# Patient Record
Sex: MALE | Race: WHITE | Employment: OTHER | URBAN - METROPOLITAN AREA
[De-identification: names, ages, dates, MRNs, and addresses within clinical notes are randomized per-mention and may not be internally consistent; named-entity substitution may affect disease eponyms.]

---

## 2017-01-17 DIAGNOSIS — M70.62 TROCHANTERIC BURSITIS OF LEFT HIP: ICD-10-CM

## 2017-01-17 RX ORDER — DICLOFENAC SODIUM 75 MG/1
TABLET, DELAYED RELEASE ORAL
Qty: 60 TABLET | Refills: 1 | Status: SHIPPED | OUTPATIENT
Start: 2017-01-17 | End: 2017-03-13 | Stop reason: SDUPTHER

## 2017-02-07 ENCOUNTER — OFFICE VISIT (OUTPATIENT)
Dept: ORTHOPEDIC SURGERY | Age: 72
End: 2017-02-07

## 2017-02-07 VITALS
WEIGHT: 195.11 LBS | DIASTOLIC BLOOD PRESSURE: 74 MMHG | BODY MASS INDEX: 31.36 KG/M2 | SYSTOLIC BLOOD PRESSURE: 136 MMHG | HEIGHT: 66 IN | HEART RATE: 76 BPM

## 2017-02-07 DIAGNOSIS — M70.62 TROCHANTERIC BURSITIS OF LEFT HIP: Primary | ICD-10-CM

## 2017-02-07 PROCEDURE — 99213 OFFICE O/P EST LOW 20 MIN: CPT | Performed by: ORTHOPAEDIC SURGERY

## 2017-02-07 RX ORDER — LISINOPRIL 10 MG/1
10 TABLET ORAL DAILY
COMMUNITY
Start: 2017-02-01 | End: 2018-09-26

## 2017-02-28 ENCOUNTER — OFFICE VISIT (OUTPATIENT)
Dept: ORTHOPEDIC SURGERY | Age: 72
End: 2017-02-28

## 2017-02-28 VITALS
SYSTOLIC BLOOD PRESSURE: 131 MMHG | HEART RATE: 73 BPM | WEIGHT: 195.11 LBS | BODY MASS INDEX: 32.51 KG/M2 | DIASTOLIC BLOOD PRESSURE: 65 MMHG | HEIGHT: 65 IN

## 2017-02-28 DIAGNOSIS — M70.62 TROCHANTERIC BURSITIS OF LEFT HIP: Primary | ICD-10-CM

## 2017-02-28 PROCEDURE — 99212 OFFICE O/P EST SF 10 MIN: CPT | Performed by: ORTHOPAEDIC SURGERY

## 2017-02-28 PROCEDURE — 20610 DRAIN/INJ JOINT/BURSA W/O US: CPT | Performed by: ORTHOPAEDIC SURGERY

## 2017-03-13 DIAGNOSIS — M70.62 TROCHANTERIC BURSITIS OF LEFT HIP: ICD-10-CM

## 2017-03-13 RX ORDER — DICLOFENAC SODIUM 75 MG/1
TABLET, DELAYED RELEASE ORAL
Qty: 60 TABLET | Refills: 0 | Status: SHIPPED | OUTPATIENT
Start: 2017-03-13 | End: 2017-04-17 | Stop reason: SDUPTHER

## 2017-04-17 DIAGNOSIS — M70.62 TROCHANTERIC BURSITIS OF LEFT HIP: ICD-10-CM

## 2017-04-19 RX ORDER — DICLOFENAC SODIUM 75 MG/1
TABLET, DELAYED RELEASE ORAL
Qty: 60 TABLET | Refills: 0 | Status: SHIPPED | OUTPATIENT
Start: 2017-04-19 | End: 2017-05-15 | Stop reason: SDUPTHER

## 2017-05-15 DIAGNOSIS — M70.62 TROCHANTERIC BURSITIS OF LEFT HIP: ICD-10-CM

## 2017-05-15 RX ORDER — DICLOFENAC SODIUM 75 MG/1
TABLET, DELAYED RELEASE ORAL
Qty: 60 TABLET | Refills: 0 | Status: SHIPPED | OUTPATIENT
Start: 2017-05-15 | End: 2017-06-19 | Stop reason: SDUPTHER

## 2017-06-19 DIAGNOSIS — M70.62 TROCHANTERIC BURSITIS OF LEFT HIP: ICD-10-CM

## 2017-06-19 RX ORDER — DICLOFENAC SODIUM 75 MG/1
TABLET, DELAYED RELEASE ORAL
Qty: 60 TABLET | Refills: 0 | Status: SHIPPED | OUTPATIENT
Start: 2017-06-19 | End: 2017-07-19 | Stop reason: SDUPTHER

## 2017-07-19 DIAGNOSIS — M70.62 TROCHANTERIC BURSITIS OF LEFT HIP: ICD-10-CM

## 2017-07-19 RX ORDER — DICLOFENAC SODIUM 75 MG/1
TABLET, DELAYED RELEASE ORAL
Qty: 60 TABLET | Refills: 0 | Status: SHIPPED | OUTPATIENT
Start: 2017-07-19 | End: 2017-08-20 | Stop reason: SDUPTHER

## 2017-08-20 DIAGNOSIS — M70.62 TROCHANTERIC BURSITIS OF LEFT HIP: ICD-10-CM

## 2017-08-21 RX ORDER — DICLOFENAC SODIUM 75 MG/1
TABLET, DELAYED RELEASE ORAL
Qty: 60 TABLET | Refills: 0 | Status: SHIPPED | OUTPATIENT
Start: 2017-08-21 | End: 2017-09-19 | Stop reason: SDUPTHER

## 2017-09-14 ENCOUNTER — HOSPITAL ENCOUNTER (OUTPATIENT)
Dept: OTHER | Age: 72
Discharge: HOME | End: 2017-09-14
Attending: INTERNAL MEDICINE | Admitting: INTERNAL MEDICINE

## 2017-09-14 VITALS
BODY MASS INDEX: 31.34 KG/M2 | HEIGHT: 66 IN | WEIGHT: 195 LBS | DIASTOLIC BLOOD PRESSURE: 71 MMHG | TEMPERATURE: 98.3 F | RESPIRATION RATE: 16 BRPM | HEART RATE: 60 BPM | OXYGEN SATURATION: 92 % | SYSTOLIC BLOOD PRESSURE: 112 MMHG

## 2017-09-14 LAB
GLUCOSE BLD-MCNC: 103 MG/DL (ref 70–99)
GLUCOSE BLD-MCNC: 117 MG/DL (ref 70–99)
PERFORMED ON: ABNORMAL
PERFORMED ON: ABNORMAL

## 2017-09-14 RX ORDER — SODIUM CHLORIDE, SODIUM LACTATE, POTASSIUM CHLORIDE, CALCIUM CHLORIDE 600; 310; 30; 20 MG/100ML; MG/100ML; MG/100ML; MG/100ML
INJECTION, SOLUTION INTRAVENOUS CONTINUOUS
Status: DISCONTINUED | OUTPATIENT
Start: 2017-09-14 | End: 2017-09-15 | Stop reason: HOSPADM

## 2017-09-14 RX ADMIN — SODIUM CHLORIDE, SODIUM LACTATE, POTASSIUM CHLORIDE, CALCIUM CHLORIDE: 600; 310; 30; 20 INJECTION, SOLUTION INTRAVENOUS at 09:11

## 2017-09-14 ASSESSMENT — PAIN - FUNCTIONAL ASSESSMENT: PAIN_FUNCTIONAL_ASSESSMENT: 0-10

## 2017-09-19 DIAGNOSIS — M70.62 TROCHANTERIC BURSITIS OF LEFT HIP: ICD-10-CM

## 2017-09-19 RX ORDER — DICLOFENAC SODIUM 75 MG/1
TABLET, DELAYED RELEASE ORAL
Qty: 60 TABLET | Refills: 0 | Status: ON HOLD | OUTPATIENT
Start: 2017-09-19 | End: 2018-09-19 | Stop reason: ALTCHOICE

## 2017-10-03 ENCOUNTER — OFFICE VISIT (OUTPATIENT)
Dept: ORTHOPEDIC SURGERY | Age: 72
End: 2017-10-03

## 2017-10-03 VITALS
HEART RATE: 70 BPM | DIASTOLIC BLOOD PRESSURE: 72 MMHG | SYSTOLIC BLOOD PRESSURE: 123 MMHG | HEIGHT: 66 IN | BODY MASS INDEX: 31.36 KG/M2 | WEIGHT: 195.11 LBS

## 2017-10-03 DIAGNOSIS — M70.62 TROCHANTERIC BURSITIS OF LEFT HIP: Primary | ICD-10-CM

## 2017-10-03 PROCEDURE — 99212 OFFICE O/P EST SF 10 MIN: CPT | Performed by: ORTHOPAEDIC SURGERY

## 2017-10-03 PROCEDURE — 20610 DRAIN/INJ JOINT/BURSA W/O US: CPT | Performed by: ORTHOPAEDIC SURGERY

## 2017-10-03 RX ORDER — MELOXICAM 15 MG/1
15 TABLET ORAL DAILY
Qty: 30 TABLET | Refills: 2 | Status: SHIPPED | OUTPATIENT
Start: 2017-10-03 | End: 2018-01-09 | Stop reason: SDUPTHER

## 2017-10-03 NOTE — MR AVS SNAPSHOT
After Visit Summary             Lisa Roberto Back   10/3/2017 1:45 PM   Office Visit    Description:  Male : 1945   Provider:  Hilaria Piña MD   Department:  [address] [place] [address] Drive and Future Appointments         Below is a list of your follow-up and future appointments. This may not be a complete list as you may have made appointments directly with providers that we are not aware of or your providers may have made some for you. Please call your providers to confirm appointments. It is important to keep your appointments. Please bring your current insurance card, photo ID, co-pay, and all medication bottles to your appointment. If self-pay, payment is expected at the time of service. Information from Your Visit        Department     Name Address Phone Fax    [employer] [facility]  [address] [phone]      You Were Seen for:         Comments    Trochanteric bursitis of left hip   [[id number]]         Vital Signs     Blood Pressure Pulse Height Weight Body Mass Index Smoking Status    123/72 70 5' 6.14\" (1.68 m) 195 lb 1.7 oz (88.5 kg) 31.36 kg/m2 Former Smoker      Additional Information about your Body Mass Index (BMI)           Your BMI as listed above is considered obese (30 or more). BMI is an estimate of body fat, calculated from your height and weight. The higher your BMI, the greater your risk of heart disease, high blood pressure, type 2 diabetes, stroke, gallstones, arthritis, sleep apnea, and certain cancers. BMI is not perfect. It may overestimate body fat in athletes and people who are more muscular. Even a small weight loss (between 5 and 10 percent of your current weight) by decreasing your calorie intake and becoming more physically active will help lower your risk of developing or worsening diseases associated with obesity.

## 2018-01-09 DIAGNOSIS — M70.62 TROCHANTERIC BURSITIS OF LEFT HIP: ICD-10-CM

## 2018-01-10 RX ORDER — MELOXICAM 15 MG/1
TABLET ORAL
Qty: 30 TABLET | Refills: 1 | Status: SHIPPED | OUTPATIENT
Start: 2018-01-10 | End: 2018-03-12 | Stop reason: SDUPTHER

## 2018-03-09 ENCOUNTER — OFFICE VISIT (OUTPATIENT)
Dept: ORTHOPEDIC SURGERY | Age: 73
End: 2018-03-09

## 2018-03-09 VITALS
DIASTOLIC BLOOD PRESSURE: 95 MMHG | WEIGHT: 195.11 LBS | HEIGHT: 66 IN | HEART RATE: 77 BPM | BODY MASS INDEX: 31.36 KG/M2 | SYSTOLIC BLOOD PRESSURE: 167 MMHG

## 2018-03-09 DIAGNOSIS — M70.62 TROCHANTERIC BURSITIS OF LEFT HIP: Primary | ICD-10-CM

## 2018-03-09 PROCEDURE — 4040F PNEUMOC VAC/ADMIN/RCVD: CPT | Performed by: ORTHOPAEDIC SURGERY

## 2018-03-09 PROCEDURE — 3017F COLORECTAL CA SCREEN DOC REV: CPT | Performed by: ORTHOPAEDIC SURGERY

## 2018-03-09 PROCEDURE — 1036F TOBACCO NON-USER: CPT | Performed by: ORTHOPAEDIC SURGERY

## 2018-03-09 PROCEDURE — 20610 DRAIN/INJ JOINT/BURSA W/O US: CPT | Performed by: ORTHOPAEDIC SURGERY

## 2018-03-09 PROCEDURE — 99212 OFFICE O/P EST SF 10 MIN: CPT | Performed by: ORTHOPAEDIC SURGERY

## 2018-03-09 PROCEDURE — G8427 DOCREV CUR MEDS BY ELIG CLIN: HCPCS | Performed by: ORTHOPAEDIC SURGERY

## 2018-03-09 PROCEDURE — G8484 FLU IMMUNIZE NO ADMIN: HCPCS | Performed by: ORTHOPAEDIC SURGERY

## 2018-03-09 PROCEDURE — G8417 CALC BMI ABV UP PARAM F/U: HCPCS | Performed by: ORTHOPAEDIC SURGERY

## 2018-03-09 PROCEDURE — 1123F ACP DISCUSS/DSCN MKR DOCD: CPT | Performed by: ORTHOPAEDIC SURGERY

## 2018-03-09 NOTE — PROGRESS NOTES
Subjective: Patient states that he is here for follow-up of his chronic left hip greater trochanteric bursitis. I last injected him in October 2017 and he is requesting another injection. Most of his pain is over the lateral aspect of his hip does not radiate proximal or distal and there is no numbness or tingling  Objective: Physical exam shows tenderness right over the greater trochanteric bursa. Increased pain with hip abduction against resistance. Negative logroll. Strength is 5 over 5 in the hip flexion extension 3+ to 4 minus over 5 into a hip abduction is alert and oriented ×3   Imaging:  Assessment and plan: I injected his left hip greater trochanteric bursa with Marcaine and lidocaine and Kenalog 4/4/2 mL for greater trochanteric bursitis. He tolerated this well. He'll continue his meloxicam and we discussed side effects of this medicine he'll follow-up with me as needed.   We did talk about the possibility of bursectomy in the future

## 2018-03-12 DIAGNOSIS — M70.62 TROCHANTERIC BURSITIS OF LEFT HIP: ICD-10-CM

## 2018-03-12 RX ORDER — MELOXICAM 15 MG/1
15 TABLET ORAL DAILY
Qty: 90 TABLET | Refills: 1 | Status: SHIPPED | OUTPATIENT
Start: 2018-03-12 | End: 2018-09-09 | Stop reason: SDUPTHER

## 2018-09-18 ENCOUNTER — HOSPITAL ENCOUNTER (OUTPATIENT)
Age: 73
Discharge: HOME | End: 2018-09-18
Payer: MEDICARE

## 2018-09-18 ENCOUNTER — ANESTHESIA EVENT (OUTPATIENT)
Dept: ENDOSCOPY | Age: 73
End: 2018-09-18
Payer: MEDICARE

## 2018-09-18 ENCOUNTER — HOSPITAL ENCOUNTER (OUTPATIENT)
Dept: CT IMAGING | Age: 73
Discharge: HOME | End: 2018-09-18
Payer: MEDICARE

## 2018-09-18 DIAGNOSIS — C16.0 GASTROESOPHAGEAL CANCER (HCC): ICD-10-CM

## 2018-09-18 PROCEDURE — 6360000004 HC RX CONTRAST MEDICATION: Performed by: INTERNAL MEDICINE

## 2018-09-18 PROCEDURE — 71260 CT THORAX DX C+: CPT

## 2018-09-18 PROCEDURE — 74177 CT ABD & PELVIS W/CONTRAST: CPT

## 2018-09-18 RX ADMIN — IOHEXOL 50 ML: 240 INJECTION, SOLUTION INTRATHECAL; INTRAVASCULAR; INTRAVENOUS; ORAL at 07:16

## 2018-09-18 RX ADMIN — IOPAMIDOL 75 ML: 755 INJECTION, SOLUTION INTRAVENOUS at 07:17

## 2018-09-18 NOTE — ANESTHESIA PRE PROCEDURE
Department of Anesthesiology  Preprocedure Note       Name:  Kathi Franks   Age:  68 y.o.  :  1945                                          MRN:  [id number]         Date:  2018      Surgeon: Verona Schofield):  Gerard Watson MD    Procedure: Procedure(s):  EUS/ WITH ANESTHESIA    HPI:  This is a 69 y/o male here for an EGD/EUS evaluation. Medications prior to admission:   meloxicam (MOBIC) 15 MG tablet TAKE ONE TABLET BY MOUTH DAILY   diclofenac (VOLTAREN) 75 MG EC tablet TAKE ONE TABLET BY MOUTH TWICE A DAY   lisinopril (PRINIVIL;ZESTRIL) 10 MG tablet    gabapentin (NEURONTIN) 300 MG capsule    gabapentin (NEURONTIN) 100 MG capsule Take 3 capsules by mouth every evening   metFORMIN (GLUCOPHAGE) 500 MG tablet    CARTIA  MG ER capsule      Allergies:     No Known Allergies      Problem List:     Trochanteric bursitis of left hip M70.62     Past Medical History:     Cancer ([facility]     prostate    Diabetes mellitus ([facility]     Hypertension      Past Surgical History:     CATARACT REMOVAL WITH IMPLANT      COLONOSCOPY      negative    COLONOSCOPY  2017    diverticulosis    HERNIA REPAIR      PROSTATECTOMY       Social History:     Smoking status: Former Smoker     Types: Cigarettes     Quit date:     Smokeless tobacco: Never Used    Alcohol use No                           Vital Signs (Current):   BP: 155/85 Pulse: 63   Resp: 16 SpO2: 96   Temp: 97.2 °F (36.2 °C)   Height: 5' 6\" (1.676 m)  (18) Weight: 200 lb (90.7 kg)  (18)   BMI: 32.3           NPO Status: > 8 hrs    ECHO:  2015  Normal left ventricle size, wall thickness and systolic function with an estimated ejection fraction of 55%. No regional wall motion abnormalities are seen. Diastolic filling parameters suggest grade I diastolic dysfunction. Aortic sclerosis vs very minimal aortic stenosis. There is trivial to mild tricuspid regurgitation.  Systolic pulmonary artery pressure (SPAP) is normal and estimated at 28. Mild pulmonic regurgitation is present. Anesthesia Evaluation  Patient summary reviewed and Nursing notes reviewed  Airway:   TM distance: >3 FB   Neck ROM: limited  Mouth opening: > = 3 FB Dental:          Pulmonary:       (-) COPD, asthma, sleep apnea and not a current smoker                           Cardiovascular:    (+) hypertension:,     (-) past MI, CABG/stent and  angina          Echocardiogram reviewed                  Neuro/Psych:      (-) seizures, TIA and CVA           GI/Hepatic/Renal:            ROS comment: H/O Prostate Ca. Endo/Other:    (+) DiabetesType II DM, , malignancy/cancer. (-) hypothyroidism               Abdominal:           Vascular:     - DVT and PE. Anesthesia Plan      TIVA     ASA 3       Induction: intravenous. Anesthetic plan and risks discussed with patient. Plan discussed with CRNA.             Lauren Alcaraz MD

## 2018-09-19 ENCOUNTER — HOSPITAL ENCOUNTER (OUTPATIENT)
Age: 73
Setting detail: OUTPATIENT SURGERY
Discharge: HOME | End: 2018-09-19
Attending: INTERNAL MEDICINE | Admitting: INTERNAL MEDICINE
Payer: MEDICARE

## 2018-09-19 ENCOUNTER — ANESTHESIA (OUTPATIENT)
Dept: ENDOSCOPY | Age: 73
End: 2018-09-19
Payer: MEDICARE

## 2018-09-19 VITALS
SYSTOLIC BLOOD PRESSURE: 145 MMHG | HEIGHT: 66 IN | DIASTOLIC BLOOD PRESSURE: 77 MMHG | BODY MASS INDEX: 32.14 KG/M2 | OXYGEN SATURATION: 97 % | WEIGHT: 200 LBS | TEMPERATURE: 97.8 F | HEART RATE: 63 BPM | RESPIRATION RATE: 18 BRPM

## 2018-09-19 VITALS — DIASTOLIC BLOOD PRESSURE: 91 MMHG | SYSTOLIC BLOOD PRESSURE: 149 MMHG | OXYGEN SATURATION: 91 %

## 2018-09-19 LAB
GLUCOSE BLD-MCNC: 119 MG/DL (ref 70–99)
PERFORMED ON: ABNORMAL

## 2018-09-19 PROCEDURE — 2709999900 HC NON-CHARGEABLE SUPPLY: Performed by: INTERNAL MEDICINE

## 2018-09-19 PROCEDURE — 7100000011 HC PHASE II RECOVERY - ADDTL 15 MIN: Performed by: INTERNAL MEDICINE

## 2018-09-19 PROCEDURE — 3700000000 HC ANESTHESIA ATTENDED CARE: Performed by: INTERNAL MEDICINE

## 2018-09-19 PROCEDURE — 6360000002 HC RX W HCPCS: Performed by: NURSE ANESTHETIST, CERTIFIED REGISTERED

## 2018-09-19 PROCEDURE — 88305 TISSUE EXAM BY PATHOLOGIST: CPT

## 2018-09-19 PROCEDURE — 3609012400 HC EGD TRANSORAL BIOPSY SINGLE/MULTIPLE: Performed by: INTERNAL MEDICINE

## 2018-09-19 PROCEDURE — 88173 CYTOPATH EVAL FNA REPORT: CPT

## 2018-09-19 PROCEDURE — 2580000003 HC RX 258: Performed by: NURSE ANESTHETIST, CERTIFIED REGISTERED

## 2018-09-19 PROCEDURE — 2720000010 HC SURG SUPPLY STERILE: Performed by: INTERNAL MEDICINE

## 2018-09-19 PROCEDURE — 3700000001 HC ADD 15 MINUTES (ANESTHESIA): Performed by: INTERNAL MEDICINE

## 2018-09-19 PROCEDURE — 7100000010 HC PHASE II RECOVERY - FIRST 15 MIN: Performed by: INTERNAL MEDICINE

## 2018-09-19 PROCEDURE — 3609018500 HC EGD US SCOPE W/ADJACENT STRUCTURES: Performed by: INTERNAL MEDICINE

## 2018-09-19 PROCEDURE — 88172 CYTP DX EVAL FNA 1ST EA SITE: CPT

## 2018-09-19 PROCEDURE — 88177 CYTP FNA EVAL EA ADDL: CPT

## 2018-09-19 RX ORDER — SODIUM CHLORIDE, SODIUM LACTATE, POTASSIUM CHLORIDE, CALCIUM CHLORIDE 600; 310; 30; 20 MG/100ML; MG/100ML; MG/100ML; MG/100ML
INJECTION, SOLUTION INTRAVENOUS CONTINUOUS PRN
Status: DISCONTINUED | OUTPATIENT
Start: 2018-09-19 | End: 2018-09-19 | Stop reason: SDUPTHER

## 2018-09-19 RX ORDER — PROPOFOL 10 MG/ML
INJECTION, EMULSION INTRAVENOUS PRN
Status: DISCONTINUED | OUTPATIENT
Start: 2018-09-19 | End: 2018-09-19 | Stop reason: SDUPTHER

## 2018-09-19 RX ORDER — SODIUM CHLORIDE, SODIUM LACTATE, POTASSIUM CHLORIDE, CALCIUM CHLORIDE 600; 310; 30; 20 MG/100ML; MG/100ML; MG/100ML; MG/100ML
INJECTION, SOLUTION INTRAVENOUS ONCE
Status: DISCONTINUED | OUTPATIENT
Start: 2018-09-19 | End: 2018-09-19 | Stop reason: HOSPADM

## 2018-09-19 RX ADMIN — SODIUM CHLORIDE, POTASSIUM CHLORIDE, SODIUM LACTATE AND CALCIUM CHLORIDE: 600; 310; 30; 20 INJECTION, SOLUTION INTRAVENOUS at 10:13

## 2018-09-19 RX ADMIN — PROPOFOL 600 MG: 10 INJECTION, EMULSION INTRAVENOUS at 10:13

## 2018-09-19 ASSESSMENT — PULMONARY FUNCTION TESTS
PIF_VALUE: 0

## 2018-09-19 ASSESSMENT — PAIN SCALES - GENERAL
PAINLEVEL_OUTOF10: 0

## 2018-09-19 ASSESSMENT — LIFESTYLE VARIABLES: SMOKING_STATUS: 0

## 2018-09-19 ASSESSMENT — PAIN - FUNCTIONAL ASSESSMENT: PAIN_FUNCTIONAL_ASSESSMENT: 0-10

## 2018-09-19 NOTE — ANESTHESIA POSTPROCEDURE EVALUATION
Department of Anesthesiology  Postprocedure Note    Patient: Jo-Ann Faustin  MRN: [id number]  YOB: 1945  Date of evaluation: 9/19/2018    Procedure Summary     Date:  09/19/18 Room / Location:  [place] [address] / [facility] ENDOSCOPY    Anesthesia Start:  1000 Anesthesia Stop:  1053    Procedures:       EUS/ WITH ANESTHESIA (N/A )      EGD BIOPSY Diagnosis:  (LOCALIZED TUMOR)    Surgeon:  Rizwan Berrios MD Responsible Provider:  Donovan Paris MD    Anesthesia Type:  TIVA ASA Status:  3     Anesthesia Type: TIVA    Caleb Phase I: Caleb Score: 10    Caleb Phase II: Caleb Score: 10    Anesthesia Post Evaluation   Anesthetic Problems: no   Last Vitals:   BP: 145/77 Pulse: 63   Resp: 18 SpO2: 97   Temp: 97.8     Cardiovascular System Stable: yes  Respiratory Function: Airway Patent yes  ETT no  Ventilator no  Level of consciousness: awake, alert and oriented  Post-op pain: adequate analgesia  Hydration Adequate: yes  Nausea/Vomiting:no  Other Issues:     Bev Butler MD

## 2018-09-19 NOTE — OP NOTE
[address]                  [address] [address]                                 OPERATIVE REPORT    PATIENT NAME: Jeny Rodgers                        :        1945  MED REC NO:   [id number]                          ROOM:  ACCOUNT NO:   [de-identified]                           ADMIT DATE: 2018  PROVIDER:     Jose Ryan MD    DATE OF PROCEDURE:  2018    PREPROCEDURE DIAGNOSES:  1.  GE junction tumor. 2.  Dysphagia. POSTPROCEDURE DIAGNOSES:  1.  A 3 cm GE junction tumor. 2.  A 1 cm paraesophageal lymph node status post FNA. 3.  T3 esophageal mass. PROCEDURE PERFORMED:  EUS with FNA. PROCEDURE INDICATIONS:  This is a 51-year-old male with history of  hypertension, diabetes, prostate cancer, presented two weeks ago with  complaints of dysphagia. He underwent EGD last week, which showed evidence  of GE junction tumor. Biopsies confirmed adenocarcinoma, poorly  differentiated. CT of the abdomen and pelvis did not show any evidence of  metastatic disease except for small local lymph nodes, concerning for local  metastasis. EUS is being performed for staging purposes. MEDICATIONS:  MAC per Anesthesia. OPERATIVE PROCEDURE:  Informed consent was obtained after discussing risks,  benefits, and alternatives. Full history and physical was performed. The  patient was classified as ASA class III. Medications were sequentially  given by Anesthesia. Cardiopulmonary status was continuously monitored  throughout the procedure. The patient was placed in the left lateral  decubitus position. Once the patient was deemed to be adequately sedated,  a standard linear echoendoscope was inserted in the mouth and advanced  under direct visualization to the second portion of the duodenum. The  entire mucosa of the esophagus, stomach, and duodenum were examined  carefully.     Ultrasound images of the mediastinum, AP window, and

## 2018-09-19 NOTE — OP NOTE
Jalen Zaidi   9/19/2018  Upper EUS  A pre-procedure re-evaluation was performed immediately prior to the procedure.   Preprocedure Dx: gej cancer  Postprocedure Dx: same  Medications: Procedural sedation with MAC  Complications: None  Estimated Blood Loss: <5cc  Specimens: were obtained  Edward Jackman

## 2018-09-26 ENCOUNTER — OFFICE VISIT (OUTPATIENT)
Dept: CARDIOTHORACIC SURGERY | Age: 73
End: 2018-09-26
Payer: MEDICARE

## 2018-09-26 VITALS
DIASTOLIC BLOOD PRESSURE: 82 MMHG | OXYGEN SATURATION: 95 % | SYSTOLIC BLOOD PRESSURE: 154 MMHG | HEART RATE: 69 BPM | TEMPERATURE: 98.4 F | WEIGHT: 203 LBS | BODY MASS INDEX: 32.77 KG/M2

## 2018-09-26 DIAGNOSIS — C15.5 MALIGNANT NEOPLASM OF LOWER THIRD OF ESOPHAGUS (HCC): Primary | ICD-10-CM

## 2018-09-26 PROCEDURE — 99204 OFFICE O/P NEW MOD 45 MIN: CPT | Performed by: THORACIC SURGERY (CARDIOTHORACIC VASCULAR SURGERY)

## 2018-09-26 RX ORDER — DILTIAZEM HYDROCHLORIDE 120 MG/1
120 CAPSULE, EXTENDED RELEASE ORAL DAILY
Status: ON HOLD | COMMUNITY
End: 2019-04-24 | Stop reason: HOSPADM

## 2018-09-26 NOTE — Clinical Note
Thank you for your referral I was sure if the treatment we'll start earlier so we can have time prior to-[place] for surgery

## 2018-09-26 NOTE — PROGRESS NOTES
Referred by Dr. Deion Parker. Review of Systems:  Constitutional:  No night sweats, headaches, weight loss. Eyes:  No glaucoma, cataracts. ENMT:  + occasional food gets stuck in throat but no real difficulty swallowing. No nosebleeds, deviated septum. Cardiac:  No arrhythmias. No cardiologist.  Vascular:  No claudication, varicosities. GI:  No PUD, heartburn. :  No kidney stones, frequent UTIs  Musculoskeletal:  No arthritis, gout. Respiratory:  No SOB, emphysema, asthma. Integumentary:  No dermatitis, itching, rash. Neurological:  No stroke, TIAs, seizures. Psychiatric:  No depression, anxiety. Endocrine: + diabetes. No thyroid issues. Hematologic:  No bleeding, easy bruising. Immunologic:  + esophageal cancer- starts treatment 10/9/18. No steroid therapies.

## 2018-10-17 ENCOUNTER — OFFICE VISIT (OUTPATIENT)
Dept: CARDIOTHORACIC SURGERY | Age: 73
End: 2018-10-17
Payer: MEDICARE

## 2018-10-17 VITALS
WEIGHT: 205 LBS | DIASTOLIC BLOOD PRESSURE: 76 MMHG | SYSTOLIC BLOOD PRESSURE: 136 MMHG | BODY MASS INDEX: 32.95 KG/M2 | TEMPERATURE: 98.9 F | HEIGHT: 66 IN | OXYGEN SATURATION: 96 % | HEART RATE: 73 BPM

## 2018-10-17 DIAGNOSIS — C15.5 MALIGNANT NEOPLASM OF LOWER THIRD OF ESOPHAGUS (HCC): Primary | ICD-10-CM

## 2018-10-17 PROCEDURE — 99212 OFFICE O/P EST SF 10 MIN: CPT | Performed by: THORACIC SURGERY (CARDIOTHORACIC VASCULAR SURGERY)

## 2018-10-17 RX ORDER — ONDANSETRON 4 MG/1
4 TABLET, FILM COATED ORAL EVERY 8 HOURS PRN
Status: ON HOLD | COMMUNITY
End: 2019-04-26

## 2018-11-01 NOTE — PROGRESS NOTES
Pre-op esophageal cancer  No major complain or event since seen last  Maintaining his weight  Vital signs stable afebrile  S1 plus S2 +0 no additional sounds  Bilaterally clear no additional sounds  Soft nondistended nontender bowel sounds positive  Plan  Follow-up in 3 weeks if patient maintained weight we'll plan for a PET scan 4 weeks after chemo if no distant metastasis proceed with surgical resection

## 2018-11-07 ENCOUNTER — OFFICE VISIT (OUTPATIENT)
Dept: CARDIOTHORACIC SURGERY | Age: 73
End: 2018-11-07
Payer: MEDICARE

## 2018-11-07 VITALS
SYSTOLIC BLOOD PRESSURE: 120 MMHG | HEART RATE: 78 BPM | HEIGHT: 66 IN | BODY MASS INDEX: 32.78 KG/M2 | DIASTOLIC BLOOD PRESSURE: 68 MMHG | WEIGHT: 204 LBS | OXYGEN SATURATION: 96 % | TEMPERATURE: 97.2 F

## 2018-11-07 DIAGNOSIS — C15.5 MALIGNANT NEOPLASM OF LOWER THIRD OF ESOPHAGUS (HCC): Primary | ICD-10-CM

## 2018-11-07 PROCEDURE — 99212 OFFICE O/P EST SF 10 MIN: CPT | Performed by: THORACIC SURGERY (CARDIOTHORACIC VASCULAR SURGERY)

## 2018-11-07 RX ORDER — OMEPRAZOLE 40 MG/1
40 CAPSULE, DELAYED RELEASE ORAL DAILY PRN
Status: ON HOLD | COMMUNITY
End: 2019-04-24 | Stop reason: HOSPADM

## 2018-11-07 RX ORDER — BIOTIN 10 MG
10 TABLET ORAL DAILY
COMMUNITY
End: 2019-04-11

## 2018-11-20 ENCOUNTER — TELEPHONE (OUTPATIENT)
Dept: CARDIOTHORACIC SURGERY | Age: 73
End: 2018-11-20

## 2018-11-26 ENCOUNTER — OFFICE VISIT (OUTPATIENT)
Dept: CARDIOLOGY CLINIC | Age: 73
End: 2018-11-26
Payer: MEDICARE

## 2018-11-26 VITALS
SYSTOLIC BLOOD PRESSURE: 112 MMHG | HEART RATE: 90 BPM | DIASTOLIC BLOOD PRESSURE: 70 MMHG | BODY MASS INDEX: 31.79 KG/M2 | WEIGHT: 197.8 LBS | OXYGEN SATURATION: 96 % | HEIGHT: 66 IN

## 2018-11-26 DIAGNOSIS — R01.1 MURMUR: ICD-10-CM

## 2018-11-26 DIAGNOSIS — Z01.810 PREOPERATIVE CARDIOVASCULAR EXAMINATION: Primary | ICD-10-CM

## 2018-11-26 DIAGNOSIS — I10 ESSENTIAL HYPERTENSION: ICD-10-CM

## 2018-11-26 PROCEDURE — G8417 CALC BMI ABV UP PARAM F/U: HCPCS | Performed by: INTERNAL MEDICINE

## 2018-11-26 PROCEDURE — 1101F PT FALLS ASSESS-DOCD LE1/YR: CPT | Performed by: INTERNAL MEDICINE

## 2018-11-26 PROCEDURE — 93000 ELECTROCARDIOGRAM COMPLETE: CPT | Performed by: INTERNAL MEDICINE

## 2018-11-26 PROCEDURE — G8427 DOCREV CUR MEDS BY ELIG CLIN: HCPCS | Performed by: INTERNAL MEDICINE

## 2018-11-26 PROCEDURE — G8484 FLU IMMUNIZE NO ADMIN: HCPCS | Performed by: INTERNAL MEDICINE

## 2018-11-26 PROCEDURE — 99204 OFFICE O/P NEW MOD 45 MIN: CPT | Performed by: INTERNAL MEDICINE

## 2018-11-26 PROCEDURE — 3017F COLORECTAL CA SCREEN DOC REV: CPT | Performed by: INTERNAL MEDICINE

## 2018-11-26 NOTE — PROGRESS NOTES
[facility]   Cardiac Consultation    Referring Provider:  Beth Almeida MD     Chief Complaint   Patient presents with    New Patient    Cardiac Clearance     tumor removal         History of Present Illness:  NEW patient for interventional cardiology, referred by Dr. Fabi Swan for preoperative cardiac evaluation for esophageal surgery. Today he states he has been feeling well. He is active and is able to walk 2 flights of stairs with no chest pain or SOB. He can walk one mile with no issues. Patient currently denies any weight gain, edema, palpitations, chest pain, shortness of breath, dizziness, and syncope. Past Medical History:   has a past medical history of Cancer ([facility] [address] Diabetes mellitus ([facility] [address] and Hypertension. Surgical History:   has a past surgical history that includes Colonoscopy (2012); hernia repair; Prostatectomy (2000); Cataract removal with implant (2003); Colonoscopy (09/14/2017); pr office/outpt visit,procedure only (N/A, 9/19/2018); and Upper gastrointestinal endoscopy (9/19/2018). Social History:   reports that he quit smoking about 46 years ago. His smoking use included Cigarettes. He has never used smokeless tobacco. He reports that he does not drink alcohol or use drugs. Family History:  family history includes Cancer in his father. Home Medications:  Prior to Admission medications    Medication Sig Start Date End Date Taking?  Authorizing Provider   Biotin 10 MG tablet Take 10 mg by mouth daily   Yes Historical Provider, MD   omeprazole (PRILOSEC) 40 MG delayed release capsule Take 40 mg by mouth daily   Yes Historical Provider, MD   Magic Mouthwash (MIRACLE MOUTHWASH) Swish and spit 5 mLs 4 times daily as needed for Irritation   Yes Historical Provider, MD   ondansetron (ZOFRAN) 4 MG tablet Take 4 mg by mouth every 8 hours as needed for Nausea or Vomiting   Yes Historical Provider, MD   diltiazem (CARDIZEM 12 HR) 120 MG extended release capsule Take medical decision making performed by me. MD Maria Luisa Leslie.  Viktoriya Marcus M.D., [facility]

## 2018-11-26 NOTE — LETTER
Biotin 10 MG tablet Take 10 mg by mouth daily   Yes Historical Provider, MD   omeprazole (PRILOSEC) 40 MG delayed release capsule Take 40 mg by mouth daily   Yes Historical Provider, MD   Magic Mouthwash (MIRACLE MOUTHWASH) Swish and spit 5 mLs 4 times daily as needed for Irritation   Yes Historical Provider, MD   ondansetron (ZOFRAN) 4 MG tablet Take 4 mg by mouth every 8 hours as needed for Nausea or Vomiting   Yes Historical Provider, MD   diltiazem (CARDIZEM 12 HR) 120 MG extended release capsule Take 120 mg by mouth daily   Yes Historical Provider, MD   meloxicam (MOBIC) 15 MG tablet TAKE ONE TABLET BY MOUTH DAILY 9/10/18  Yes Patrice Li MD   gabapentin (NEURONTIN) 100 MG capsule Take 3 capsules by mouth every evening  Patient taking differently: Take by mouth 2 times daily. . 5/3/16  Yes Patrice Li MD   metFORMIN (GLUCOPHAGE) 500 MG tablet 500 mg daily (with breakfast)  8/31/15  Yes Historical Provider, MD        Allergies:  No known allergies     Review of Systems:   · Constitutional: there has been no unanticipated weight loss. There's been no change in energy level, sleep pattern, or activity level. · Eyes: No visual changes or diplopia. No scleral icterus. · ENT: No Headaches, hearing loss or vertigo. No mouth sores or sore throat. · Cardiovascular: Reviewed in HPI  · Respiratory: No cough or wheezing, no sputum production. No hematemesis. · Gastrointestinal: No abdominal pain, appetite loss, blood in stools. No change in bowel or bladder habits. · Genitourinary: No dysuria, trouble voiding, or hematuria. · Musculoskeletal:  No gait disturbance, weakness or joint complaints. · Integumentary: No rash or pruritis. · Neurological: No headache, diplopia, change in muscle strength, numbness or tingling. No change in gait, balance, coordination, mood, affect, memory, mentation, behavior. · Psychiatric: No anxiety, no depression.

## 2018-11-27 ENCOUNTER — HOSPITAL ENCOUNTER (OUTPATIENT)
Dept: CARDIOLOGY | Age: 73
Discharge: HOME | End: 2018-11-27
Payer: MEDICARE

## 2018-11-27 DIAGNOSIS — I10 ESSENTIAL HYPERTENSION: ICD-10-CM

## 2018-11-27 DIAGNOSIS — Z01.810 PREOPERATIVE CARDIOVASCULAR EXAMINATION: ICD-10-CM

## 2018-11-27 DIAGNOSIS — R01.1 MURMUR: ICD-10-CM

## 2018-11-27 LAB
LV EF: 55 %
LVEF MODALITY: NORMAL

## 2018-11-27 PROCEDURE — 93306 TTE W/DOPPLER COMPLETE: CPT

## 2018-11-28 ENCOUNTER — TELEPHONE (OUTPATIENT)
Dept: CARDIOLOGY CLINIC | Age: 73
End: 2018-11-28

## 2018-12-12 ENCOUNTER — TELEPHONE (OUTPATIENT)
Dept: CARDIOTHORACIC SURGERY | Age: 73
End: 2018-12-12

## 2018-12-12 DIAGNOSIS — C15.5 MALIGNANT NEOPLASM OF LOWER THIRD OF ESOPHAGUS (HCC): Primary | ICD-10-CM

## 2018-12-19 ENCOUNTER — OFFICE VISIT (OUTPATIENT)
Dept: CARDIOTHORACIC SURGERY | Age: 73
End: 2018-12-19
Payer: MEDICARE

## 2018-12-19 VITALS
SYSTOLIC BLOOD PRESSURE: 132 MMHG | OXYGEN SATURATION: 97 % | HEART RATE: 72 BPM | BODY MASS INDEX: 31.34 KG/M2 | DIASTOLIC BLOOD PRESSURE: 70 MMHG | HEIGHT: 66 IN | WEIGHT: 195 LBS | TEMPERATURE: 97.1 F

## 2018-12-19 DIAGNOSIS — C15.5 MALIGNANT NEOPLASM OF LOWER THIRD OF ESOPHAGUS (HCC): Primary | ICD-10-CM

## 2018-12-19 PROCEDURE — G8417 CALC BMI ABV UP PARAM F/U: HCPCS | Performed by: THORACIC SURGERY (CARDIOTHORACIC VASCULAR SURGERY)

## 2018-12-19 PROCEDURE — G8427 DOCREV CUR MEDS BY ELIG CLIN: HCPCS | Performed by: THORACIC SURGERY (CARDIOTHORACIC VASCULAR SURGERY)

## 2018-12-19 PROCEDURE — 3017F COLORECTAL CA SCREEN DOC REV: CPT | Performed by: THORACIC SURGERY (CARDIOTHORACIC VASCULAR SURGERY)

## 2018-12-19 PROCEDURE — 99211 OFF/OP EST MAY X REQ PHY/QHP: CPT | Performed by: THORACIC SURGERY (CARDIOTHORACIC VASCULAR SURGERY)

## 2019-01-02 ENCOUNTER — ANESTHESIA EVENT (OUTPATIENT)
Dept: OPERATING ROOM | Age: 74
End: 2019-01-02
Payer: MEDICARE

## 2019-01-02 RX ORDER — LIDOCAINE HYDROCHLORIDE 10 MG/ML
1 INJECTION, SOLUTION EPIDURAL; INFILTRATION; INTRACAUDAL; PERINEURAL
Status: CANCELLED | OUTPATIENT
Start: 2019-01-02 | End: 2019-01-02

## 2019-01-02 RX ORDER — SODIUM CHLORIDE 0.9 % (FLUSH) 0.9 %
10 SYRINGE (ML) INJECTION PRN
Status: CANCELLED | OUTPATIENT
Start: 2019-01-02

## 2019-01-02 RX ORDER — SODIUM CHLORIDE, SODIUM LACTATE, POTASSIUM CHLORIDE, CALCIUM CHLORIDE 600; 310; 30; 20 MG/100ML; MG/100ML; MG/100ML; MG/100ML
INJECTION, SOLUTION INTRAVENOUS CONTINUOUS
Status: CANCELLED | OUTPATIENT
Start: 2019-01-02

## 2019-01-03 ENCOUNTER — HOSPITAL ENCOUNTER (OUTPATIENT)
Age: 74
Setting detail: OUTPATIENT SURGERY
Discharge: HOME | End: 2019-01-03
Attending: THORACIC SURGERY (CARDIOTHORACIC VASCULAR SURGERY) | Admitting: THORACIC SURGERY (CARDIOTHORACIC VASCULAR SURGERY)
Payer: MEDICARE

## 2019-01-03 ENCOUNTER — ANESTHESIA (OUTPATIENT)
Dept: OPERATING ROOM | Age: 74
End: 2019-01-03
Payer: MEDICARE

## 2019-01-03 VITALS
HEIGHT: 66 IN | HEART RATE: 67 BPM | RESPIRATION RATE: 14 BRPM | WEIGHT: 190 LBS | OXYGEN SATURATION: 96 % | BODY MASS INDEX: 30.53 KG/M2 | TEMPERATURE: 97.1 F | SYSTOLIC BLOOD PRESSURE: 116 MMHG | DIASTOLIC BLOOD PRESSURE: 70 MMHG

## 2019-01-03 VITALS — SYSTOLIC BLOOD PRESSURE: 97 MMHG | DIASTOLIC BLOOD PRESSURE: 61 MMHG | OXYGEN SATURATION: 100 %

## 2019-01-03 DIAGNOSIS — C15.5 CANCER OF ABDOMINAL ESOPHAGUS (HCC): ICD-10-CM

## 2019-01-03 LAB
ANION GAP SERPL CALCULATED.3IONS-SCNC: 10 MMOL/L (ref 3–16)
BUN BLDV-MCNC: 14 MG/DL (ref 7–20)
CALCIUM SERPL-MCNC: 9.3 MG/DL (ref 8.3–10.6)
CHLORIDE BLD-SCNC: 106 MMOL/L (ref 99–110)
CO2: 28 MMOL/L (ref 21–32)
CREAT SERPL-MCNC: 0.8 MG/DL (ref 0.8–1.3)
GFR AFRICAN AMERICAN: >60
GFR NON-AFRICAN AMERICAN: >60
GLUCOSE BLD-MCNC: 107 MG/DL (ref 70–99)
GLUCOSE BLD-MCNC: 122 MG/DL (ref 70–99)
PERFORMED ON: ABNORMAL
POTASSIUM REFLEX MAGNESIUM: 4.5 MMOL/L (ref 3.5–5.1)
SODIUM BLD-SCNC: 144 MMOL/L (ref 136–145)

## 2019-01-03 PROCEDURE — 7100000000 HC PACU RECOVERY - FIRST 15 MIN: Performed by: THORACIC SURGERY (CARDIOTHORACIC VASCULAR SURGERY)

## 2019-01-03 PROCEDURE — 2500000003 HC RX 250 WO HCPCS: Performed by: NURSE ANESTHETIST, CERTIFIED REGISTERED

## 2019-01-03 PROCEDURE — 88312 SPECIAL STAINS GROUP 1: CPT

## 2019-01-03 PROCEDURE — 2580000003 HC RX 258: Performed by: ANESTHESIOLOGY

## 2019-01-03 PROCEDURE — 3600000015 HC SURGERY LEVEL 5 ADDTL 15MIN: Performed by: THORACIC SURGERY (CARDIOTHORACIC VASCULAR SURGERY)

## 2019-01-03 PROCEDURE — 88342 IMHCHEM/IMCYTCHM 1ST ANTB: CPT

## 2019-01-03 PROCEDURE — 3600000005 HC SURGERY LEVEL 5 BASE: Performed by: THORACIC SURGERY (CARDIOTHORACIC VASCULAR SURGERY)

## 2019-01-03 PROCEDURE — 80048 BASIC METABOLIC PNL TOTAL CA: CPT

## 2019-01-03 PROCEDURE — 88305 TISSUE EXAM BY PATHOLOGIST: CPT

## 2019-01-03 PROCEDURE — 7100000001 HC PACU RECOVERY - ADDTL 15 MIN: Performed by: THORACIC SURGERY (CARDIOTHORACIC VASCULAR SURGERY)

## 2019-01-03 PROCEDURE — 2500000003 HC RX 250 WO HCPCS: Performed by: ANESTHESIOLOGY

## 2019-01-03 PROCEDURE — 6360000002 HC RX W HCPCS: Performed by: NURSE ANESTHETIST, CERTIFIED REGISTERED

## 2019-01-03 PROCEDURE — 3700000000 HC ANESTHESIA ATTENDED CARE: Performed by: THORACIC SURGERY (CARDIOTHORACIC VASCULAR SURGERY)

## 2019-01-03 PROCEDURE — 3700000001 HC ADD 15 MINUTES (ANESTHESIA): Performed by: THORACIC SURGERY (CARDIOTHORACIC VASCULAR SURGERY)

## 2019-01-03 PROCEDURE — 7100000011 HC PHASE II RECOVERY - ADDTL 15 MIN: Performed by: THORACIC SURGERY (CARDIOTHORACIC VASCULAR SURGERY)

## 2019-01-03 PROCEDURE — 2709999900 HC NON-CHARGEABLE SUPPLY: Performed by: THORACIC SURGERY (CARDIOTHORACIC VASCULAR SURGERY)

## 2019-01-03 PROCEDURE — 88341 IMHCHEM/IMCYTCHM EA ADD ANTB: CPT

## 2019-01-03 PROCEDURE — 7100000010 HC PHASE II RECOVERY - FIRST 15 MIN: Performed by: THORACIC SURGERY (CARDIOTHORACIC VASCULAR SURGERY)

## 2019-01-03 RX ORDER — SODIUM CHLORIDE, SODIUM LACTATE, POTASSIUM CHLORIDE, CALCIUM CHLORIDE 600; 310; 30; 20 MG/100ML; MG/100ML; MG/100ML; MG/100ML
INJECTION, SOLUTION INTRAVENOUS CONTINUOUS
Status: DISCONTINUED | OUTPATIENT
Start: 2019-01-03 | End: 2019-01-03 | Stop reason: HOSPADM

## 2019-01-03 RX ORDER — SODIUM CHLORIDE 0.9 % (FLUSH) 0.9 %
10 SYRINGE (ML) INJECTION EVERY 12 HOURS SCHEDULED
Status: DISCONTINUED | OUTPATIENT
Start: 2019-01-03 | End: 2019-01-03 | Stop reason: HOSPADM

## 2019-01-03 RX ORDER — ONDANSETRON 2 MG/ML
4 INJECTION INTRAMUSCULAR; INTRAVENOUS PRN
Status: DISCONTINUED | OUTPATIENT
Start: 2019-01-03 | End: 2019-01-03 | Stop reason: HOSPADM

## 2019-01-03 RX ORDER — ONDANSETRON 2 MG/ML
INJECTION INTRAMUSCULAR; INTRAVENOUS PRN
Status: DISCONTINUED | OUTPATIENT
Start: 2019-01-03 | End: 2019-01-03 | Stop reason: SDUPTHER

## 2019-01-03 RX ORDER — LIDOCAINE HYDROCHLORIDE 10 MG/ML
1 INJECTION, SOLUTION EPIDURAL; INFILTRATION; INTRACAUDAL; PERINEURAL
Status: COMPLETED | OUTPATIENT
Start: 2019-01-03 | End: 2019-01-03

## 2019-01-03 RX ORDER — LIDOCAINE HYDROCHLORIDE 20 MG/ML
INJECTION, SOLUTION INFILTRATION; PERINEURAL PRN
Status: DISCONTINUED | OUTPATIENT
Start: 2019-01-03 | End: 2019-01-03 | Stop reason: SDUPTHER

## 2019-01-03 RX ORDER — MORPHINE SULFATE 4 MG/ML
2 INJECTION, SOLUTION INTRAMUSCULAR; INTRAVENOUS EVERY 5 MIN PRN
Status: DISCONTINUED | OUTPATIENT
Start: 2019-01-03 | End: 2019-01-03 | Stop reason: HOSPADM

## 2019-01-03 RX ORDER — MORPHINE SULFATE 4 MG/ML
1 INJECTION, SOLUTION INTRAMUSCULAR; INTRAVENOUS EVERY 5 MIN PRN
Status: DISCONTINUED | OUTPATIENT
Start: 2019-01-03 | End: 2019-01-03 | Stop reason: HOSPADM

## 2019-01-03 RX ORDER — OXYCODONE HYDROCHLORIDE AND ACETAMINOPHEN 5; 325 MG/1; MG/1
1 TABLET ORAL PRN
Status: DISCONTINUED | OUTPATIENT
Start: 2019-01-03 | End: 2019-01-03 | Stop reason: HOSPADM

## 2019-01-03 RX ORDER — DIPHENHYDRAMINE HYDROCHLORIDE 50 MG/ML
12.5 INJECTION INTRAMUSCULAR; INTRAVENOUS
Status: DISCONTINUED | OUTPATIENT
Start: 2019-01-03 | End: 2019-01-03 | Stop reason: HOSPADM

## 2019-01-03 RX ORDER — OXYCODONE HYDROCHLORIDE AND ACETAMINOPHEN 5; 325 MG/1; MG/1
2 TABLET ORAL PRN
Status: DISCONTINUED | OUTPATIENT
Start: 2019-01-03 | End: 2019-01-03 | Stop reason: HOSPADM

## 2019-01-03 RX ORDER — DEXAMETHASONE SODIUM PHOSPHATE 4 MG/ML
INJECTION, SOLUTION INTRA-ARTICULAR; INTRALESIONAL; INTRAMUSCULAR; INTRAVENOUS; SOFT TISSUE PRN
Status: DISCONTINUED | OUTPATIENT
Start: 2019-01-03 | End: 2019-01-03 | Stop reason: SDUPTHER

## 2019-01-03 RX ORDER — MEPERIDINE HYDROCHLORIDE 50 MG/ML
12.5 INJECTION INTRAMUSCULAR; INTRAVENOUS; SUBCUTANEOUS EVERY 5 MIN PRN
Status: DISCONTINUED | OUTPATIENT
Start: 2019-01-03 | End: 2019-01-03 | Stop reason: HOSPADM

## 2019-01-03 RX ORDER — ROCURONIUM BROMIDE 10 MG/ML
INJECTION, SOLUTION INTRAVENOUS PRN
Status: DISCONTINUED | OUTPATIENT
Start: 2019-01-03 | End: 2019-01-03 | Stop reason: SDUPTHER

## 2019-01-03 RX ORDER — PROMETHAZINE HYDROCHLORIDE 25 MG/ML
6.25 INJECTION, SOLUTION INTRAMUSCULAR; INTRAVENOUS
Status: DISCONTINUED | OUTPATIENT
Start: 2019-01-03 | End: 2019-01-03 | Stop reason: HOSPADM

## 2019-01-03 RX ORDER — LABETALOL HYDROCHLORIDE 5 MG/ML
5 INJECTION, SOLUTION INTRAVENOUS EVERY 10 MIN PRN
Status: DISCONTINUED | OUTPATIENT
Start: 2019-01-03 | End: 2019-01-03 | Stop reason: HOSPADM

## 2019-01-03 RX ORDER — HYDRALAZINE HYDROCHLORIDE 20 MG/ML
5 INJECTION INTRAMUSCULAR; INTRAVENOUS EVERY 10 MIN PRN
Status: DISCONTINUED | OUTPATIENT
Start: 2019-01-03 | End: 2019-01-03 | Stop reason: HOSPADM

## 2019-01-03 RX ORDER — PROPOFOL 10 MG/ML
INJECTION, EMULSION INTRAVENOUS PRN
Status: DISCONTINUED | OUTPATIENT
Start: 2019-01-03 | End: 2019-01-03 | Stop reason: SDUPTHER

## 2019-01-03 RX ADMIN — PROPOFOL 200 MG: 10 INJECTION, EMULSION INTRAVENOUS at 08:21

## 2019-01-03 RX ADMIN — LIDOCAINE HYDROCHLORIDE 50 MG: 20 INJECTION, SOLUTION INFILTRATION; PERINEURAL at 08:21

## 2019-01-03 RX ADMIN — DEXAMETHASONE SODIUM PHOSPHATE 6 MG: 4 INJECTION, SOLUTION INTRAMUSCULAR; INTRAVENOUS at 08:28

## 2019-01-03 RX ADMIN — SODIUM CHLORIDE, POTASSIUM CHLORIDE, SODIUM LACTATE AND CALCIUM CHLORIDE: 600; 310; 30; 20 INJECTION, SOLUTION INTRAVENOUS at 06:57

## 2019-01-03 RX ADMIN — ROCURONIUM BROMIDE 50 MG: 10 SOLUTION INTRAVENOUS at 08:21

## 2019-01-03 RX ADMIN — LIDOCAINE HYDROCHLORIDE 1 ML: 10 INJECTION, SOLUTION EPIDURAL; INFILTRATION; INTRACAUDAL; PERINEURAL at 06:57

## 2019-01-03 RX ADMIN — SUGAMMADEX 200 MG: 100 INJECTION, SOLUTION INTRAVENOUS at 08:46

## 2019-01-03 RX ADMIN — ONDANSETRON 4 MG: 2 INJECTION INTRAMUSCULAR; INTRAVENOUS at 08:21

## 2019-01-03 ASSESSMENT — PULMONARY FUNCTION TESTS
PIF_VALUE: 0
PIF_VALUE: 18
PIF_VALUE: 23
PIF_VALUE: 18
PIF_VALUE: 20
PIF_VALUE: 18
PIF_VALUE: 23
PIF_VALUE: 19
PIF_VALUE: 20
PIF_VALUE: 0
PIF_VALUE: 1
PIF_VALUE: 20
PIF_VALUE: 1
PIF_VALUE: 18
PIF_VALUE: 1
PIF_VALUE: 19
PIF_VALUE: 18
PIF_VALUE: 18
PIF_VALUE: 0
PIF_VALUE: 18
PIF_VALUE: 18
PIF_VALUE: 20
PIF_VALUE: 18
PIF_VALUE: 19
PIF_VALUE: 14
PIF_VALUE: 0
PIF_VALUE: 0
PIF_VALUE: 18
PIF_VALUE: 18
PIF_VALUE: 0
PIF_VALUE: 0
PIF_VALUE: 18
PIF_VALUE: 21

## 2019-01-03 ASSESSMENT — PAIN - FUNCTIONAL ASSESSMENT: PAIN_FUNCTIONAL_ASSESSMENT: 0-10

## 2019-01-03 ASSESSMENT — PAIN SCALES - GENERAL
PAINLEVEL_OUTOF10: 0
PAINLEVEL_OUTOF10: 0

## 2019-01-28 ENCOUNTER — TELEPHONE (OUTPATIENT)
Dept: CARDIOTHORACIC SURGERY | Age: 74
End: 2019-01-28

## 2019-01-29 ENCOUNTER — OFFICE VISIT (OUTPATIENT)
Dept: ORTHOPEDIC SURGERY | Age: 74
End: 2019-01-29
Payer: MEDICARE

## 2019-01-29 VITALS — BODY MASS INDEX: 30.54 KG/M2 | WEIGHT: 190.04 LBS | HEIGHT: 66 IN

## 2019-01-29 DIAGNOSIS — M70.62 TROCHANTERIC BURSITIS OF LEFT HIP: Primary | ICD-10-CM

## 2019-01-29 PROCEDURE — 1101F PT FALLS ASSESS-DOCD LE1/YR: CPT | Performed by: ORTHOPAEDIC SURGERY

## 2019-01-29 PROCEDURE — G8427 DOCREV CUR MEDS BY ELIG CLIN: HCPCS | Performed by: ORTHOPAEDIC SURGERY

## 2019-01-29 PROCEDURE — 99212 OFFICE O/P EST SF 10 MIN: CPT | Performed by: ORTHOPAEDIC SURGERY

## 2019-01-29 PROCEDURE — 1036F TOBACCO NON-USER: CPT | Performed by: ORTHOPAEDIC SURGERY

## 2019-01-29 PROCEDURE — G8417 CALC BMI ABV UP PARAM F/U: HCPCS | Performed by: ORTHOPAEDIC SURGERY

## 2019-01-29 PROCEDURE — 20610 DRAIN/INJ JOINT/BURSA W/O US: CPT | Performed by: ORTHOPAEDIC SURGERY

## 2019-01-29 PROCEDURE — G8484 FLU IMMUNIZE NO ADMIN: HCPCS | Performed by: ORTHOPAEDIC SURGERY

## 2019-01-29 PROCEDURE — 1123F ACP DISCUSS/DSCN MKR DOCD: CPT | Performed by: ORTHOPAEDIC SURGERY

## 2019-01-29 PROCEDURE — 3017F COLORECTAL CA SCREEN DOC REV: CPT | Performed by: ORTHOPAEDIC SURGERY

## 2019-01-29 PROCEDURE — 4040F PNEUMOC VAC/ADMIN/RCVD: CPT | Performed by: ORTHOPAEDIC SURGERY

## 2019-03-05 ENCOUNTER — TELEPHONE (OUTPATIENT)
Dept: VASCULAR SURGERY | Age: 74
End: 2019-03-05

## 2019-03-11 DIAGNOSIS — M70.62 TROCHANTERIC BURSITIS OF LEFT HIP: ICD-10-CM

## 2019-03-11 RX ORDER — MELOXICAM 15 MG/1
TABLET ORAL
Qty: 90 TABLET | Refills: 0 | Status: ON HOLD | OUTPATIENT
Start: 2019-03-11 | End: 2019-04-24 | Stop reason: HOSPADM

## 2019-03-25 ENCOUNTER — TELEPHONE (OUTPATIENT)
Dept: CARDIOTHORACIC SURGERY | Age: 74
End: 2019-03-25

## 2019-03-25 ENCOUNTER — TELEPHONE (OUTPATIENT)
Dept: CARDIOLOGY CLINIC | Age: 74
End: 2019-03-25

## 2019-03-26 NOTE — PROGRESS NOTES
EKG DATED 11/26/18 OK PER CARDIOLOGY FOR CLEARANCE, NSR, DR HARE AWARE OF DATE AND IS OK WITH IT FOR SURGERY 4/2/19.

## 2019-04-01 ENCOUNTER — ANESTHESIA EVENT (OUTPATIENT)
Dept: OPERATING ROOM | Age: 74
End: 2019-04-01
Payer: MEDICARE

## 2019-04-01 NOTE — ANESTHESIA PRE PROCEDURE
Department of Anesthesiology  Preprocedure Note       Name:  Chucky Mckoy   Age:  68 y.o.  :  1945                                          MRN:  [id number]         Date:  2019      Surgeon:  Lukas Rogers MD    Procedure: ESOPHAGOGASTRODUODENOSCOPY WITH BIOPSY  CPT CODE - 99306 (N/A )    HPI:   Chucky Mckoy is a 68 y.o. male who has a c/o with food sticking in his lower part of the esophagus. Initially he could pass it with some water; subsequently progressed to where he had to cough to release the pressure. No pain. About 10-15 pounds lost weight  An EGD on 2019 which showed lower esophageal mass biopsy confirmed adenocarcinoma. EUS confirmed the presence of positive paraesophageal lymph nodes.     Medications prior to admission:    meloxicam (MOBIC) 15 MG tablet TAKE ONE TABLET BY MOUTH DAILY   Biotin 10 MG tablet Take 10 mg by mouth daily   omeprazole (PRILOSEC) 40 MG Take 40 mg by mouth daily   ondansetron (ZOFRAN) 4 MG tablet Take 4 mg by mouth every 8 hours as needed for Nausea or Vomiting   diltiazem (CARDIZEM 12 HR) 120 MG ER Take 120 mg by mouth daily   gabapentin (NEURONTIN) 100 MG capsule Take one tab 3 times daily   metFORMIN (GLUCOPHAGE) 500 MG tablet 500 mg Daily with supper      Allergies:     No Known Allergies      Problem List:     Trochanteric bursitis of left hip M70.62     Past Medical History:     Acid reflux     hx of     Cancer (HCC)     prostate    Diabetes mellitus ([address]     Esophageal cancer ([address]     Hypertension     HÉCTOR (obstructive sleep apnea)     past history, resolved after surgery     Past Surgical History:     CATARACT REMOVAL WITH IMPLANT      COLONOSCOPY      negative    COLONOSCOPY  2017    diverticulosis    ESOPHAGEAL DILATATION N/A 1/3/2019    ESOPHAGOGASTRODUODENOSCOPY WITH BIOPSY  CPT CODE - 30515 performed by Lukas Rogers MD at [address] Right 2    inguinal    VT OFFICE/OUTPT VISIT,PROCEDURE ONLY N/A 2018 (-) seizures, TIA and CVA           GI/Hepatic/Renal:   (+) GERD: well controlled,      (-) no renal disease      ROS comment: SEE HPI. Endo/Other:    (+) DiabetesType II DM, , : arthritis: OA., .                 Abdominal:           Vascular:     - DVT and PE. Anesthesia Plan      general     ASA 3       Induction: intravenous. MIPS: Prophylactic antiemetics administered. Anesthetic plan and risks discussed with patient and spouse. Plan discussed with CRNA.           Bud Gerber MD

## 2019-04-02 ENCOUNTER — HOSPITAL ENCOUNTER (OUTPATIENT)
Age: 74
Setting detail: OUTPATIENT SURGERY
Discharge: HOME | End: 2019-04-02
Attending: THORACIC SURGERY (CARDIOTHORACIC VASCULAR SURGERY) | Admitting: THORACIC SURGERY (CARDIOTHORACIC VASCULAR SURGERY)
Payer: MEDICARE

## 2019-04-02 ENCOUNTER — ANESTHESIA (OUTPATIENT)
Dept: OPERATING ROOM | Age: 74
End: 2019-04-02
Payer: MEDICARE

## 2019-04-02 VITALS
WEIGHT: 190 LBS | TEMPERATURE: 97.3 F | HEART RATE: 71 BPM | DIASTOLIC BLOOD PRESSURE: 70 MMHG | RESPIRATION RATE: 16 BRPM | SYSTOLIC BLOOD PRESSURE: 119 MMHG | HEIGHT: 66 IN | OXYGEN SATURATION: 93 % | BODY MASS INDEX: 30.53 KG/M2

## 2019-04-02 VITALS — DIASTOLIC BLOOD PRESSURE: 72 MMHG | OXYGEN SATURATION: 100 % | SYSTOLIC BLOOD PRESSURE: 120 MMHG

## 2019-04-02 DIAGNOSIS — C15.9 MALIGNANT NEOPLASM OF ESOPHAGUS, UNSPECIFIED LOCATION (HCC): ICD-10-CM

## 2019-04-02 LAB
ANION GAP SERPL CALCULATED.3IONS-SCNC: 12 MMOL/L (ref 3–16)
BUN BLDV-MCNC: 12 MG/DL (ref 7–20)
CALCIUM SERPL-MCNC: 9.5 MG/DL (ref 8.3–10.6)
CHLORIDE BLD-SCNC: 106 MMOL/L (ref 99–110)
CO2: 23 MMOL/L (ref 21–32)
CREAT SERPL-MCNC: 0.7 MG/DL (ref 0.8–1.3)
GFR AFRICAN AMERICAN: >60
GFR NON-AFRICAN AMERICAN: >60
GLUCOSE BLD-MCNC: 102 MG/DL (ref 70–99)
GLUCOSE BLD-MCNC: 106 MG/DL (ref 70–99)
GLUCOSE BLD-MCNC: 118 MG/DL (ref 70–99)
PERFORMED ON: ABNORMAL
PERFORMED ON: ABNORMAL
POTASSIUM REFLEX MAGNESIUM: 4.6 MMOL/L (ref 3.5–5.1)
SODIUM BLD-SCNC: 141 MMOL/L (ref 136–145)

## 2019-04-02 PROCEDURE — 3700000001 HC ADD 15 MINUTES (ANESTHESIA): Performed by: THORACIC SURGERY (CARDIOTHORACIC VASCULAR SURGERY)

## 2019-04-02 PROCEDURE — 3600000015 HC SURGERY LEVEL 5 ADDTL 15MIN: Performed by: THORACIC SURGERY (CARDIOTHORACIC VASCULAR SURGERY)

## 2019-04-02 PROCEDURE — 7100000001 HC PACU RECOVERY - ADDTL 15 MIN: Performed by: THORACIC SURGERY (CARDIOTHORACIC VASCULAR SURGERY)

## 2019-04-02 PROCEDURE — 2709999900 HC NON-CHARGEABLE SUPPLY: Performed by: THORACIC SURGERY (CARDIOTHORACIC VASCULAR SURGERY)

## 2019-04-02 PROCEDURE — 2500000003 HC RX 250 WO HCPCS: Performed by: NURSE ANESTHETIST, CERTIFIED REGISTERED

## 2019-04-02 PROCEDURE — 7100000000 HC PACU RECOVERY - FIRST 15 MIN: Performed by: THORACIC SURGERY (CARDIOTHORACIC VASCULAR SURGERY)

## 2019-04-02 PROCEDURE — 7100000010 HC PHASE II RECOVERY - FIRST 15 MIN: Performed by: THORACIC SURGERY (CARDIOTHORACIC VASCULAR SURGERY)

## 2019-04-02 PROCEDURE — 7100000011 HC PHASE II RECOVERY - ADDTL 15 MIN: Performed by: THORACIC SURGERY (CARDIOTHORACIC VASCULAR SURGERY)

## 2019-04-02 PROCEDURE — 3700000000 HC ANESTHESIA ATTENDED CARE: Performed by: THORACIC SURGERY (CARDIOTHORACIC VASCULAR SURGERY)

## 2019-04-02 PROCEDURE — 80048 BASIC METABOLIC PNL TOTAL CA: CPT

## 2019-04-02 PROCEDURE — 2580000003 HC RX 258: Performed by: ANESTHESIOLOGY

## 2019-04-02 PROCEDURE — 6370000000 HC RX 637 (ALT 250 FOR IP): Performed by: ANESTHESIOLOGY

## 2019-04-02 PROCEDURE — 3600000005 HC SURGERY LEVEL 5 BASE: Performed by: THORACIC SURGERY (CARDIOTHORACIC VASCULAR SURGERY)

## 2019-04-02 PROCEDURE — 6360000002 HC RX W HCPCS: Performed by: NURSE ANESTHETIST, CERTIFIED REGISTERED

## 2019-04-02 PROCEDURE — 88305 TISSUE EXAM BY PATHOLOGIST: CPT

## 2019-04-02 RX ORDER — PROPOFOL 10 MG/ML
INJECTION, EMULSION INTRAVENOUS PRN
Status: DISCONTINUED | OUTPATIENT
Start: 2019-04-02 | End: 2019-04-02 | Stop reason: SDUPTHER

## 2019-04-02 RX ORDER — LIDOCAINE HYDROCHLORIDE 20 MG/ML
INJECTION, SOLUTION EPIDURAL; INFILTRATION; INTRACAUDAL; PERINEURAL PRN
Status: DISCONTINUED | OUTPATIENT
Start: 2019-04-02 | End: 2019-04-02 | Stop reason: SDUPTHER

## 2019-04-02 RX ORDER — OXYCODONE HYDROCHLORIDE AND ACETAMINOPHEN 5; 325 MG/1; MG/1
2 TABLET ORAL PRN
Status: COMPLETED | OUTPATIENT
Start: 2019-04-02 | End: 2019-04-02

## 2019-04-02 RX ORDER — ONDANSETRON 2 MG/ML
INJECTION INTRAMUSCULAR; INTRAVENOUS PRN
Status: DISCONTINUED | OUTPATIENT
Start: 2019-04-02 | End: 2019-04-02 | Stop reason: SDUPTHER

## 2019-04-02 RX ORDER — SODIUM CHLORIDE 0.9 % (FLUSH) 0.9 %
10 SYRINGE (ML) INJECTION EVERY 12 HOURS SCHEDULED
Status: DISCONTINUED | OUTPATIENT
Start: 2019-04-02 | End: 2019-04-02 | Stop reason: HOSPADM

## 2019-04-02 RX ORDER — SODIUM CHLORIDE 0.9 % (FLUSH) 0.9 %
10 SYRINGE (ML) INJECTION PRN
Status: DISCONTINUED | OUTPATIENT
Start: 2019-04-02 | End: 2019-04-02 | Stop reason: HOSPADM

## 2019-04-02 RX ORDER — ROCURONIUM BROMIDE 10 MG/ML
INJECTION, SOLUTION INTRAVENOUS PRN
Status: DISCONTINUED | OUTPATIENT
Start: 2019-04-02 | End: 2019-04-02 | Stop reason: SDUPTHER

## 2019-04-02 RX ORDER — FENTANYL CITRATE 50 UG/ML
25 INJECTION, SOLUTION INTRAMUSCULAR; INTRAVENOUS EVERY 5 MIN PRN
Status: DISCONTINUED | OUTPATIENT
Start: 2019-04-02 | End: 2019-04-02 | Stop reason: HOSPADM

## 2019-04-02 RX ORDER — ONDANSETRON 2 MG/ML
4 INJECTION INTRAMUSCULAR; INTRAVENOUS
Status: DISCONTINUED | OUTPATIENT
Start: 2019-04-02 | End: 2019-04-02 | Stop reason: HOSPADM

## 2019-04-02 RX ORDER — MEPERIDINE HYDROCHLORIDE 50 MG/ML
12.5 INJECTION INTRAMUSCULAR; INTRAVENOUS; SUBCUTANEOUS EVERY 5 MIN PRN
Status: DISCONTINUED | OUTPATIENT
Start: 2019-04-02 | End: 2019-04-02 | Stop reason: HOSPADM

## 2019-04-02 RX ORDER — DEXAMETHASONE SODIUM PHOSPHATE 4 MG/ML
INJECTION, SOLUTION INTRA-ARTICULAR; INTRALESIONAL; INTRAMUSCULAR; INTRAVENOUS; SOFT TISSUE PRN
Status: DISCONTINUED | OUTPATIENT
Start: 2019-04-02 | End: 2019-04-02 | Stop reason: SDUPTHER

## 2019-04-02 RX ORDER — FENTANYL CITRATE 50 UG/ML
INJECTION, SOLUTION INTRAMUSCULAR; INTRAVENOUS PRN
Status: DISCONTINUED | OUTPATIENT
Start: 2019-04-02 | End: 2019-04-02 | Stop reason: SDUPTHER

## 2019-04-02 RX ORDER — LIDOCAINE HYDROCHLORIDE 10 MG/ML
0.3 INJECTION, SOLUTION EPIDURAL; INFILTRATION; INTRACAUDAL; PERINEURAL
Status: DISCONTINUED | OUTPATIENT
Start: 2019-04-02 | End: 2019-04-02 | Stop reason: HOSPADM

## 2019-04-02 RX ORDER — SODIUM CHLORIDE, SODIUM LACTATE, POTASSIUM CHLORIDE, CALCIUM CHLORIDE 600; 310; 30; 20 MG/100ML; MG/100ML; MG/100ML; MG/100ML
INJECTION, SOLUTION INTRAVENOUS CONTINUOUS
Status: DISCONTINUED | OUTPATIENT
Start: 2019-04-02 | End: 2019-04-02 | Stop reason: HOSPADM

## 2019-04-02 RX ORDER — OXYCODONE HYDROCHLORIDE AND ACETAMINOPHEN 5; 325 MG/1; MG/1
1 TABLET ORAL PRN
Status: COMPLETED | OUTPATIENT
Start: 2019-04-02 | End: 2019-04-02

## 2019-04-02 RX ORDER — HYDRALAZINE HYDROCHLORIDE 20 MG/ML
5 INJECTION INTRAMUSCULAR; INTRAVENOUS EVERY 10 MIN PRN
Status: DISCONTINUED | OUTPATIENT
Start: 2019-04-02 | End: 2019-04-02 | Stop reason: HOSPADM

## 2019-04-02 RX ADMIN — ONDANSETRON 4 MG: 2 INJECTION INTRAMUSCULAR; INTRAVENOUS at 11:44

## 2019-04-02 RX ADMIN — PHENYLEPHRINE HYDROCHLORIDE 200 MCG: 10 INJECTION INTRAVENOUS at 11:43

## 2019-04-02 RX ADMIN — PHENYLEPHRINE HYDROCHLORIDE 200 MCG: 10 INJECTION INTRAVENOUS at 11:22

## 2019-04-02 RX ADMIN — DEXAMETHASONE SODIUM PHOSPHATE 8 MG: 4 INJECTION, SOLUTION INTRAMUSCULAR; INTRAVENOUS at 11:34

## 2019-04-02 RX ADMIN — LIDOCAINE HYDROCHLORIDE 60 MG: 20 INJECTION, SOLUTION EPIDURAL; INFILTRATION; INTRACAUDAL; PERINEURAL at 11:14

## 2019-04-02 RX ADMIN — SUGAMMADEX 200 MG: 100 INJECTION, SOLUTION INTRAVENOUS at 11:44

## 2019-04-02 RX ADMIN — PHENYLEPHRINE HYDROCHLORIDE 200 MCG: 10 INJECTION INTRAVENOUS at 11:28

## 2019-04-02 RX ADMIN — ROCURONIUM BROMIDE 50 MG: 10 SOLUTION INTRAVENOUS at 11:14

## 2019-04-02 RX ADMIN — FENTANYL CITRATE 50 MCG: 50 INJECTION INTRAMUSCULAR; INTRAVENOUS at 11:14

## 2019-04-02 RX ADMIN — FENTANYL CITRATE 50 MCG: 50 INJECTION INTRAMUSCULAR; INTRAVENOUS at 11:09

## 2019-04-02 RX ADMIN — OXYCODONE AND ACETAMINOPHEN 2 TABLET: 5; 325 TABLET ORAL at 13:07

## 2019-04-02 RX ADMIN — PROPOFOL 160 MG: 10 INJECTION, EMULSION INTRAVENOUS at 11:14

## 2019-04-02 RX ADMIN — SODIUM CHLORIDE, POTASSIUM CHLORIDE, SODIUM LACTATE AND CALCIUM CHLORIDE: 600; 310; 30; 20 INJECTION, SOLUTION INTRAVENOUS at 10:26

## 2019-04-02 ASSESSMENT — PULMONARY FUNCTION TESTS
PIF_VALUE: 2
PIF_VALUE: 19
PIF_VALUE: 19
PIF_VALUE: 20
PIF_VALUE: 1
PIF_VALUE: 20
PIF_VALUE: 0
PIF_VALUE: 20
PIF_VALUE: 21
PIF_VALUE: 20
PIF_VALUE: 21
PIF_VALUE: 21
PIF_VALUE: 19
PIF_VALUE: 21
PIF_VALUE: 1
PIF_VALUE: 20
PIF_VALUE: 19
PIF_VALUE: 20
PIF_VALUE: 0
PIF_VALUE: 20
PIF_VALUE: 2
PIF_VALUE: 20
PIF_VALUE: 20
PIF_VALUE: 19
PIF_VALUE: 20
PIF_VALUE: 20
PIF_VALUE: 17
PIF_VALUE: 20
PIF_VALUE: 20
PIF_VALUE: 16
PIF_VALUE: 0
PIF_VALUE: 20
PIF_VALUE: 19
PIF_VALUE: 22
PIF_VALUE: 2
PIF_VALUE: 18

## 2019-04-02 ASSESSMENT — PAIN SCALES - GENERAL
PAINLEVEL_OUTOF10: 5
PAINLEVEL_OUTOF10: 5

## 2019-04-02 ASSESSMENT — PAIN - FUNCTIONAL ASSESSMENT: PAIN_FUNCTIONAL_ASSESSMENT: 0-10

## 2019-04-02 NOTE — PROGRESS NOTES
Patient arrived to PACU. VSS. Report from [address] [place] [address] and [address] / Rubin Brown RN.

## 2019-04-02 NOTE — H&P
Chief Complaint: Difficulty swallowing     History of Present Illness: We are asked to see this patient in consultation by Dr. martinez  Dawne Bloch a 68 y. o. male who 6 month with food stuck in his lower part of the esophagus initially he could pass it with some water subsequently progressed to he have to trough to release the pressure no pain about 10-15 pounds lost weight: 2.4 EGD which showed lower esophageal mass biopsy confirmed adenocarcinoma EUS confirmed the presence of positive paraesophageal lymph node PET scan is pending     Past Medical History:  Past Medical History           Past Medical History:   Diagnosis Date    Cancer [facility])       prostate    Diabetes mellitus ([facility] [address]      Hypertension              Past Surgical History:  Past Surgical History             Past Surgical History:   Procedure Laterality Date    CATARACT REMOVAL WITH IMPLANT   2003    COLONOSCOPY   2012     negative    COLONOSCOPY   09/14/2017     diverticulosis    HERNIA REPAIR        MS OFFICE/OUTPT VISIT,PROCEDURE ONLY N/A 9/19/2018     EUS/ WITH ANESTHESIA performed by Bob Ramos MD at [facility]   9/19/2018     EGD BIOPSY performed by Bob Ramos MD at [address] [place] [address] Medications:   Home Medications                 Prior to Admission medications    Medication Sig Start Date End Date Taking? Authorizing Provider   diltiazem (CARDIZEM 12 HR) 120 MG extended release capsule Take 120 mg by mouth daily     Yes Historical Provider, MD   meloxicam (MOBIC) 15 MG tablet TAKE ONE TABLET BY MOUTH DAILY 9/10/18   Yes Behzad Orozco MD   gabapentin (NEURONTIN) 100 MG capsule Take 3 capsules by mouth every evening  Patient taking differently: Take by mouth 2 times daily.  . 5/3/16   Yes Behzad Orozco MD   metFORMIN (GLUCOPHAGE) 500 MG tablet 500 mg daily (with breakfast)  8/31/15   Yes Historical Provider, MD       Facility Administered Medications:      Allergies:            Allergies   Allergen Reactions    No Known Allergies           Social History:    Working:   Caffeine:   Lifestyle:    Social History   Social History                Social History    Marital status:        Spouse name: N/A    Number of children: N/A    Years of education: N/A            Occupational History    Not on file.                Social History Main Topics    Smoking status: Former Smoker       Types: Cigarettes       Quit date: 1972    Smokeless tobacco: Never Used    Alcohol use No    Drug use: No    Sexual activity: Yes              Other Topics Concern    Not on file            Social History Narrative    No narrative on file            Family History:  Heart Disease:   Stroke:   Cancer:   Diabetes:   Hypertension:   Aneurysm/PVD:      Family History             Family History   Problem Relation Age of Onset    Cancer Father              Review of Systems:  Constitutional:  No night sweats, headaches, weight loss. Eyes:  No glaucoma, cataracts. ENMT:  No nosebleeds, deviated septum. Cardiac:  No arrhythmias. Vascular:  No claudication, varicosities. GI:  No PUD, heartburn. :  No kidney stones, frequent UTIs  Musculoskeletal:  No arthritis, gout. Respiratory:  No SOB, emphysema, asthma. Integumentary:  No dermatitis, itching, rash. Neurological:  No stroke, TIAs, seizures. Psychiatric:  No depression, anxiety. Endocrine: No diabetes, thyroid issues. Hematologic:  No bleeding, easy bruising.   Immunologic:  No known cancer, steroid therapies.        Physical Examination:    BP (!) 154/82 (Site: Left Upper Arm, Position: Sitting, Cuff Size: Medium Adult)   Pulse 69   Temp 98.4 °F (36.9 °C) (Oral)   Wt 203 lb (92.1 kg)   SpO2 95%   BMI 32.77 kg/m²      BP RUE:          BP LUE:   Admission Weight: 203 lb (92.1 kg)   Hand dominance:     General appearance: NAD, well nourished  Eyes: anicteric, PERRLA  ENMT: no scars or lesions, no nasal deformity, normal dentition, no cyanosis of oral mucosa  Neck: no masses, no thyroid enlargement, no JVD. Respiratory: effort is unlabored, symmetric, no crackles, wheezes or rubs. No palpable/percussable abnormalities. Cardiovascular: regular, no murmur. PMI normal, no thrill. No carotid bruits. No edema or varicosities. Abdominal aorta cannot be appreciated given body habitus. GI: abdomen soft, nondistended, no organomegaly. No masses. Lymphatic: no cervical/supraclavicular adenopathy  Musculoskeletal: strength and tone normal. Full ROM. No scoliosis. Extremities: warm and pink. No clubbing or petechiae. Skin: no dermatitis or ulceration. No nodularity or induration. Neuro: CN grossly intact. Sensation and motor function grossly intact. Psychiatric: oriented, appropriate mood/affect.        MEDICAL DECISION MAKING/TESTING  Studies personally reviewed.        CT:   Within the chest there are few sub solid nodules in the right upper lobe of   indeterminate etiology.  Postinflammatory-infectious change is favored. .   Consider follow-up chest CT in 3 months time.  In addition, there are few   tiny nonspecific mediastinal nodes.       There is mild thickening at the GE junction, compatible with a history of   gastroesophageal carcinoma. Doreene Sohail are few small lymph nodes seen in this   region, the largest of which measures 1.0 x 1.2 cm.  These could represent   early maria luz metastasis       Small hyperdense nodule at the base of the bladder.  Consider direct   visualization to rule out bladder mass.       Cholelithiasis.       There are few tiny sclerotic foci within the bones, favored to represent bone   islands.  Recommend correlation with PSA level         Pathology  FINAL DIAGNOSIS:    Lymph Node, Paraesophageal Fine Needle Aspiration:  -  Positive for malignancy  -  Atypical glandular groups, consistent with adenocarcinoma.    CASEL/CASEL  09/20/2018    CLINICAL DIAGNOSIS:    Localized tumor, gastroesophageal cancer    SPECIMEN:  LYMPH NODE, PARAESOPHAGEAL FNA    GROSS DESCRIPTION:   Three direct smears prepared. Specimen also in  cytolyte (sent to [facility] laboratory for a liquid based preparation  smear and cell block).  CASEL/CASEL  IMMEDIATE EVALUATION:  Specimen A. Evaluation episode #1 - paraesophageal lymph node-  :Nondiagnostic, mostly blood. Specimen A. Evaluation episode #2 - paraesophageal lymph node- : One  atypical group of glandular cells - suspicious. Specimen A. Evaluation episode #3 - paraesophageal lymph node-  :Nondiagnostic, blood only. (Dr. Demetris Vanegas).          Labs:   CBC: No results for input(s): WBC, HGB, HCT, MCV, PLT in the last 72 hours. BMP: No results for input(s): NA, K, CL, CO2, PHOS, BUN, CREATININE, CALCIUM, MG in the last 72 hours. Cardiac Enzymes: No results for input(s): CKTOTAL, CKMB, CKMBINDEX, TROPONINI in the last 72 hours. PT/INR: No results for input(s): PROTIME, INR in the last 72 hours. APTT: No results for input(s): APTT in the last 72 hours. 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 Risks, benefits and postoperative complications discussed including bleeding, infection, stroke, death, postop pulmonary and renal issues.

## 2019-04-03 NOTE — ANESTHESIA POSTPROCEDURE EVALUATION
Department of Anesthesiology  Postprocedure Note  LATE ENTRY-PATIENT SEEN DOS      Patient: Frank Raya  MRN: [id number]  YOB: 1945  Date: 4/3/2019    Procedure Summary     Date:  04/02/19 Room / Location:  [facility] 08 / [facility]    Anesthesia Start:  1109 Anesthesia Stop:  1200    Procedure:  ESOPHAGOGASTRODUODENOSCOPY WITH BIOPSY  CPT CODE - 53747 (N/A Esophagus) Diagnosis:       Malign[facility] of [facility]      (ESOPHAGEAL CANCER)    Surgeon:  Lexx Robison MD Responsible Provider:  Joao Katz MD    Anesthesia Type:  general ASA Status:  3        Anesthesia Type: general    Caleb Phase I: Caleb Score: 10    Caleb Phase II: Caleb Score: 10    Anesthesia Post Evaluation   Anesthetic Problems: no   Last Vitals:     BP: 144/88 Pulse: 81   Resp: 14 SpO2: 95   Temp: 97.2 °F (36.2 °C)     Cardiovascular System Stable: yes  Respiratory Function: Airway Patent yes  ETT no  Ventilator no  Level of consciousness: awake, alert and oriented  Post-op pain: adequate analgesia  Hydration Adequate: yes  Nausea/Vomiting:no  Other Issues:     James Alba MD

## 2019-04-08 NOTE — OP NOTE
[address] [address]                                OPERATIVE REPORT    PATIENT NAME: Ingrid Dobson                        :        1945  MED REC NO:   [id number]                          ROOM:  ACCOUNT NO:   [de-identified]                           ADMIT DATE: 2019  PROVIDER:     Lara Rudolph MD    DATE OF PROCEDURE:  2019    PREOPERATIVE DIAGNOSIS:  Esophageal cancer. POSTOPERATIVE DIAGNOSIS:  Esophageal cancer. OPERATION PERFORMED:  EGD with multiple biopsy. PROCEDURE DESCRIPTION:  The patient was taken to the operating room,  after informed written consent was obtained, lying supine under general  anesthesia. ET tube was placed with no difficulty. EGD was inserted  through the mouth, advanced all the way to the stomach at the EG  junction roughly at 39. We could see multiple nodular structure very  suspicious for malignant recurrence. Multiple biopsy was obtained, sent  for pathology. Scope was removed and the patient was dispositioned to  recovery room in stable condition without any complication.         Leela Hylton MD    D: 2019 21:01:57       T: 2019 0:03:13     [id number]  Job#: [id number]     Doc#: [id number]    CC:

## 2019-04-10 ENCOUNTER — OFFICE VISIT (OUTPATIENT)
Dept: CARDIOTHORACIC SURGERY | Age: 74
End: 2019-04-10
Payer: MEDICARE

## 2019-04-10 VITALS
HEART RATE: 77 BPM | SYSTOLIC BLOOD PRESSURE: 120 MMHG | DIASTOLIC BLOOD PRESSURE: 70 MMHG | TEMPERATURE: 98.3 F | OXYGEN SATURATION: 97 % | BODY MASS INDEX: 30.53 KG/M2 | HEIGHT: 66 IN | WEIGHT: 190 LBS

## 2019-04-10 DIAGNOSIS — C15.3 MALIGNANT NEOPLASM OF UPPER THIRD OF ESOPHAGUS (HCC): Primary | ICD-10-CM

## 2019-04-10 PROCEDURE — 4040F PNEUMOC VAC/ADMIN/RCVD: CPT | Performed by: THORACIC SURGERY (CARDIOTHORACIC VASCULAR SURGERY)

## 2019-04-10 PROCEDURE — 1036F TOBACCO NON-USER: CPT | Performed by: THORACIC SURGERY (CARDIOTHORACIC VASCULAR SURGERY)

## 2019-04-10 PROCEDURE — 99214 OFFICE O/P EST MOD 30 MIN: CPT | Performed by: THORACIC SURGERY (CARDIOTHORACIC VASCULAR SURGERY)

## 2019-04-10 PROCEDURE — 3017F COLORECTAL CA SCREEN DOC REV: CPT | Performed by: THORACIC SURGERY (CARDIOTHORACIC VASCULAR SURGERY)

## 2019-04-10 PROCEDURE — G8427 DOCREV CUR MEDS BY ELIG CLIN: HCPCS | Performed by: THORACIC SURGERY (CARDIOTHORACIC VASCULAR SURGERY)

## 2019-04-10 PROCEDURE — 1123F ACP DISCUSS/DSCN MKR DOCD: CPT | Performed by: THORACIC SURGERY (CARDIOTHORACIC VASCULAR SURGERY)

## 2019-04-10 PROCEDURE — G8417 CALC BMI ABV UP PARAM F/U: HCPCS | Performed by: THORACIC SURGERY (CARDIOTHORACIC VASCULAR SURGERY)

## 2019-04-10 NOTE — Clinical Note
On plan for surgical intervention Mark Mejia next week please let me know if you have a different opinionThanks

## 2019-04-11 NOTE — PROGRESS NOTES
Obstructive Sleep Apnea (HÉCTOR) Screening     Patient:  Adelso Bradford    YOB: 1945      Medical Record #:  [id number]                     Date:  4/11/2019     1. Are you a loud and/or regular snorer? []  Yes       [x] No    2. Have you been observed to gasp or stop breathing during sleep? []  Yes       [x] No    3. Do you feel tired or groggy upon awakening or do you awaken with a headache?           []  Yes       [x] No    4. Are you often tired or fatigued during the wake time hours? []  Yes       [x] No    5. Do you fall asleep sitting, reading, watching TV or driving? []  Yes       [x] No    6. Do you often have problems with memory or concentration? []  Yes       [x] No    **If patient's score is ? 3 they are considered high risk for HÉCTOR. Notify the anesthesiologist of the high risk and document in focus note. Note:  If the patient's BMI is more than 35 kg m¯² , has neck circumference > 40 cm, and/or high blood pressure the risk is greater (© American Sleep Apnea Association, 2006).

## 2019-04-15 ENCOUNTER — TELEPHONE (OUTPATIENT)
Dept: CARDIOTHORACIC SURGERY | Age: 74
End: 2019-04-15

## 2019-04-15 ENCOUNTER — HOSPITAL ENCOUNTER (OUTPATIENT)
Dept: PREADMISSION TESTING | Age: 74
Discharge: HOME | End: 2019-04-19
Payer: MEDICARE

## 2019-04-15 ENCOUNTER — HOSPITAL ENCOUNTER (OUTPATIENT)
Dept: GENERAL RADIOLOGY | Age: 74
Discharge: HOME | End: 2019-04-15
Payer: MEDICARE

## 2019-04-15 DIAGNOSIS — J18.9 PNEUMONIA OF LOWER LOBE DUE TO INFECTIOUS ORGANISM, UNSPECIFIED LATERALITY: Primary | ICD-10-CM

## 2019-04-15 DIAGNOSIS — Z01.811 PRE-OP CHEST EXAM: ICD-10-CM

## 2019-04-15 LAB
ABO/RH: NORMAL
ANION GAP SERPL CALCULATED.3IONS-SCNC: 9 MMOL/L (ref 3–16)
ANTIBODY SCREEN: NORMAL
BASOPHILS ABSOLUTE: 0 K/UL (ref 0–0.2)
BASOPHILS RELATIVE PERCENT: 0.8 %
BILIRUBIN URINE: NEGATIVE
BLOOD, URINE: NEGATIVE
BUN BLDV-MCNC: 11 MG/DL (ref 7–20)
CALCIUM SERPL-MCNC: 9.9 MG/DL (ref 8.3–10.6)
CHLORIDE BLD-SCNC: 105 MMOL/L (ref 99–110)
CLARITY: CLEAR
CO2: 28 MMOL/L (ref 21–32)
COLOR: YELLOW
CREAT SERPL-MCNC: 0.9 MG/DL (ref 0.8–1.3)
EOSINOPHILS ABSOLUTE: 0.2 K/UL (ref 0–0.6)
EOSINOPHILS RELATIVE PERCENT: 3.9 %
GFR AFRICAN AMERICAN: >60
GFR NON-AFRICAN AMERICAN: >60
GLUCOSE BLD-MCNC: 120 MG/DL (ref 70–99)
GLUCOSE URINE: NEGATIVE MG/DL
HCT VFR BLD CALC: 41.9 % (ref 40.5–52.5)
HEMOGLOBIN: 14 G/DL (ref 13.5–17.5)
KETONES, URINE: NEGATIVE MG/DL
LEUKOCYTE ESTERASE, URINE: NEGATIVE
LYMPHOCYTES ABSOLUTE: 1 K/UL (ref 1–5.1)
LYMPHOCYTES RELATIVE PERCENT: 16.2 %
MCH RBC QN AUTO: 30.7 PG (ref 26–34)
MCHC RBC AUTO-ENTMCNC: 33.4 G/DL (ref 31–36)
MCV RBC AUTO: 91.9 FL (ref 80–100)
MICROSCOPIC EXAMINATION: NORMAL
MONOCYTES ABSOLUTE: 0.8 K/UL (ref 0–1.3)
MONOCYTES RELATIVE PERCENT: 12.7 %
NEUTROPHILS ABSOLUTE: 3.9 K/UL (ref 1.7–7.7)
NEUTROPHILS RELATIVE PERCENT: 66.4 %
NITRITE, URINE: NEGATIVE
PDW BLD-RTO: 13.4 % (ref 12.4–15.4)
PH UA: 6.5 (ref 5–8)
PLATELET # BLD: 238 K/UL (ref 135–450)
PMV BLD AUTO: 7 FL (ref 5–10.5)
POTASSIUM SERPL-SCNC: 4.7 MMOL/L (ref 3.5–5.1)
PROTEIN UA: NEGATIVE MG/DL
RBC # BLD: 4.56 M/UL (ref 4.2–5.9)
SODIUM BLD-SCNC: 142 MMOL/L (ref 136–145)
SPECIFIC GRAVITY UA: <=1.005 (ref 1–1.03)
URINE REFLEX TO CULTURE: NORMAL
URINE TYPE: NORMAL
UROBILINOGEN, URINE: 1 E.U./DL
WBC # BLD: 5.9 K/UL (ref 4–11)

## 2019-04-15 PROCEDURE — 71046 X-RAY EXAM CHEST 2 VIEWS: CPT

## 2019-04-15 PROCEDURE — 81003 URINALYSIS AUTO W/O SCOPE: CPT

## 2019-04-15 PROCEDURE — 85025 COMPLETE CBC W/AUTO DIFF WBC: CPT

## 2019-04-15 PROCEDURE — 86901 BLOOD TYPING SEROLOGIC RH(D): CPT

## 2019-04-15 PROCEDURE — 86850 RBC ANTIBODY SCREEN: CPT

## 2019-04-15 PROCEDURE — 36415 COLL VENOUS BLD VENIPUNCTURE: CPT

## 2019-04-15 PROCEDURE — 86900 BLOOD TYPING SEROLOGIC ABO: CPT

## 2019-04-15 PROCEDURE — 80048 BASIC METABOLIC PNL TOTAL CA: CPT

## 2019-04-15 PROCEDURE — 93005 ELECTROCARDIOGRAM TRACING: CPT | Performed by: THORACIC SURGERY (CARDIOTHORACIC VASCULAR SURGERY)

## 2019-04-15 RX ORDER — AMOXICILLIN AND CLAVULANATE POTASSIUM 500; 125 MG/1; MG/1
1 TABLET, FILM COATED ORAL 3 TIMES DAILY
Qty: 15 TABLET | Refills: 0 | Status: ON HOLD | OUTPATIENT
Start: 2019-04-15 | End: 2019-04-24 | Stop reason: HOSPADM

## 2019-04-15 NOTE — TELEPHONE ENCOUNTER
Pre-op chest xray showing possible pneumonia. Reviewed with Dr. Melanie Posada. He wants to start augmentin. Patient agreeable to antibiotic. No allergies. Script sent to pharmacy. Everything a go for his surgery on Thursday.

## 2019-04-15 NOTE — TELEPHONE ENCOUNTER
Ed Jacome with [facility] called, she let me know that there was an abnormal CXR on Mr. Gaviota Forman, this was done pre op for surgery on the 18th. .  There is a L lower lobe opacity suspicious for pneumonia. I updated Ayesha Mills on this info when she came in this morning.

## 2019-04-16 ENCOUNTER — TELEPHONE (OUTPATIENT)
Dept: VASCULAR SURGERY | Age: 74
End: 2019-04-16

## 2019-04-16 LAB
EKG ATRIAL RATE: 69 BPM
EKG DIAGNOSIS: NORMAL
EKG P AXIS: 29 DEGREES
EKG P-R INTERVAL: 182 MS
EKG Q-T INTERVAL: 416 MS
EKG QRS DURATION: 70 MS
EKG QTC CALCULATION (BAZETT): 445 MS
EKG R AXIS: -8 DEGREES
EKG T AXIS: -32 DEGREES
EKG VENTRICULAR RATE: 69 BPM

## 2019-04-16 PROCEDURE — 93010 ELECTROCARDIOGRAM REPORT: CPT | Performed by: INTERNAL MEDICINE

## 2019-04-16 NOTE — TELEPHONE ENCOUNTER
Spoke with patient and wife in the office regarding surgery scheduled for 4/18/2019 @ 7:30 am with arrival time of 5:30 am @ [facility] with Dr. Jerardo Galvan to include: Reflatia Whitney esophagectomy with mediastinal lymphadenectomy including thoracic duct. Patient completed pre-admit lab work 4/15/2019. Patient knows not to eat or drink after midnight the day of surgery, take only heart related medications the morning of surgery with a sip of water and to call me with any questions or concerns.

## 2019-04-17 ENCOUNTER — ANESTHESIA EVENT (OUTPATIENT)
Dept: OPERATING ROOM | Age: 74
DRG: 327 | End: 2019-04-17
Payer: MEDICARE

## 2019-04-17 NOTE — ANESTHESIA PRE PROCEDURE
Department of Anesthesiology  Preprocedure Note       Name:  Elizabeth Ewing   Age:  68 y.o.  :  1945                                          MRN:  [id number]         Date:  2019      Surgeon: Jailene Ramirez):  Thomas Tolbert MD    Procedure: BENITO VALENTINA ESOPHAGECTOMY WITH MEDIASTINAL LYMPHADENECTOMY INCLUDING THORACIC DUCT (N/A )    Medications prior to admission:   Prior to Admission medications    Medication Sig Start Date End Date Taking? Authorizing Provider   amoxicillin-clavulanate (AUGMENTIN) 500-125 MG per tablet Take 1 tablet by mouth 3 times daily for 5 days 4/15/19 4/20/19  Ambreen Prakash MD   meloxicam (MOBIC) 15 MG tablet TAKE ONE TABLET BY MOUTH DAILY 3/11/19   Maile Ewing MD   omeprazole (PRILOSEC) 40 MG delayed release capsule Take 40 mg by mouth daily as needed     Historical Provider, MD   ondansetron (ZOFRAN) 4 MG tablet Take 4 mg by mouth every 8 hours as needed for Nausea or Vomiting    Historical Provider, MD   diltiazem (CARDIZEM 12 HR) 120 MG extended release capsule Take 120 mg by mouth daily    Historical Provider, MD   gabapentin (NEURONTIN) 100 MG capsule Take 3 capsules by mouth every evening  Patient taking differently: Take 300 mg by mouth 3 times daily. 300 mg every evening & 300mg at bedtime 5/3/16   Maile Ewing MD   metFORMIN (GLUCOPHAGE) 500 MG tablet 500 mg Daily with supper  8/31/15   Historical Provider, MD       Current medications:    No current facility-administered medications for this encounter.       Current Outpatient Medications   Medication Sig Dispense Refill    amoxicillin-clavulanate (AUGMENTIN) 500-125 MG per tablet Take 1 tablet by mouth 3 times daily for 5 days 15 tablet 0    meloxicam (MOBIC) 15 MG tablet TAKE ONE TABLET BY MOUTH DAILY 90 tablet 0    omeprazole (PRILOSEC) 40 MG delayed release capsule Take 40 mg by mouth daily as needed       ondansetron (ZOFRAN) 4 MG tablet Take 4 mg by mouth every 8 hours as needed for Nausea or Vomiting      diltiazem (CARDIZEM 12 HR) 120 MG extended release capsule Take 120 mg by mouth daily      gabapentin (NEURONTIN) 100 MG capsule Take 3 capsules by mouth every evening (Patient taking differently: Take 300 mg by mouth 3 times daily. 300 mg every evening & 300mg at bedtime) 90 capsule 3    metFORMIN (GLUCOPHAGE) 500 MG tablet 500 mg Daily with supper          Allergies: Allergies   Allergen Reactions    No Known Allergies        Problem List:    Patient Active Problem List   Diagnosis Code    Trochanteric bursitis of left hip M70.62       Past Medical History:        Diagnosis Date    Diabetes mellitus ([facility] [address]     Esophageal cancer ([facility] [address]     H/O sleep apnea     resolved after surgery    History of esophageal reflux     Hypertension     Prostate cancer [facility])        Past Surgical History:        Procedure Laterality Date    CATARACT REMOVAL WITH IMPLANT      COLONOSCOPY      negative    COLONOSCOPY  2017    diverticulosis    ESOPHAGEAL DILATATION N/A 1/3/2019    ESOPHAGOGASTRODUODENOSCOPY WITH BIOPSY  CPT CODE - 66635 performed by Bora Lassiter MD at [facility]    [place] OFFICE/OUTPT VISIT,PROCEDURE ONLY N/A 2018    EUS/ WITH ANESTHESIA performed by Sofia Davey MD at [facility] ENDOSCOPY  2018    EGD BIOPSY performed by Sofia Davey MD at [facility] N/A 2019    ESOPHAGOGASTRODUODENOSCOPY WITH BIOPSY  CPT CODE - 64782 performed by Bora Lassiter MD at [address] [place] [address]      hx of sleep apnea       Social History:    Social History     Tobacco Use    Smoking status: Former Smoker     Types: Cigarettes     Last attempt to quit: 1972     Years since quittin.3    Smokeless tobacco: Never Used   Substance Use Topics    Alcohol use:  Yes     Alcohol/week: 0.0 oz     Comment: very occasional/social                                Counseling given: Not Answered      Vital Signs (Current):   Vitals:    04/11/19 1400   Weight: 190 lb (86.2 kg)   Height: 5' 6\" (1.676 m)                                              BP Readings from Last 3 Encounters:   04/10/19 120/70   04/02/19 120/72   04/02/19 119/70       NPO Status:MN                                                                                 BMI:   Wt Readings from Last 3 Encounters:   04/10/19 190 lb (86.2 kg)   04/02/19 190 lb (86.2 kg)   01/29/19 190 lb 0.6 oz (86.2 kg)     Body mass index is 30.67 kg/m². CBC:   Lab Results   Component Value Date    WBC 5.9 04/15/2019    RBC 4.56 04/15/2019    HGB 14.0 04/15/2019    HCT 41.9 04/15/2019    MCV 91.9 04/15/2019    RDW 13.4 04/15/2019     04/15/2019       CMP:   Lab Results   Component Value Date     04/15/2019    K 4.7 04/15/2019    K 4.6 04/02/2019     04/15/2019    CO2 28 04/15/2019    BUN 11 04/15/2019    CREATININE 0.9 04/15/2019    GFRAA >60 04/15/2019    AGRATIO 1.8 11/21/2014    LABGLOM >60 04/15/2019    GLUCOSE 120 04/15/2019    PROT 6.9 11/21/2014    CALCIUM 9.9 04/15/2019    BILITOT 0.7 11/21/2014    ALKPHOS 55 11/21/2014    AST 20 11/21/2014    ALT 18 11/21/2014       POC Tests: No results for input(s): POCGLU, POCNA, POCK, POCCL, POCBUN, POCHEMO, POCHCT in the last 72 hours.     Coags: No results found for: PROTIME, INR, APTT    HCG (If Applicable): No results found for: PREGTESTUR, PREGSERUM, HCG, HCGQUANT     ABGs: No results found for: PHART, PO2ART, GOJ3IMO, RTJ3FJQ, BEART, F4RMYRZV     Type & Screen (If Applicable):  No results found for: LABABO, [address]    Anesthesia Evaluation  Patient summary reviewed and Nursing notes reviewed no history of anesthetic complications:   Airway: Mallampati: II  TM distance: >3 FB   Neck ROM: full  Mouth opening: > = 3 FB Dental: normal exam         Pulmonary:normal exam  breath sounds clear to auscultation  (+) sleep apnea:      (-) recent URI and not a current smoker                          ROS comment: S/P UPPP   Cardiovascular:    (+) hypertension:,       ECG reviewed  Rhythm: regular  Rate: normal    Stress test reviewed                Neuro/Psych:   Negative Neuro/Psych ROS              GI/Hepatic/Renal:   (+) GERD: well controlled,      (-) hiatal hernia      ROS comment: Dysphagia  Esophageal CA  Obese  . Endo/Other:    (+) DiabetesType II DM, well controlled, , malignancy/cancer. ROS comment: Esophageal CA  Hx of Prostate CA  S/P XRT and CHemotx Abdominal:           Vascular: negative vascular ROS. Anesthesia Plan      general     ASA 3       Induction: intravenous. arterial line  MIPS: Postoperative opioids intended. Anesthetic plan and risks discussed with patient. Use of blood products discussed with patient whom consented to blood products. Plan discussed with CRNA.     Attending anesthesiologist reviewed and agrees with Freeman Aguirre MD   4/17/2019

## 2019-04-18 ENCOUNTER — ANESTHESIA (OUTPATIENT)
Dept: OPERATING ROOM | Age: 74
DRG: 327 | End: 2019-04-18
Payer: MEDICARE

## 2019-04-18 ENCOUNTER — HOSPITAL ENCOUNTER (INPATIENT)
Age: 74
LOS: 6 days | Discharge: HOME HEALTH | DRG: 327 | End: 2019-04-24
Attending: THORACIC SURGERY (CARDIOTHORACIC VASCULAR SURGERY) | Admitting: THORACIC SURGERY (CARDIOTHORACIC VASCULAR SURGERY)
Payer: MEDICARE

## 2019-04-18 VITALS
RESPIRATION RATE: 19 BRPM | OXYGEN SATURATION: 100 % | SYSTOLIC BLOOD PRESSURE: 86 MMHG | TEMPERATURE: 94.8 F | DIASTOLIC BLOOD PRESSURE: 64 MMHG

## 2019-04-18 DIAGNOSIS — R13.10 DYSPHAGIA, UNSPECIFIED TYPE: ICD-10-CM

## 2019-04-18 DIAGNOSIS — G89.18 ACUTE POST-OPERATIVE PAIN: Primary | ICD-10-CM

## 2019-04-18 DIAGNOSIS — C15.9 MALIGNANT NEOPLASM OF ESOPHAGUS, UNSPECIFIED LOCATION (HCC): ICD-10-CM

## 2019-04-18 LAB
ABO/RH: NORMAL
ANTIBODY SCREEN: NORMAL
GLUCOSE BLD-MCNC: 102 MG/DL (ref 70–99)
GLUCOSE BLD-MCNC: 197 MG/DL (ref 70–99)
HCT VFR BLD CALC: 39.1 % (ref 40.5–52.5)
HEMOGLOBIN: 13.4 G/DL (ref 13.5–17.5)
MCH RBC QN AUTO: 31.2 PG (ref 26–34)
MCHC RBC AUTO-ENTMCNC: 34.4 G/DL (ref 31–36)
MCV RBC AUTO: 90.7 FL (ref 80–100)
PDW BLD-RTO: 13.5 % (ref 12.4–15.4)
PERFORMED ON: ABNORMAL
PERFORMED ON: ABNORMAL
PLATELET # BLD: 255 K/UL (ref 135–450)
PMV BLD AUTO: 6.5 FL (ref 5–10.5)
RBC # BLD: 4.31 M/UL (ref 4.2–5.9)
WBC # BLD: 13.4 K/UL (ref 4–11)

## 2019-04-18 PROCEDURE — 43200 ESOPHAGOSCOPY FLEXIBLE BRUSH: CPT | Performed by: THORACIC SURGERY (CARDIOTHORACIC VASCULAR SURGERY)

## 2019-04-18 PROCEDURE — 2000000000 HC ICU R&B

## 2019-04-18 PROCEDURE — C9290 INJ, BUPIVACAINE LIPOSOME: HCPCS | Performed by: THORACIC SURGERY (CARDIOTHORACIC VASCULAR SURGERY)

## 2019-04-18 PROCEDURE — 43117 PARTIAL REMOVAL OF ESOPHAGUS: CPT | Performed by: THORACIC SURGERY (CARDIOTHORACIC VASCULAR SURGERY)

## 2019-04-18 PROCEDURE — 6360000002 HC RX W HCPCS: Performed by: NURSE ANESTHETIST, CERTIFIED REGISTERED

## 2019-04-18 PROCEDURE — 85027 COMPLETE CBC AUTOMATED: CPT

## 2019-04-18 PROCEDURE — 88309 TISSUE EXAM BY PATHOLOGIST: CPT

## 2019-04-18 PROCEDURE — 94668 MNPJ CHEST WALL SBSQ: CPT

## 2019-04-18 PROCEDURE — 3E0G3GC INTRODUCTION OF OTHER THERAPEUTIC SUBSTANCE INTO UPPER GI, PERCUTANEOUS APPROACH: ICD-10-PCS | Performed by: THORACIC SURGERY (CARDIOTHORACIC VASCULAR SURGERY)

## 2019-04-18 PROCEDURE — 3600000014 HC SURGERY LEVEL 4 ADDTL 15MIN: Performed by: THORACIC SURGERY (CARDIOTHORACIC VASCULAR SURGERY)

## 2019-04-18 PROCEDURE — 2580000003 HC RX 258: Performed by: ANESTHESIOLOGY

## 2019-04-18 PROCEDURE — 6360000002 HC RX W HCPCS: Performed by: THORACIC SURGERY (CARDIOTHORACIC VASCULAR SURGERY)

## 2019-04-18 PROCEDURE — 3700000001 HC ADD 15 MINUTES (ANESTHESIA): Performed by: THORACIC SURGERY (CARDIOTHORACIC VASCULAR SURGERY)

## 2019-04-18 PROCEDURE — 88331 PATH CONSLTJ SURG 1 BLK 1SPC: CPT

## 2019-04-18 PROCEDURE — 44015 INSERT NEEDLE CATH BOWEL: CPT | Performed by: THORACIC SURGERY (CARDIOTHORACIC VASCULAR SURGERY)

## 2019-04-18 PROCEDURE — 2720000010 HC SURG SUPPLY STERILE: Performed by: THORACIC SURGERY (CARDIOTHORACIC VASCULAR SURGERY)

## 2019-04-18 PROCEDURE — 94770 HC ETCO2 MONITOR DAILY: CPT

## 2019-04-18 PROCEDURE — 2709999900 HC NON-CHARGEABLE SUPPLY: Performed by: THORACIC SURGERY (CARDIOTHORACIC VASCULAR SURGERY)

## 2019-04-18 PROCEDURE — 3600000004 HC SURGERY LEVEL 4 BASE: Performed by: THORACIC SURGERY (CARDIOTHORACIC VASCULAR SURGERY)

## 2019-04-18 PROCEDURE — 38381 THORACIC DUCT PROCEDURE: CPT | Performed by: THORACIC SURGERY (CARDIOTHORACIC VASCULAR SURGERY)

## 2019-04-18 PROCEDURE — 3700000000 HC ANESTHESIA ATTENDED CARE: Performed by: THORACIC SURGERY (CARDIOTHORACIC VASCULAR SURGERY)

## 2019-04-18 PROCEDURE — 88305 TISSUE EXAM BY PATHOLOGIST: CPT

## 2019-04-18 PROCEDURE — 2500000003 HC RX 250 WO HCPCS: Performed by: THORACIC SURGERY (CARDIOTHORACIC VASCULAR SURGERY)

## 2019-04-18 PROCEDURE — 86900 BLOOD TYPING SEROLOGIC ABO: CPT

## 2019-04-18 PROCEDURE — 6370000000 HC RX 637 (ALT 250 FOR IP): Performed by: THORACIC SURGERY (CARDIOTHORACIC VASCULAR SURGERY)

## 2019-04-18 PROCEDURE — 94761 N-INVAS EAR/PLS OXIMETRY MLT: CPT

## 2019-04-18 PROCEDURE — 6370000000 HC RX 637 (ALT 250 FOR IP)

## 2019-04-18 PROCEDURE — 2500000003 HC RX 250 WO HCPCS: Performed by: NURSE ANESTHETIST, CERTIFIED REGISTERED

## 2019-04-18 PROCEDURE — 0DT40ZZ RESECTION OF ESOPHAGOGASTRIC JUNCTION, OPEN APPROACH: ICD-10-PCS | Performed by: THORACIC SURGERY (CARDIOTHORACIC VASCULAR SURGERY)

## 2019-04-18 PROCEDURE — 2580000003 HC RX 258: Performed by: THORACIC SURGERY (CARDIOTHORACIC VASCULAR SURGERY)

## 2019-04-18 PROCEDURE — 86901 BLOOD TYPING SEROLOGIC RH(D): CPT

## 2019-04-18 PROCEDURE — 0WQF0ZZ REPAIR ABDOMINAL WALL, OPEN APPROACH: ICD-10-PCS | Performed by: THORACIC SURGERY (CARDIOTHORACIC VASCULAR SURGERY)

## 2019-04-18 PROCEDURE — 0DHA3UZ INSERTION OF FEEDING DEVICE INTO JEJUNUM, PERCUTANEOUS APPROACH: ICD-10-PCS | Performed by: THORACIC SURGERY (CARDIOTHORACIC VASCULAR SURGERY)

## 2019-04-18 PROCEDURE — 2700000000 HC OXYGEN THERAPY PER DAY

## 2019-04-18 PROCEDURE — 6360000002 HC RX W HCPCS: Performed by: ANESTHESIOLOGY

## 2019-04-18 PROCEDURE — 94640 AIRWAY INHALATION TREATMENT: CPT

## 2019-04-18 PROCEDURE — 88312 SPECIAL STAINS GROUP 1: CPT

## 2019-04-18 PROCEDURE — 86850 RBC ANTIBODY SCREEN: CPT

## 2019-04-18 PROCEDURE — 38746 REMOVE THORACIC LYMPH NODES: CPT | Performed by: THORACIC SURGERY (CARDIOTHORACIC VASCULAR SURGERY)

## 2019-04-18 PROCEDURE — 07T70ZZ RESECTION OF THORAX LYMPHATIC, OPEN APPROACH: ICD-10-PCS | Performed by: THORACIC SURGERY (CARDIOTHORACIC VASCULAR SURGERY)

## 2019-04-18 PROCEDURE — 94667 MNPJ CHEST WALL 1ST: CPT

## 2019-04-18 RX ORDER — ACETAMINOPHEN 325 MG/1
650 TABLET ORAL EVERY 4 HOURS PRN
Status: DISCONTINUED | OUTPATIENT
Start: 2019-04-18 | End: 2019-04-19

## 2019-04-18 RX ORDER — FENTANYL CITRATE 50 UG/ML
25 INJECTION, SOLUTION INTRAMUSCULAR; INTRAVENOUS EVERY 5 MIN PRN
Status: DISCONTINUED | OUTPATIENT
Start: 2019-04-18 | End: 2019-04-19

## 2019-04-18 RX ORDER — EPHEDRINE SULFATE 50 MG/ML
INJECTION INTRAVENOUS PRN
Status: DISCONTINUED | OUTPATIENT
Start: 2019-04-18 | End: 2019-04-18 | Stop reason: SDUPTHER

## 2019-04-18 RX ORDER — HYDROMORPHONE HCL 110MG/55ML
0.5 PATIENT CONTROLLED ANALGESIA SYRINGE INTRAVENOUS EVERY 5 MIN PRN
Status: DISCONTINUED | OUTPATIENT
Start: 2019-04-18 | End: 2019-04-19

## 2019-04-18 RX ORDER — FENTANYL CITRATE 50 UG/ML
50 INJECTION, SOLUTION INTRAMUSCULAR; INTRAVENOUS EVERY 5 MIN PRN
Status: DISCONTINUED | OUTPATIENT
Start: 2019-04-18 | End: 2019-04-19

## 2019-04-18 RX ORDER — ROCURONIUM BROMIDE 10 MG/ML
INJECTION, SOLUTION INTRAVENOUS PRN
Status: DISCONTINUED | OUTPATIENT
Start: 2019-04-18 | End: 2019-04-18 | Stop reason: SDUPTHER

## 2019-04-18 RX ORDER — PROPOFOL 10 MG/ML
INJECTION, EMULSION INTRAVENOUS PRN
Status: DISCONTINUED | OUTPATIENT
Start: 2019-04-18 | End: 2019-04-18 | Stop reason: SDUPTHER

## 2019-04-18 RX ORDER — LIDOCAINE HYDROCHLORIDE 10 MG/ML
1 INJECTION, SOLUTION EPIDURAL; INFILTRATION; INTRACAUDAL; PERINEURAL
Status: DISCONTINUED | OUTPATIENT
Start: 2019-04-18 | End: 2019-04-18 | Stop reason: HOSPADM

## 2019-04-18 RX ORDER — FENTANYL CITRATE 50 UG/ML
INJECTION, SOLUTION INTRAMUSCULAR; INTRAVENOUS PRN
Status: DISCONTINUED | OUTPATIENT
Start: 2019-04-18 | End: 2019-04-18 | Stop reason: SDUPTHER

## 2019-04-18 RX ORDER — MORPHINE SULFATE/0.9% NACL/PF 1 MG/ML
SYRINGE (ML) INJECTION CONTINUOUS
Status: DISCONTINUED | OUTPATIENT
Start: 2019-04-18 | End: 2019-04-22

## 2019-04-18 RX ORDER — SODIUM CHLORIDE 9 MG/ML
INJECTION, SOLUTION INTRAVENOUS
Status: DISPENSED
Start: 2019-04-18 | End: 2019-04-19

## 2019-04-18 RX ORDER — SODIUM CHLORIDE 0.9 % (FLUSH) 0.9 %
10 SYRINGE (ML) INJECTION EVERY 12 HOURS SCHEDULED
Status: DISCONTINUED | OUTPATIENT
Start: 2019-04-18 | End: 2019-04-18 | Stop reason: HOSPADM

## 2019-04-18 RX ORDER — SODIUM CHLORIDE 0.9 % (FLUSH) 0.9 %
10 SYRINGE (ML) INJECTION PRN
Status: DISCONTINUED | OUTPATIENT
Start: 2019-04-18 | End: 2019-04-18 | Stop reason: HOSPADM

## 2019-04-18 RX ORDER — HYDROMORPHONE HCL 110MG/55ML
PATIENT CONTROLLED ANALGESIA SYRINGE INTRAVENOUS PRN
Status: DISCONTINUED | OUTPATIENT
Start: 2019-04-18 | End: 2019-04-18 | Stop reason: SDUPTHER

## 2019-04-18 RX ORDER — DOCUSATE SODIUM 100 MG/1
100 CAPSULE, LIQUID FILLED ORAL 2 TIMES DAILY
Status: DISCONTINUED | OUTPATIENT
Start: 2019-04-19 | End: 2019-04-19

## 2019-04-18 RX ORDER — MORPHINE SULFATE 2 MG/ML
2 INJECTION, SOLUTION INTRAMUSCULAR; INTRAVENOUS
Status: DISCONTINUED | OUTPATIENT
Start: 2019-04-18 | End: 2019-04-24 | Stop reason: HOSPADM

## 2019-04-18 RX ORDER — NALOXONE HYDROCHLORIDE 0.4 MG/ML
0.4 INJECTION, SOLUTION INTRAMUSCULAR; INTRAVENOUS; SUBCUTANEOUS PRN
Status: DISCONTINUED | OUTPATIENT
Start: 2019-04-18 | End: 2019-04-22

## 2019-04-18 RX ORDER — KETOROLAC TROMETHAMINE 30 MG/ML
30 INJECTION, SOLUTION INTRAMUSCULAR; INTRAVENOUS EVERY 6 HOURS
Status: DISCONTINUED | OUTPATIENT
Start: 2019-04-18 | End: 2019-04-19

## 2019-04-18 RX ORDER — ONDANSETRON 2 MG/ML
4 INJECTION INTRAMUSCULAR; INTRAVENOUS
Status: ACTIVE | OUTPATIENT
Start: 2019-04-18 | End: 2019-04-18

## 2019-04-18 RX ORDER — HYDROMORPHONE HCL 110MG/55ML
0.25 PATIENT CONTROLLED ANALGESIA SYRINGE INTRAVENOUS EVERY 5 MIN PRN
Status: DISCONTINUED | OUTPATIENT
Start: 2019-04-18 | End: 2019-04-19

## 2019-04-18 RX ORDER — MORPHINE SULFATE 4 MG/ML
4 INJECTION, SOLUTION INTRAMUSCULAR; INTRAVENOUS
Status: DISCONTINUED | OUTPATIENT
Start: 2019-04-18 | End: 2019-04-24 | Stop reason: HOSPADM

## 2019-04-18 RX ORDER — ALBUTEROL SULFATE 2.5 MG/3ML
2.5 SOLUTION RESPIRATORY (INHALATION)
Status: DISCONTINUED | OUTPATIENT
Start: 2019-04-18 | End: 2019-04-24 | Stop reason: HOSPADM

## 2019-04-18 RX ORDER — ONDANSETRON 2 MG/ML
4 INJECTION INTRAMUSCULAR; INTRAVENOUS EVERY 6 HOURS PRN
Status: DISCONTINUED | OUTPATIENT
Start: 2019-04-18 | End: 2019-04-24 | Stop reason: HOSPADM

## 2019-04-18 RX ORDER — MIDAZOLAM HYDROCHLORIDE 1 MG/ML
INJECTION INTRAMUSCULAR; INTRAVENOUS PRN
Status: DISCONTINUED | OUTPATIENT
Start: 2019-04-18 | End: 2019-04-18 | Stop reason: SDUPTHER

## 2019-04-18 RX ORDER — PROMETHAZINE HYDROCHLORIDE 25 MG/ML
6.25 INJECTION, SOLUTION INTRAMUSCULAR; INTRAVENOUS
Status: ACTIVE | OUTPATIENT
Start: 2019-04-18 | End: 2019-04-18

## 2019-04-18 RX ORDER — SODIUM CHLORIDE 0.9 % (FLUSH) 0.9 %
10 SYRINGE (ML) INJECTION PRN
Status: DISCONTINUED | OUTPATIENT
Start: 2019-04-18 | End: 2019-04-24 | Stop reason: HOSPADM

## 2019-04-18 RX ORDER — SODIUM CHLORIDE, SODIUM LACTATE, POTASSIUM CHLORIDE, CALCIUM CHLORIDE 600; 310; 30; 20 MG/100ML; MG/100ML; MG/100ML; MG/100ML
INJECTION, SOLUTION INTRAVENOUS CONTINUOUS
Status: DISCONTINUED | OUTPATIENT
Start: 2019-04-18 | End: 2019-04-18

## 2019-04-18 RX ORDER — ONDANSETRON 2 MG/ML
INJECTION INTRAMUSCULAR; INTRAVENOUS PRN
Status: DISCONTINUED | OUTPATIENT
Start: 2019-04-18 | End: 2019-04-18 | Stop reason: SDUPTHER

## 2019-04-18 RX ORDER — SODIUM CHLORIDE 0.9 % (FLUSH) 0.9 %
10 SYRINGE (ML) INJECTION EVERY 12 HOURS SCHEDULED
Status: DISCONTINUED | OUTPATIENT
Start: 2019-04-18 | End: 2019-04-24 | Stop reason: HOSPADM

## 2019-04-18 RX ORDER — DEXTROSE, SODIUM CHLORIDE, AND POTASSIUM CHLORIDE 5; .45; .15 G/100ML; G/100ML; G/100ML
INJECTION INTRAVENOUS CONTINUOUS
Status: DISCONTINUED | OUTPATIENT
Start: 2019-04-18 | End: 2019-04-19

## 2019-04-18 RX ORDER — LIDOCAINE HYDROCHLORIDE 20 MG/ML
INJECTION, SOLUTION INFILTRATION; PERINEURAL PRN
Status: DISCONTINUED | OUTPATIENT
Start: 2019-04-18 | End: 2019-04-18 | Stop reason: SDUPTHER

## 2019-04-18 RX ADMIN — SODIUM CHLORIDE, POTASSIUM CHLORIDE, SODIUM LACTATE AND CALCIUM CHLORIDE: 600; 310; 30; 20 INJECTION, SOLUTION INTRAVENOUS at 06:06

## 2019-04-18 RX ADMIN — MUPIROCIN: 20 OINTMENT TOPICAL at 14:07

## 2019-04-18 RX ADMIN — Medication 200 MCG: at 10:46

## 2019-04-18 RX ADMIN — Medication 2 G: at 07:33

## 2019-04-18 RX ADMIN — Medication 10 ML: at 20:56

## 2019-04-18 RX ADMIN — ROCURONIUM BROMIDE 25 MG: 10 SOLUTION INTRAVENOUS at 08:16

## 2019-04-18 RX ADMIN — HYDROMORPHONE HYDROCHLORIDE 0.5 MG: 2 INJECTION INTRAMUSCULAR; INTRAVENOUS; SUBCUTANEOUS at 08:56

## 2019-04-18 RX ADMIN — POTASSIUM CHLORIDE, DEXTROSE MONOHYDRATE AND SODIUM CHLORIDE: 150; 5; 450 INJECTION, SOLUTION INTRAVENOUS at 13:24

## 2019-04-18 RX ADMIN — ROCURONIUM BROMIDE 30 MG: 10 SOLUTION INTRAVENOUS at 10:41

## 2019-04-18 RX ADMIN — SUGAMMADEX 200 MG: 100 INJECTION, SOLUTION INTRAVENOUS at 12:16

## 2019-04-18 RX ADMIN — LIDOCAINE HYDROCHLORIDE 20 MG: 20 INJECTION, SOLUTION INFILTRATION; PERINEURAL at 07:33

## 2019-04-18 RX ADMIN — HYDROMORPHONE HYDROCHLORIDE 0.5 MG: 2 INJECTION INTRAMUSCULAR; INTRAVENOUS; SUBCUTANEOUS at 13:18

## 2019-04-18 RX ADMIN — ALBUTEROL SULFATE 2.5 MG: 2.5 SOLUTION RESPIRATORY (INHALATION) at 20:30

## 2019-04-18 RX ADMIN — MUPIROCIN: 20 OINTMENT TOPICAL at 20:53

## 2019-04-18 RX ADMIN — Medication 100 MCG: at 11:04

## 2019-04-18 RX ADMIN — Medication: at 16:17

## 2019-04-18 RX ADMIN — EPHEDRINE SULFATE 10 MG: 50 INJECTION INTRAVENOUS at 10:47

## 2019-04-18 RX ADMIN — FENTANYL CITRATE 100 MCG: 50 INJECTION INTRAMUSCULAR; INTRAVENOUS at 07:33

## 2019-04-18 RX ADMIN — FAMOTIDINE 20 MG: 10 INJECTION, SOLUTION INTRAVENOUS at 21:04

## 2019-04-18 RX ADMIN — SODIUM CHLORIDE, POTASSIUM CHLORIDE, SODIUM LACTATE AND CALCIUM CHLORIDE: 600; 310; 30; 20 INJECTION, SOLUTION INTRAVENOUS at 07:30

## 2019-04-18 RX ADMIN — ROCURONIUM BROMIDE 50 MG: 10 SOLUTION INTRAVENOUS at 07:33

## 2019-04-18 RX ADMIN — HYDROMORPHONE HYDROCHLORIDE 0.5 MG: 2 INJECTION INTRAMUSCULAR; INTRAVENOUS; SUBCUTANEOUS at 13:27

## 2019-04-18 RX ADMIN — HYDROMORPHONE HYDROCHLORIDE 1 MG: 2 INJECTION INTRAMUSCULAR; INTRAVENOUS; SUBCUTANEOUS at 10:30

## 2019-04-18 RX ADMIN — HYDROMORPHONE HYDROCHLORIDE 0.5 MG: 2 INJECTION INTRAMUSCULAR; INTRAVENOUS; SUBCUTANEOUS at 08:49

## 2019-04-18 RX ADMIN — MIDAZOLAM HYDROCHLORIDE 2 MG: 2 INJECTION, SOLUTION INTRAMUSCULAR; INTRAVENOUS at 07:30

## 2019-04-18 RX ADMIN — ONDANSETRON 4 MG: 2 INJECTION INTRAMUSCULAR; INTRAVENOUS at 13:15

## 2019-04-18 RX ADMIN — Medication 1 G: at 11:25

## 2019-04-18 RX ADMIN — POTASSIUM CHLORIDE, DEXTROSE MONOHYDRATE AND SODIUM CHLORIDE 100 ML/HR: 150; 5; 450 INJECTION, SOLUTION INTRAVENOUS at 22:59

## 2019-04-18 RX ADMIN — KETOROLAC TROMETHAMINE 30 MG: 30 INJECTION, SOLUTION INTRAMUSCULAR; INTRAVENOUS at 13:17

## 2019-04-18 RX ADMIN — ALBUTEROL SULFATE 2.5 MG: 2.5 SOLUTION RESPIRATORY (INHALATION) at 16:11

## 2019-04-18 RX ADMIN — Medication 100 MCG: at 12:28

## 2019-04-18 RX ADMIN — PROPOFOL 180 MG: 10 INJECTION, EMULSION INTRAVENOUS at 07:33

## 2019-04-18 RX ADMIN — Medication 200 MCG: at 07:43

## 2019-04-18 RX ADMIN — ROCURONIUM BROMIDE 25 MG: 10 SOLUTION INTRAVENOUS at 10:01

## 2019-04-18 RX ADMIN — KETOROLAC TROMETHAMINE 30 MG: 30 INJECTION, SOLUTION INTRAMUSCULAR; INTRAVENOUS at 20:56

## 2019-04-18 RX ADMIN — ONDANSETRON 4 MG: 2 INJECTION INTRAMUSCULAR; INTRAVENOUS at 07:33

## 2019-04-18 RX ADMIN — SODIUM CHLORIDE, POTASSIUM CHLORIDE, SODIUM LACTATE AND CALCIUM CHLORIDE: 600; 310; 30; 20 INJECTION, SOLUTION INTRAVENOUS at 10:23

## 2019-04-18 RX ADMIN — MORPHINE SULFATE 30 MG: 1 INJECTION INTRAVENOUS at 13:37

## 2019-04-18 RX ADMIN — EPHEDRINE SULFATE 10 MG: 50 INJECTION INTRAVENOUS at 07:46

## 2019-04-18 ASSESSMENT — PULMONARY FUNCTION TESTS
PIF_VALUE: 36
PIF_VALUE: 24
PIF_VALUE: 3
PIF_VALUE: 33
PIF_VALUE: 36
PIF_VALUE: 21
PIF_VALUE: 22
PIF_VALUE: 35
PIF_VALUE: 18
PIF_VALUE: 21
PIF_VALUE: 36
PIF_VALUE: 20
PIF_VALUE: 35
PIF_VALUE: 25
PIF_VALUE: 36
PIF_VALUE: 32
PIF_VALUE: 36
PIF_VALUE: 18
PIF_VALUE: 3
PIF_VALUE: 38
PIF_VALUE: 34
PIF_VALUE: 3
PIF_VALUE: 34
PIF_VALUE: 35
PIF_VALUE: 22
PIF_VALUE: 21
PIF_VALUE: 22
PIF_VALUE: 26
PIF_VALUE: 22
PIF_VALUE: 19
PIF_VALUE: 24
PIF_VALUE: 20
PIF_VALUE: 19
PIF_VALUE: 3
PIF_VALUE: 35
PIF_VALUE: 23
PIF_VALUE: 36
PIF_VALUE: 25
PIF_VALUE: 21
PIF_VALUE: 2
PIF_VALUE: 34
PIF_VALUE: 34
PIF_VALUE: 36
PIF_VALUE: 35
PIF_VALUE: 1
PIF_VALUE: 36
PIF_VALUE: 34
PIF_VALUE: 33
PIF_VALUE: 33
PIF_VALUE: 2
PIF_VALUE: 34
PIF_VALUE: 20
PIF_VALUE: 35
PIF_VALUE: 21
PIF_VALUE: 20
PIF_VALUE: 20
PIF_VALUE: 21
PIF_VALUE: 20
PIF_VALUE: 22
PIF_VALUE: 21
PIF_VALUE: 36
PIF_VALUE: 32
PIF_VALUE: 20
PIF_VALUE: 23
PIF_VALUE: 23
PIF_VALUE: 26
PIF_VALUE: 22
PIF_VALUE: 20
PIF_VALUE: 36
PIF_VALUE: 22
PIF_VALUE: 37
PIF_VALUE: 20
PIF_VALUE: 34
PIF_VALUE: 27
PIF_VALUE: 23
PIF_VALUE: 1
PIF_VALUE: 34
PIF_VALUE: 20
PIF_VALUE: 21
PIF_VALUE: 33
PIF_VALUE: 26
PIF_VALUE: 22
PIF_VALUE: 34
PIF_VALUE: 23
PIF_VALUE: 3
PIF_VALUE: 21
PIF_VALUE: 21
PIF_VALUE: 17
PIF_VALUE: 23
PIF_VALUE: 36
PIF_VALUE: 23
PIF_VALUE: 35
PIF_VALUE: 36
PIF_VALUE: 25
PIF_VALUE: 36
PIF_VALUE: 36
PIF_VALUE: 22
PIF_VALUE: 2
PIF_VALUE: 35
PIF_VALUE: 32
PIF_VALUE: 1
PIF_VALUE: 21
PIF_VALUE: 1
PIF_VALUE: 21
PIF_VALUE: 31
PIF_VALUE: 23
PIF_VALUE: 36
PIF_VALUE: 22
PIF_VALUE: 24
PIF_VALUE: 36
PIF_VALUE: 34
PIF_VALUE: 28
PIF_VALUE: 22
PIF_VALUE: 23
PIF_VALUE: 1
PIF_VALUE: 21
PIF_VALUE: 35
PIF_VALUE: 22
PIF_VALUE: 21
PIF_VALUE: 21
PIF_VALUE: 20
PIF_VALUE: 26
PIF_VALUE: 36
PIF_VALUE: 23
PIF_VALUE: 17
PIF_VALUE: 21
PIF_VALUE: 36
PIF_VALUE: 36
PIF_VALUE: 22
PIF_VALUE: 22
PIF_VALUE: 31
PIF_VALUE: 20
PIF_VALUE: 23
PIF_VALUE: 20
PIF_VALUE: 35
PIF_VALUE: 35
PIF_VALUE: 22
PIF_VALUE: 36
PIF_VALUE: 23
PIF_VALUE: 31
PIF_VALUE: 32
PIF_VALUE: 36
PIF_VALUE: 22
PIF_VALUE: 22
PIF_VALUE: 34
PIF_VALUE: 36
PIF_VALUE: 33
PIF_VALUE: 26
PIF_VALUE: 33
PIF_VALUE: 21
PIF_VALUE: 36
PIF_VALUE: 20
PIF_VALUE: 26
PIF_VALUE: 24
PIF_VALUE: 22
PIF_VALUE: 21
PIF_VALUE: 36
PIF_VALUE: 18
PIF_VALUE: 37
PIF_VALUE: 35
PIF_VALUE: 32
PIF_VALUE: 22
PIF_VALUE: 25
PIF_VALUE: 33
PIF_VALUE: 22
PIF_VALUE: 22
PIF_VALUE: 32
PIF_VALUE: 0
PIF_VALUE: 23
PIF_VALUE: 22
PIF_VALUE: 35
PIF_VALUE: 26
PIF_VALUE: 3
PIF_VALUE: 23
PIF_VALUE: 23
PIF_VALUE: 34
PIF_VALUE: 17
PIF_VALUE: 35
PIF_VALUE: 19
PIF_VALUE: 23
PIF_VALUE: 35
PIF_VALUE: 32
PIF_VALUE: 37
PIF_VALUE: 34
PIF_VALUE: 34
PIF_VALUE: 24
PIF_VALUE: 21
PIF_VALUE: 24
PIF_VALUE: 36
PIF_VALUE: 35
PIF_VALUE: 21
PIF_VALUE: 34
PIF_VALUE: 25
PIF_VALUE: 23
PIF_VALUE: 22
PIF_VALUE: 34
PIF_VALUE: 19
PIF_VALUE: 17
PIF_VALUE: 35
PIF_VALUE: 33
PIF_VALUE: 36
PIF_VALUE: 23
PIF_VALUE: 4
PIF_VALUE: 20
PIF_VALUE: 36
PIF_VALUE: 36
PIF_VALUE: 35
PIF_VALUE: 19
PIF_VALUE: 21
PIF_VALUE: 17
PIF_VALUE: 20
PIF_VALUE: 22
PIF_VALUE: 22
PIF_VALUE: 18
PIF_VALUE: 21
PIF_VALUE: 36
PIF_VALUE: 23
PIF_VALUE: 21
PIF_VALUE: 21
PIF_VALUE: 34
PIF_VALUE: 20
PIF_VALUE: 35
PIF_VALUE: 27
PIF_VALUE: 2
PIF_VALUE: 38
PIF_VALUE: 35
PIF_VALUE: 22
PIF_VALUE: 38
PIF_VALUE: 10
PIF_VALUE: 1
PIF_VALUE: 34
PIF_VALUE: 34
PIF_VALUE: 36
PIF_VALUE: 20
PIF_VALUE: 35
PIF_VALUE: 21
PIF_VALUE: 20
PIF_VALUE: 20
PIF_VALUE: 21
PIF_VALUE: 20
PIF_VALUE: 23
PIF_VALUE: 59
PIF_VALUE: 36
PIF_VALUE: 35
PIF_VALUE: 20
PIF_VALUE: 20
PIF_VALUE: 21
PIF_VALUE: 21
PIF_VALUE: 1
PIF_VALUE: 35
PIF_VALUE: 33
PIF_VALUE: 21
PIF_VALUE: 24
PIF_VALUE: 19
PIF_VALUE: 24
PIF_VALUE: 24
PIF_VALUE: 35
PIF_VALUE: 33
PIF_VALUE: 20
PIF_VALUE: 26
PIF_VALUE: 35
PIF_VALUE: 21
PIF_VALUE: 17
PIF_VALUE: 21
PIF_VALUE: 22
PIF_VALUE: 23
PIF_VALUE: 23
PIF_VALUE: 32
PIF_VALUE: 21
PIF_VALUE: 23
PIF_VALUE: 18
PIF_VALUE: 1
PIF_VALUE: 34
PIF_VALUE: 36
PIF_VALUE: 35
PIF_VALUE: 19
PIF_VALUE: 22
PIF_VALUE: 35
PIF_VALUE: 32
PIF_VALUE: 31
PIF_VALUE: 34
PIF_VALUE: 33
PIF_VALUE: 23
PIF_VALUE: 36
PIF_VALUE: 35
PIF_VALUE: 35
PIF_VALUE: 27
PIF_VALUE: 34
PIF_VALUE: 20
PIF_VALUE: 19
PIF_VALUE: 21
PIF_VALUE: 20
PIF_VALUE: 36
PIF_VALUE: 35
PIF_VALUE: 21
PIF_VALUE: 20
PIF_VALUE: 21
PIF_VALUE: 19
PIF_VALUE: 0
PIF_VALUE: 36
PIF_VALUE: 35
PIF_VALUE: 35
PIF_VALUE: 36
PIF_VALUE: 37
PIF_VALUE: 21
PIF_VALUE: 35
PIF_VALUE: 22
PIF_VALUE: 21
PIF_VALUE: 21
PIF_VALUE: 36
PIF_VALUE: 33
PIF_VALUE: 35
PIF_VALUE: 26
PIF_VALUE: 35

## 2019-04-18 ASSESSMENT — PAIN SCALES - GENERAL
PAINLEVEL_OUTOF10: 8
PAINLEVEL_OUTOF10: 8
PAINLEVEL_OUTOF10: 9
PAINLEVEL_OUTOF10: 4
PAINLEVEL_OUTOF10: 8
PAINLEVEL_OUTOF10: 8

## 2019-04-18 ASSESSMENT — PAIN DESCRIPTION - ORIENTATION: ORIENTATION: MID;RIGHT

## 2019-04-18 ASSESSMENT — LIFESTYLE VARIABLES: SMOKING_STATUS: 0

## 2019-04-18 ASSESSMENT — PAIN DESCRIPTION - LOCATION: LOCATION: ABDOMEN;FLANK

## 2019-04-18 ASSESSMENT — PAIN DESCRIPTION - DESCRIPTORS
DESCRIPTORS: CONSTANT
DESCRIPTORS: PRESSURE

## 2019-04-18 ASSESSMENT — PAIN DESCRIPTION - FREQUENCY: FREQUENCY: CONTINUOUS

## 2019-04-18 ASSESSMENT — PAIN DESCRIPTION - ONSET: ONSET: ON-GOING

## 2019-04-18 ASSESSMENT — PAIN - FUNCTIONAL ASSESSMENT: PAIN_FUNCTIONAL_ASSESSMENT: 0-10

## 2019-04-18 NOTE — H&P
Patient was seen examined this morning image was reviewed history and physical was reviewed no new event or complaining since he last will proceed previously mentioned plan    Mel Doan MD [facility]

## 2019-04-18 NOTE — BRIEF OP NOTE
Brief Postoperative Note  ______________________________________________________________    Patient: Teagan Gonzalez  YOB: 1945  MRN: [id number]  Date of Procedure: 4/18/2019    Pre-Op Diagnosis: ESOPHAGEAL CANCER WITH DIFFICULTY SWALLOWING    Post-Op Diagnosis: Same       Procedure(s):  BENITO VALENTINA ESOPHAGECTOMY WITH   MEDIASTINAL LYMPHADENECTOMY INCLUDING THORACIC DUCT,   UMBILICAL HERNIA REPAIR  EGD  Jejunostomy feeding tube      Anesthesia: General    Surgeon(s):  Nicole Jain MD    Assistant: Jael Deras    Estimated Blood Loss (mL): 380     Complications: None    Specimens:   ID Type Source Tests Collected by Time Destination   A : FALCIFORM LIGAMENT Specimen Abdomen SURGICAL PATHOLOGY Nicole Jain MD 4/18/2019 0819    B : LEVEL 7 LYMPH NODE Tissue Tissue SURGICAL PATHOLOGY Nicole Jain MD 4/18/2019 1046    C : Esophagus and proximal stomach - needs margins Specimen Abdomen SURGICAL PATHOLOGY Nicole Jain MD 4/18/2019 1108    D : true gastric margin Specimen Abdomen SURGICAL PATHOLOGY Nicole Jain MD 4/18/2019 1138    E : true esophageal margin Specimen Abdomen SURGICAL PATHOLOGY Nicole Jain MD 4/18/2019 1138        Implants:  * No implants in log *      Drains:   Gastrostomy/Enterostomy/Jejunostomy Gastrostomy LUQ 1 (Active)       Findings: Margin is negative    Nicole Jain MD  Date: 4/18/2019  Time: 1:03 PM

## 2019-04-18 NOTE — PROGRESS NOTES
Patient admitted to CVU from OR and attached to monitors. Report received from [address] [place] [address]. Chest tubes connected to suction per order. NG tube placed to suction per order. Labs drawn and sent. Assessment complete. Hemodymanics stable and will continue to monitor. Please refer to the [facility] for medication administration.

## 2019-04-18 NOTE — PLAN OF CARE
Problem: Discharge Planning:  Goal: Participates in care planning  Description  Participates in care planning  Outcome: Ongoing  Goal: Discharged to appropriate level of care  Description  Discharged to appropriate level of care  Outcome: Ongoing     Problem: Airway Clearance - Ineffective:  Goal: Ability to maintain a clear airway will improve  Description  Ability to maintain a clear airway will improve  Outcome: Ongoing     Problem: Anxiety/Stress:  Goal: Level of anxiety will decrease  Description  Level of anxiety will decrease  Outcome: Ongoing     Problem: Aspiration:  Goal: Absence of aspiration  Description  Absence of aspiration  Outcome: Ongoing     Problem:  Bowel Function - Altered:  Goal: Bowel elimination is within specified parameters  Description  Bowel elimination is within specified parameters  Outcome: Ongoing     Problem: Cardiac Output - Decreased:  Goal: Hemodynamic stability will improve  Description  Hemodynamic stability will improve  Outcome: Ongoing     Problem: Fluid Volume - Imbalance:  Goal: Absence of imbalanced fluid volume signs and symptoms  Description  Absence of imbalanced fluid volume signs and symptoms  Outcome: Ongoing     Problem: Gas Exchange - Impaired:  Goal: Levels of oxygenation will improve  Description  Levels of oxygenation will improve  Outcome: Ongoing     Problem: Mental Status - Impaired:  Goal: Mental status will be restored to baseline  Description  Mental status will be restored to baseline  Outcome: Ongoing     Problem: Nutrition Deficit:  Goal: Ability to achieve adequate nutritional intake will improve  Description  Ability to achieve adequate nutritional intake will improve  Outcome: Ongoing     Problem: Pain:  Goal: Pain level will decrease  Description  Pain level will decrease  Outcome: Ongoing  Goal: Recognizes and communicates pain  Description  Recognizes and communicates pain  Outcome: Ongoing  Goal: Control of acute pain  Description  Control of

## 2019-04-18 NOTE — ANESTHESIA PROCEDURE NOTES
Arterial Line:    An arterial line was placed using surface landmarks, in the OR for the following indication(s): continuous blood pressure monitoring and blood sampling needed. A 20 gauge (size), 1 and 3/4 inch (length), Arrow (type) catheter was placed, Seldinger technique not used, into the left radial artery, secured by tape and suture. Anesthesia type: General    Events:  patient tolerated procedure well with no complications.     Additional notes:  US guided  Anesthesiologist: Speedy Culp MD  Preanesthetic Checklist  Completed: patient identified, site marked, surgical consent, pre-op evaluation, timeout performed, IV checked, risks and benefits discussed, monitors and equipment checked, anesthesia consent given, oxygen available and patient being monitored

## 2019-04-19 ENCOUNTER — APPOINTMENT (OUTPATIENT)
Dept: GENERAL RADIOLOGY | Age: 74
DRG: 327 | End: 2019-04-19
Attending: THORACIC SURGERY (CARDIOTHORACIC VASCULAR SURGERY)
Payer: MEDICARE

## 2019-04-19 LAB
ANION GAP SERPL CALCULATED.3IONS-SCNC: 9 MMOL/L (ref 3–16)
BUN BLDV-MCNC: 20 MG/DL (ref 7–20)
CALCIUM SERPL-MCNC: 7.6 MG/DL (ref 8.3–10.6)
CHLORIDE BLD-SCNC: 103 MMOL/L (ref 99–110)
CO2: 23 MMOL/L (ref 21–32)
CREAT SERPL-MCNC: 1.1 MG/DL (ref 0.8–1.3)
GFR AFRICAN AMERICAN: >60
GFR NON-AFRICAN AMERICAN: >60
GLUCOSE BLD-MCNC: 115 MG/DL (ref 70–99)
GLUCOSE BLD-MCNC: 137 MG/DL (ref 70–99)
GLUCOSE BLD-MCNC: 137 MG/DL (ref 70–99)
GLUCOSE BLD-MCNC: 142 MG/DL (ref 70–99)
GLUCOSE BLD-MCNC: 159 MG/DL (ref 70–99)
GLUCOSE BLD-MCNC: 204 MG/DL (ref 70–99)
HCT VFR BLD CALC: 34.9 % (ref 40.5–52.5)
HEMOGLOBIN: 12.2 G/DL (ref 13.5–17.5)
MAGNESIUM: 1.9 MG/DL (ref 1.8–2.4)
MCH RBC QN AUTO: 31.6 PG (ref 26–34)
MCHC RBC AUTO-ENTMCNC: 34.8 G/DL (ref 31–36)
MCV RBC AUTO: 90.7 FL (ref 80–100)
PDW BLD-RTO: 13.7 % (ref 12.4–15.4)
PERFORMED ON: ABNORMAL
PHOSPHORUS: 3.5 MG/DL (ref 2.5–4.9)
PLATELET # BLD: 230 K/UL (ref 135–450)
PMV BLD AUTO: 6.6 FL (ref 5–10.5)
POTASSIUM SERPL-SCNC: 4.9 MMOL/L (ref 3.5–5.1)
RBC # BLD: 3.85 M/UL (ref 4.2–5.9)
SODIUM BLD-SCNC: 135 MMOL/L (ref 136–145)
WBC # BLD: 9.5 K/UL (ref 4–11)

## 2019-04-19 PROCEDURE — 2500000003 HC RX 250 WO HCPCS: Performed by: NURSE PRACTITIONER

## 2019-04-19 PROCEDURE — 94770 HC ETCO2 MONITOR DAILY: CPT

## 2019-04-19 PROCEDURE — 6370000000 HC RX 637 (ALT 250 FOR IP): Performed by: NURSE PRACTITIONER

## 2019-04-19 PROCEDURE — 83735 ASSAY OF MAGNESIUM: CPT

## 2019-04-19 PROCEDURE — 2700000000 HC OXYGEN THERAPY PER DAY

## 2019-04-19 PROCEDURE — 94761 N-INVAS EAR/PLS OXIMETRY MLT: CPT

## 2019-04-19 PROCEDURE — 99024 POSTOP FOLLOW-UP VISIT: CPT | Performed by: THORACIC SURGERY (CARDIOTHORACIC VASCULAR SURGERY)

## 2019-04-19 PROCEDURE — 85027 COMPLETE CBC AUTOMATED: CPT

## 2019-04-19 PROCEDURE — 94640 AIRWAY INHALATION TREATMENT: CPT

## 2019-04-19 PROCEDURE — 6360000002 HC RX W HCPCS: Performed by: NURSE PRACTITIONER

## 2019-04-19 PROCEDURE — 6360000002 HC RX W HCPCS: Performed by: THORACIC SURGERY (CARDIOTHORACIC VASCULAR SURGERY)

## 2019-04-19 PROCEDURE — 37799 UNLISTED PX VASCULAR SURGERY: CPT

## 2019-04-19 PROCEDURE — C9113 INJ PANTOPRAZOLE SODIUM, VIA: HCPCS | Performed by: NURSE PRACTITIONER

## 2019-04-19 PROCEDURE — 80048 BASIC METABOLIC PNL TOTAL CA: CPT

## 2019-04-19 PROCEDURE — 71045 X-RAY EXAM CHEST 1 VIEW: CPT

## 2019-04-19 PROCEDURE — 2580000003 HC RX 258: Performed by: NURSE PRACTITIONER

## 2019-04-19 PROCEDURE — 2000000000 HC ICU R&B

## 2019-04-19 PROCEDURE — 84100 ASSAY OF PHOSPHORUS: CPT

## 2019-04-19 PROCEDURE — 2580000003 HC RX 258: Performed by: THORACIC SURGERY (CARDIOTHORACIC VASCULAR SURGERY)

## 2019-04-19 PROCEDURE — 94668 MNPJ CHEST WALL SBSQ: CPT

## 2019-04-19 RX ORDER — DEXTROSE MONOHYDRATE 25 G/50ML
12.5 INJECTION, SOLUTION INTRAVENOUS PRN
Status: DISCONTINUED | OUTPATIENT
Start: 2019-04-19 | End: 2019-04-24 | Stop reason: HOSPADM

## 2019-04-19 RX ORDER — PANTOPRAZOLE SODIUM 40 MG/10ML
40 INJECTION, POWDER, LYOPHILIZED, FOR SOLUTION INTRAVENOUS DAILY
Status: DISCONTINUED | OUTPATIENT
Start: 2019-04-19 | End: 2019-04-24 | Stop reason: HOSPADM

## 2019-04-19 RX ORDER — MAGNESIUM SULFATE 1 G/100ML
1 INJECTION INTRAVENOUS ONCE
Status: COMPLETED | OUTPATIENT
Start: 2019-04-19 | End: 2019-04-19

## 2019-04-19 RX ORDER — ACETAMINOPHEN 650 MG/1
650 SUPPOSITORY RECTAL EVERY 4 HOURS PRN
Status: DISCONTINUED | OUTPATIENT
Start: 2019-04-19 | End: 2019-04-24 | Stop reason: HOSPADM

## 2019-04-19 RX ORDER — FUROSEMIDE 10 MG/ML
20 INJECTION INTRAMUSCULAR; INTRAVENOUS EVERY 6 HOURS
Status: DISCONTINUED | OUTPATIENT
Start: 2019-04-19 | End: 2019-04-23

## 2019-04-19 RX ORDER — DEXTROSE MONOHYDRATE 50 MG/ML
100 INJECTION, SOLUTION INTRAVENOUS PRN
Status: DISCONTINUED | OUTPATIENT
Start: 2019-04-19 | End: 2019-04-24 | Stop reason: HOSPADM

## 2019-04-19 RX ORDER — METOPROLOL TARTRATE 5 MG/5ML
5 INJECTION INTRAVENOUS
Status: DISCONTINUED | OUTPATIENT
Start: 2019-04-19 | End: 2019-04-23

## 2019-04-19 RX ORDER — SODIUM CHLORIDE 9 MG/ML
INJECTION, SOLUTION INTRAVENOUS CONTINUOUS
Status: DISCONTINUED | OUTPATIENT
Start: 2019-04-19 | End: 2019-04-24 | Stop reason: HOSPADM

## 2019-04-19 RX ORDER — NICOTINE POLACRILEX 4 MG
15 LOZENGE BUCCAL PRN
Status: DISCONTINUED | OUTPATIENT
Start: 2019-04-19 | End: 2019-04-24 | Stop reason: HOSPADM

## 2019-04-19 RX ORDER — KETOROLAC TROMETHAMINE 30 MG/ML
15 INJECTION, SOLUTION INTRAMUSCULAR; INTRAVENOUS EVERY 6 HOURS
Status: COMPLETED | OUTPATIENT
Start: 2019-04-19 | End: 2019-04-20

## 2019-04-19 RX ORDER — SODIUM CHLORIDE 9 MG/ML
INJECTION, SOLUTION INTRAVENOUS
Status: DISPENSED
Start: 2019-04-19 | End: 2019-04-19

## 2019-04-19 RX ADMIN — FUROSEMIDE 20 MG: 10 INJECTION, SOLUTION INTRAMUSCULAR; INTRAVENOUS at 13:46

## 2019-04-19 RX ADMIN — FUROSEMIDE 20 MG: 10 INJECTION, SOLUTION INTRAMUSCULAR; INTRAVENOUS at 19:57

## 2019-04-19 RX ADMIN — KETOROLAC TROMETHAMINE 15 MG: 30 INJECTION, SOLUTION INTRAMUSCULAR; INTRAVENOUS at 09:14

## 2019-04-19 RX ADMIN — ENOXAPARIN SODIUM 40 MG: 40 INJECTION SUBCUTANEOUS at 09:14

## 2019-04-19 RX ADMIN — PANTOPRAZOLE SODIUM 40 MG: 40 INJECTION, POWDER, FOR SOLUTION INTRAVENOUS at 09:14

## 2019-04-19 RX ADMIN — INSULIN LISPRO 2 UNITS: 100 INJECTION, SOLUTION INTRAVENOUS; SUBCUTANEOUS at 07:58

## 2019-04-19 RX ADMIN — ALBUTEROL SULFATE 2.5 MG: 2.5 SOLUTION RESPIRATORY (INHALATION) at 20:34

## 2019-04-19 RX ADMIN — Medication 10 ML: at 09:15

## 2019-04-19 RX ADMIN — Medication 10 ML: at 20:08

## 2019-04-19 RX ADMIN — ALBUTEROL SULFATE 2.5 MG: 2.5 SOLUTION RESPIRATORY (INHALATION) at 16:05

## 2019-04-19 RX ADMIN — INSULIN LISPRO 2 UNITS: 100 INJECTION, SOLUTION INTRAVENOUS; SUBCUTANEOUS at 20:17

## 2019-04-19 RX ADMIN — MAGNESIUM SULFATE HEPTAHYDRATE 1 G: 1 INJECTION, SOLUTION INTRAVENOUS at 08:07

## 2019-04-19 RX ADMIN — CHLORASEPTIC 1 SPRAY: 1.5 LIQUID ORAL at 20:08

## 2019-04-19 RX ADMIN — SODIUM CHLORIDE: 9 INJECTION, SOLUTION INTRAVENOUS at 11:09

## 2019-04-19 RX ADMIN — METOPROLOL TARTRATE 5 MG: 5 INJECTION INTRAVENOUS at 15:09

## 2019-04-19 RX ADMIN — SODIUM CHLORIDE: 9 INJECTION, SOLUTION INTRAVENOUS at 19:58

## 2019-04-19 RX ADMIN — MUPIROCIN: 20 OINTMENT TOPICAL at 09:13

## 2019-04-19 RX ADMIN — KETOROLAC TROMETHAMINE 15 MG: 30 INJECTION, SOLUTION INTRAMUSCULAR; INTRAVENOUS at 15:06

## 2019-04-19 RX ADMIN — CHLORASEPTIC 1 SPRAY: 1.5 LIQUID ORAL at 12:22

## 2019-04-19 RX ADMIN — KETOROLAC TROMETHAMINE 30 MG: 30 INJECTION, SOLUTION INTRAMUSCULAR; INTRAVENOUS at 03:26

## 2019-04-19 RX ADMIN — SODIUM CHLORIDE: 9 INJECTION, SOLUTION INTRAVENOUS at 07:39

## 2019-04-19 RX ADMIN — ALBUTEROL SULFATE 2.5 MG: 2.5 SOLUTION RESPIRATORY (INHALATION) at 08:53

## 2019-04-19 RX ADMIN — METOPROLOL TARTRATE 5 MG: 5 INJECTION INTRAVENOUS at 20:54

## 2019-04-19 RX ADMIN — ALBUTEROL SULFATE 2.5 MG: 2.5 SOLUTION RESPIRATORY (INHALATION) at 11:43

## 2019-04-19 RX ADMIN — KETOROLAC TROMETHAMINE 15 MG: 30 INJECTION, SOLUTION INTRAMUSCULAR; INTRAVENOUS at 19:57

## 2019-04-19 RX ADMIN — METOPROLOL TARTRATE 5 MG: 5 INJECTION INTRAVENOUS at 23:45

## 2019-04-19 RX ADMIN — CHLORASEPTIC 1 SPRAY: 1.5 LIQUID ORAL at 09:42

## 2019-04-19 RX ADMIN — MUPIROCIN: 20 OINTMENT TOPICAL at 20:08

## 2019-04-19 RX ADMIN — CHLORASEPTIC 1 SPRAY: 1.5 LIQUID ORAL at 23:45

## 2019-04-19 ASSESSMENT — PAIN SCALES - GENERAL
PAINLEVEL_OUTOF10: 2
PAINLEVEL_OUTOF10: 2
PAINLEVEL_OUTOF10: 3
PAINLEVEL_OUTOF10: 2
PAINLEVEL_OUTOF10: 1
PAINLEVEL_OUTOF10: 3
PAINLEVEL_OUTOF10: 3
PAINLEVEL_OUTOF10: 2
PAINLEVEL_OUTOF10: 3
PAINLEVEL_OUTOF10: 2
PAINLEVEL_OUTOF10: 3
PAINLEVEL_OUTOF10: 2
PAINLEVEL_OUTOF10: 2

## 2019-04-19 ASSESSMENT — PAIN DESCRIPTION - PAIN TYPE
TYPE: SURGICAL PAIN

## 2019-04-19 ASSESSMENT — PAIN DESCRIPTION - LOCATION
LOCATION: CHEST;ABDOMEN
LOCATION: ABDOMEN;CHEST;RIB CAGE
LOCATION: CHEST;ABDOMEN;RIB CAGE
LOCATION: CHEST;ABDOMEN

## 2019-04-19 ASSESSMENT — PAIN DESCRIPTION - ORIENTATION
ORIENTATION: RIGHT;MID

## 2019-04-19 NOTE — PLAN OF CARE
Problem: Serum Glucose Level - Abnormal:  Goal: Ability to maintain appropriate glucose levels will improve to within specified parameters  Description  Ability to maintain appropriate glucose levels will improve to within specified parameters  4/19/2019 0840 by Jessica Proctor RN  Outcome: Ongoing  Accu checks as ordered. Sliding scale insulin coverage as ordered. Teaching done with patient concerning insulin usage, blood sugar coverage, Metformin held during admission and NPO status. Verbalized understanding.

## 2019-04-19 NOTE — PROGRESS NOTES
Shirland removed from left wrist by RN & pressure held for 10 minutes & pressure dressing applied. Splint reapplied to prevent bending at this time. CMS check WNL, fingers warm & cap refill less than 3 secs.  Davion Hash

## 2019-04-19 NOTE — PROGRESS NOTES
4 Eyes Skin Assessment     The patient is being assess for   Shift Handoff    I agree that 2 RN's have performed a thorough Head to Toe Skin Assessment on the patient. ALL assessment sites listed below have been assessed. Areas assessed by both nurses:   [x]   Head, Face, and Ears   [x]   Shoulders, Back, and Chest, Abdomen  [x]   Arms, Elbows, and Hands   [x]   Coccyx, Sacrum, and Ischium  [x]   Legs, Feet, and Heels            **SHARE this note so that the co-signing nurse is able to place an eSignature**    Co-signer eSignature: Electronically signed by Miri Truong RN on 4/19/19 at 8:35 AM    Does the Patient have Skin Breakdown?   No          Johann Prevention initiated:  Yes   Wound Care Orders initiated:  NA      [facility] nurse consulted for Pressure Injury (Stage 3,4, Unstageable, DTI, NWPT, Complex wounds)and New or Established Ostomies:  NA      Primary Nurse eSignature: Electronically signed by Arabella Tran RN on 4/19/19 at 8:05 AM

## 2019-04-19 NOTE — CARE COORDINATION
KESHA Rueda requested writer initiate referral to [facility] for Nocturnal Tube Feeds. Placed call to [facility] and spoke with [facility] for the referral. Provided her with patient's name and date of birth as they can access [facility]. [facility] was given contact number for KESHA Rueda and fax number. Updated Marybeth.      [facility] MSW, LSW

## 2019-04-19 NOTE — CARE COORDINATION
CASE MANAGEMENT INITIAL ASSESSMENT      Reviewed chart and met with patient today, re: 68year old male,  Explained Case Management role/services. Family present: wife  Primary contact information: Julian Zaidi [phone]    Admit date/status: 4/18/19  Diagnosis: esophagus cancer    Insurance: [facility]   Precert required for SNF - yes     3 night stay required -no    Living arrangements, Adls, care needs, prior to admission: patient lives in a single story house with wife. Independent in all ADL's     Transportation: private    [facility] [employer] [place] [facility] [place] [employer] in the home and/or outpatient, prior to admission: none    PT/OT recs: 2 [place] Notification (HEN): not initiated    Barriers to discharge: none    Plan/comments:   CM  Met with patient and wife at bedside. Explained need for tube feeding and patient states that they had spoken to dietician earlier today and would like referral to [address] Jaswinder Campos  Writer asked that Rommie Every make referral to [address]. Referral made.      [facility] on chart for MD brisa Rodrigues, RN

## 2019-04-19 NOTE — PLAN OF CARE
symptoms  4/19/2019 0156 by Shannon Urias RN  Outcome: Ongoing  4/18/2019 1633 by Danni Velázquez RN  Outcome: Ongoing     Problem: Tissue Perfusion - Cardiopulmonary, Altered:  Goal: Hemodynamic stability will improve  Description  Hemodynamic stability will improve  4/18/2019 1633 by Danni Velázquez RN  Outcome: Ongoing

## 2019-04-19 NOTE — PROGRESS NOTES
04/19/19 1605   Oxygen Therapy/Pulse Ox   O2 Therapy Room air   O2 Device Nasal cannula   Resp 19   SpO2 91 %   $End Tidal CO2 28

## 2019-04-19 NOTE — PROGRESS NOTES
Patient instructed on IS & acapella at 0800 & using every hour. Turned & repositioned with pillows for support to left side at 0915. Resting in naps. Remains NPO with Lemon swabs only for mouth care. Alee Bird NP into see patient and updated patient and wife as needed.  Daron Tobar

## 2019-04-19 NOTE — PROGRESS NOTES
Monitor 02 sats, patient drops to 87-89% when needs to cough. Coughing encouraged & splinting with pillow done. Patient able to cough up thick mucous & spit out, 02 sat increases back to 92-97% on RA. HOB elevated to a promote comfort & to facilitate breathing.  Marcia Tavares

## 2019-04-19 NOTE — PROGRESS NOTES
CVTS Thoracic Progress Note:          CC:  Post op follow up 4/18/19 BENITO VALENTINA ESOPHAGECTOMY WITH MEDIASTINAL LYMPHADENECTOMY INCLUDING THORACIC DUCT, UMBILICAL HERNIA REPAIR  EGD,Jejunostomy feeding tube. Subj: awake, sitting up in bed, in NAD     Obj:    Blood pressure (!) 127/59, pulse 95, temperature 99.1 °F (37.3 °C), temperature source Oral, resp. rate 18, height 5' 6\" (1.676 m), weight 186 lb 1.1 oz (84.4 kg), SpO2 94 %. Lungs CTAB   S1S2 remains RRR, SR in 90's    Abdomen rounded, soft, non-tender; J tube capped   Incision with occlusive dressing, CDI   Chest tube with 150/120/130= 400 ml drainage in last 24 hours/no airleak    Diagnostics:   Recent Labs     04/18/19  1330 04/19/19  0517   WBC 13.4* 9.5   HGB 13.4* 12.2*   HCT 39.1* 34.9*    230                                                                  Recent Labs     04/19/19  0517   *   K 4.9      CO2 23   BUN 20   CREATININE 1.1   GLUCOSE 204*            Recent Labs     04/19/19  0517   MG 1.90        CXR: 4/19/19   No ptx, left trace pleural effusion. Assess/Plan:   Recurrent adenocarcinoma (of the EG junction): s/p [place] valentina esophagectomy  Strict NPO   Continue chest tube to suction  J tube to remain capped (not to be used yet)  NG to low CWS  Morphine PCA for pain control - pt states he has adequate control. Will discuss with team possibly removing A line. Prophylaxis:  Lovenox, pepcid, and currently holding colace as he is NPO and not to use J tube yet.         Tyron Mccord, CNP  4/19/2019  9:06 AM   chest tube off suction,   Metoprolol 5 mg Q3 hours scheduled  D/c Art line  Lasix 20- mg Q6 hrs  IVF to 75 ml/hr  oobtc as tolerated    Patient was seen examined chart was reviewed image was reviewed personally  Agree with the previous Hanh Kearney MD [facility]

## 2019-04-20 ENCOUNTER — APPOINTMENT (OUTPATIENT)
Dept: GENERAL RADIOLOGY | Age: 74
DRG: 327 | End: 2019-04-20
Attending: THORACIC SURGERY (CARDIOTHORACIC VASCULAR SURGERY)
Payer: MEDICARE

## 2019-04-20 LAB
ANION GAP SERPL CALCULATED.3IONS-SCNC: 9 MMOL/L (ref 3–16)
BASOPHILS ABSOLUTE: 0 K/UL (ref 0–0.2)
BASOPHILS RELATIVE PERCENT: 0.3 %
BUN BLDV-MCNC: 18 MG/DL (ref 7–20)
CALCIUM SERPL-MCNC: 8.2 MG/DL (ref 8.3–10.6)
CHLORIDE BLD-SCNC: 104 MMOL/L (ref 99–110)
CO2: 26 MMOL/L (ref 21–32)
CREAT SERPL-MCNC: 1.1 MG/DL (ref 0.8–1.3)
EOSINOPHILS ABSOLUTE: 0.1 K/UL (ref 0–0.6)
EOSINOPHILS RELATIVE PERCENT: 0.7 %
GFR AFRICAN AMERICAN: >60
GFR NON-AFRICAN AMERICAN: >60
GLUCOSE BLD-MCNC: 131 MG/DL (ref 70–99)
GLUCOSE BLD-MCNC: 132 MG/DL (ref 70–99)
GLUCOSE BLD-MCNC: 137 MG/DL (ref 70–99)
GLUCOSE BLD-MCNC: 142 MG/DL (ref 70–99)
GLUCOSE BLD-MCNC: 149 MG/DL (ref 70–99)
GLUCOSE BLD-MCNC: 158 MG/DL (ref 70–99)
HCT VFR BLD CALC: 35.2 % (ref 40.5–52.5)
HEMOGLOBIN: 12 G/DL (ref 13.5–17.5)
LYMPHOCYTES ABSOLUTE: 0.6 K/UL (ref 1–5.1)
LYMPHOCYTES RELATIVE PERCENT: 6 %
MCH RBC QN AUTO: 31.3 PG (ref 26–34)
MCHC RBC AUTO-ENTMCNC: 34.1 G/DL (ref 31–36)
MCV RBC AUTO: 91.6 FL (ref 80–100)
MONOCYTES ABSOLUTE: 1.1 K/UL (ref 0–1.3)
MONOCYTES RELATIVE PERCENT: 11.1 %
NEUTROPHILS ABSOLUTE: 8.2 K/UL (ref 1.7–7.7)
NEUTROPHILS RELATIVE PERCENT: 81.9 %
PDW BLD-RTO: 13.8 % (ref 12.4–15.4)
PERFORMED ON: ABNORMAL
PLATELET # BLD: 223 K/UL (ref 135–450)
PMV BLD AUTO: 6.6 FL (ref 5–10.5)
POTASSIUM SERPL-SCNC: 4.5 MMOL/L (ref 3.5–5.1)
RBC # BLD: 3.85 M/UL (ref 4.2–5.9)
SODIUM BLD-SCNC: 139 MMOL/L (ref 136–145)
WBC # BLD: 10 K/UL (ref 4–11)

## 2019-04-20 PROCEDURE — 6370000000 HC RX 637 (ALT 250 FOR IP): Performed by: NURSE PRACTITIONER

## 2019-04-20 PROCEDURE — 2700000000 HC OXYGEN THERAPY PER DAY

## 2019-04-20 PROCEDURE — 2000000000 HC ICU R&B

## 2019-04-20 PROCEDURE — 97166 OT EVAL MOD COMPLEX 45 MIN: CPT

## 2019-04-20 PROCEDURE — 94761 N-INVAS EAR/PLS OXIMETRY MLT: CPT

## 2019-04-20 PROCEDURE — C9113 INJ PANTOPRAZOLE SODIUM, VIA: HCPCS | Performed by: NURSE PRACTITIONER

## 2019-04-20 PROCEDURE — 2500000003 HC RX 250 WO HCPCS: Performed by: NURSE PRACTITIONER

## 2019-04-20 PROCEDURE — 6360000002 HC RX W HCPCS: Performed by: THORACIC SURGERY (CARDIOTHORACIC VASCULAR SURGERY)

## 2019-04-20 PROCEDURE — 97110 THERAPEUTIC EXERCISES: CPT

## 2019-04-20 PROCEDURE — 80048 BASIC METABOLIC PNL TOTAL CA: CPT

## 2019-04-20 PROCEDURE — 6360000002 HC RX W HCPCS: Performed by: NURSE PRACTITIONER

## 2019-04-20 PROCEDURE — 71045 X-RAY EXAM CHEST 1 VIEW: CPT

## 2019-04-20 PROCEDURE — 2580000003 HC RX 258: Performed by: THORACIC SURGERY (CARDIOTHORACIC VASCULAR SURGERY)

## 2019-04-20 PROCEDURE — 36415 COLL VENOUS BLD VENIPUNCTURE: CPT

## 2019-04-20 PROCEDURE — 99024 POSTOP FOLLOW-UP VISIT: CPT | Performed by: THORACIC SURGERY (CARDIOTHORACIC VASCULAR SURGERY)

## 2019-04-20 PROCEDURE — 85025 COMPLETE CBC W/AUTO DIFF WBC: CPT

## 2019-04-20 PROCEDURE — 94668 MNPJ CHEST WALL SBSQ: CPT

## 2019-04-20 PROCEDURE — 94640 AIRWAY INHALATION TREATMENT: CPT

## 2019-04-20 RX ADMIN — ENOXAPARIN SODIUM 40 MG: 40 INJECTION SUBCUTANEOUS at 07:52

## 2019-04-20 RX ADMIN — METOPROLOL TARTRATE 5 MG: 5 INJECTION INTRAVENOUS at 13:14

## 2019-04-20 RX ADMIN — Medication 10 ML: at 07:52

## 2019-04-20 RX ADMIN — METOPROLOL TARTRATE 5 MG: 5 INJECTION INTRAVENOUS at 23:05

## 2019-04-20 RX ADMIN — FUROSEMIDE 20 MG: 10 INJECTION, SOLUTION INTRAMUSCULAR; INTRAVENOUS at 19:40

## 2019-04-20 RX ADMIN — ALBUTEROL SULFATE 2.5 MG: 2.5 SOLUTION RESPIRATORY (INHALATION) at 12:07

## 2019-04-20 RX ADMIN — INSULIN LISPRO 2 UNITS: 100 INJECTION, SOLUTION INTRAVENOUS; SUBCUTANEOUS at 04:10

## 2019-04-20 RX ADMIN — KETOROLAC TROMETHAMINE 15 MG: 30 INJECTION, SOLUTION INTRAMUSCULAR; INTRAVENOUS at 02:24

## 2019-04-20 RX ADMIN — MUPIROCIN: 20 OINTMENT TOPICAL at 07:51

## 2019-04-20 RX ADMIN — ALBUTEROL SULFATE 2.5 MG: 2.5 SOLUTION RESPIRATORY (INHALATION) at 16:04

## 2019-04-20 RX ADMIN — FUROSEMIDE 20 MG: 10 INJECTION, SOLUTION INTRAMUSCULAR; INTRAVENOUS at 13:13

## 2019-04-20 RX ADMIN — METOPROLOL TARTRATE 5 MG: 5 INJECTION INTRAVENOUS at 19:38

## 2019-04-20 RX ADMIN — MORPHINE SULFATE 30 MG: 1 INJECTION INTRAVENOUS at 00:00

## 2019-04-20 RX ADMIN — ALBUTEROL SULFATE 2.5 MG: 2.5 SOLUTION RESPIRATORY (INHALATION) at 20:20

## 2019-04-20 RX ADMIN — PANTOPRAZOLE SODIUM 40 MG: 40 INJECTION, POWDER, FOR SOLUTION INTRAVENOUS at 07:52

## 2019-04-20 RX ADMIN — Medication 10 ML: at 19:39

## 2019-04-20 RX ADMIN — MUPIROCIN: 20 OINTMENT TOPICAL at 20:00

## 2019-04-20 RX ADMIN — METOPROLOL TARTRATE 5 MG: 5 INJECTION INTRAVENOUS at 10:08

## 2019-04-20 RX ADMIN — INSULIN LISPRO 2 UNITS: 100 INJECTION, SOLUTION INTRAVENOUS; SUBCUTANEOUS at 20:31

## 2019-04-20 RX ADMIN — METOPROLOL TARTRATE 5 MG: 5 INJECTION INTRAVENOUS at 06:23

## 2019-04-20 RX ADMIN — CHLORASEPTIC 1 SPRAY: 1.5 LIQUID ORAL at 06:24

## 2019-04-20 RX ADMIN — ALBUTEROL SULFATE 2.5 MG: 2.5 SOLUTION RESPIRATORY (INHALATION) at 08:25

## 2019-04-20 RX ADMIN — KETOROLAC TROMETHAMINE 15 MG: 30 INJECTION, SOLUTION INTRAMUSCULAR; INTRAVENOUS at 07:51

## 2019-04-20 RX ADMIN — FUROSEMIDE 20 MG: 10 INJECTION, SOLUTION INTRAMUSCULAR; INTRAVENOUS at 07:51

## 2019-04-20 RX ADMIN — METOPROLOL TARTRATE 5 MG: 5 INJECTION INTRAVENOUS at 16:00

## 2019-04-20 RX ADMIN — FUROSEMIDE 20 MG: 10 INJECTION, SOLUTION INTRAMUSCULAR; INTRAVENOUS at 02:24

## 2019-04-20 ASSESSMENT — PAIN SCALES - GENERAL
PAINLEVEL_OUTOF10: 2
PAINLEVEL_OUTOF10: 2
PAINLEVEL_OUTOF10: 1
PAINLEVEL_OUTOF10: 2

## 2019-04-20 NOTE — PLAN OF CARE
UE There ex, Bed mobility, Transfers, ADL training, Adaptative equipment training, Therapeutic activity, Neuromuscular re-education, Patient education

## 2019-04-20 NOTE — PROGRESS NOTES
POD #2    Looks great. Up and around well already. Pain control good. Not passing gas yet. We discussed need for NGT for a while yet as his esophagus is healing  Will start J-tube feedings once his ileus resolves.

## 2019-04-20 NOTE — PROGRESS NOTES
Occupational Therapy   Occupational Therapy Initial Assessment  Date: 2019   Patient Name: Dima Isabel  MRN: [id number]     : 1945    Date of Service: 2019    Discharge Recommendations:  24 hour supervision or assist       Assessment   Performance deficits / Impairments: Decreased ADL status; Decreased functional mobility ; Decreased balance;Decreased high-level IADLs     After evaluation, pt found to be presenting with the above mentioned occupational performance deficits which are affecting participation in daily living skills. Pt would benefit from continued skilled occupational therapy to address ADLs, functional mobility, and safety while in acute care    Patient Education: role of OT, safety  REQUIRES OT FOLLOW UP: Yes  Activity Tolerance  Activity Tolerance: Patient Tolerated treatment well  Safety Devices  Safety Devices in place: Yes  Type of devices: Call light within reach; Left in chair;Nurse notified           Patient Diagnosis(es): Diagnoses of Malignant neoplasm of esophagus, unspecified location [facility]) and Dysphagia, unspecified type were pertinent to this visit. has a past medical history of Diabetes mellitus ([facility] Esophageal cancer ([facility] [address] H/O sleep apnea, History of esophageal reflux, Hypertension, and Prostate cancer ([facility] [address] has a past surgical history that includes Colonoscopy (); Prostatectomy (); Cataract removal with implant (); Colonoscopy (2017); pr office/outpt visit,procedure only (N/A, 2018); Upper gastrointestinal endoscopy (2018); Uvulopalatopharygoplasty; Esophagus dilation (N/A, 1/3/2019); Upper gastrointestinal endoscopy (N/A, 2019); Inguinal hernia repair (Right, ); and Esophagectomy (N/A, 2019).            Restrictions  Restrictions/Precautions  Restrictions/Precautions: General Precautions, Fall Risk  Position Activity Restriction  Other position/activity restrictions: IV, NG tube to suction , Right chest tube, bustamante catheter, telemetry    Subjective   General  Chart Reviewed: Yes  Patient assessed for rehabilitation services?: Yes  Family / Caregiver Present: Yes(wife)  Referring Practitioner: Dr. Hardik Dill  Diagnosis: [address]; s/p BENITO VALENTINA ESOPHAGECTOMY WITH MEDIASTINAL LYMPHADENECTOMY INCLUDING THORACIC DUCT,UMBILICAL HERNIA REPAIR 4-18-97   General Comment  Comments: RN cleared pt for OT eval; pt up in chair, pt & wife agreeable to OT  Oxygen Therapy  SpO2: 95 %  Social/Functional History  Social/Functional History  Lives With: Spouse(retired)  Type of Home: House  Home Layout: One level  Home Access: Stairs to enter with rails  Entrance Stairs - Number of Steps: 2  Entrance Stairs - Rails: Both  Bathroom Shower/Tub: Walk-in shower  Bathroom Toilet: Standard  Bathroom Equipment: Built-in shower seat, Hand-held shower  Home Equipment: Reacher, Sock aid, Long-handled shoehorn(adjustable bed)  ADL Assistance: Independent  Ambulation Assistance: Independent  Transfer Assistance: Independent  Active : Yes  Mode of Transportation: Car, Truck  Occupation: Retired  Type of occupation:   Leisure & Hobbies: hunting & fishing       Objective   Vision: Impaired  Vision Exceptions: Wears glasses for reading  Hearing: Within functional limits    Orientation  Overall Orientation Status: Within Functional Limits     Balance  Sitting Balance: Modified independent   Standing Balance: Contact guard assistance  Standing Balance  Time: 1-2 minutes x 1  Activity: sit<-->stand & forward reaching  Comment: declined walking at this time, had already been up with nursing  ADL  Feeding: NPO  LE Dressing:  Moderate assistance  Toileting: Dependent/Total(bustamante catheter)    RUE Tone: Normotonic  LUE Tone: Normotonic  Coordination  Movements Are Fluid And Coordinated: Yes     Bed mobility  Supine to Sit: Unable to assess(already up in chair)  Sit to Supine: Unable to assess(Left up in chair, pt agreeable)  Transfers  Sit to stand: Contact guard assistance  Stand to sit: Contact guard assistance  Vision - Basic Assessment  Prior Vision: Wears glasses only for reading  Cognition  Overall Cognitive Status: WFL       Type of ROM/Therapeutic Exercise: AROM BUE seated  Hand flex/ext: x  10  Reps  Elbow flex/ext:  x  10  Reps  Forearm sup/pron:  x 10   Reps  Shld flex/ext:  x  10  Reps      LUE AROM : WFL  RUE AROM : WFL    L Hand Grasp: 5/5  L Hand Release: 5/5    R Hand Grasp: 5/5  R Hand Release: 5/5     Plan   Plan  Times per week: 4-5x/ week   Current Treatment Recommendations: Functional Mobility Training, Safety Education & Training, Self-Care / ADL, Endurance Training      AM-PAC Score        AM-[facility] Inpatient Daily Activity Raw Score: 11  AM-PAC Inpatient ADL T-Scale Score : 29.04  ADL Inpatient CMS 0-100% Score: 70.42  ADL Inpatient CMS G-Code Modifier : CL    Goals  Short term goals  Time Frame for Short term goals: 1 week  Short term goal 1: SBA with bathroom mobility by 4-27-19  Short term goal 2: min assist with bed mobility   Short term goal 3: CGA with LE dressing  Short term goal 4: tolerate standing ADL's with supervision for 5 minutes  Long term goals  Time Frame for Long term goals : 2 weeks   Long term goal 1: independent with toileting hygiene  Patient Goals   Patient goals : be able to unpack \"we just moved into our new house\"       Therapy Time   Individual Concurrent Group Co-treatment   Time In 1528         Time Out 1550         Minutes [address], [place]

## 2019-04-20 NOTE — PLAN OF CARE
Problem: Anxiety/Stress:  Goal: Level of anxiety will decrease  Description  Level of anxiety will decrease  Outcome: Met This Shift     Problem: Gas Exchange - Impaired:  Goal: Levels of oxygenation will improve  Description  Levels of oxygenation will improve  Outcome: Met This Shift     Problem: Falls - Risk of:  Goal: Will remain free from falls  Description  Will remain free from falls  4/20/2019 1059 by Ashley Cruz RN  Outcome: Met This Shift  4/19/2019 2134 by Cherre Ormond, RN  Outcome: Ongoing     Problem: Cardiac Output - Decreased:  Goal: Hemodynamic stability will improve  Description  Hemodynamic stability will improve  4/19/2019 2134 by Cherre Ormond, RN  Outcome: Ongoing     Problem: Pain:  Goal: Control of acute pain  Description  Control of acute pain  4/19/2019 2134 by Cherre Ormond, RN  Outcome: Ongoing

## 2019-04-21 ENCOUNTER — APPOINTMENT (OUTPATIENT)
Dept: GENERAL RADIOLOGY | Age: 74
DRG: 327 | End: 2019-04-21
Attending: THORACIC SURGERY (CARDIOTHORACIC VASCULAR SURGERY)
Payer: MEDICARE

## 2019-04-21 LAB
ANION GAP SERPL CALCULATED.3IONS-SCNC: 10 MMOL/L (ref 3–16)
BASOPHILS ABSOLUTE: 0.1 K/UL (ref 0–0.2)
BASOPHILS RELATIVE PERCENT: 0.5 %
BUN BLDV-MCNC: 17 MG/DL (ref 7–20)
CALCIUM SERPL-MCNC: 8.6 MG/DL (ref 8.3–10.6)
CHLORIDE BLD-SCNC: 104 MMOL/L (ref 99–110)
CO2: 27 MMOL/L (ref 21–32)
CREAT SERPL-MCNC: 0.9 MG/DL (ref 0.8–1.3)
EOSINOPHILS ABSOLUTE: 0.2 K/UL (ref 0–0.6)
EOSINOPHILS RELATIVE PERCENT: 1.7 %
GFR AFRICAN AMERICAN: >60
GFR NON-AFRICAN AMERICAN: >60
GLUCOSE BLD-MCNC: 119 MG/DL (ref 70–99)
GLUCOSE BLD-MCNC: 134 MG/DL (ref 70–99)
GLUCOSE BLD-MCNC: 134 MG/DL (ref 70–99)
GLUCOSE BLD-MCNC: 136 MG/DL (ref 70–99)
GLUCOSE BLD-MCNC: 140 MG/DL (ref 70–99)
GLUCOSE BLD-MCNC: 147 MG/DL (ref 70–99)
HCT VFR BLD CALC: 33.5 % (ref 40.5–52.5)
HEMOGLOBIN: 11.7 G/DL (ref 13.5–17.5)
LYMPHOCYTES ABSOLUTE: 0.6 K/UL (ref 1–5.1)
LYMPHOCYTES RELATIVE PERCENT: 6 %
MCH RBC QN AUTO: 31.4 PG (ref 26–34)
MCHC RBC AUTO-ENTMCNC: 34.9 G/DL (ref 31–36)
MCV RBC AUTO: 90.1 FL (ref 80–100)
MONOCYTES ABSOLUTE: 1.1 K/UL (ref 0–1.3)
MONOCYTES RELATIVE PERCENT: 11.2 %
NEUTROPHILS ABSOLUTE: 8.2 K/UL (ref 1.7–7.7)
NEUTROPHILS RELATIVE PERCENT: 80.6 %
PDW BLD-RTO: 13.3 % (ref 12.4–15.4)
PERFORMED ON: ABNORMAL
PLATELET # BLD: 234 K/UL (ref 135–450)
PMV BLD AUTO: 6.7 FL (ref 5–10.5)
POTASSIUM SERPL-SCNC: 4.1 MMOL/L (ref 3.5–5.1)
RBC # BLD: 3.72 M/UL (ref 4.2–5.9)
SODIUM BLD-SCNC: 141 MMOL/L (ref 136–145)
WBC # BLD: 10.1 K/UL (ref 4–11)

## 2019-04-21 PROCEDURE — 6370000000 HC RX 637 (ALT 250 FOR IP): Performed by: NURSE PRACTITIONER

## 2019-04-21 PROCEDURE — 2580000003 HC RX 258: Performed by: THORACIC SURGERY (CARDIOTHORACIC VASCULAR SURGERY)

## 2019-04-21 PROCEDURE — 2580000003 HC RX 258: Performed by: NURSE PRACTITIONER

## 2019-04-21 PROCEDURE — 94668 MNPJ CHEST WALL SBSQ: CPT

## 2019-04-21 PROCEDURE — 80048 BASIC METABOLIC PNL TOTAL CA: CPT

## 2019-04-21 PROCEDURE — 97110 THERAPEUTIC EXERCISES: CPT

## 2019-04-21 PROCEDURE — G8979 MOBILITY GOAL STATUS: HCPCS

## 2019-04-21 PROCEDURE — 99024 POSTOP FOLLOW-UP VISIT: CPT | Performed by: THORACIC SURGERY (CARDIOTHORACIC VASCULAR SURGERY)

## 2019-04-21 PROCEDURE — 71045 X-RAY EXAM CHEST 1 VIEW: CPT

## 2019-04-21 PROCEDURE — 85025 COMPLETE CBC W/AUTO DIFF WBC: CPT

## 2019-04-21 PROCEDURE — 6360000002 HC RX W HCPCS: Performed by: THORACIC SURGERY (CARDIOTHORACIC VASCULAR SURGERY)

## 2019-04-21 PROCEDURE — 36415 COLL VENOUS BLD VENIPUNCTURE: CPT

## 2019-04-21 PROCEDURE — 2000000000 HC ICU R&B

## 2019-04-21 PROCEDURE — C9113 INJ PANTOPRAZOLE SODIUM, VIA: HCPCS | Performed by: NURSE PRACTITIONER

## 2019-04-21 PROCEDURE — 2500000003 HC RX 250 WO HCPCS: Performed by: NURSE PRACTITIONER

## 2019-04-21 PROCEDURE — 94640 AIRWAY INHALATION TREATMENT: CPT

## 2019-04-21 PROCEDURE — 97162 PT EVAL MOD COMPLEX 30 MIN: CPT

## 2019-04-21 PROCEDURE — G8978 MOBILITY CURRENT STATUS: HCPCS

## 2019-04-21 PROCEDURE — 6360000002 HC RX W HCPCS: Performed by: NURSE PRACTITIONER

## 2019-04-21 RX ADMIN — ALBUTEROL SULFATE 2.5 MG: 2.5 SOLUTION RESPIRATORY (INHALATION) at 12:00

## 2019-04-21 RX ADMIN — ALBUTEROL SULFATE 2.5 MG: 2.5 SOLUTION RESPIRATORY (INHALATION) at 08:19

## 2019-04-21 RX ADMIN — ENOXAPARIN SODIUM 40 MG: 40 INJECTION SUBCUTANEOUS at 08:34

## 2019-04-21 RX ADMIN — PANTOPRAZOLE SODIUM 40 MG: 40 INJECTION, POWDER, FOR SOLUTION INTRAVENOUS at 08:34

## 2019-04-21 RX ADMIN — FUROSEMIDE 20 MG: 10 INJECTION, SOLUTION INTRAMUSCULAR; INTRAVENOUS at 01:56

## 2019-04-21 RX ADMIN — MUPIROCIN: 20 OINTMENT TOPICAL at 08:35

## 2019-04-21 RX ADMIN — METOPROLOL TARTRATE 5 MG: 5 INJECTION INTRAVENOUS at 16:49

## 2019-04-21 RX ADMIN — METOPROLOL TARTRATE 5 MG: 5 INJECTION INTRAVENOUS at 13:40

## 2019-04-21 RX ADMIN — INSULIN LISPRO 2 UNITS: 100 INJECTION, SOLUTION INTRAVENOUS; SUBCUTANEOUS at 04:09

## 2019-04-21 RX ADMIN — FUROSEMIDE 20 MG: 10 INJECTION, SOLUTION INTRAMUSCULAR; INTRAVENOUS at 19:00

## 2019-04-21 RX ADMIN — METOPROLOL TARTRATE 5 MG: 5 INJECTION INTRAVENOUS at 08:34

## 2019-04-21 RX ADMIN — METOPROLOL TARTRATE 5 MG: 5 INJECTION INTRAVENOUS at 01:52

## 2019-04-21 RX ADMIN — MUPIROCIN: 20 OINTMENT TOPICAL at 22:05

## 2019-04-21 RX ADMIN — METOPROLOL TARTRATE 5 MG: 5 INJECTION INTRAVENOUS at 19:03

## 2019-04-21 RX ADMIN — ALBUTEROL SULFATE 2.5 MG: 2.5 SOLUTION RESPIRATORY (INHALATION) at 15:47

## 2019-04-21 RX ADMIN — METOPROLOL TARTRATE 5 MG: 5 INJECTION INTRAVENOUS at 12:17

## 2019-04-21 RX ADMIN — METOPROLOL TARTRATE 5 MG: 5 INJECTION INTRAVENOUS at 04:09

## 2019-04-21 RX ADMIN — SODIUM CHLORIDE: 9 INJECTION, SOLUTION INTRAVENOUS at 08:34

## 2019-04-21 RX ADMIN — FUROSEMIDE 20 MG: 10 INJECTION, SOLUTION INTRAMUSCULAR; INTRAVENOUS at 08:34

## 2019-04-21 RX ADMIN — Medication 10 ML: at 22:04

## 2019-04-21 RX ADMIN — FUROSEMIDE 20 MG: 10 INJECTION, SOLUTION INTRAMUSCULAR; INTRAVENOUS at 13:40

## 2019-04-21 RX ADMIN — ALBUTEROL SULFATE 2.5 MG: 2.5 SOLUTION RESPIRATORY (INHALATION) at 19:56

## 2019-04-21 RX ADMIN — METOPROLOL TARTRATE 5 MG: 5 INJECTION INTRAVENOUS at 22:04

## 2019-04-21 RX ADMIN — Medication 10 ML: at 08:34

## 2019-04-21 ASSESSMENT — PAIN SCALES - GENERAL
PAINLEVEL_OUTOF10: 0

## 2019-04-21 NOTE — PROGRESS NOTES
Physical Therapy    Facility/Department: [facility] C2 CARD TELEMETRY  Initial Assessment    NAME: Senia Fischer  : 1945  MRN: [id number]    Date of Service: 2019    Discharge Recommendations:  Home with assist PRN, Home with Home health PT   PT Equipment Recommendations  Equipment Needed: No    Assessment   Body structures, Functions, Activity limitations: Decreased functional mobility ; Decreased endurance  Assessment: Patient is a 68year old male with hx of esophageal CA with difficulty swallowing; s/p vince margie esophagectomy with mediastinal lymphadenectomy including thoracic duct, umbilical hernia repair 8-15-08. Patient requires SBA for OOB activity at this time. He is currently functioning slightly below baseline with functional mobility and endurance, and requires continued skilled therapy to address deficits and DC safely. Recommending home with PRN A. Treatment Diagnosis: decreased functional mobility  Prognosis: Good  Decision Making: Medium Complexity  Patient Education: Role of PT, safety with mobility, ther-ex, DC recs  Barriers to Learning: None  REQUIRES PT FOLLOW UP: Yes  Activity Tolerance  Activity Tolerance: Patient Tolerated treatment well       Patient Diagnosis(es): Diagnoses of Malignant neoplasm of esophagus, unspecified location ([address] and Dysphagia, unspecified type were pertinent to this visit. has a past medical history of Diabetes mellitus ([address] Esophageal cancer ([address] H/O sleep apnea, History of esophageal reflux, Hypertension, and Prostate cancer ([address] has a past surgical history that includes Colonoscopy (); Prostatectomy (); Cataract removal with implant (); Colonoscopy (2017); pr office/outpt visit,procedure only (N/A, 2018); Upper gastrointestinal endoscopy (2018); Uvulopalatopharygoplasty; Esophagus dilation (N/A, 1/3/2019); Upper gastrointestinal endoscopy (N/A, 2019);  Inguinal hernia repair (Right, ); and Esophagectomy (N/A, Sit: Unable to assess  Sit to Supine: Unable to assess  Comment: pt sitting in chair upon entry and at end of session     Transfers  Sit to Stand: Stand by assistance  Stand to sit: Stand by assistance     Ambulation  Ambulation?: Yes  Ambulation 1  Surface: level tile  Device: No Device  Assistance: Stand by assistance  Quality of Gait: Patient ambulates with decreased heel strike, moderate esteban, and no unsteadiness or LOB. Distance: 1000 ft    Stairs/Curb  Stairs?: No     Balance  Sitting - Static: Good  Sitting - Dynamic: Good  Standing - Static: Good  Standing - Dynamic: Good;-     Exercises  Hamstring Sets: HS curls with manual resistance: 20x B LE  Gluteal Sets: 20x B LE  Hip Flexion: seated marches: 20x B LE  Hip Abduction: with knee ext: 20x B LE  Knee Long Arc Quad: 20x B LE  Ankle Pumps: 20x B LE  Other exercises  Other exercises?: Yes  Other exercises 1: hip/knee flex to ext with manual resistance: 20x B LE  Other exercises 2: hip add pillow squeezes: 20x B LE     Plan   Plan  Times per week: 5-7x/wk in ICU  Times per day: Daily  Current Treatment Recommendations: Strengthening, Transfer Training, Endurance Training, Gait Training, Functional Mobility Training, Stair training, Safety Education & Training  Safety Devices  Type of devices: All fall risk precautions in place, Call light within reach, Left in chair, Nurse notified    G-Code  PT G-Codes  Functional Assessment Tool Used: AM-PAC  Score: 18  Functional Limitation: Mobility: Walking and moving around  Mobility: Walking and Moving Around Current Status (): At least 20 percent but less than 40 percent impaired, limited or restricted  Mobility: Walking and Moving Around Goal Status ():  At least 1 percent but less than 20 percent impaired, limited or restricted    AM-PAC Score  AM-PAC Inpatient Mobility without Stair Climbing Raw Score : 18  AM-PAC Inpatient without Stair Climbing T-Scale Score : 51.97  Mobility Inpatient CMS 0-100% Score: 23.26  Mobility Inpatient without Stair CMS G-Code Modifier : CJ       Goals  Short term goals  Time Frame for Short term goals: 1 week  Short term goal 1: Patient will perform bed mobility independently. Short term goal 2: Patient will perform transfers independently. Short term goal 3: Patient will ambulate 500 ft independently. Short term goal 4: Patient will ascend/descend 2 step with hand rail with SBA. Short term goal 5: Patient will perform 10-15 reps of B LE exercises.   Patient Goals   Patient goals : to return home       Therapy Time   Individual Concurrent Group Co-treatment   Time In 1402         Time Out 1430         Minutes 28         Timed Code Treatment Minutes: [address], [place] #[id number]

## 2019-04-21 NOTE — PROGRESS NOTES
Assessment complete. OOB to chair. A&Ox4. No s/s distress. No c/o pain Tele SR. Surgical incisions with dry dressings CDI. CT to H2O seal. No air leak. No crepitus. Hannon secure and draining. NG secure and to LWIS. Pt maintained NPO. Denies current needs. Call light in reach. Will monitor.

## 2019-04-21 NOTE — PROGRESS NOTES
POD #3    Doing well. Passing gas this morning. Abd soft. CT drainage low. No air leak. c/o odd \"popping\" sensation in his abdomen a few times over night. Not painful. Not quite sure what to make of this. Possible intestinal rub now that his GI tract is waking up. Will start TF via J tube at 10 cc's per hour for now. Continue current rehab.

## 2019-04-22 ENCOUNTER — APPOINTMENT (OUTPATIENT)
Dept: GENERAL RADIOLOGY | Age: 74
DRG: 327 | End: 2019-04-22
Attending: THORACIC SURGERY (CARDIOTHORACIC VASCULAR SURGERY)
Payer: MEDICARE

## 2019-04-22 LAB
ANION GAP SERPL CALCULATED.3IONS-SCNC: 13 MMOL/L (ref 3–16)
BASOPHILS ABSOLUTE: 0.1 K/UL (ref 0–0.2)
BASOPHILS RELATIVE PERCENT: 0.8 %
BUN BLDV-MCNC: 21 MG/DL (ref 7–20)
CALCIUM SERPL-MCNC: 8.3 MG/DL (ref 8.3–10.6)
CHLORIDE BLD-SCNC: 104 MMOL/L (ref 99–110)
CO2: 26 MMOL/L (ref 21–32)
CREAT SERPL-MCNC: 0.9 MG/DL (ref 0.8–1.3)
EOSINOPHILS ABSOLUTE: 0.3 K/UL (ref 0–0.6)
EOSINOPHILS RELATIVE PERCENT: 3.7 %
GFR AFRICAN AMERICAN: >60
GFR NON-AFRICAN AMERICAN: >60
GLUCOSE BLD-MCNC: 120 MG/DL (ref 70–99)
GLUCOSE BLD-MCNC: 123 MG/DL (ref 70–99)
GLUCOSE BLD-MCNC: 135 MG/DL (ref 70–99)
GLUCOSE BLD-MCNC: 146 MG/DL (ref 70–99)
GLUCOSE BLD-MCNC: 156 MG/DL (ref 70–99)
GLUCOSE BLD-MCNC: 157 MG/DL (ref 70–99)
HCT VFR BLD CALC: 35.2 % (ref 40.5–52.5)
HEMOGLOBIN: 12.2 G/DL (ref 13.5–17.5)
LYMPHOCYTES ABSOLUTE: 0.7 K/UL (ref 1–5.1)
LYMPHOCYTES RELATIVE PERCENT: 8.8 %
MCH RBC QN AUTO: 31.1 PG (ref 26–34)
MCHC RBC AUTO-ENTMCNC: 34.6 G/DL (ref 31–36)
MCV RBC AUTO: 90 FL (ref 80–100)
MONOCYTES ABSOLUTE: 0.9 K/UL (ref 0–1.3)
MONOCYTES RELATIVE PERCENT: 11.1 %
NEUTROPHILS ABSOLUTE: 6 K/UL (ref 1.7–7.7)
NEUTROPHILS RELATIVE PERCENT: 75.6 %
PDW BLD-RTO: 13.5 % (ref 12.4–15.4)
PERFORMED ON: ABNORMAL
PLATELET # BLD: 294 K/UL (ref 135–450)
PMV BLD AUTO: 6.6 FL (ref 5–10.5)
POTASSIUM SERPL-SCNC: 3.4 MMOL/L (ref 3.5–5.1)
RBC # BLD: 3.91 M/UL (ref 4.2–5.9)
SODIUM BLD-SCNC: 143 MMOL/L (ref 136–145)
WBC # BLD: 7.9 K/UL (ref 4–11)

## 2019-04-22 PROCEDURE — 85025 COMPLETE CBC W/AUTO DIFF WBC: CPT

## 2019-04-22 PROCEDURE — BD11YZZ FLUOROSCOPY OF ESOPHAGUS USING OTHER CONTRAST: ICD-10-PCS | Performed by: RADIOLOGY

## 2019-04-22 PROCEDURE — 6360000002 HC RX W HCPCS: Performed by: NURSE PRACTITIONER

## 2019-04-22 PROCEDURE — 71045 X-RAY EXAM CHEST 1 VIEW: CPT

## 2019-04-22 PROCEDURE — 94640 AIRWAY INHALATION TREATMENT: CPT

## 2019-04-22 PROCEDURE — 6370000000 HC RX 637 (ALT 250 FOR IP): Performed by: NURSE PRACTITIONER

## 2019-04-22 PROCEDURE — 2580000003 HC RX 258: Performed by: NURSE PRACTITIONER

## 2019-04-22 PROCEDURE — 43762 RPLC GTUBE NO REVJ TRC: CPT

## 2019-04-22 PROCEDURE — 2000000000 HC ICU R&B

## 2019-04-22 PROCEDURE — 97530 THERAPEUTIC ACTIVITIES: CPT

## 2019-04-22 PROCEDURE — C9113 INJ PANTOPRAZOLE SODIUM, VIA: HCPCS | Performed by: NURSE PRACTITIONER

## 2019-04-22 PROCEDURE — 99999 PR OFFICE/OUTPT VISIT,PROCEDURE ONLY: CPT | Performed by: THORACIC SURGERY (CARDIOTHORACIC VASCULAR SURGERY)

## 2019-04-22 PROCEDURE — 36415 COLL VENOUS BLD VENIPUNCTURE: CPT

## 2019-04-22 PROCEDURE — 94668 MNPJ CHEST WALL SBSQ: CPT

## 2019-04-22 PROCEDURE — 97116 GAIT TRAINING THERAPY: CPT

## 2019-04-22 PROCEDURE — 80048 BASIC METABOLIC PNL TOTAL CA: CPT

## 2019-04-22 PROCEDURE — 32551 INSERTION OF CHEST TUBE: CPT

## 2019-04-22 PROCEDURE — 2500000003 HC RX 250 WO HCPCS: Performed by: NURSE PRACTITIONER

## 2019-04-22 PROCEDURE — 2580000003 HC RX 258: Performed by: THORACIC SURGERY (CARDIOTHORACIC VASCULAR SURGERY)

## 2019-04-22 PROCEDURE — 74220 X-RAY XM ESOPHAGUS 1CNTRST: CPT

## 2019-04-22 PROCEDURE — 6360000002 HC RX W HCPCS: Performed by: THORACIC SURGERY (CARDIOTHORACIC VASCULAR SURGERY)

## 2019-04-22 RX ADMIN — INSULIN LISPRO 2 UNITS: 100 INJECTION, SOLUTION INTRAVENOUS; SUBCUTANEOUS at 21:02

## 2019-04-22 RX ADMIN — MUPIROCIN: 20 OINTMENT TOPICAL at 08:34

## 2019-04-22 RX ADMIN — FUROSEMIDE 20 MG: 10 INJECTION, SOLUTION INTRAMUSCULAR; INTRAVENOUS at 08:34

## 2019-04-22 RX ADMIN — FUROSEMIDE 20 MG: 10 INJECTION, SOLUTION INTRAMUSCULAR; INTRAVENOUS at 01:45

## 2019-04-22 RX ADMIN — METOPROLOL TARTRATE 5 MG: 5 INJECTION INTRAVENOUS at 01:44

## 2019-04-22 RX ADMIN — ALBUTEROL SULFATE 2.5 MG: 2.5 SOLUTION RESPIRATORY (INHALATION) at 07:54

## 2019-04-22 RX ADMIN — INSULIN LISPRO 2 UNITS: 100 INJECTION, SOLUTION INTRAVENOUS; SUBCUTANEOUS at 13:08

## 2019-04-22 RX ADMIN — PANTOPRAZOLE SODIUM 40 MG: 40 INJECTION, POWDER, FOR SOLUTION INTRAVENOUS at 08:35

## 2019-04-22 RX ADMIN — ALBUTEROL SULFATE 2.5 MG: 2.5 SOLUTION RESPIRATORY (INHALATION) at 12:20

## 2019-04-22 RX ADMIN — ENOXAPARIN SODIUM 40 MG: 40 INJECTION SUBCUTANEOUS at 08:35

## 2019-04-22 RX ADMIN — MUPIROCIN: 20 OINTMENT TOPICAL at 21:02

## 2019-04-22 RX ADMIN — FUROSEMIDE 20 MG: 10 INJECTION, SOLUTION INTRAMUSCULAR; INTRAVENOUS at 13:14

## 2019-04-22 RX ADMIN — ALBUTEROL SULFATE 2.5 MG: 2.5 SOLUTION RESPIRATORY (INHALATION) at 20:01

## 2019-04-22 RX ADMIN — METOPROLOL TARTRATE 5 MG: 5 INJECTION INTRAVENOUS at 08:34

## 2019-04-22 RX ADMIN — ALBUTEROL SULFATE 2.5 MG: 2.5 SOLUTION RESPIRATORY (INHALATION) at 16:52

## 2019-04-22 RX ADMIN — METOPROLOL TARTRATE 5 MG: 5 INJECTION INTRAVENOUS at 13:09

## 2019-04-22 RX ADMIN — METOPROLOL TARTRATE 5 MG: 5 INJECTION INTRAVENOUS at 18:30

## 2019-04-22 RX ADMIN — SODIUM CHLORIDE: 9 INJECTION, SOLUTION INTRAVENOUS at 05:36

## 2019-04-22 RX ADMIN — SODIUM CHLORIDE, PRESERVATIVE FREE 10 ML: 5 INJECTION INTRAVENOUS at 13:09

## 2019-04-22 RX ADMIN — METOPROLOL TARTRATE 5 MG: 5 INJECTION INTRAVENOUS at 16:01

## 2019-04-22 RX ADMIN — FUROSEMIDE 20 MG: 10 INJECTION, SOLUTION INTRAMUSCULAR; INTRAVENOUS at 21:01

## 2019-04-22 RX ADMIN — Medication 10 ML: at 08:35

## 2019-04-22 RX ADMIN — SODIUM CHLORIDE, PRESERVATIVE FREE 10 ML: 5 INJECTION INTRAVENOUS at 11:02

## 2019-04-22 RX ADMIN — METOPROLOL TARTRATE 5 MG: 5 INJECTION INTRAVENOUS at 21:02

## 2019-04-22 RX ADMIN — METOPROLOL TARTRATE 5 MG: 5 INJECTION INTRAVENOUS at 04:58

## 2019-04-22 RX ADMIN — SODIUM CHLORIDE, PRESERVATIVE FREE 10 ML: 5 INJECTION INTRAVENOUS at 16:01

## 2019-04-22 RX ADMIN — Medication 10 ML: at 21:02

## 2019-04-22 RX ADMIN — METOPROLOL TARTRATE 5 MG: 5 INJECTION INTRAVENOUS at 11:02

## 2019-04-22 ASSESSMENT — PAIN SCALES - GENERAL
PAINLEVEL_OUTOF10: 0

## 2019-04-22 NOTE — CARE COORDINATION
CM spoke to [facility] earlier today to update them of patient address and potential discharge in the next 1-2 days. CM went to bedside and updated patient and family that [facility] is continuing to follow and should be contacting them soon for discharge. Wife asking about PEG education. Nursing at bedside and states that they will likely start that process tomorrow as this is the first day we have used the tube.       Dany Hurst RN

## 2019-04-22 NOTE — FLOWSHEET NOTE
Bedside report given by Travis Howard RN, Skin assessment completed. Shift assessment completed and documented; see flowsheets for details. Pt resting in bed, VSS, Lines checked and verified. Repositioned patient, sacral Mepilex in place, call light within reach, will continue to closely monitor.

## 2019-04-22 NOTE — PLAN OF CARE
Problem: Nutrition  Goal: Optimal nutrition therapy  Outcome: Ongoing   Nutrition Problem: Inadequate oral intake  Intervention: Food and/or Nutrient Delivery: Start Tube Feeding  Nutritional Goals:  Tolerate TF at goal with wt maintenence

## 2019-04-22 NOTE — PROGRESS NOTES
Per discussion with Samuel Haley RN who cared for pt on 4/21/19, TF was not started because order for particular formula and water flushes were not clear and Dr. Kelli Lee told Saintclair Salinas RN to consult Dietitian. Saintclair Salinas RN did not start TF because the dietitian would not be in until Monday morning.  Req's received from dietitian, order placed and will start TF, today, after returning from [facility].

## 2019-04-22 NOTE — PROGRESS NOTES
CANCER WITH DIFFICULTY SWALLOWING; s/p BENITO VALENTINA ESOPHAGECTOMY WITH MEDIASTINAL LYMPHADENECTOMY INCLUDING THORACIC DUCT,UMBILICAL HERNIA REPAIR 7-40-97   General Comment  Comments: RN cleared pt for OT eval; pt up in chair, pt & wife agreeable to OT  Vital Signs  Patient Currently in Pain: Denies   Orientation  Orientation  Overall Orientation Status: Within Functional Limits  Objective             Balance  Sitting Balance: Modified independent   Standing Balance: Stand by assistance  Standing Balance  Time: ~10 minutes   Activity: functional ambulation  Comment: assist with line management  Functional Mobility  Functional - Mobility Device: No device  Activity: Other  Assist Level: Stand by assistance     Transfers  Sit to stand: Stand by assistance  Stand to sit: Stand by assistance  Transfer Comments: no AD                       Cognition  Overall Cognitive Status: WNL                    Plan   Plan  Times per week: 4-5x/ week   Current Treatment Recommendations: Functional Mobility Training, Safety Education & Training, Self-Care / ADL, Endurance Training    AM-PAC Score        AM-[facility] Inpatient Daily Activity Raw Score: 14  AM-PAC Inpatient ADL T-Scale Score : 33.39  ADL Inpatient CMS 0-100% Score: 59.67  ADL Inpatient CMS G-Code Modifier : CK    Goals  Short term goals  Time Frame for Short term goals: 1 week  Short term goal 1: SBA with bathroom mobility by 4-27-19  Short term goal 2: min assist with bed mobility   Short term goal 3: CGA with LE dressing  Short term goal 4: tolerate standing ADL's with supervision for 5 minutes  Long term goals  Time Frame for Long term goals : 2 weeks   Long term goal 1: independent with toileting hygiene  Patient Goals   Patient goals : be able to unpack \"we just moved into our new house\"       Therapy Time   Individual Concurrent Group Co-treatment   Time In 1205         Time Out 1220         Minutes 15         Timed Code Treatment Minutes: 15 Minutes       Wilfred Enrique

## 2019-04-22 NOTE — PROGRESS NOTES
Chest tubes meet criteria to remove per open heart protocol. No  air leak. no crepitus. Pt instructed on procedure. Site cleansed and prepped per protocol. Chest tubes X 2 removed without difficulty. Dry sterile dressing applied. Bilateral breath sounds audible. O2 Sats 94% on 0L nasal cannula. Patient tolerated well.

## 2019-04-22 NOTE — PROGRESS NOTES
CVTS Thoracic Progress Note:          CC:  Post op follow up 4/18/19 BHUPENDRA VALENTINA ESOPHAGECTOMY WITH MEDIASTINAL LYMPHADENECTOMY INCLUDING THORACIC DUCT, UMBILICAL HERNIA REPAIR  EGD,Jejunostomy feeding tube. Subj: awake, sitting up in chair, in NAD     Obj:    Blood pressure 139/74, pulse 84, temperature 98.6 °F (37 °C), temperature source Oral, resp. rate 16, height 5' 6\" (1.676 m), weight 181 lb 11.2 oz (82.4 kg), SpO2 95 %. Lungs CTAB   S1S2 remains RRR, SR in 80's    Abdomen rounded, soft, non-tender; J tube capped   Incision with occlusive dressing, CDI   Chest tube with 30/10/0= 40 ml drainage in last 24 hours/no airleak    Diagnostics:   Recent Labs     04/20/19  0425 04/21/19  0408 04/22/19  0415   WBC 10.0 10.1 7.9   HGB 12.0* 11.7* 12.2*   HCT 35.2* 33.5* 35.2*    234 294                                                                  Recent Labs     04/20/19  0425 04/21/19  0408 04/22/19  0415    141 143   K 4.5 4.1 3.4*    104 104   CO2 26 27 26   BUN 18 17 21*   CREATININE 1.1 0.9 0.9   GLUCOSE 158* 147* 146*       CXR: 4/19/19   No ptx, left trace pleural effusion. 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 Will d/c bustamante catheter. He will have an esophagram today (POD #4); if no leak might remove NGT later today     Prophylaxis:  Lovenox, pepcid, and currently holding colace as he is NPO. Will discuss possibly restarting colace (as liquid) tomorrow.        Mack Forde CNP  4/22/2019  8:59 AM       Patient was seen examined chart was reviewed image was reviewed personally  Agree with the previous Christi Barrera MD [facility]

## 2019-04-22 NOTE — PROGRESS NOTES
Physical Therapy  Facility/Department: [facility] C2 CARD TELEMETRY  Daily Treatment Note  NAME: Verito Zaidi  : 1945  MRN: [id number]    Date of Service: 2019    Discharge Recommendations:  Home with assist PRN   PT Equipment Recommendations  Equipment Needed: No    Patient Diagnosis(es): Diagnoses of Malignant neoplasm of esophagus, unspecified location [facility]) and Dysphagia, unspecified type were pertinent to this visit. has a past medical history of Diabetes mellitus ([facility] Esophageal cancer ([facility] H/O sleep apnea, History of esophageal reflux, Hypertension, and Prostate cancer ([facility] has a past surgical history that includes Colonoscopy (); Prostatectomy (); Cataract removal with implant (); Colonoscopy (2017); pr office/outpt visit,procedure only (N/A, 2018); Upper gastrointestinal endoscopy (2018); Uvulopalatopharygoplasty; Esophagus dilation (N/A, 1/3/2019); Upper gastrointestinal endoscopy (N/A, 2019); Inguinal hernia repair (Right, ); and Esophagectomy (N/A, 2019). Restrictions  Restrictions/Precautions  Restrictions/Precautions: General Precautions, Fall Risk  Position Activity Restriction  Other position/activity restrictions: IV, NG tube to suction , Right chest tube, telemetry  Subjective   General  Chart Reviewed: Yes  Response To Previous Treatment: Patient with no complaints from previous session.   Family / Caregiver Present: No  Referring Practitioner: Jose G Rudolph  Subjective  Subjective: Pt agreeable to therapy  General Comment  Comments: Pt resting in bed on approach; RN cleared pt for therapy  Pain Screening  Patient Currently in Pain: Denies       Objective   Bed mobility  Supine to Sit: Unable to assess  Sit to Supine: Unable to assess  Comment: Pt up in chair at start and end of session     Transfers  Sit to Stand: Supervision  Stand to sit: Supervision     Ambulation  Ambulation?: Yes  Ambulation 1  Surface: level tile  Device: No Device  Assistance: Supervision  Quality of Gait: Good esteban, no LOB or unsteadiness. Lacks heel strike bilaterally causing shuffling gait  Distance: 700 ft        Exercises  Heelslides: x 15 BLE  Hip Abduction: Supine x 15 BLE  Ankle Pumps: x 15 BLE          Assessment   Body structures, Functions, Activity limitations: Decreased functional mobility ; Decreased endurance  Assessment: Pt tolerated therapy well this date. Supervision for mobility. Anticipate one more PT session to promote independence with ambulation and stairs prior to d/c. Treatment Diagnosis: decreased functional mobility  Prognosis: Good  Patient Education: Role of PT, safety with mobility, ther-ex, DC recs  REQUIRES PT FOLLOW UP: Yes  Activity Tolerance  Activity Tolerance: Patient Tolerated treatment well            AM-PAC Score     AM-PAC Inpatient Mobility without Stair Climbing Raw Score : 18  AM-PAC Inpatient without Stair Climbing T-Scale Score : 51.97  Mobility Inpatient CMS 0-100% Score: 23.26  Mobility Inpatient without Stair CMS G-Code Modifier : CJ       Goals  Short term goals  Time Frame for Short term goals: 1 week  Short term goal 1: Patient will perform bed mobility independently. Short term goal 2: Patient will perform transfers independently. Short term goal 3: Patient will ambulate 500 ft independently. Short term goal 4: Patient will ascend/descend 2 step with hand rail with SBA. Short term goal 5: Patient will perform 10-15 reps of B LE exercises. - GOAL MET 4/22  Patient Goals   Patient goals : to return home    Plan    Plan  Times per week: 3-5 x/wk   Times per day: Daily  Current Treatment Recommendations: Strengthening, Transfer Training, Endurance Training, Gait Training, Functional Mobility Training, Stair training, Safety Education & Training  Safety Devices  Type of devices:  All fall risk precautions in place, Call light within reach, Gait belt, Nurse notified, Left in chair     Therapy Time   Individual Concurrent Group Co-treatment   Time In 0611         Time Out 1135         Minutes 12         Timed Code Treatment Minutes: [address] [facility] [address], [address] [facility], DPT #[id number]

## 2019-04-22 NOTE — CONSULTS
Nutrition Assessment    Type and Reason for Visit: Reassess, Consult(TF ordering and mgmt)    Nutrition Recommendations:   1. Recommend Initiate TF when able. Start Glucerna 1.5 (Diabetic 1.5 formula) at 20 mL/hr to start. As tolerated, Increase by 20 mL q 4 hrs until goal is met. Goal is a nocturnal cyclic schedule: Glucerna 1.5 at 100 mL/hr x 14 hrs (suggest 6p-8a) nightly. 2. Once IVF off, Recommend program free water flushes: 65 mL/hr x 14 hrs with TF to provide 910 mL free water flushes (1973 mL free water total from flushes and feeds). 3. Monitor labs: electrolytes, BG, Na, and need for adjustments in TF and/or free water flushes  4. Monitor TF tolerance. Note: GRV should not be checked on small bowel feeding tubes (as j tubes might collapse). Assess Abd distention, bowel habits, cramping, N/V, pain      Nutrition Assessment: Follow up: Pt continues in the ICU. Plans for esophagram today and initiate TF at 20 mL/hr. IVF infusing at 50 mL/hr. Pt reporting no questions/concerns at this time. CM following and has set up [facility] at d/c for TF supplies. Malnutrition Assessment:  · Malnutrition Status: No malnutrition    Nutrition Risk Level: High    Nutrient Needs:  · Estimated Daily Total Kcal: 6779-0526 kcals   · Estimated Daily Protein (g): 101-126 gm   · Estimated Daily Total Fluid (ml/day): 1 mL/kcal     Nutrition Diagnosis:   · Problem: Inadequate oral intake  · Etiology: related to Alteration in GI function     Signs and symptoms:  as evidenced by (esophageal cancer s/p surgery and J tube placement )    Objective Information:  · Nutrition-Focused Physical Findings: Remains NPO. NGT in place to suction. x 2 chest tubes in place.   · Wound Type: (Surgical incisions )  · Current Nutrition Therapies:  · Oral Diet Orders: NPO   · Anthropometric Measures:  · Ht: 5' 6\" (167.6 cm)   · Current Body Wt: 181 lb (82.1 kg)  · Admission Body Wt: 190 lb (86.2 kg)  · Ideal Body Wt: 130 lb (59 kg)   · BMI Classification: BMI 30.0 - 34.9 Obese Class I    Nutrition Interventions:   Start Tube Feeding  Continued Inpatient Monitoring    Nutrition Evaluation:   · Evaluation: Progressing toward goals   · Goals: Tolerate TF at goal with wt maintenence     · Monitoring: Nutrition Progression, TF Intake, TF Tolerance, Weight, Pertinent Labs      Electronically signed by Simone Gonzalez.  Migdalia Argueta RD, LD on 4/22/19 at 10:18 AM    Contact Number: Office: [phone]; [address]: [phone]

## 2019-04-22 NOTE — PROGRESS NOTES
Gave Bedside report to: Pinky RN    4 Eyes Skin Assessment     The patient is being assess for   Shift Handoff    I agree that 2 RN's have performed a thorough Head to Toe Skin Assessment on the patient. ALL assessment sites listed below have been assessed. Areas assessed by both nurses:   [x]   Head, Face, and Ears   [x]   Shoulders, Back, and Chest, Abdomen  [x]   Arms, Elbows, and Hands   [x]   Coccyx, Sacrum, and Ischium  [x]   Legs, Feet, and Heels        Does the Patient have Skin Breakdown?   Yes LDA WOUND CARE was Initiated documentation include the Yanni-wound, Wound Assessment, Measurements, Dressing Treatment, Drainage, and Color\",          Johann Prevention initiated:  Yes   Wound Care Orders initiated:  Yes      [id number] nurse consulted for Pressure Injury (Stage 3,4, Unstageable, DTI, NWPT, Complex wounds)and New or Established Ostomies:  NA      Primary Nurse eSignature: Electronically signed by Alexander Robert RN on 4/22/19 at 7:02 PM    **SHARE this note so that the co-signing nurse is able to place an eSignature**    Co-signer eSignature: Electronically signed by Mallika Rivas RN on 4/22/19 at 11:12 PM

## 2019-04-22 NOTE — PROGRESS NOTES
Dr. Alisha Tinoco at bedside. After examining pt  Instructed RN to remove NGT and both CTs. Will implement and continue to closely monitor.

## 2019-04-22 NOTE — PROGRESS NOTES
TF started now at 20mL/4hrs per order from Dr. Beth Osorio and diabetic formula used and 50mL/4hrs water flushes per recommendations from Dietitian.

## 2019-04-23 ENCOUNTER — APPOINTMENT (OUTPATIENT)
Dept: GENERAL RADIOLOGY | Age: 74
DRG: 327 | End: 2019-04-23
Attending: THORACIC SURGERY (CARDIOTHORACIC VASCULAR SURGERY)
Payer: MEDICARE

## 2019-04-23 LAB
ANION GAP SERPL CALCULATED.3IONS-SCNC: 13 MMOL/L (ref 3–16)
BASOPHILS ABSOLUTE: 0.1 K/UL (ref 0–0.2)
BASOPHILS RELATIVE PERCENT: 1 %
BUN BLDV-MCNC: 26 MG/DL (ref 7–20)
CALCIUM SERPL-MCNC: 8.7 MG/DL (ref 8.3–10.6)
CHLORIDE BLD-SCNC: 105 MMOL/L (ref 99–110)
CO2: 29 MMOL/L (ref 21–32)
CREAT SERPL-MCNC: 0.9 MG/DL (ref 0.8–1.3)
EOSINOPHILS ABSOLUTE: 0.3 K/UL (ref 0–0.6)
EOSINOPHILS RELATIVE PERCENT: 4.5 %
GFR AFRICAN AMERICAN: >60
GFR NON-AFRICAN AMERICAN: >60
GLUCOSE BLD-MCNC: 125 MG/DL (ref 70–99)
GLUCOSE BLD-MCNC: 143 MG/DL (ref 70–99)
GLUCOSE BLD-MCNC: 150 MG/DL (ref 70–99)
GLUCOSE BLD-MCNC: 151 MG/DL (ref 70–99)
GLUCOSE BLD-MCNC: 151 MG/DL (ref 70–99)
GLUCOSE BLD-MCNC: 168 MG/DL (ref 70–99)
GLUCOSE BLD-MCNC: 200 MG/DL (ref 70–99)
HCT VFR BLD CALC: 36 % (ref 40.5–52.5)
HEMOGLOBIN: 12.3 G/DL (ref 13.5–17.5)
LYMPHOCYTES ABSOLUTE: 0.8 K/UL (ref 1–5.1)
LYMPHOCYTES RELATIVE PERCENT: 10.9 %
MCH RBC QN AUTO: 31 PG (ref 26–34)
MCHC RBC AUTO-ENTMCNC: 34.2 G/DL (ref 31–36)
MCV RBC AUTO: 90.7 FL (ref 80–100)
MONOCYTES ABSOLUTE: 1 K/UL (ref 0–1.3)
MONOCYTES RELATIVE PERCENT: 13.9 %
NEUTROPHILS ABSOLUTE: 5 K/UL (ref 1.7–7.7)
NEUTROPHILS RELATIVE PERCENT: 69.7 %
PDW BLD-RTO: 13.3 % (ref 12.4–15.4)
PERFORMED ON: ABNORMAL
PLATELET # BLD: 327 K/UL (ref 135–450)
PMV BLD AUTO: 6.6 FL (ref 5–10.5)
POTASSIUM SERPL-SCNC: 3.4 MMOL/L (ref 3.5–5.1)
RBC # BLD: 3.96 M/UL (ref 4.2–5.9)
SODIUM BLD-SCNC: 147 MMOL/L (ref 136–145)
WBC # BLD: 7.2 K/UL (ref 4–11)

## 2019-04-23 PROCEDURE — 6360000002 HC RX W HCPCS: Performed by: THORACIC SURGERY (CARDIOTHORACIC VASCULAR SURGERY)

## 2019-04-23 PROCEDURE — 97116 GAIT TRAINING THERAPY: CPT

## 2019-04-23 PROCEDURE — 99999 PR OFFICE/OUTPT VISIT,PROCEDURE ONLY: CPT | Performed by: THORACIC SURGERY (CARDIOTHORACIC VASCULAR SURGERY)

## 2019-04-23 PROCEDURE — 6370000000 HC RX 637 (ALT 250 FOR IP): Performed by: THORACIC SURGERY (CARDIOTHORACIC VASCULAR SURGERY)

## 2019-04-23 PROCEDURE — 2580000003 HC RX 258: Performed by: NURSE PRACTITIONER

## 2019-04-23 PROCEDURE — 85025 COMPLETE CBC W/AUTO DIFF WBC: CPT

## 2019-04-23 PROCEDURE — 97535 SELF CARE MNGMENT TRAINING: CPT

## 2019-04-23 PROCEDURE — 94640 AIRWAY INHALATION TREATMENT: CPT

## 2019-04-23 PROCEDURE — 97530 THERAPEUTIC ACTIVITIES: CPT

## 2019-04-23 PROCEDURE — 2500000003 HC RX 250 WO HCPCS: Performed by: NURSE PRACTITIONER

## 2019-04-23 PROCEDURE — 6370000000 HC RX 637 (ALT 250 FOR IP): Performed by: NURSE PRACTITIONER

## 2019-04-23 PROCEDURE — 94668 MNPJ CHEST WALL SBSQ: CPT

## 2019-04-23 PROCEDURE — 71045 X-RAY EXAM CHEST 1 VIEW: CPT

## 2019-04-23 PROCEDURE — 2580000003 HC RX 258: Performed by: THORACIC SURGERY (CARDIOTHORACIC VASCULAR SURGERY)

## 2019-04-23 PROCEDURE — 97110 THERAPEUTIC EXERCISES: CPT

## 2019-04-23 PROCEDURE — 36415 COLL VENOUS BLD VENIPUNCTURE: CPT

## 2019-04-23 PROCEDURE — C9113 INJ PANTOPRAZOLE SODIUM, VIA: HCPCS | Performed by: NURSE PRACTITIONER

## 2019-04-23 PROCEDURE — 80048 BASIC METABOLIC PNL TOTAL CA: CPT

## 2019-04-23 PROCEDURE — 2000000000 HC ICU R&B

## 2019-04-23 PROCEDURE — 6360000002 HC RX W HCPCS: Performed by: NURSE PRACTITIONER

## 2019-04-23 RX ORDER — DILTIAZEM HYDROCHLORIDE 60 MG/1
30 TABLET, FILM COATED ORAL EVERY 6 HOURS SCHEDULED
Status: DISCONTINUED | OUTPATIENT
Start: 2019-04-23 | End: 2019-04-24 | Stop reason: HOSPADM

## 2019-04-23 RX ORDER — GABAPENTIN 250 MG/5ML
100 SOLUTION ORAL EVERY 8 HOURS SCHEDULED
Status: DISCONTINUED | OUTPATIENT
Start: 2019-04-23 | End: 2019-04-24 | Stop reason: HOSPADM

## 2019-04-23 RX ADMIN — GABAPENTIN 100 MG: 250 SOLUTION ORAL at 14:30

## 2019-04-23 RX ADMIN — SODIUM CHLORIDE, PRESERVATIVE FREE 10 ML: 5 INJECTION INTRAVENOUS at 07:48

## 2019-04-23 RX ADMIN — ENOXAPARIN SODIUM 40 MG: 40 INJECTION SUBCUTANEOUS at 08:30

## 2019-04-23 RX ADMIN — FUROSEMIDE 20 MG: 10 INJECTION, SOLUTION INTRAMUSCULAR; INTRAVENOUS at 00:48

## 2019-04-23 RX ADMIN — Medication 10 ML: at 08:00

## 2019-04-23 RX ADMIN — ALBUTEROL SULFATE 2.5 MG: 2.5 SOLUTION RESPIRATORY (INHALATION) at 12:00

## 2019-04-23 RX ADMIN — METOPROLOL TARTRATE 5 MG: 5 INJECTION INTRAVENOUS at 00:49

## 2019-04-23 RX ADMIN — INSULIN LISPRO 2 UNITS: 100 INJECTION, SOLUTION INTRAVENOUS; SUBCUTANEOUS at 20:40

## 2019-04-23 RX ADMIN — INSULIN LISPRO 2 UNITS: 100 INJECTION, SOLUTION INTRAVENOUS; SUBCUTANEOUS at 00:55

## 2019-04-23 RX ADMIN — ALBUTEROL SULFATE 2.5 MG: 2.5 SOLUTION RESPIRATORY (INHALATION) at 08:40

## 2019-04-23 RX ADMIN — Medication 10 ML: at 20:40

## 2019-04-23 RX ADMIN — SODIUM CHLORIDE: 9 INJECTION, SOLUTION INTRAVENOUS at 00:53

## 2019-04-23 RX ADMIN — METOPROLOL TARTRATE 5 MG: 5 INJECTION INTRAVENOUS at 04:45

## 2019-04-23 RX ADMIN — GABAPENTIN 100 MG: 250 SOLUTION ORAL at 22:07

## 2019-04-23 RX ADMIN — PANTOPRAZOLE SODIUM 40 MG: 40 INJECTION, POWDER, FOR SOLUTION INTRAVENOUS at 07:48

## 2019-04-23 RX ADMIN — FUROSEMIDE 20 MG: 10 INJECTION, SOLUTION INTRAMUSCULAR; INTRAVENOUS at 07:48

## 2019-04-23 RX ADMIN — DILTIAZEM HYDROCHLORIDE 30 MG: 60 TABLET, FILM COATED ORAL at 12:00

## 2019-04-23 RX ADMIN — INSULIN LISPRO 2 UNITS: 100 INJECTION, SOLUTION INTRAVENOUS; SUBCUTANEOUS at 17:11

## 2019-04-23 RX ADMIN — INSULIN LISPRO 2 UNITS: 100 INJECTION, SOLUTION INTRAVENOUS; SUBCUTANEOUS at 04:46

## 2019-04-23 RX ADMIN — DILTIAZEM HYDROCHLORIDE 30 MG: 60 TABLET, FILM COATED ORAL at 17:21

## 2019-04-23 RX ADMIN — ALBUTEROL SULFATE 2.5 MG: 2.5 SOLUTION RESPIRATORY (INHALATION) at 20:18

## 2019-04-23 RX ADMIN — INSULIN LISPRO 4 UNITS: 100 INJECTION, SOLUTION INTRAVENOUS; SUBCUTANEOUS at 12:01

## 2019-04-23 RX ADMIN — METOPROLOL TARTRATE 5 MG: 5 INJECTION INTRAVENOUS at 07:49

## 2019-04-23 ASSESSMENT — PAIN SCALES - GENERAL
PAINLEVEL_OUTOF10: 0

## 2019-04-23 NOTE — PLAN OF CARE
Problem: Pain:  Goal: Control of acute pain  Description  Control of acute pain  Outcome: Met This Shift  Note:   Patient able to rate pain on a scale of 0-10. Instructed patient to notify nurse if pain level increases. Call light within reach. Will continue to monitor.

## 2019-04-23 NOTE — FLOWSHEET NOTE
04/22/19 2010   Assessment   Charting Type Shift assessment   Neurological   Neuro (WDL) WDL   Orientation Level Oriented X4   Cognition Appropriate judgement; Follows commands; Appropriate safety awareness; Appropriate attention/concentration; Appropriate for developmental age   Language Clear   Size R Pupil (mm) 3   R Pupil Shape Round   R Pupil Reaction Brisk   Size L Pupil (mm) 3   L Pupil Shape Round   L Pupil Reaction Brisk   HEENT   HEENT (WDL) X   Tongue Pink & moist   Mucous Membrane Pink;Moist   Teeth Intact   Respiratory   Respiratory (WDL) WDL   Respiratory Pattern Regular   L Breath Sounds Clear   R Breath Sounds Clear   Breath Sounds   Right Upper Lobe Clear   Right Middle Lobe Clear   Right Lower Lobe Clear   Left Upper Lobe Clear   Left Lower Lobe Clear   Cardiac   Cardiac (WDL) WDL   Cardiac Rhythm NSR   Cardiac Regularity Regular   Heart Sounds S1, S2   Cardiac Monitor   Telemetry Monitor On Yes   Telemetry Audible Yes   Telemetry Alarms Set Yes   Telemetry Box Number ICU Monitor   Gastrointestinal   Abdominal (WDL) X   RUQ Bowel Sounds Active   LUQ Bowel Sounds Active   RLQ Bowel Sounds Active   LLQ Bowel Sounds Active   Abdomen Inspection Soft  (jtube)   Tenderness Soft;Tenderness   Peripheral Vascular   Peripheral Vascular (WDL) X   Edema Right lower extremity; Left lower extremity   RLE Edema +1   LLE Edema +1   Skin Color/Condition   Skin Color/Condition (WDL) WDL   Skin Color Appropriate for ethnicity   Skin Condition/Temp Warm   Skin Integrity   Skin Integrity (WDL) X   Skin Integrity Other (Comment)  (surgical incision)   Location ABD   Preventative Dressing Yes   Dressing Site Sacrum   Date Applied 04/21/19   Assessed this shift?  Yes   Musculoskeletal   Musculoskeletal (WDL) WDL   Genitourinary   Genitourinary (WDL) WDL   Flank Tenderness No   Suprapubic Tenderness No   Dysuria No   Urine Assessment   Urine Color Yellow/straw   Urine Appearance Clear   Incontinence No   Anus/Rectum

## 2019-04-23 NOTE — CARE COORDINATION
[facility]    Referral received from BALTAZAR Bhatt RN CM with request for SN services- pt to dc home with new tube feeds. Writer made aware services have already been set in place for [address] and liaison is to come to [facility] to do a teach with pt and wife this evening. CM reports the pts anticipated DC date is 4/24. CM made writer aware the pt will be [address] [facility] [facility] to [address] [place] [address]. Orab [id number]. I will continue to follow patient for needs, and arrange [facility] services prior to DC. Should pt dc over the weekend please fax facesheet, order, LYSSA, and H&P to [facility].      Hu Joshi RN CTN with April Ramirez [facility]  DHT:[phone]

## 2019-04-23 NOTE — PLAN OF CARE
[address] REHAB PHASE I  Esophagectomy    Chris Chavez Back   1945 04/23/19    Previous cardiac rehab notes: ambulate 800*ft\"      Activity limitations:  shortness of breath  fatigue    Patient and family's stated goal of care prior to discharge:  Patient goal(s): reduce shortness of breath  increase activity tolerance  be more comfortable      History:  Past Medical History:   Diagnosis Date    [facility]     [facility]     H/O sleep apnea     resolved after surgery    History of esophageal reflux     Hypertension     [facility] ([facility]          ACTIVITY TOLERANCE    Patient's baseline reported from RN prior to activity:  RPE: 6/20   RPD: 0.5/10   O2: 96% on RA   HR:  74   BP:  127/66       Patient's tolerance during activity:  Stage 3  Ambulate:   Patient assistance level: stand by   Assist Devices: none       RPE: 14/20   RPD: 3/10   Rest breaks none   Distance 800 feet   /71   HR 83   O2 93% on RA      Time: 30 Minutes    Discussed with RN to see patient. Per RN-okay to work with pt. Pt is independent moving from sitting to standing position from chair. Pt ambulated in hallway with stand by assist and no assistive device. Assisted pt back to chair and placed phone and call light within reach. Pt has no needs at this time. Touched base with RN after pt's session, discussed pt toleration. Will continue to follow up during the duration of the CR order.      KELLEE Galicia 04/23/19 12:45 PM    Exercise Physiologist    Phone: [phone]

## 2019-04-23 NOTE — PROGRESS NOTES
Yes  Patient assessed for rehabilitation services?: Yes  Family / Caregiver Present: No  Referring Practitioner: Dr. Chelsy Rose  Diagnosis: [address]; s/p BENTIO VALENTINA ESOPHAGECTOMY WITH MEDIASTINAL LYMPHADENECTOMY INCLUDING THORACIC DUCT,UMBILICAL HERNIA REPAIR 0-94-72   Subjective  Subjective: \"I have been waiting to get up\"  General Comment  Comments: Per RN ok for therapy  Pain Assessment  Pain Assessment: 0-10  Vital Signs  Patient Currently in Pain: Denies   Orientation  Orientation  Overall Orientation Status: Within Functional Limits  Objective    ADL  Grooming: Supervision(stood sinkside to wash face/hands and complete oral care)  LE Dressing: Minimal assistance(don/doff sock seated in chair; wife reported she could assist prn)     Balance  Sitting Balance: Modified independent   Standing Balance: Stand by assistance  Standing Balance  Time: ~10 minutes   Activity: functional ambulation; bathroom mobility  Comment: assist with line management  Functional Mobility  Functional - Mobility Device: No device  Activity: Other  Assist Level: Stand by assistance  Toilet Transfers  Toilet Transfer: Unable to assess  Toilet Transfers Comments: pt declined toilet transfer  Bed mobility  Supine to Sit: Unable to assess  Sit to Supine: Unable to assess  Comment: pt up in chair at start and end of sessoion  Transfers  Sit to stand: Stand by assistance  Stand to sit: Stand by assistance  Transfer Comments: no AD     Cognition  Overall Cognitive Status: WFL     LUE AROM (degrees)  LUE AROM : WFL  RUE AROM (degrees)  RUE AROM : WFL      Plan   Plan  Times per week: 4-5x/ week   Current Treatment Recommendations: Functional Mobility Training, Safety Education & Training, Self-Care / ADL, Endurance Training    AM-PAC Score     AM-PAC Inpatient Daily Activity Raw Score: 15  AM-PAC Inpatient ADL T-Scale Score : 34.69  ADL Inpatient CMS 0-100% Score: 56.46  ADL Inpatient CMS G-Code Modifier :

## 2019-04-23 NOTE — PROGRESS NOTES
CVTS Thoracic Progress Note:          CC:  Post op follow up 4/18/19 BHUPENDRA VALENTINA ESOPHAGECTOMY WITH MEDIASTINAL LYMPHADENECTOMY INCLUDING THORACIC DUCT, UMBILICAL HERNIA REPAIR  EGD,Jejunostomy feeding tube. Subj: awake, sitting up in chair, in NAD     Obj:    Blood pressure 130/81, pulse 74, temperature 98.1 °F (36.7 °C), temperature source Oral, resp. rate 15, height 5' 6\" (1.676 m), weight 172 lb 13.5 oz (78.4 kg), SpO2 94 %. Lungs CTAB   S1S2 remains RRR, SR in 80's    Abdomen rounded, soft, non-tender; J tube with TF at 20 ml/hr    Diagnostics:   Recent Labs     04/21/19  0408 04/22/19  0415 04/23/19  0414   WBC 10.1 7.9 7.2   HGB 11.7* 12.2* 12.3*   HCT 33.5* 35.2* 36.0*    294 327                                                                  Recent Labs     04/21/19  0408 04/22/19  0415 04/23/19  0414    143 147*   K 4.1 3.4* 3.4*    104 105   CO2 27 26 29   BUN 17 21* 26*   CREATININE 0.9 0.9 0.9   GLUCOSE 147* 146* 151*       CXR: 4/19/19   No ptx, left trace pleural effusion. 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      Prophylaxis:  Lovenox, pepcid    Robb Contreras [facility]  4/23/2019  11:43 AM       Patient was seen examined chart was reviewed image was reviewed personally  Agree with the previous Karen Lopez MD [facility]

## 2019-04-23 NOTE — PROGRESS NOTES
04/23/19 1201   Oxygen Therapy/Pulse Ox   SpO2 95 %   Treatment   Treatment Type HHN;Vibratory Mucous Clearing Therapy or Intervention   $Bronchial Hygiene $Subsequent   $Treatment Type $Medication Delivery   Medications Albuterol   Duration 10   Is patient on O2?  N   Pre-Tx Pulse 86   Pre-Tx Resps 16   Breath Sounds Pre-Tx KORIN Diminished   Breath Sounds Pre-Tx LLL Diminished   Breath Sounds Pre-Tx RUL Diminished   Breath Sounds Pre-Tx RML Diminished   Breath Sounds Pre-Tx RLL Diminished   Breath Sounds Post-Tx KORIN Diminished   Breath Sounds Post-Tx LLL Diminished   Breath Sounds Post-Tx RUL Diminished   Breath Sounds Post-Tx RML Diminished   Breath Sounds Post-Tx RLL Diminished   Post-Tx Pulse 86   Post-Tx Resps 16   Delivery Source Air   Position Sitting   Tx Tolerance Well   Cough/Sputum   Sputum How Obtained Cough on request   Cough Non-productive   Patient Observation   Observations aerobika in line with tx

## 2019-04-23 NOTE — PROGRESS NOTES
Nutrition Assessment (Enteral Nutrition)    Type and Reason for Visit: Reassess     Nutrition Recommendations:   1. Recommend Initiate TF when able. Start Glucerna 1.5 (Diabetic 1.5 formula) at 20 mL/hr to start. As tolerated, Increase by 20 mL q 4 hrs until goal of 70 mL/hr x 20 hrs is met.   2. Once IVF off, Recommend 125 mL flush q 3 hrs. Monitor Na, IVF and need for flushes   3. Monitor labs: electrolytes, BG, Na  4. Monitor TF tolerance. Note: GRV should not be checked on small bowel feeding tubes (as j tubes might collapse). Assess Abd distention, bowel habits, cramping, N/V, pain       Nutrition Assessment: Follow up: Pt tolerating trophic TF of Glucerna 1.5 at 20 mL/hr. Plans to titrate toward goal today. Pt and wife seen in room. RN providing edu on using kangaroo TF pump. Rodo to come and provide further TF education in the hospital p/t d/c. Provded TF guide to pt wife. No further questions reported at this time. Malnutrition Assessment:  · Malnutrition Status: At risk for malnutrition    Nutrition Risk Level: High    Nutrition Needs:  · Estimated Daily Total Kcal: 3584-0964 kcals   · Estimated Daily Protein (g): 101-126 gm   · Estimated Daily Fluid (ml/day): 1 mL/kcal     Nutrition Diagnosis:   · Problem: Inadequate oral intake  · Etiology: related to Alteration in GI function     Signs and symptoms:  as evidenced by (esophageal cancer s/p surgery and J tube placement )    Objective Information:  · Nutrition-Focused Physical Findings: NGT removed. + BM 4/22. chest tubes removed. · Wound Type: (Surgical incisions )  · Current Nutrition Therapies:  · Oral Diet Orders: NPO   · Oral Diet intake: NPO  · Tube Feeding (TF) Orders:   · Feeding Route: Nasogastric  · Formula: 1.5 Diabetic  · Current TF & Flush Orders Provides: If continuous schedule desired, recommend Glucerna 1.5 at 70 mL/hr x 20 hrs to provide 1400 mL TV, 2100 kcals, 116 gm protein, and 1063 mL free fluid.  recommend 125 mL free

## 2019-04-23 NOTE — PROGRESS NOTES
Physical Therapy  Facility/Department: [facility] C2 CARD TELEMETRY  Daily Treatment Note and Discharge Summary  NAME: Safia Zamora  : 1945  MRN: [id number]    Date of Service: 2019    Discharge Recommendations:  Home with assist PRN   PT Equipment Recommendations  Equipment Needed: No    Patient Diagnosis(es): Diagnoses of Malignant neoplasm of esophagus, unspecified location [facility]) and Dysphagia, unspecified type were pertinent to this visit. has a past medical history of Diabetes mellitus ([facility] Esophageal cancer ([facility] H/O sleep apnea, History of esophageal reflux, Hypertension, and Prostate cancer ([facility] has a past surgical history that includes Colonoscopy (); Prostatectomy (); Cataract removal with implant (); Colonoscopy (2017); pr office/outpt visit,procedure only (N/A, 2018); Upper gastrointestinal endoscopy (2018); Uvulopalatopharygoplasty; Esophagus dilation (N/A, 1/3/2019); Upper gastrointestinal endoscopy (N/A, 2019); Inguinal hernia repair (Right, ); and Esophagectomy (N/A, 2019). Restrictions  Restrictions/Precautions  Restrictions/Precautions: General Precautions, Fall Risk  Position Activity Restriction  Other position/activity restrictions: IV, telemetry  Subjective   General  Chart Reviewed: Yes  Response To Previous Treatment: Patient with no complaints from previous session. Family / Caregiver Present: No  Referring Practitioner: Corey Molina  Subjective  Subjective: Pt agreeable to therapy  General Comment  Comments: Pt up in chair on approach; RN cleared pt for therapy  Pain Screening  Patient Currently in Pain: Denies       Objective   Bed mobility  Supine to Sit: Unable to assess  Sit to Supine: Unable to assess  Comment: Pt up in chair at start and end of session; Declined to practice bed mobility stating he is indep with no concerns     Transfers  Sit to Stand: Independent  Stand to sit:  Independent     Ambulation  Ambulation?: Yes  Ambulation 1  Surface: level tile  Device: No Device  Assistance: Independent  Quality of Gait: Good esteban, no LOB or unsteadiness. Distance: 700 ft    Stairs  # Steps : 4  Stairs Height: 6\"  Rails: Bilateral  Device: No Device  Assistance: Modified independent   Comment: Reciprocal pattern, no LOB or unsteadiness        Exercises  Hip Flexion: seated marches x 15 BLE  Hip Abduction: Seated clamshells x 15 BLE  Knee Long Arc Quad: x 15 BLE  Ankle Pumps: x 15 BLE  Comments: Pt given handout of seated and standing HEP. Instructed to perform 10-15 reps, 2x/day one sheet. Pt verbalized understanding             Assessment   Body structures, Functions, Activity limitations: Decreased functional mobility ; Decreased endurance  Assessment: Pt tolerated therapy well this date. Pt has progressed to independent with transfers, ambulation, and stairs. Pt declines concerns or questions about mobility at home. Will d/c from acute PT due to all goals met. Treatment Diagnosis: decreased functional mobility  Prognosis: Good  Patient Education: Role of PT, safety with mobility, ther-ex, DC recs  No Skilled PT: Safe to return home  REQUIRES PT FOLLOW UP: No  Activity Tolerance  Activity Tolerance: Patient Tolerated treatment well            AM-PAC Score  AM-PAC Inpatient Mobility Raw Score : 24  AM-PAC Inpatient T-Scale Score : 61.14  Mobility Inpatient CMS 0-100% Score: 0  Mobility Inpatient CMS G-Code Modifier : CH         Goals  Short term goals  Time Frame for Short term goals: 1 week  Short term goal 1: Patient will perform bed mobility independently. - GOAL MET 4/23  Short term goal 2: Patient will perform transfers independently. - GOAL MET 4/23  Short term goal 3: Patient will ambulate 500 ft independently. - GOAL MET 4/23  Short term goal 4: Patient will ascend/descend 2 step with hand rail with SBA. - GOAL MET 4/23  Short term goal 5: Patient will perform 10-15 reps of B LE exercises.  - GOAL MET 4/22  Patient Goals

## 2019-04-23 NOTE — PLAN OF CARE
Problem: Cardiac Output - Decreased:  Goal: Hemodynamic stability will improve  Description  Hemodynamic stability will improve  Outcome: Met This Shift     Problem: Gas Exchange - Impaired:  Goal: Levels of oxygenation will improve  Description  Levels of oxygenation will improve  Outcome: Met This Shift     Problem: Mental Status - Impaired:  Goal: Mental status will be restored to baseline  Description  Mental status will be restored to baseline  Outcome: Met This Shift     Problem: Skin Integrity - Impaired:  Goal: Will show no infection signs and symptoms  Description  Will show no infection signs and symptoms  Outcome: Met This Shift     Problem: Tissue Perfusion - Cardiopulmonary, Altered:  Goal: Absence of angina  Description  Absence of angina  Outcome: Met This Shift  Goal: Hemodynamic stability will improve  Description  Hemodynamic stability will improve  Outcome: Met This Shift     Problem: Discharge Planning:  Goal: Participates in care planning  Description  Participates in care planning  Outcome: Ongoing     Problem: Airway Clearance - Ineffective:  Goal: Ability to maintain a clear airway will improve  Description  Ability to maintain a clear airway will improve  Outcome: Ongoing     Problem: Anxiety/Stress:  Goal: Level of anxiety will decrease  Description  Level of anxiety will decrease  Outcome: Ongoing     Problem: Aspiration:  Goal: Absence of aspiration  Description  Absence of aspiration  Outcome: Ongoing     Problem:  Bowel Function - Altered:  Goal: Bowel elimination is within specified parameters  Description  Bowel elimination is within specified parameters  Outcome: Ongoing     Problem: Fluid Volume - Imbalance:  Goal: Absence of imbalanced fluid volume signs and symptoms  Description  Absence of imbalanced fluid volume signs and symptoms  Outcome: Ongoing     Problem: Nutrition Deficit:  Goal: Ability to achieve adequate nutritional intake will improve  Description  Ability to achieve adequate nutritional intake will improve  Outcome: Ongoing     Problem: Pain:  Goal: Pain level will decrease  Description  Pain level will decrease  Outcome: Ongoing  Goal: Recognizes and communicates pain  Description  Recognizes and communicates pain  Outcome: Ongoing  Goal: Control of acute pain  Description  Control of acute pain  Outcome: Ongoing     Problem: Serum Glucose Level - Abnormal:  Goal: Ability to maintain appropriate glucose levels will improve to within specified parameters  Description  Ability to maintain appropriate glucose levels will improve to within specified parameters  Outcome: Ongoing  Goal: Ability to maintain appropriate glucose levels will improve  Description  Ability to maintain appropriate glucose levels will improve  Outcome: Ongoing     Problem: Skin Integrity - Impaired:  Goal: Absence of new skin breakdown  Description  Absence of new skin breakdown  Outcome: Ongoing     Problem: Sleep Pattern Disturbance:  Goal: Appears well-rested  Description  Appears well-rested  Outcome: Ongoing     Problem: Tissue Perfusion, Altered:  Goal: Circulatory function within specified parameters  Description  Circulatory function within specified parameters  Outcome: Ongoing     Problem: Pain:  Goal: Pain level will decrease  Description  Pain level will decrease  Outcome: Ongoing  Goal: Control of acute pain  Description  Control of acute pain  4/23/2019 1310 by Fabián Torres RN  Outcome: Ongoing  4/23/2019 0335 by Betito Mayorga RN  Outcome: Met This Shift  Note:   Patient able to rate pain on a scale of 0-10. Instructed patient to notify nurse if pain level increases. Call light within reach. Will continue to monitor.         Problem: Falls - Risk of:  Goal: Will remain free from falls  Description  Will remain free from falls  Outcome: Ongoing  Goal: Absence of physical injury  Description  Absence of physical injury  Outcome: Ongoing     Problem: Nutrition  Goal: Optimal nutrition therapy  Outcome: Ongoing     Problem: Sensory Perception - Impaired:  Goal: Ability to maintain a stable neurologic state will improve  Description  Ability to maintain a stable neurologic state will improve  Outcome: Ongoing

## 2019-04-23 NOTE — PLAN OF CARE
[address] REHAB PHASE I  Esophagectomy    [place] Rounds   1945 04/23/19    Previous cardiac rehab notes: ambulate 350 feet      Pt is currently working with OT at this time. Will follow up with activity and/or ambulation when appropriate. Will continue to follow up during the duration of the CR order.      KELLEE Galicia 04/23/19 11:00 AM    Exercise Physiologist    Phone: [phone]

## 2019-04-23 NOTE — CARE COORDINATION
Lamine Alston with [employer] called stating they are coming this evening to hospital to provide patient with supplies and pump. They will go over how to use pump at that time. There is NO ongoing education. LD spoke to Jamie Coon NP regarding this. Will arrange [facility] through [address] [place] [address] to provide this.   Maia Watt RN

## 2019-04-23 NOTE — FLOWSHEET NOTE
Bedside report given by [facility] RN, Skin assessment completed. Shift assessment completed and documented; see flowsheets for details. Pt resting in bed, VSS, Lines, and dressings checked and verified. Repositioned patient, sacral Mepilex in place, call light within reach, will continue to closely monitor.

## 2019-04-24 ENCOUNTER — APPOINTMENT (OUTPATIENT)
Dept: GENERAL RADIOLOGY | Age: 74
DRG: 327 | End: 2019-04-24
Attending: THORACIC SURGERY (CARDIOTHORACIC VASCULAR SURGERY)
Payer: MEDICARE

## 2019-04-24 VITALS
DIASTOLIC BLOOD PRESSURE: 54 MMHG | WEIGHT: 173 LBS | RESPIRATION RATE: 14 BRPM | BODY MASS INDEX: 27.8 KG/M2 | HEIGHT: 66 IN | TEMPERATURE: 98.4 F | OXYGEN SATURATION: 94 % | HEART RATE: 88 BPM | SYSTOLIC BLOOD PRESSURE: 124 MMHG

## 2019-04-24 LAB
ANION GAP SERPL CALCULATED.3IONS-SCNC: 11 MMOL/L (ref 3–16)
BASOPHILS ABSOLUTE: 0.1 K/UL (ref 0–0.2)
BASOPHILS RELATIVE PERCENT: 0.7 %
BUN BLDV-MCNC: 28 MG/DL (ref 7–20)
CALCIUM SERPL-MCNC: 9.1 MG/DL (ref 8.3–10.6)
CHLORIDE BLD-SCNC: 105 MMOL/L (ref 99–110)
CO2: 31 MMOL/L (ref 21–32)
CREAT SERPL-MCNC: 0.9 MG/DL (ref 0.8–1.3)
EOSINOPHILS ABSOLUTE: 0.4 K/UL (ref 0–0.6)
EOSINOPHILS RELATIVE PERCENT: 4.4 %
GFR AFRICAN AMERICAN: >60
GFR NON-AFRICAN AMERICAN: >60
GLUCOSE BLD-MCNC: 182 MG/DL (ref 70–99)
GLUCOSE BLD-MCNC: 190 MG/DL (ref 70–99)
GLUCOSE BLD-MCNC: 191 MG/DL (ref 70–99)
GLUCOSE BLD-MCNC: 193 MG/DL (ref 70–99)
GLUCOSE BLD-MCNC: 216 MG/DL (ref 70–99)
HCT VFR BLD CALC: 36.8 % (ref 40.5–52.5)
HEMOGLOBIN: 12.4 G/DL (ref 13.5–17.5)
LYMPHOCYTES ABSOLUTE: 1 K/UL (ref 1–5.1)
LYMPHOCYTES RELATIVE PERCENT: 11 %
MAGNESIUM: 2.7 MG/DL (ref 1.8–2.4)
MCH RBC QN AUTO: 30.6 PG (ref 26–34)
MCHC RBC AUTO-ENTMCNC: 33.6 G/DL (ref 31–36)
MCV RBC AUTO: 91.2 FL (ref 80–100)
MONOCYTES ABSOLUTE: 1.2 K/UL (ref 0–1.3)
MONOCYTES RELATIVE PERCENT: 13.6 %
NEUTROPHILS ABSOLUTE: 6.1 K/UL (ref 1.7–7.7)
NEUTROPHILS RELATIVE PERCENT: 70.3 %
PDW BLD-RTO: 13.3 % (ref 12.4–15.4)
PERFORMED ON: ABNORMAL
PLATELET # BLD: 354 K/UL (ref 135–450)
PMV BLD AUTO: 6.7 FL (ref 5–10.5)
POTASSIUM SERPL-SCNC: 3.6 MMOL/L (ref 3.5–5.1)
RBC # BLD: 4.04 M/UL (ref 4.2–5.9)
SODIUM BLD-SCNC: 147 MMOL/L (ref 136–145)
WBC # BLD: 8.7 K/UL (ref 4–11)

## 2019-04-24 PROCEDURE — 71045 X-RAY EXAM CHEST 1 VIEW: CPT

## 2019-04-24 PROCEDURE — 94668 MNPJ CHEST WALL SBSQ: CPT

## 2019-04-24 PROCEDURE — 2580000003 HC RX 258: Performed by: THORACIC SURGERY (CARDIOTHORACIC VASCULAR SURGERY)

## 2019-04-24 PROCEDURE — 94640 AIRWAY INHALATION TREATMENT: CPT

## 2019-04-24 PROCEDURE — 6360000002 HC RX W HCPCS: Performed by: NURSE PRACTITIONER

## 2019-04-24 PROCEDURE — 6360000002 HC RX W HCPCS: Performed by: THORACIC SURGERY (CARDIOTHORACIC VASCULAR SURGERY)

## 2019-04-24 PROCEDURE — 36415 COLL VENOUS BLD VENIPUNCTURE: CPT

## 2019-04-24 PROCEDURE — 83735 ASSAY OF MAGNESIUM: CPT

## 2019-04-24 PROCEDURE — 80048 BASIC METABOLIC PNL TOTAL CA: CPT

## 2019-04-24 PROCEDURE — 99999 PR OFFICE/OUTPT VISIT,PROCEDURE ONLY: CPT | Performed by: THORACIC SURGERY (CARDIOTHORACIC VASCULAR SURGERY)

## 2019-04-24 PROCEDURE — 85025 COMPLETE CBC W/AUTO DIFF WBC: CPT

## 2019-04-24 PROCEDURE — C9113 INJ PANTOPRAZOLE SODIUM, VIA: HCPCS | Performed by: NURSE PRACTITIONER

## 2019-04-24 PROCEDURE — 6370000000 HC RX 637 (ALT 250 FOR IP): Performed by: THORACIC SURGERY (CARDIOTHORACIC VASCULAR SURGERY)

## 2019-04-24 PROCEDURE — 6370000000 HC RX 637 (ALT 250 FOR IP): Performed by: NURSE PRACTITIONER

## 2019-04-24 RX ORDER — OXYCODONE HCL 5 MG/5 ML
5 SOLUTION, ORAL ORAL EVERY 6 HOURS PRN
Qty: 60 ML | Refills: 0 | Status: ON HOLD | OUTPATIENT
Start: 2019-04-24 | End: 2019-04-26

## 2019-04-24 RX ORDER — GABAPENTIN 250 MG/5ML
100 SOLUTION ORAL EVERY 8 HOURS SCHEDULED
Qty: 84 ML | Refills: 0 | Status: SHIPPED | OUTPATIENT
Start: 2019-04-24 | End: 2019-06-18

## 2019-04-24 RX ADMIN — INSULIN LISPRO 2 UNITS: 100 INJECTION, SOLUTION INTRAVENOUS; SUBCUTANEOUS at 04:09

## 2019-04-24 RX ADMIN — DILTIAZEM HYDROCHLORIDE 30 MG: 60 TABLET, FILM COATED ORAL at 00:13

## 2019-04-24 RX ADMIN — INSULIN LISPRO 2 UNITS: 100 INJECTION, SOLUTION INTRAVENOUS; SUBCUTANEOUS at 08:38

## 2019-04-24 RX ADMIN — ALBUTEROL SULFATE 2.5 MG: 2.5 SOLUTION RESPIRATORY (INHALATION) at 08:52

## 2019-04-24 RX ADMIN — PANTOPRAZOLE SODIUM 40 MG: 40 INJECTION, POWDER, FOR SOLUTION INTRAVENOUS at 08:28

## 2019-04-24 RX ADMIN — ALBUTEROL SULFATE 2.5 MG: 2.5 SOLUTION RESPIRATORY (INHALATION) at 11:48

## 2019-04-24 RX ADMIN — ENOXAPARIN SODIUM 40 MG: 40 INJECTION SUBCUTANEOUS at 08:30

## 2019-04-24 RX ADMIN — GABAPENTIN 100 MG: 250 SOLUTION ORAL at 05:19

## 2019-04-24 RX ADMIN — DILTIAZEM HYDROCHLORIDE 30 MG: 60 TABLET, FILM COATED ORAL at 05:19

## 2019-04-24 RX ADMIN — DILTIAZEM HYDROCHLORIDE 30 MG: 60 TABLET, FILM COATED ORAL at 12:29

## 2019-04-24 RX ADMIN — Medication 10 ML: at 08:33

## 2019-04-24 RX ADMIN — INSULIN LISPRO 4 UNITS: 100 INJECTION, SOLUTION INTRAVENOUS; SUBCUTANEOUS at 12:35

## 2019-04-24 RX ADMIN — INSULIN LISPRO 2 UNITS: 100 INJECTION, SOLUTION INTRAVENOUS; SUBCUTANEOUS at 00:15

## 2019-04-24 NOTE — CARE COORDINATION
[facility]    Spoke with pt and spouse, Sonnie Hamman, at bedside about [facility], all questions answered. Pt and spouse are agreeable to SN visits to assist with tube feeds, continuous- see order. Rafa Sinclair came to [facility] 4/23pm to deliver supplies, etc. Pt aware agency will call to schedule SOC later today, waiting for DC order. Please call wife's phone, [phone]. Writer discussed with agency [facility] and [facility] can provide for [facility] this evening, pt, spouse and NP are aware. Demographics verified, pt lives at home with his spouse. Orders will be faxed to [facility]. Should pt dc over the weekend please fax facesheet, order, LYSSA, and H&P to [facility].      Lizbeth Crouch RN CTN with [facility] [address]  ZGJ:[phone]

## 2019-04-24 NOTE — DISCHARGE INSTR - COC
Continuity of Care Form    Patient Name: Andrew Saldivar   :  1945  MRN:  [id number]    Admit date:  2019  Discharge date:  19    Code Status Order: Full Code   Advance Directives:   Advance Care Flowsheet Documentation     Date/Time Healthcare Directive Type of Healthcare Directive Copy in [address] Agent's Name Healthcare Agent's Phone Number    19 1022  Yes, patient has an advance directive for healthcare treatment  --  --  --  --  --    19 1412  Yes, patient has an advance directive for healthcare treatment  Durable power of  for health care;Living will  Yes, copy in chart  Spouse  --  --          Admitting Physician:  Zuleyma Dang MD  PCP: Edward Batres MD    Discharging Nurse: [facility] Unit/Room#: 3499/8780-76  Discharging Unit Phone Number: ***    Emergency Contact:   Extended Emergency Contact Information  Primary Emergency Contact: [place]  Address: [address]  [address] Phone: [phone]  Mobile Phone: [phone]  Relation: Spouse    Past Surgical History:  Past Surgical History:   Procedure Laterality Date    CATARACT REMOVAL WITH IMPLANT      COLONOSCOPY      negative    COLONOSCOPY  2017    diverticulosis    ESOPHAGEAL DILATATION N/A 1/3/2019    ESOPHAGOGASTRODUODENOSCOPY WITH BIOPSY  CPT CODE - 07157 performed by Zuleyma Dang MD at [facility] ESOPHAGECTOMY N/A 2019    BENITO VALENTINA ESOPHAGECTOMY WITH MEDIASTINAL LYMPHADENECTOMY INCLUDING THORACIC DUCT, UMBILICAL HERNIA REPAIR performed by Zuleyma Dang MD at [address] [place] [address] OFFICE/OUTPT VISIT,PROCEDURE ONLY N/A 2018    EUS/ WITH ANESTHESIA performed by Luisana Webb MD at [address] [place] [address] ENDOSCOPY  2018    EGD BIOPSY performed by Jerson Judd Edvin Abreu MD at [facility] ENDOSCOPY N/A 4/2/2019    ESOPHAGOGASTRODUODENOSCOPY WITH BIOPSY  CPT CODE - 75278 performed by Rina Morales MD at [address]      hx of sleep apnea       Immunization History:   Immunization History   Administered Date(s) Administered    Influenza, High Dose (Fluzone 65 yrs and older) 10/22/2018       Active Problems:  Patient Active Problem List   Diagnosis Code    Trochanteric bursitis of left hip M70.62    Esophagus cancer (HCC) C15.9       Isolation/Infection:   Isolation          No Isolation            Nurse Assessment:  Last Vital Signs: /67   Pulse 91   Temp 98.7 °F (37.1 °C) (Oral)   Resp 14   Ht 5' 6\" (1.676 m)   Wt 173 lb (78.5 kg)   SpO2 94%   BMI 27.92 kg/m²     Last documented pain score (0-10 scale): Pain Level: 0  Last Weight:   Wt Readings from Last 1 Encounters:   04/24/19 173 lb (78.5 kg)     Mental Status:  {IP PT MENTAL STATUS:60087}    IV Access:  { LYSSA IV ACCESS:086173551}    Nursing Mobility/ADLs:  Walking   {CHP DME IRNA:468344577}  Transfer  {CHP DME WCFC:655642203}  Bathing  {CHP DME YFNC:365899704}  Dressing  {CHP DME XAFS:022020832}  Toileting  {CHP DME KPGD:368339356}  Feeding  {CHP DME SEKN:395834708}  Med Admin  {CHP DME FAEP:457418817}  Med Delivery   [facility] MED Delivery:065012849}    Wound Care Documentation and Therapy:        Elimination:  Continence:   · Bowel: {YES / LP:62849}  · Bladder: {YES / XW:10904}  Urinary Catheter: {Urinary Catheter:209189889}   Colostomy/Ileostomy/Ileal Conduit: {YES / KS:22126}       Date of Last BM: ***    Intake/Output Summary (Last 24 hours) at 4/24/2019 1025  Last data filed at 4/23/2019 2200  Gross per 24 hour   Intake 637 ml   Output 600 ml   Net 37 ml     I/O last 3 completed shifts: In: 1 [P.O.:236;  I.V.:89; NG/GT:391]  Out: 600 [Urine:600]    Safety Concerns:     [address] Safety Concerns:786446096}    Impairments/Disabilities: [address] [facility] Impairments/Disabilities:499272747}    Nutrition Therapy:  Current Nutrition Therapy:   [address] [facility] Diet List:630667398}    Routes of Feeding: {CHP DME Other Feedings:057448516}  Liquids: {Slp liquid thickness:48052}  Daily Fluid Restriction: {CHP DME Yes amt example:848648269}  Last Modified Barium Swallow with Video (Video Swallowing Test): {Done Not Done BTRF:364053451}    Treatments at the Time of Hospital Discharge:   Respiratory Treatments: ***  Oxygen Therapy:  {Therapy; copd oxygen:93326}  Ventilator:    { CC Vent IUKT:154340150}    Rehab Therapies: NURSE  Weight Bearing Status/Restrictions: [facility] Weight Bearin}  Other Medical Equipment (for information only, NOT a DME order):  {EQUIPMENT:749398949}  Other Treatments: TF EDUCATION AND REINFORCEMENT, INCISION SITE CARE    Patient's personal belongings (please select all that are sent with patient):  {P DME Belongings:198760807}    RN SIGNATURE:  {Esignature:761987362}    CASE MANAGEMENT/SOCIAL WORK SECTION    Inpatient Status Date: 19    Readmission Risk Assessment Score:  Readmission Risk              Risk of Unplanned Readmission:        10           Discharging to Facility/ Agency   Name:  [facility] care    Address: [address] [place] [address], [address] [place] [address]  Phone: [phone]  Fax: [phone]      / signature: Electronically signed by Yuri Gifford RN on 19 at 10:25 AM    PHYSICIAN SECTION    Prognosis: Good    Condition at Discharge: Stable    Rehab Potential (if transferring to Rehab): Good    Recommended Labs or Other Treatments After Discharge: home care: skilled nursing    Physician Certification: I certify the above information and transfer of Andrew Saldivar  is necessary for the continuing treatment of the diagnosis listed and that he requires Home Care for less 30 days.      Update Admission H&P: No change in H&P    PHYSICIAN SIGNATURE:  Electronically signed by Benoit Garcia

## 2019-04-24 NOTE — CARE COORDINATION
Per conversation with cardiothoracic NP, Bridgette Arenas, pt will discharge home today. Already being followed by [employer] [facility] [employer] home care [facility] notified of d/c today) and [facility]. This CM spoke with Marcie Cage from [facility] regarding discharge. Pt was provided ONE WEEK of supplies yesterday per [facility]. Currently pending discharge order and completion of AVS to fax to [facility] and infusion agency.      Sammy Leong RN DCP

## 2019-04-24 NOTE — CARE COORDINATION
Pending signed LYSSA to send information/orders to [facility] and infusion agencies.  NP with cardiothoracic, Jamie Coon, notified and states she will notify MD.    Regla Rodriguez RN DCP

## 2019-04-24 NOTE — DISCHARGE SUMMARY
Cardiac, Vascular & Thoracic Surgery  Discharge Summary    Patient:  Aaron Carrillo 1945 [id number]   Admission Date:  4/18/2019  5:14 AM  Discharge Date:  4/24/19    Principle Diagnosis:  <principal problem not specified>    Secondary Diagnosis:  Active Problems:    Esophagus cancer ([address]  Resolved Problems:    * No resolved hospital problems. *    Procedure:  4/18/19 BENITO VALENTINA ESOPHAGECTOMY WITH MEDIASTINAL LYMPHADENECTOMY INCLUDING THORACIC DUCT, UMBILICAL HERNIA REPAIR  EGD,Jejunostomy feeding tube. History: We are asked to see this patient in consultation by  regarding  Aaron Carrillo. He is a 68 y. o. male who 6 month with food stuck in his lower part of the esophagus initially he could pass it with some water subsequently progressed to where he had trouble releasing the pressure. He did not have pain. He has lost about 10-15 pounds. 2/4 EGD which showed lower esophageal mass. Biopsy confirmed adenocarcinoma. EUS confirmed the presence of positive paraesophageal lymph node PET scan is pending    Hospital Course: The post operative course was uneventful. The patient underwent surgery on 4/18/19. Esophagram on 4/22/19 was negative, showing no evidence of leakage. Pt will be discharging home with home health care, as well as tube feed company, on 4/24/19. He is not to have clear liquids, only some ice chips (up to 3 cup max daily), and lollipops. He will follow up with Dr. Kb Mccann in office on 5/06/19 at 9:45 am.  The patient was discharged on     Discharged Condition: good    Disposition:  Home with home healthcare    Discharge Medications:   Back, [facility] Medication Instructions ULT:[id number]    Printed on:04/24/19 1055   Medication Information                      diltiazem (CARDIZEM) 30 MG tablet  Take 1 tablet by mouth 4 times daily Make sure to crush tablet.             gabapentin (NEURONTIN) 250 MG/5ML solution  Take 2 mLs by mouth every 8 hours for 14 days.              lansoprazole (PREVACID SOLUTAB) 30 MG disintegrating tablet  Take 1 tablet by mouth daily             metFORMIN (GLUCOPHAGE) 500 MG tablet  Take 1 tablet by mouth Daily with supper Metformin must be crushed. ondansetron (ZOFRAN) 4 MG tablet  Take 4 mg by mouth every 8 hours as needed for Nausea or Vomiting             oxyCODONE (ROXICODONE) 5 MG/5ML solution  Take 5 mLs by mouth every 6 hours as needed for Pain (acute post op pain) for up to 3 days. Patient Instructions: Activity: DO NOT LIFT, PUSH, OR PULL ANYTHING OVER 5 POUNDS FOR 6 WEEK from the day of surgery  Diet: ice chips (max 3 cups/day), lollipops, crushed/liquid/dissolvable medications  Wound Care:  [address] YOUR INCISIONS DAILY WITH A CLEAN WASHCLOTH AND ANTIBACTERIAL SOAP. Do not wash your incisions after you have cleansed other parts of your body    Follow up with Cardiothoracic Surgeon, Dr. Adam Mcdonald on 5/06/19. TASHI Majano-CNP   Agree with note.       Karla Nuñez MD, FACS, [facility], FACCP, [place]

## 2019-04-24 NOTE — PROGRESS NOTES
Occupational Therapy  OT attempted to see pt for therapy today. Pt declined session reporting he was discharging today. Will follow up later if patient is still in house.     Dony Rangel OTR/L

## 2019-04-24 NOTE — PLAN OF CARE
Problem: Discharge Planning:  Goal: Participates in care planning  Description  Participates in care planning  Outcome: Ongoing  Goal: Discharged to appropriate level of care  Description  Discharged to appropriate level of care  Outcome: Ongoing     Problem: Airway Clearance - Ineffective:  Goal: Ability to maintain a clear airway will improve  Description  Ability to maintain a clear airway will improve  Outcome: Ongoing     Problem: Anxiety/Stress:  Goal: Level of anxiety will decrease  Description  Level of anxiety will decrease  Outcome: Ongoing     Problem: Aspiration:  Goal: Absence of aspiration  Description  Absence of aspiration  Outcome: Ongoing     Problem:  Bowel Function - Altered:  Goal: Bowel elimination is within specified parameters  Description  Bowel elimination is within specified parameters  Outcome: Ongoing     Problem: Cardiac Output - Decreased:  Goal: Hemodynamic stability will improve  Description  Hemodynamic stability will improve  Outcome: Ongoing     Problem: Fluid Volume - Imbalance:  Goal: Absence of imbalanced fluid volume signs and symptoms  Description  Absence of imbalanced fluid volume signs and symptoms  Outcome: Ongoing     Problem: Gas Exchange - Impaired:  Goal: Levels of oxygenation will improve  Description  Levels of oxygenation will improve  Outcome: Ongoing     Problem: Mental Status - Impaired:  Goal: Mental status will be restored to baseline  Description  Mental status will be restored to baseline  Outcome: Ongoing     Problem: Nutrition Deficit:  Goal: Ability to achieve adequate nutritional intake will improve  Description  Ability to achieve adequate nutritional intake will improve  Outcome: Ongoing     Problem: Pain:  Goal: Pain level will decrease  Description  Pain level will decrease  Outcome: Ongoing  Goal: Recognizes and communicates pain  Description  Recognizes and communicates pain  Outcome: Ongoing  Goal: Control of acute pain  Description  Control of injury  Description  Absence of physical injury  Outcome: Ongoing     Problem: Nutrition  Goal: Optimal nutrition therapy  Outcome: Ongoing     Problem: Sensory Perception - Impaired:  Goal: Ability to maintain a stable neurologic state will improve  Description  Ability to maintain a stable neurologic state will improve  Outcome: Ongoing

## 2019-04-24 NOTE — PROGRESS NOTES
CVTS Thoracic Progress Note:          CC:  Post op follow up 4/18/19 BENITO VALENTINA ESOPHAGECTOMY WITH MEDIASTINAL LYMPHADENECTOMY INCLUDING THORACIC DUCT, UMBILICAL HERNIA REPAIR  EGD,Jejunostomy feeding tube. Subj: awake, sitting up in chair, in NAD. Wife visiting at bedside. Obj:    Blood pressure 127/67, pulse 91, temperature 98.7 °F (37.1 °C), temperature source Oral, resp. rate 14, height 5' 6\" (1.676 m), weight 173 lb (78.5 kg), SpO2 94 %. Lungs CTAB   S1S2 remains RRR, SR in 80's    Abdomen rounded, soft, non-tender; J tube with TF at 50 ml/hr continuous    Diagnostics:   Recent Labs     04/22/19 0415 04/23/19  0414 04/24/19  0420   WBC 7.9 7.2 8.7   HGB 12.2* 12.3* 12.4*   HCT 35.2* 36.0* 36.8*    327 354                                                                  Recent Labs     04/22/19 0415 04/23/19 0414 04/24/19  0420    147* 147*   K 3.4* 3.4* 3.6    105 105   CO2 26 29 31   BUN 21* 26* 28*   CREATININE 0.9 0.9 0.9   GLUCOSE 146* 151* 193*       CXR: 4/19/19 No ptx, left trace pleural effusion. CXR 4/24/19 stable    Assess/Plan:   Recurrent adenocarcinoma (of the EG junction): s/p Sheldon valentina esophagectomy  Strict NPO - may have ice chips (max 3 cups/day) and lollipops only (NO CLEAR LIQUIDS)  Chest tube and NGT removed 4/22 after esophagram (negative for leaks)  Tube feeds to remain at 50 ml/hr continuous, per J tube  Restarted home PO meds 4/23 (ok to take by mouth- crushed)   Will [facility]. Pt will need to follow up with Dr. Garrett Miller in two weeks. Prophylaxis:  Lovenox, pepcid    Mariella Kingston  4/24/2019  9:34 AM   Note reviewed, events of last 24 hours reviewed along with vital signs and I/Os and patient examined. Assessment and plans discussed and are as outlined above.      Azalea Callaway MD, FACS, [facility], FACCP, [place]

## 2019-04-24 NOTE — PROGRESS NOTES
Pt's discharge instructions reviewed with the pt and his wife. Both verbalize understanding. Pt sent home with tube feed supplies and lemon swabs. Pt's wife stated that she already has kangaroo pump at home. Pt is being discharged with 5 medications, 3 will be filled here at [facility] to beds (Cardizem, Oxycodone, Metformin) and the other 2 (Prevacid, Gabapentin) will be filled at [facility] [place]. Deena Aly.

## 2019-04-26 ENCOUNTER — TELEPHONE (OUTPATIENT)
Dept: OTHER | Facility: CLINIC | Age: 74
End: 2019-04-26

## 2019-04-26 ENCOUNTER — TELEPHONE (OUTPATIENT)
Dept: CARDIOTHORACIC SURGERY | Age: 74
End: 2019-04-26

## 2019-04-26 ENCOUNTER — HOSPITAL ENCOUNTER (INPATIENT)
Age: 74
LOS: 1 days | Discharge: HOME HEALTH | DRG: 394 | End: 2019-04-27
Attending: EMERGENCY MEDICINE | Admitting: INTERNAL MEDICINE
Payer: MEDICARE

## 2019-04-26 DIAGNOSIS — K94.13 MALFUNCTIONING JEJUNOSTOMY TUBE (HCC): Primary | ICD-10-CM

## 2019-04-26 PROBLEM — T85.598A CLOGGED FEEDING TUBE: Status: ACTIVE | Noted: 2019-04-26

## 2019-04-26 PROBLEM — R63.0 LOSS OF APPETITE: Status: ACTIVE | Noted: 2019-04-26

## 2019-04-26 PROBLEM — R13.10 DYSPHAGIA: Status: ACTIVE | Noted: 2019-04-26

## 2019-04-26 PROBLEM — K12.33 MUCOSITIS DUE TO RADIATION THERAPY: Status: ACTIVE | Noted: 2019-04-26

## 2019-04-26 PROBLEM — E11.9 TYPE 2 DIABETES MELLITUS (HCC): Status: ACTIVE | Noted: 2019-04-26

## 2019-04-26 PROBLEM — R01.1 UNDIAGNOSED CARDIAC MURMURS: Status: ACTIVE | Noted: 2019-04-26

## 2019-04-26 PROBLEM — I10 ESSENTIAL (PRIMARY) HYPERTENSION: Status: ACTIVE | Noted: 2019-04-26

## 2019-04-26 PROBLEM — C77.9 MALIGNANT NEOPLASM METASTATIC TO LYMPH NODES (HCC): Status: ACTIVE | Noted: 2019-04-26

## 2019-04-26 PROBLEM — Z90.79 HISTORY OF PROSTATECTOMY: Status: ACTIVE | Noted: 2019-04-26

## 2019-04-26 LAB
A/G RATIO: 0.9 (ref 1.1–2.2)
ALBUMIN SERPL-MCNC: 3.1 G/DL (ref 3.4–5)
ALP BLD-CCNC: 109 U/L (ref 40–129)
ALT SERPL-CCNC: 27 U/L (ref 10–40)
ANION GAP SERPL CALCULATED.3IONS-SCNC: 12 MMOL/L (ref 3–16)
AST SERPL-CCNC: 36 U/L (ref 15–37)
BILIRUB SERPL-MCNC: 0.8 MG/DL (ref 0–1)
BUN BLDV-MCNC: 21 MG/DL (ref 7–20)
CALCIUM SERPL-MCNC: 8.4 MG/DL (ref 8.3–10.6)
CHLORIDE BLD-SCNC: 101 MMOL/L (ref 99–110)
CO2: 29 MMOL/L (ref 21–32)
CREAT SERPL-MCNC: 0.7 MG/DL (ref 0.8–1.3)
GFR AFRICAN AMERICAN: >60
GFR NON-AFRICAN AMERICAN: >60
GLOBULIN: 3.4 G/DL
GLUCOSE BLD-MCNC: 119 MG/DL (ref 70–99)
GLUCOSE BLD-MCNC: 133 MG/DL (ref 70–99)
HCT VFR BLD CALC: 35.6 % (ref 40.5–52.5)
HEMOGLOBIN: 12.2 G/DL (ref 13.5–17.5)
MCH RBC QN AUTO: 30.9 PG (ref 26–34)
MCHC RBC AUTO-ENTMCNC: 34.3 G/DL (ref 31–36)
MCV RBC AUTO: 89.9 FL (ref 80–100)
PDW BLD-RTO: 13.4 % (ref 12.4–15.4)
PERFORMED ON: ABNORMAL
PLATELET # BLD: 368 K/UL (ref 135–450)
PMV BLD AUTO: 7.2 FL (ref 5–10.5)
POTASSIUM SERPL-SCNC: 5.3 MMOL/L (ref 3.5–5.1)
RBC # BLD: 3.97 M/UL (ref 4.2–5.9)
SODIUM BLD-SCNC: 142 MMOL/L (ref 136–145)
TOTAL PROTEIN: 6.5 G/DL (ref 6.4–8.2)
WBC # BLD: 9.3 K/UL (ref 4–11)

## 2019-04-26 PROCEDURE — 2580000003 HC RX 258: Performed by: EMERGENCY MEDICINE

## 2019-04-26 PROCEDURE — 1200000000 HC SEMI PRIVATE

## 2019-04-26 PROCEDURE — G0378 HOSPITAL OBSERVATION PER HR: HCPCS

## 2019-04-26 PROCEDURE — 6370000000 HC RX 637 (ALT 250 FOR IP): Performed by: NURSE PRACTITIONER

## 2019-04-26 PROCEDURE — 85027 COMPLETE CBC AUTOMATED: CPT

## 2019-04-26 PROCEDURE — 80053 COMPREHEN METABOLIC PANEL: CPT

## 2019-04-26 PROCEDURE — 2580000003 HC RX 258: Performed by: NURSE PRACTITIONER

## 2019-04-26 PROCEDURE — 99284 EMERGENCY DEPT VISIT MOD MDM: CPT

## 2019-04-26 RX ORDER — SODIUM CHLORIDE 0.9 % (FLUSH) 0.9 %
10 SYRINGE (ML) INJECTION EVERY 12 HOURS SCHEDULED
Status: DISCONTINUED | OUTPATIENT
Start: 2019-04-26 | End: 2019-04-27 | Stop reason: HOSPADM

## 2019-04-26 RX ORDER — LANCETS 33 GAUGE
EACH MISCELLANEOUS
COMMUNITY
Start: 2019-01-30

## 2019-04-26 RX ORDER — SODIUM CHLORIDE 9 MG/ML
INJECTION, SOLUTION INTRAVENOUS CONTINUOUS
Status: DISCONTINUED | OUTPATIENT
Start: 2019-04-26 | End: 2019-04-27 | Stop reason: HOSPADM

## 2019-04-26 RX ORDER — GABAPENTIN 300 MG/1
CAPSULE ORAL
Status: ON HOLD | COMMUNITY
Start: 2019-03-11 | End: 2019-04-26

## 2019-04-26 RX ORDER — DEXTROSE MONOHYDRATE 25 G/50ML
12.5 INJECTION, SOLUTION INTRAVENOUS PRN
Status: DISCONTINUED | OUTPATIENT
Start: 2019-04-26 | End: 2019-04-27 | Stop reason: HOSPADM

## 2019-04-26 RX ORDER — SODIUM CHLORIDE 0.9 % (FLUSH) 0.9 %
10 SYRINGE (ML) INJECTION PRN
Status: DISCONTINUED | OUTPATIENT
Start: 2019-04-26 | End: 2019-04-27 | Stop reason: HOSPADM

## 2019-04-26 RX ORDER — DEXTROSE MONOHYDRATE 50 MG/ML
100 INJECTION, SOLUTION INTRAVENOUS PRN
Status: DISCONTINUED | OUTPATIENT
Start: 2019-04-26 | End: 2019-04-27 | Stop reason: HOSPADM

## 2019-04-26 RX ORDER — DILTIAZEM HYDROCHLORIDE 60 MG/1
30 TABLET, FILM COATED ORAL 4 TIMES DAILY
Status: DISCONTINUED | OUTPATIENT
Start: 2019-04-26 | End: 2019-04-27 | Stop reason: HOSPADM

## 2019-04-26 RX ORDER — NICOTINE POLACRILEX 4 MG
15 LOZENGE BUCCAL PRN
Status: DISCONTINUED | OUTPATIENT
Start: 2019-04-26 | End: 2019-04-27 | Stop reason: HOSPADM

## 2019-04-26 RX ORDER — GABAPENTIN 250 MG/5ML
100 SOLUTION ORAL EVERY 8 HOURS SCHEDULED
Status: DISCONTINUED | OUTPATIENT
Start: 2019-04-26 | End: 2019-04-27 | Stop reason: HOSPADM

## 2019-04-26 RX ORDER — ONDANSETRON 2 MG/ML
4 INJECTION INTRAMUSCULAR; INTRAVENOUS EVERY 6 HOURS PRN
Status: DISCONTINUED | OUTPATIENT
Start: 2019-04-26 | End: 2019-04-27 | Stop reason: HOSPADM

## 2019-04-26 RX ORDER — OXYCODONE HCL 5 MG/5 ML
5 SOLUTION, ORAL ORAL EVERY 6 HOURS PRN
Status: DISCONTINUED | OUTPATIENT
Start: 2019-04-26 | End: 2019-04-27 | Stop reason: HOSPADM

## 2019-04-26 RX ORDER — SODIUM CHLORIDE 9 MG/ML
1000 INJECTION, SOLUTION INTRAVENOUS CONTINUOUS
Status: DISCONTINUED | OUTPATIENT
Start: 2019-04-26 | End: 2019-04-27 | Stop reason: HOSPADM

## 2019-04-26 RX ORDER — DILTIAZEM HYDROCHLORIDE 120 MG/1
CAPSULE, EXTENDED RELEASE ORAL
COMMUNITY
Start: 2019-01-28 | End: 2019-04-26

## 2019-04-26 RX ADMIN — SODIUM CHLORIDE 1000 ML: 9 INJECTION, SOLUTION INTRAVENOUS at 18:18

## 2019-04-26 RX ADMIN — DILTIAZEM HYDROCHLORIDE 30 MG: 60 TABLET, FILM COATED ORAL at 21:46

## 2019-04-26 RX ADMIN — SODIUM CHLORIDE: 9 INJECTION, SOLUTION INTRAVENOUS at 21:24

## 2019-04-26 RX ADMIN — GABAPENTIN 100 MG: 250 SOLUTION ORAL at 22:12

## 2019-04-26 ASSESSMENT — PAIN - FUNCTIONAL ASSESSMENT: PAIN_FUNCTIONAL_ASSESSMENT: 0-10

## 2019-04-26 NOTE — ED NOTES
IV attempted L AC without success. Neg bleeding. DSD to site. Pt nicko well.        Joanne Campos RN  04/26/19 4689

## 2019-04-26 NOTE — H&P
Hospital Medicine History & Physical      PCP: Queen Agusto MD    Date of Admission: 4/26/2019    Date of Service: Pt seen/examined on 4/26/2019 and Admitted to Inpatient with expected LOS greater than two midnights due to medical therapy. Chief Complaint:  Clogged feeding tube    History Of Present Illness:      68 y.o. male, with PMH of malignant neoplasm of esophagus and DM, who presented to [facility] with a clogged feeding tube. History obtained from the patient and review of EMR. The patient stated he was having difficulty with food getting stuck in the lower part of his esophagus for roughly 6 months. Per EMR, the patient was diagnosed with esophageal cancer and went through chemotherapy,  finishing in November, 2018. The patient stated he had an EGD on February 4, 2019 that showed recurring esophageal carcinoma. Per EMR, the patient had surgery to have feeding tube placed last Thursday, April 18. Dr. Angélica Hernandez performed an esophagectomy with mediastinal lymphadenectomy including thoracic duct, umbilical hernia repair, EGD and jejunostomy feeding tube placement. The patient stated he has been using his J-tube since this past Sunday. He stated it did become clogged on one other occasion and his wife was able to unclog it using coca cola. The patient stated today his wife was changing his tube feeding out and within an hour, his tube became clogged again, but this time the coca cola was unsuccessful so they came to the ED for further evaluation. He stated he takes nothing by mouth and this J-tube is his only source of hydration/nutrition. The patient denied any other associated symptoms as well as any aggravating and/or alleviating factors. At the time of this assessment, the patient was resting comfortably in bed. He currently denies any chest pain, back pain, abdominal pain, shortness of breath, numbness, tingling, N/V/C/D, fever and/or chills.      Past Medical History: Diagnosis Date    Diabetes mellitus ([facility]     Esophageal cancer ([facility]     H/O sleep apnea     resolved after surgery    History of esophageal reflux     Hypertension     Prostate cancer [facility])      Past Surgical History:          Procedure Laterality Date    CATARACT REMOVAL WITH IMPLANT  2003    COLONOSCOPY  2012    negative    COLONOSCOPY  09/14/2017    diverticulosis    ESOPHAGEAL DILATATION N/A 1/3/2019    ESOPHAGOGASTRODUODENOSCOPY WITH BIOPSY  CPT CODE - 42940 performed by Ye Martinez MD at [facility] ESOPHAGECTOMY N/A 4/18/2019    BENITO VALENTINA ESOPHAGECTOMY WITH MEDIASTINAL LYMPHADENECTOMY INCLUDING THORACIC DUCT, UMBILICAL HERNIA REPAIR performed by Ye Martinez MD at [address] 1962    IA OFFICE/OUTPT VISIT,PROCEDURE ONLY N/A 9/19/2018    EUS/ WITH ANESTHESIA performed by Ana Santiago MD at [address] [place] [address] ENDOSCOPY  9/19/2018    EGD BIOPSY performed by Ana Santiago MD at [facility] ENDOSCOPY N/A 4/2/2019    ESOPHAGOGASTRODUODENOSCOPY WITH BIOPSY  CPT CODE - 47134 performed by Ye Martinez MD at [address] [place] [address]      hx of sleep apnea     Medications Prior to Admission:      Prior to Admission medications    Medication Sig Start Date End Date Taking? Authorizing Provider   gabapentin (NEURONTIN) 300 MG capsule  3/11/19  Yes Historical Provider, MD   ONE TOUCH ULTRA TEST strip  1/30/19  Yes Historical Provider, MD Garret Bird LANCETS 98G [address] [facility]  1/30/19  Yes Historical Provider, MD   oxyCODONE (ROXICODONE) 5 MG/5ML solution Take 5 mLs by mouth every 6 hours as needed for Pain (acute post op pain) for up to 3 days. 4/24/19 4/27/19 Yes TASHI Stewart CNP   diltiazem (CARDIZEM) 30 MG tablet Take 1 tablet by mouth 4 times daily Make sure to crush tablet.  4/24/19  Yes TASHI Stewart CNP   gabapentin (NEURONTIN) 250 MG/5ML solution Take 2 mLs by mouth every 8 hours for 14 days. 4/24/19 5/8/19 Yes TASHI Stewart CNP   metFORMIN (GLUCOPHAGE) 500 MG tablet Take 1 tablet by mouth Daily with supper Metformin must be crushed. Patient taking differently: Take 500 mg by mouth 2 times daily (with meals) Metformin must be crushed. 4/24/19  Yes TASHI Stewart CNP   lansoprazole (PREVACID SOLUTAB) 30 MG disintegrating tablet Take 1 tablet by mouth daily Place tablet on tongue until dissolved, then swallow. 4/24/19 4/23/20 Yes TASHI Stewart CNP   ondansetron (ZOFRAN) 4 MG tablet Take 4 mg by mouth every 8 hours as needed for Nausea or Vomiting   Yes Historical Provider, MD     Allergies:  No known allergies    Social History:      The patient currently lives at home with his wife    TOBACCO:   reports that he quit smoking about 47 years ago. His smoking use included cigarettes. He has never used smokeless tobacco.  ETOH:   reports that he drinks alcohol. Family History:      Reviewed in detail. Positive as follows:        Problem Relation Age of Onset   [place]spinosa Cancer Father         lung    Other Mother         blood disorder     REVIEW OF SYSTEMS:   Pertinent positives as noted in the HPI. All other systems reviewed and negative. PHYSICAL EXAM PERFORMED:    /72   Pulse 90   Temp 98.3 °F (36.8 °C) (Oral)   Resp 20   Ht 5' 6\" (1.676 m)   Wt 179 lb (81.2 kg)   SpO2 93%   BMI 28.89 kg/m²     General appearance:  Pleasant male in no apparent distress, appears stated age and cooperative. HEENT:  Pupils equal, round, and reactive to light. Extra ocular muscles intact. Conjunctivae/corneas clear. Neck: Supple, with full range of motion. No jugular venous distention. Trachea midline. Respiratory:  Normal respiratory effort. Clear to auscultation, bilaterally without Rales/Wheezes/Rhonchi. Cardiovascular:  Regular rate and rhythm with normal S1/S2 without murmurs, rubs or gallops.   Abdomen: Soft, non-tender, non-distended with normal bowel sounds. J tube  Musculoskeletal:  No clubbing, cyanosis or edema bilaterally. Full range of motion without deformity. Skin: Skin color, texture, turgor normal.  No significant rashes or lesions. Neurologic:  Neurovascularly intact.  Cranial nerves: II-XII intact, grossly non-focal.  Psychiatric:  Alert and oriented, thought content appropriate, normal insight  Capillary Refill: Brisk,< 3 seconds   Peripheral Pulses: +2 palpable, equal bilaterally     Labs:     Recent Labs     04/24/19  0420 04/26/19  1732   WBC 8.7 9.3   HGB 12.4* 12.2*   HCT 36.8* 35.6*    368     Recent Labs     04/24/19  0420 04/26/19  1732   * 142   K 3.6 5.3*    101   CO2 31 29   BUN 28* 21*   CREATININE 0.9 0.7*   CALCIUM 9.1 8.4     Recent Labs     04/26/19  1732   AST 36   ALT 27   BILITOT 0.8   ALKPHOS 109     Urinalysis:      Lab Results   Component Value Date    NITRU Negative 04/15/2019    BLOODU Negative 04/15/2019    SPECGRAV <=1.005 04/15/2019    GLUCOSEU Negative 04/15/2019     Radiology:     CXR: I have reviewed the CXR with the following interpretation: N/A  EKG:  I have reviewed the EKG with the following interpretation: N/A    No orders to display     ASSESSMENT:    C/Ollie Martínez 1106 Problems    Diagnosis Date Noted    Type 2 diabetes mellitus ([facility] [E11.9] 04/26/2019    Hypertension [I10] 04/26/2019    Malignant neoplasm of esophagus ([facility] [C15.9] 04/18/2019     PLAN:    Malfunctioning jejunostomy tube in pt with malignant neoplasm of esophagus  -1 L NS bolus in ED  -CT surgery consulted in ED - appreciate recommendations in advance - tube to be replaced tomorrow  -MIVF  -NPO    DM2 with peripheral neuropathy, uncontrolled  -  -hemglobin a1c 6.0 on 11/21/2014, repeat in am  -hold home metformin  -mdssi  -poct ac/hs  -hypoglycemia protocol  -carb control diet  -continue gabapentin    Chronic normocytic anemia, 12.2/35.6 on admission  -no s/s of bleeding at this time  -cbc in am    DVT Prophylaxis: Lovenox  Diet: No diet orders on file  Code Status: Prior    PT/OT Eval Status: No indication for need at this time    Dispo - 2-3 days pending clinical improvement     TASHI Estrada - CNP    Thank you Leroy Alfred MD for the opportunity to be involved in this patient's care.  If you have any questions or concerns please feel free to contact me at [phone].  ------------------------------------Dr. Komal Mas---------------------------------------------

## 2019-04-26 NOTE — ED PROVIDER NOTES
I independently performed a history and physical on Jalen Zaidi. All diagnostic, treatment, and disposition decisions were made by myself in conjunction with the mid-level provider. For further details of [facility] emergency department encounter, please see CECILIA Sellers's documentation. Patient presents to the emergency department complaining of J-tube dysfunction. He reportedly had extensive surgical repair completed by Dr. Chris Lawson as per below. Feeding tube has not been working today. They have attempted to unclog the tube with cola without success. 4/18/19 BENITO VALENTINA ESOPHAGECTOMY WITH MEDIASTINAL LYMPHADENECTOMY INCLUDING THORACIC DUCT, UMBILICAL HERNIA REPAIR  EGD,Jejunostomy feeding tube      Physical exam: Gen.: No acute distress. Abdomen: Soft. Postoperative changes within normal limits. G-tube present. It does not appear dislodged. Cardiothoracic surgery was contacted. They recommended admitting patient at this time for fluid rehydration. Tube will be replaced tomorrow. Assessment/plan:   1.  Malfunctioning jejunostomy tube [facility])                     Adrienne Pollack, DO  04/26/19 [address] [place] [address],   04/26/19 2031

## 2019-04-26 NOTE — ED PROVIDER NOTES
[facility]  ED  eMERGENCY dEPARTMENT eNCOUnter        Pt Name: Darwin Persaud  MRN: [id number]  [place] 1945  Date of evaluation: 4/26/2019  Provider: Carol Albarran PA-C  PCP: Queen Agusto MD  ED Attending: Shola Fisher DO      History is provided by the patient    CHIEF COMPLAINT:  Feeding Tube Problem (had surgery to have it placed last thursday (18th) no dificulty until today when they could not flush it. )      HISTORY OF PRESENT ILLNESS:  Darwin Persaud is a 68 y.o. male who presents to the ED via private vehicle with his wife with complaints that his feeding tube is blocked. The patient underwent extensive surgery by Dr. Angélica Hernandez on 4/18. The patient was found to have esophageal cancer. He underwent esophagectomy with removal of mass, repair of umbilical hernia and placement of J-tube. The patient has been using his J-tube since this past Sunday. On one other occasion it became blocked, though he and his wife are able to get it to flush using cola. They tried this again today. They tried different positions and have been unsuccessful in getting the J-tube to work. He takes nothing by mouth and this J-tube is his only source of hydration/nutrition. They are seeking help in order to get it   No other complaints, modifying factors or associated symptoms. Nursing notes reviewed.    Past Medical History:   Diagnosis Date    Diabetes mellitus ([facility]     Esophageal cancer ([facility]     H/O sleep apnea     resolved after surgery    History of esophageal reflux     Hypertension     Prostate cancer [facility])      Past Surgical History:   Procedure Laterality Date    CATARACT REMOVAL WITH IMPLANT  2003    COLONOSCOPY  2012    negative    COLONOSCOPY  09/14/2017    diverticulosis    ESOPHAGEAL DILATATION N/A 1/3/2019    ESOPHAGOGASTRODUODENOSCOPY WITH BIOPSY  CPT CODE - 98670 performed by [facility] [address], MD at [facility] ESOPHAGECTOMY N/A 4/18/2019    BENITO VALENTINA ESOPHAGECTOMY WITH MEDIASTINAL LYMPHADENECTOMY INCLUDING THORACIC DUCT, UMBILICAL HERNIA REPAIR performed by Olivia Mathur MD at [address] 1962    IN OFFICE/OUTPT VISIT,PROCEDURE ONLY N/A 2018    EUS/ WITH ANESTHESIA performed by Jackie Mon MD at [facility]    UPPER GASTROINTESTINAL ENDOSCOPY  2018    EGD BIOPSY performed by Jackie Mon MD at [facility] N/A 2019    ESOPHAGOGASTRODUODENOSCOPY WITH BIOPSY  CPT CODE - 04878 performed by Olivia Mathur MD at [address] [facility]      hx of sleep apnea     Family History   Problem Relation Age of Onset    Cancer Father         lung    Other Mother         blood disorder     Social History     Socioeconomic History    Marital status:      Spouse name: Not on file    Number of children: Not on file    Years of education: Not on file    Highest education level: Not on file   Occupational History    Not on file   Social Needs    Financial resource strain: Not on file    Food insecurity:     Worry: Not on file     Inability: Not on file    Transportation needs:     Medical: Not on file     Non-medical: Not on file   Tobacco Use    Smoking status: Former Smoker     Types: Cigarettes     Last attempt to quit: 1972     Years since quittin.3    Smokeless tobacco: Never Used   Substance and Sexual Activity    Alcohol use:  Yes     Alcohol/week: 0.0 oz     Comment: very occasional/social    Drug use: No    Sexual activity: Yes   Lifestyle    Physical activity:     Days per week: Not on file     Minutes per session: Not on file    Stress: Not on file   Relationships    Social connections:     Talks on phone: Not on file     Gets together: Not on file     Attends [employer] service: Not on file     Active member of club or organization: Not on file     Attends meetings of clubs or organizations: Not on file     Relationship status: Not on file    Intimate partner violence:     Fear of current or ex partner: Not on file     Emotionally abused: Not on file     Physically abused: Not on file     Forced sexual activity: Not on file   Other Topics Concern    Not on file   Social History Narrative    Not on file     No current facility-administered medications for this encounter. Current Outpatient Medications   Medication Sig Dispense Refill    gabapentin (NEURONTIN) 300 MG capsule       ONE TOUCH ULTRA TEST strip       ONETOUCH DELICA LANCETS 47S MISC       oxyCODONE (ROXICODONE) 5 MG/5ML solution Take 5 mLs by mouth every 6 hours as needed for Pain (acute post op pain) for up to 3 days. 60 mL 0    diltiazem (CARDIZEM) 30 MG tablet Take 1 tablet by mouth 4 times daily Make sure to crush tablet. 120 tablet 0    gabapentin (NEURONTIN) 250 MG/5ML solution Take 2 mLs by mouth every 8 hours for 14 days. 84 mL 0    metFORMIN (GLUCOPHAGE) 500 MG tablet Take 1 tablet by mouth Daily with supper Metformin must be crushed. (Patient taking differently: Take 500 mg by mouth 2 times daily (with meals) Metformin must be crushed.) 30 tablet 0    lansoprazole (PREVACID SOLUTAB) 30 MG disintegrating tablet Take 1 tablet by mouth daily Place tablet on tongue until dissolved, then swallow. 30 tablet 0    ondansetron (ZOFRAN) 4 MG tablet Take 4 mg by mouth every 8 hours as needed for Nausea or Vomiting       Allergies   Allergen Reactions    No Known Allergies        REVIEW OF SYSTEMS:  6 systems reviewed, pertinent positives per HPI otherwise noted to be negative. PHYSICAL EXAM:  /68   Pulse 81   Temp 98.3 °F (36.8 °C) (Oral)   Resp 20   Ht 5' 6\" (1.676 m)   Wt 179 lb (81.2 kg)   SpO2 94%   BMI 28.89 kg/m²   CONSTITUTIONAL: Awake and alert. Well-developed. Well-nourished. Non-toxic. Cooperative. No acute distress. HENT: Normocephalic. Atraumatic. External ears normal, without discharge.  Nose normal. Mucous membranes moist.  EYES: Conjunctiva non-injected. No scleral icterus. PERRL. EOM's grossly intact. NECK: Supple. Normal ROM. CARDIOVASCULAR: Normal heart rate. Intact distal pulses. PULMONARY/CHEST WALL: Breathing is unlabored. Equal, symmetric chest rise. Speaking comfortably in full sentences. ABDOMEN: Soft. Nondistended. Non-tender. No guarding. Well-healing midline, vertical incision to the lower chest/upper abdomen from recent procedure. J-tube in place to the left upper quadrant. Normal bowel sounds in all quadrants. MUSKULOSKELETAL: Normal ROM. No acute deformities. SKIN: Warm and dry. NEUROLOGICAL: Alert and oriented x 3. Strength is 5/5 in all extremities and sensation is intact. PSYCHIATRIC: Normal affect    Labs:    None    RADIOLOGY:    All x-ray studies are viewed/reviewed by me. Formal interpretations per the radiologist are as follows:      Fl Esophagram    Result Date: 4/22/2019  EXAMINATION: SINGLE CONTRAST ESOPHAGRAM 4/22/2019 HISTORY: ORDERING SYSTEM PROVIDED HISTORY: s/p esophagectomy POD 4; evaluate for any leakage TECHNOLOGIST PROVIDED HISTORY: 1 cup gastrografin followed by 1 cup of barium. Reason for exam:->s/p esophagectomy POD 4; evaluate for any leakage FLUOROSCOPY DOSE AND TYPE OR TIME AND EXPOSURES: 1.1 minutes. 9 images including 5 fluoroscopy loops. FINDINGS: Initial evaluation was performed using water-soluble contrast, followed by barium contrast. Findings related to [address] esophagectomy. There is no contrast extravasation identified. Mildly delayed emptying of the stomach. No evidence of a leak following esophagectomy.      Xr Chest Portable    Result Date: 4/24/2019  EXAMINATION: SINGLE XRAY VIEW OF THE CHEST 4/24/2019 3:59 am COMPARISON: 04/23/2019 HISTORY: ORDERING SYSTEM PROVIDED HISTORY: Status post esophagectomy TECHNOLOGIST PROVIDED HISTORY: Reason for exam:->Status post esophagectomy Ordering Physician Provided Reason for Exam: Status post esophagectomy Type of Exam: Subsequent/Follow-up FINDINGS: Tiny right apical pneumothorax is unchanged. There is stable bibasilar airspace disease. The heart size is normal.     Stable right apical pneumothorax and bibasilar airspace disease. ED COURSE/MDM:  Patient was given the following medications: None    I have evaluated this patient here in the ED in conjunction with Dr. Lazara Chiang. This patient had a J-tube placed last week and it is now not functioning. We attempted to clear the tube using cola and have attempted to push fluid and aspirate without success. Given the fact that this tube was just placed last week, I am consulting CT surgery for further care. While in the ED, the patient has been stable. Please see Dr. Keshav Geiger note for conversation with CT surgery. Ultimately the patient was admitted. A CBC and metabolic panel done in the ED are unremarkable. CLINICAL IMPRESSION:  1. Malfunctioning jejunostomy tube (HCC)        Blood pressure 111/68, pulse 81, temperature 98.3 °F (36.8 °C), temperature source Oral, resp. rate 20, height 5' 6\" (1.676 m), weight 179 lb (81.2 kg), SpO2 94 %.           [address] [place], [place]  04/26/19 [address] [place], [place]  04/27/19 5451

## 2019-04-26 NOTE — TELEPHONE ENCOUNTER
Received call from patient's wife reporting his J tube is blocked & has been receiving continuous TF. She has attempted unblocking using [employer] cola & w/patient in a variety of positions w/no success. Dr. Beth Osorio contacted via cell to make him aware, he wants patient to proceed to [facility] ER for evaluation of issue. I called her back to let her know & contacted [facility] ER to let them know patient was going to be coming in soon.

## 2019-04-26 NOTE — ED NOTES
Attempted to unclog G tube with cola product without success. Unable to flush tube with syringe. Frankie Floyd advised.        Eron Srinivasan RN  04/26/19 5199

## 2019-04-26 NOTE — ED NOTES
Called @ 3117. Was told Dr. Beth Osorio will call back for [facility]. Called [phone]  Called @ [phone].  Was told Dr. Dayami Back is on call physician and will call back ASAP for Dr. Kole Hamm returned call [address]  04/26/19 9903

## 2019-04-27 ENCOUNTER — APPOINTMENT (OUTPATIENT)
Dept: GENERAL RADIOLOGY | Age: 74
DRG: 394 | End: 2019-04-27
Payer: MEDICARE

## 2019-04-27 VITALS
HEART RATE: 79 BPM | OXYGEN SATURATION: 92 % | HEIGHT: 66 IN | SYSTOLIC BLOOD PRESSURE: 132 MMHG | BODY MASS INDEX: 29.07 KG/M2 | TEMPERATURE: 98.5 F | WEIGHT: 180.9 LBS | DIASTOLIC BLOOD PRESSURE: 72 MMHG | RESPIRATION RATE: 18 BRPM

## 2019-04-27 PROBLEM — T85.598A CLOGGED FEEDING TUBE: Status: RESOLVED | Noted: 2019-04-26 | Resolved: 2019-04-27

## 2019-04-27 PROBLEM — C15.9 MALIGNANT NEOPLASM OF ESOPHAGUS (HCC): Status: RESOLVED | Noted: 2019-04-18 | Resolved: 2019-04-27

## 2019-04-27 PROBLEM — I10 HYPERTENSION: Status: RESOLVED | Noted: 2019-04-26 | Resolved: 2019-04-27

## 2019-04-27 LAB
ANION GAP SERPL CALCULATED.3IONS-SCNC: 9 MMOL/L (ref 3–16)
BASOPHILS ABSOLUTE: 0.1 K/UL (ref 0–0.2)
BASOPHILS RELATIVE PERCENT: 1.1 %
BUN BLDV-MCNC: 18 MG/DL (ref 7–20)
CALCIUM SERPL-MCNC: 8.3 MG/DL (ref 8.3–10.6)
CHLORIDE BLD-SCNC: 104 MMOL/L (ref 99–110)
CO2: 25 MMOL/L (ref 21–32)
CREAT SERPL-MCNC: 0.7 MG/DL (ref 0.8–1.3)
EOSINOPHILS ABSOLUTE: 0.4 K/UL (ref 0–0.6)
EOSINOPHILS RELATIVE PERCENT: 5.1 %
GFR AFRICAN AMERICAN: >60
GFR NON-AFRICAN AMERICAN: >60
GLUCOSE BLD-MCNC: 117 MG/DL (ref 70–99)
GLUCOSE BLD-MCNC: 133 MG/DL (ref 70–99)
GLUCOSE BLD-MCNC: 134 MG/DL (ref 70–99)
HCT VFR BLD CALC: 35.4 % (ref 40.5–52.5)
HEMOGLOBIN: 12 G/DL (ref 13.5–17.5)
LYMPHOCYTES ABSOLUTE: 0.9 K/UL (ref 1–5.1)
LYMPHOCYTES RELATIVE PERCENT: 10.9 %
MCH RBC QN AUTO: 30.9 PG (ref 26–34)
MCHC RBC AUTO-ENTMCNC: 33.9 G/DL (ref 31–36)
MCV RBC AUTO: 91 FL (ref 80–100)
MONOCYTES ABSOLUTE: 1 K/UL (ref 0–1.3)
MONOCYTES RELATIVE PERCENT: 11.2 %
NEUTROPHILS ABSOLUTE: 6.2 K/UL (ref 1.7–7.7)
NEUTROPHILS RELATIVE PERCENT: 71.7 %
PDW BLD-RTO: 13.5 % (ref 12.4–15.4)
PERFORMED ON: ABNORMAL
PERFORMED ON: ABNORMAL
PLATELET # BLD: 396 K/UL (ref 135–450)
PMV BLD AUTO: 6.9 FL (ref 5–10.5)
POTASSIUM REFLEX MAGNESIUM: 4.3 MMOL/L (ref 3.5–5.1)
RBC # BLD: 3.89 M/UL (ref 4.2–5.9)
SODIUM BLD-SCNC: 138 MMOL/L (ref 136–145)
WBC # BLD: 8.7 K/UL (ref 4–11)

## 2019-04-27 PROCEDURE — 83036 HEMOGLOBIN GLYCOSYLATED A1C: CPT

## 2019-04-27 PROCEDURE — G0378 HOSPITAL OBSERVATION PER HR: HCPCS

## 2019-04-27 PROCEDURE — 36415 COLL VENOUS BLD VENIPUNCTURE: CPT

## 2019-04-27 PROCEDURE — 74018 RADEX ABDOMEN 1 VIEW: CPT

## 2019-04-27 PROCEDURE — 0D2DXUZ CHANGE FEEDING DEVICE IN LOWER INTESTINAL TRACT, EXTERNAL APPROACH: ICD-10-PCS | Performed by: THORACIC SURGERY (CARDIOTHORACIC VASCULAR SURGERY)

## 2019-04-27 PROCEDURE — 6370000000 HC RX 637 (ALT 250 FOR IP): Performed by: NURSE PRACTITIONER

## 2019-04-27 PROCEDURE — 96374 THER/PROPH/DIAG INJ IV PUSH: CPT

## 2019-04-27 PROCEDURE — 85025 COMPLETE CBC W/AUTO DIFF WBC: CPT

## 2019-04-27 PROCEDURE — 80048 BASIC METABOLIC PNL TOTAL CA: CPT

## 2019-04-27 PROCEDURE — 6360000004 HC RX CONTRAST MEDICATION: Performed by: THORACIC SURGERY (CARDIOTHORACIC VASCULAR SURGERY)

## 2019-04-27 RX ADMIN — DIATRIZOATE MEGLUMINE AND DIATRIZOATE SODIUM 30 ML: 660; 100 LIQUID ORAL; RECTAL at 08:48

## 2019-04-27 RX ADMIN — GABAPENTIN 100 MG: 250 SOLUTION ORAL at 05:10

## 2019-04-27 RX ADMIN — DILTIAZEM HYDROCHLORIDE 30 MG: 60 TABLET, FILM COATED ORAL at 08:46

## 2019-04-27 NOTE — CARE COORDINATION
CASE MANAGEMENT DISCHARGE SUMMARY      Discharge to: Home with [place] ordered/agency: na    Transportation:    Family/car: private    Notified:    Facility/Agency: [facility] [id number]     Note: Discharging nurse to complete [facility], reconcile [facility], and place final copy with patient's discharge packet. RN to ensure that written prescriptions for  Level II medications are sent with patient to the facility as per protocol.

## 2019-04-27 NOTE — PLAN OF CARE
Nutrition Problem: Inadequate oral intake  Intervention: Food and/or Nutrient Delivery: Continue NPO, Start Tube Feeding  Nutritional Goals: Pt will tolerate TF at goal rate to receive 100% nutrition needs via J tube

## 2019-04-27 NOTE — PLAN OF CARE
G-tube replaced by md this am after attempting to unclog old g-tube, instructions given on flushing g-tube every 3-4 hours.

## 2019-04-27 NOTE — PROGRESS NOTES
Patient alert/oriented, vss, assisted md in attempting to unclog g-tube, new g-tube placed by md, flushed and xray to check placement, instructions given to patient on flushing g-tube every 3-4 hours with water to prevent clogging, Dr Ramy Grant stated patient could be discharged, medication given in 30 ml of water & taken by mouth, wife at bedside, call light within reach

## 2019-04-27 NOTE — DISCHARGE SUMMARY
Hospital Medicine Discharge Summary    Reinaldo Mccartney  :  1945  MRN:  [id number]    Admit date:  2019  Discharge date:  2019    Admitting Physician:  Sahil Chamorro MD  Primary Care Physician:  Francisco Real MD  Code Status:   Full Code  Status: Inpatient [101]  Discharged Condition: Stable  Activity: activity as tolerated  Diet:   G-tube      Discharge Diagnoses:      Esophageal carcinoma with obstruction     Malfunctioning jejunostomy tube    Type 2 diabetes (controlled, HgbA1c 6.0%)     Hypertension    Chronic normocytic anemia (POA)    Hospital Course:   68 y.o. male admitted with malfunctioning jejunostomy tube. Diagnosed with esophageal cancer and completed chemotherapy in 2018. EGD on 19 showed recurring esophageal carcinoma. Feeding tube placed on Thursday, . Dr. Kassandra Pride performed an esophagectomy with mediastinal lymphadenectomy including thoracic duct, umbilical hernia repair, EGD and jejunostomy feeding tube placement. The patient stated he has been using his J-tube since this past  but became clogged and wife unable to unclog using [employer]. Dr. Kassandra Pride replaced the G-tube which is functioning at discharge. Xray with contrast injection verifies placement of feeding tube in proximal small bowel. Patient and spouse anxious for discharge. LYSSA completed for return to home health. Discharge Medications:    diltiazem (CARDIZEM) 30 MG tablet  Take 1 tablet by mouth 4 times daily Make sure to crush tablet.     gabapentin (NEURONTIN) 250 MG/5ML solution  Take 2 mLs by mouth every 8 hours for 14 days. lansoprazole (PREVACID SOLUTAB) 30 MG disintegrating tablet  Take 1 tablet by mouth daily Place tablet on tongue until dissolved, then swallow. metFORMIN (GLUCOPHAGE) 500 MG tablet  Take 1 tablet by mouth Daily with supper Metformin must be crushed.          Consults:  Cardiothoracic Surgery (Dr. Kassandra Pride)    Significant Diagnostic Studies: Abdominal X-ray: Contrast injection verifies placement of feeding tube in proximal small bowel    Treatments:   G-tube replaced    Disposition:   Home with Home Health (LYSSA completed)  Follow up with Leroy Alfred MD in 1-2 weeks and with cardiothoracic surgery at next scheduled appointment.        Signed:  PHU REED  4/27/2019, 1:37 PM

## 2019-04-27 NOTE — PROGRESS NOTES
Patient upset about not getting discharged earlier and now asking to speak with hospital , message sent to Dr Kavita Alvarado about discharging patient as soon as possible.

## 2019-04-27 NOTE — PROGRESS NOTES
Nutrition Assessment (Enteral Nutrition)    Type and Reason for Visit: Initial, Positive Nutrition Screen    Nutrition Recommendations:   · Continue NPO, per MD  · TF recommendations provided in note, if initiating this admission  · Monitor nutrition adequacy, weights, pertinent labs, BMs and clinical progress    Nutrition Assessment: Positive nutrition screen for home TF regimen. Pt nutritionally compromised related to esophageal CA s/p esophagectomy, NPO and need for EN via J tube. Pt had J tube placed on 4/18/19, feeding inititated with Glucerna 1.5 formula. At risk for further nutrition decline d/t clogged feeding tube. Tube to be replaced this date. Will provide TF recommendations in note, if TF initiated this admission. Pt reports he will be d/c today. Will continue to monitor nutrition status. Malnutrition Assessment:  · Malnutrition Status: No malnutrition    Nutrition Risk Level: High    Nutrition Needs:  · Estimated Daily Total Kcal: 4033-3182  · Estimated Daily Protein (g):   · Estimated Daily Fluid (ml/day): 1 ml/kcal or per MD    Nutrition Diagnosis:   · Problem: Inadequate oral intake  · Etiology: related to Alteration in GI function, Catabolic illness     Signs and symptoms:  as evidenced by NPO status due to medical condition, Nutrition support - EN    Objective Information:  · Nutrition-Focused Physical Findings: +2 RLE edema. +BM 4/26  · Wound Type: Surgical Wound(chest and abdomen)  · Current Nutrition Therapies:  · Oral Diet Orders: NPO   · Oral Diet intake: NPO  · Oral Nutrition Supplement (ONS) Orders: None  · ONS intake: NPO  · Tube Feeding (TF) Orders:   · Goal TF & Flush Orders Provides: Recommend initiate Glucerna 1.5 at 70 mL goal rate x 20 hours. Provides 2100 kcal, 1400 mL TV, 116 gm protein and 1063 mL free water. Recommend 130 mL free water flush q 3 hour to meet hydration needs.   · Anthropometric Measures:  · Ht: 5' 6\" (167.6 cm)   · Current Body Wt: 180 lb (81.6 kg)  · Ideal Body Wt: 142 lb (64.4 kg),  · BMI Classification: BMI 25.0 - 29.9 Overweight    Nutrition Interventions:   Continue NPO, Start Tube Feeding  Continued Inpatient Monitoring    Nutrition Evaluation:   · Evaluation: Goals set   · Goals: Pt will tolerate TF at goal rate to receive 100% nutrition needs via J tube   · Monitoring: TF Intake, TF Tolerance, Weight, Pertinent Labs, Monitor Bowel Function      Electronically signed by Tameka Britt RD, LD on 4/27/19 at 12:28 PM    Contact Number: Office: [phone]; [address]: [address]

## 2019-04-27 NOTE — DISCHARGE INSTR - COC
Continuity of Care Form    Patient Name: Guy Jarvis   :  1945  MRN:  [id number]    Admit date:  2019  Discharge date:  2019    Code Status Order: Full Code   Advance Directives:   [facility] Documentation     Date/Time Healthcare Directive Type of Healthcare Directive Copy in [address] Agent's Name Healthcare Agent's Phone Number    [phone]  Yes, patient has an advance directive for healthcare treatment  Living will;Durable power of  for health care  Yes, copy in chart --  --  --          Admitting Physician:  Oly Collier MD  PCP: Tosha Castro MD    Discharging Nurse: [facility] Unit/Room#: 0562/3201-34  Discharging Unit Phone Number: ***    Emergency Contact:   Extended Emergency Contact Information  Primary Emergency Contact: Sania  Address: [address] Phone: [phone]  Mobile Phone: [phone]  Relation: Spouse    Past Surgical History:  Past Surgical History:   Procedure Laterality Date    CATARACT REMOVAL WITH IMPLANT      COLONOSCOPY      negative    COLONOSCOPY  2017    diverticulosis    ESOPHAGEAL DILATATION N/A 1/3/2019    ESOPHAGOGASTRODUODENOSCOPY WITH BIOPSY  CPT CODE - 13961 performed by Glenice Severs, MD at [facility] ESOPHAGECTOMY N/A 2019    BENITO VALENTINA ESOPHAGECTOMY WITH MEDIASTINAL LYMPHADENECTOMY INCLUDING THORACIC DUCT, UMBILICAL HERNIA REPAIR performed by Glenice Severs, MD at [address] [place] [address] OFFICE/OUTPT VISIT,PROCEDURE ONLY N/A 2018    EUS/ WITH ANESTHESIA performed by Melissa Rasheed MD at [address] [place] [address] ENDOSCOPY  2018    EGD BIOPSY performed by Melissa Rasheed MD at [place] [address] N/A 2019 ESOPHAGOGASTRODUODENOSCOPY WITH BIOPSY  CPT CODE - 00528 performed by Jamie Hernandez MD at [address]      hx of sleep apnea       Immunization History:   Immunization History   Administered Date(s) Administered    Influenza, High Dose (Fluzone 65 yrs and older) 2018, 10/22/2018    Pneumococcal 13-valent Conjugate (Kbjmwil66) 10/26/2015    Pneumococcal Polysaccharide (Davwbksbf81) 2016    Td, unspecified formulation 2012    Zoster Live (Zostavax) 2017       Active Problems:  Patient Active Problem List   Diagnosis Code    History of prostatectomy Z90.79    Dysphagia R13.10    History of prostate cancer Z85.46    Loss of appetite R63.0    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    Mucositis due to radiation therapy K12.33    Type 2 diabetes mellitus ([facility] [address] E11.9    Undiagnosed cardiac murmurs R01.1       Isolation/Infection:   Isolation          No Isolation            Nurse Assessment:  Last Vital Signs: /72   Pulse 79   Temp 98.5 °F (36.9 °C) (Oral)   Resp 18   Ht 5' 6\" (1.676 m)   Wt 180 lb 14.4 oz (82.1 kg)   SpO2 92%   BMI 29.20 kg/m²     Last documented pain score (0-10 scale): Pain Level: 0  Last Weight:   Wt Readings from Last 1 Encounters:   19 180 lb 14.4 oz (82.1 kg)     Mental Status:  {IP PT MENTAL STATUS:36030}    IV Access:  { LYSSA IV ACCESS:267621573}    Nursing Mobility/ADLs:  Walking   {[facility] DME ADT}  Transfer  {P DME HXNW:774550220}  Bathing  {[facility] DME AZTB:468301852}  Dressing  {P DME UUWQ:215282936}  Toileting  {P DME WYSJ:277635457}  Feeding  {[facility] DME DSOJ:408194648}  Med Admin  {[facility] DME HCJC:338616326}  Med Delivery   { LYSSA MED Delivery:181669096}    Wound Care Documentation and Therapy:        Elimination:  Continence:   · Bowel: {YES / EF:65613}  · Bladder: {YES / CL:03791}  Urinary Catheter: {Urinary Catheter:298323984}   Colostomy/Ileostomy/Ileal Conduit: {YES / MQ:97667} Date of Last BM: ***    Intake/Output Summary (Last 24 hours) at 2019 1336  Last data filed at 2019 1319  Gross per 24 hour   Intake 690 ml   Output 1150 ml   Net -460 ml     I/O last 3 completed shifts:   In: 80 [I.V.:690]  Out: 650 [Urine:650]    Safety Concerns:     [address] [email or web address] Safety Concerns:532550758}    Impairments/Disabilities:      [address] CIRQY Impairments/Disabilities:794301629}    Nutrition Therapy:  Current Nutrition Therapy:   [address] [employer] Diet List:361530587}    Routes of Feeding: {CHP DME Other Feedings:128574973}  Liquids: {Slp liquid thickness:91435}  Daily Fluid Restriction: {CHP DME Yes amt example:195151767}  Last Modified Barium Swallow with Video (Video Swallowing Test): {Done Not Done CGZN:143630367}    Treatments at the Time of Hospital Discharge:   Respiratory Treatments: ***  Oxygen Therapy:  {Therapy; copd oxygen:66497}  Ventilator:    { CC Vent KZNF:278997844}    Rehab Therapies: {THERAPEUTIC INTERVENTION:6621240459}  Weight Bearing Status/Restrictions: [facility] Weight Bearin}  Other Medical Equipment (for information only, NOT a DME order):  {EQUIPMENT:105751466}  Other Treatments: ***    Patient's personal belongings (please select all that are sent with patient):  {P DME Belongings:532287680}    RN SIGNATURE:  {Esignature:374918107}    CASE MANAGEMENT/SOCIAL WORK SECTION    Inpatient Status Date: ***    Readmission Risk Assessment Score:  Readmission Risk              Risk of Unplanned Readmission:        14           Discharging to Facility/ Agency   · [facility]: [facility]  · Address:  · Phone:[phone]  · Fax:[phone]    Dialysis Facility (if applicable)   · Name:  · Address:  · Dialysis Schedule:  · Phone:  · Fax:    / signature: Electronically signed by Robyn Carlisle RN on 19 at 1:59 PM    PHYSICIAN SECTION    Prognosis: Fair    Condition at Discharge: Stable    Rehab Potential (if transferring to Rehab): Good    Recommended Labs or Other

## 2019-04-27 NOTE — PROGRESS NOTES
Patient given discharge instructions, has homecare set up to come out on Monday, alert/oriented, denies pain, upset about discharge taking so long, patient discharged with all belongings in wheelchair to front entrance where spouse picked him up.

## 2019-04-28 LAB
ESTIMATED AVERAGE GLUCOSE: 145.6 MG/DL
HBA1C MFR BLD: 6.7 %

## 2019-05-02 ENCOUNTER — TELEPHONE (OUTPATIENT)
Dept: CARDIOTHORACIC SURGERY | Age: 74
End: 2019-05-02

## 2019-05-03 ENCOUNTER — TELEPHONE (OUTPATIENT)
Dept: OTHER | Facility: CLINIC | Age: 74
End: 2019-05-03

## 2019-05-03 ENCOUNTER — HOSPITAL ENCOUNTER (EMERGENCY)
Age: 74
Discharge: HOME | End: 2019-05-03
Attending: EMERGENCY MEDICINE
Payer: MEDICARE

## 2019-05-03 VITALS
SYSTOLIC BLOOD PRESSURE: 122 MMHG | TEMPERATURE: 98.6 F | DIASTOLIC BLOOD PRESSURE: 71 MMHG | OXYGEN SATURATION: 95 % | RESPIRATION RATE: 18 BRPM | WEIGHT: 174 LBS | HEART RATE: 85 BPM | BODY MASS INDEX: 27.97 KG/M2 | HEIGHT: 66 IN

## 2019-05-03 DIAGNOSIS — T85.598A FEEDING TUBE BLOCKED, INITIAL ENCOUNTER: Primary | ICD-10-CM

## 2019-05-03 PROCEDURE — 99283 EMERGENCY DEPT VISIT LOW MDM: CPT

## 2019-05-03 NOTE — ED NOTES
Cardiothoracic surgery consult placed @ 1404  RE: Kael Smith per Meggan Keller, CINDY.  *Dr. Taqueria Woodson in surgery at this time, RN in surgery will let him know the patient is in the ER.  1630- called Dr. Valencia Thapa office, spoke to Brú. Stated will send Dr. Taqueria Woodson a message regarding patient. 1642- spoke to RN with Dr. Taqueria Woodson. Stated he is still in surgery and will let us know when he is finished. Dr. Julian lPummer aware. 80- spoke to RN with Dr. Taqueria Woodson. Stated he is closing up post surgery and will be to ER after completion. Dr. Julian Plummer aware. Dr. Taqueria Woodson in ED @ 7408.      Sheryl Park  05/03/19 8423

## 2019-05-03 NOTE — ED PROVIDER NOTES
I independently performed a history and physical on Jalen Zaidi. All diagnostic, treatment, and disposition decisions were made by myself in conjunction with the advanced practice provider. For further details of [facility] emergency department encounter, please see advanced practice provider's documentation    This is a 66-year-old male presenting with an obstructed feeding tube. Patient several days he has been unable to use the feeding tube. Patient denies any pain, fever, vomiting. Physical examination: Abdomen soft nontender. Surgical incision healing well    Xr Chest Standard (2 Vw)    Result Date: 4/15/2019  EXAMINATION: TWO VIEWS OF THE CHEST 4/15/2019 8:22 am COMPARISON: CT chest 09/18/2018 HISTORY: ORDERING SYSTEM PROVIDED HISTORY: Pre-op chest exam TECHNOLOGIST PROVIDED HISTORY: Ordering Physician Provided Reason for Exam: pre op for surgery on Thursday Acuity: Acute Type of Exam: Initial FINDINGS: Left lower lobe opacity. No pleural effusion or pneumothorax. The osseous structures are stable. Cardiac silhouette is stable. No overt pulmonary edema. Left lower lobe opacity suspicious for pneumonia. The findings were sent to the Radiology Results [address] at 8:31 am on 4/15/2019to be communicated to a licensed caregiver. Xr Abdomen (kub) (single Ap View)    Result Date: 4/27/2019  EXAMINATION: SINGLE SUPINE XRAY VIEW(S) OF THE ABDOMEN 4/27/2019 8:36 am COMPARISON: None. HISTORY: ORDERING SYSTEM PROVIDED HISTORY: feeding tube placement TECHNOLOGIST PROVIDED HISTORY: Reason for exam:->feeding tube placement Ordering Physician Provided Reason for Exam: check for feedinf tube placement Acuity: Acute Type of Exam: Initial FINDINGS: Contrast injected into the feeding tube which appears to be and proximal small bowel.   No extravasation of contrast.     Contrast injection verifies placement of feeding tube in proximal small bowel     Fl Esophagram    Result Date: 4/22/2019  EXAMINATION: SINGLE CONTRAST ESOPHAGRAM 4/22/2019 HISTORY: ORDERING SYSTEM PROVIDED HISTORY: s/p esophagectomy POD 4; evaluate for any leakage TECHNOLOGIST PROVIDED HISTORY: 1 cup gastrografin followed by 1 cup of barium. Reason for exam:->s/p esophagectomy POD 4; evaluate for any leakage FLUOROSCOPY DOSE AND TYPE OR TIME AND EXPOSURES: 1.1 minutes. 9 images including 5 fluoroscopy loops. FINDINGS: Initial evaluation was performed using water-soluble contrast, followed by barium contrast. Findings related to [address] esophagectomy. There is no contrast extravasation identified. Mildly delayed emptying of the stomach. No evidence of a leak following esophagectomy. Xr Chest Portable    Result Date: 4/24/2019  EXAMINATION: SINGLE XRAY VIEW OF THE CHEST 4/24/2019 3:59 am COMPARISON: 04/23/2019 HISTORY: ORDERING SYSTEM PROVIDED HISTORY: Status post esophagectomy TECHNOLOGIST PROVIDED HISTORY: Reason for exam:->Status post esophagectomy Ordering Physician Provided Reason for Exam: Status post esophagectomy Type of Exam: Subsequent/Follow-up FINDINGS: Tiny right apical pneumothorax is unchanged. There is stable bibasilar airspace disease. The heart size is normal.     Stable right apical pneumothorax and bibasilar airspace disease. Xr Chest Portable    Result Date: 4/23/2019  EXAMINATION: SINGLE XRAY VIEW OF THE CHEST 4/23/2019 3:49 am COMPARISON: 04/22/2019 HISTORY: ORDERING SYSTEM PROVIDED HISTORY: Status post esophagectomy TECHNOLOGIST PROVIDED HISTORY: Reason for exam:->Status post esophagectomy Ordering Physician Provided Reason for Exam: Status post esophagectomy Type of Exam: Subsequent/Follow-up FINDINGS: Chest tubes and and enteric tube have been removed. Left basilar airspace disease has improved. Right basilar airspace disease is stable. The heart size is normal.  Tiny right apical pneumothorax is unchanged. 1. Stable right apical pneumothorax.  2. Improving left basilar and stable right basilar airspace disease. Xr Chest Portable    Result Date: 4/22/2019  EXAMINATION: SINGLE XRAY VIEW OF THE CHEST 4/22/2019 3:58 am COMPARISON: 04/21/2019 HISTORY: ORDERING SYSTEM PROVIDED HISTORY: Status post esophagectomy TECHNOLOGIST PROVIDED HISTORY: Reason for exam:->Status post esophagectomy Ordering Physician Provided Reason for Exam: Status post esophagectomy Type of Exam: Subsequent/Follow-up FINDINGS: Right-sided chest tubes and enteric tube remain in place. There is improving bibasilar airspace disease. The heart size is normal.  Right apical pneumothorax appears unchanged. 1. Improving bibasilar airspace disease. 2. Stable right apical pneumothorax. Xr Chest Portable    Result Date: 4/21/2019  EXAMINATION: SINGLE XRAY VIEW OF THE CHEST 4/21/2019 4:08 am COMPARISON: 04/20/2019 HISTORY: ORDERING SYSTEM PROVIDED HISTORY: Status post esophagectomy TECHNOLOGIST PROVIDED HISTORY: Reason for exam:->Status post esophagectomy Ordering Physician Provided Reason for Exam: Status post esophagectomy Type of Exam: Subsequent/Follow-up FINDINGS: Right-sided chest tubes and enteric tube remain in place. Right apical pneumothorax appears unchanged. There is bibasilar airspace disease, likely atelectasis. The heart size is at the upper limits of normal.     Stable right apical pneumothorax. Xr Chest Portable    Result Date: 4/20/2019  EXAMINATION: SINGLE XRAY VIEW OF THE CHEST 4/20/2019 4:08 am COMPARISON: 04/19/2019 HISTORY: ORDERING SYSTEM PROVIDED HISTORY: Status post esophagectomy TECHNOLOGIST PROVIDED HISTORY: Reason for exam:->Status post esophagectomy Ordering Physician Provided Reason for Exam: Status post esophagectomy Type of Exam: Subsequent/Follow-up FINDINGS: Enteric tube and right-sided chest tubes remain in place. There is a tiny right apical pneumothorax. Subcutaneous emphysema is identified in the right lateral thoracoabdominal wall.   There is stable left basilar

## 2019-05-03 NOTE — ED NOTES
Dr. Anju Carr at bedside assessing his G-tube. He said since the patient can tolerate po fluids he is going have him follow up with him in the office on Monday.       Jillian Joseph [facility]  05/03/19 1922

## 2019-05-03 NOTE — ED PROVIDER NOTES
facility-administered medications for this encounter. Current Outpatient Medications   Medication Sig Dispense Refill    diltiazem (CARDIZEM) 30 MG tablet Take 1 tablet by mouth 4 times daily Make sure to crush tablet. 120 tablet 0    gabapentin (NEURONTIN) 250 MG/5ML solution Take 2 mLs by mouth every 8 hours for 14 days. 84 mL 0    metFORMIN (GLUCOPHAGE) 500 MG tablet Take 1 tablet by mouth Daily with supper Metformin must be crushed. (Patient taking differently: Take 500 mg by mouth 2 times daily (with meals) Metformin must be crushed.) 30 tablet 0    lansoprazole (PREVACID SOLUTAB) 30 MG disintegrating tablet Take 1 tablet by mouth daily Place tablet on tongue until dissolved, then swallow. 30 tablet 0    ONE TOUCH ULTRA TEST strip       ONETOUCH DELICA LANCETS 22Z MISC        Allergies   Allergen Reactions    No Known Allergies        REVIEW OF SYSTEMS  10 systems reviewed, pertinent positives per HPI otherwise noted to be negative    PHYSICAL EXAM  /71   Pulse 85   Temp 98.6 °F (37 °C) (Oral)   Resp 18   Ht 5' 6\" (1.676 m)   Wt 174 lb (78.9 kg)   SpO2 95%   BMI 28.08 kg/m²   GENERAL APPEARANCE: Awake and alert. Cooperative. No acute distress. Vital signs are stable. Well appearing and non toxic. HEAD: Normocephalic. Atraumatic. EYES: PERRL. EOM's grossly intact. ENT: Mucous membranes are moist.   NECK: Supple. Normal ROM. HEART: RRR. No murmurs. Distal pulses are equal and intact. Cap refill less than 2 seconds. LUNGS: Respirations unlabored. CTAB. Good air exchange. Speaking comfortably in full sentences. No wheezing, rhonchi, rales, stridor. CHEST: Healing surgical scar to the right flank, no erythema, no drainage. ABDOMEN: Soft. Non-distended. Non-tender. No guarding or rebound. No masses. No organomegaly. No rigidity. Bowel sounds are normal. Vertical surgical incision in the abdomen, healing, nonerythematous, no drainage. G-tube left upper quadrant.  No erythema, drainage. EXTREMITIES: No peripheral edema. Moves all extremities equally. All extremities neurovascularly intact. SKIN: Warm and dry. No acute rashes. NEUROLOGICAL: Alert and oriented. CN's 2-12 intact. No gross facial drooping. Strength 5/5, sensation intact. No dysarthria. No dysmetria. No ataxia. PSYCHIATRIC: Normal mood and affect. ED COURSE  I have evaluated this patient in collaboration with Melissa Car. Patient presents to the emergency department for evaluation of clogged g tube. Awaiting  evaluation of patient. The encounter diagnosis was Feeding tube blocked, initial encounter. MDM/DISPOSITION    5:10 PM: I discussed the history, physical, and treatment plan with Dr. Oliver La. Jero Murray was signed out in stable condition. Please see Dr. Bella Mays note for further details, including diagnosis and disposition.            Freya Galo, TASHI - CNP  05/03/19 [address] Anna Marie Barraza, APRN - CNP  05/06/19 6795

## 2019-05-03 NOTE — ED NOTES
Surgical site is dry and pink, pt denies pain. Pt is here for problems with his G-tube, but is tolerating po fluids.      Christal Herman RN  05/03/19 8315

## 2019-05-06 ENCOUNTER — TELEPHONE (OUTPATIENT)
Dept: OTHER | Facility: CLINIC | Age: 74
End: 2019-05-06

## 2019-05-06 ENCOUNTER — OFFICE VISIT (OUTPATIENT)
Dept: CARDIOTHORACIC SURGERY | Age: 74
End: 2019-05-06

## 2019-05-06 VITALS
TEMPERATURE: 98.1 F | SYSTOLIC BLOOD PRESSURE: 126 MMHG | DIASTOLIC BLOOD PRESSURE: 60 MMHG | HEART RATE: 93 BPM | WEIGHT: 177 LBS | HEIGHT: 66 IN | OXYGEN SATURATION: 95 % | BODY MASS INDEX: 28.45 KG/M2

## 2019-05-06 DIAGNOSIS — C15.5 MALIGNANT NEOPLASM OF LOWER THIRD OF ESOPHAGUS (HCC): Primary | ICD-10-CM

## 2019-05-06 PROCEDURE — 99024 POSTOP FOLLOW-UP VISIT: CPT | Performed by: THORACIC SURGERY (CARDIOTHORACIC VASCULAR SURGERY)

## 2019-05-06 NOTE — OP NOTE
[address] [address]                                OPERATIVE REPORT    PATIENT NAME: Sofya Mars                        :        1945  MED REC NO:   [id number]                          ROOM:       0236  ACCOUNT NO:   [de-identified]                           ADMIT DATE: 2019  PROVIDER:     Neelima Cuellar MD    DATE OF PROCEDURE:  2019    PREOPERATIVE DIAGNOSIS:  Esophageal cancer. POSTOPERATIVE DIAGNOSIS:  Esophageal cancer. OPERATION PERFORMED:  1. Bhupendra Stew esophagectomy. 2.  Mediastinal lymphadenectomy including the thoracic duct. 3.  Umbilical hernia repair. 4.  EGD. 5.  Jejunostomy feeding tube. 6.  Botox injection to the pylorus. PROCEDURE DESCRIPTION:  The patient was taken to the operating room,  after informed written consent was obtained, lying supine under general  anesthesia. ET tube was placed. Double lumen tube was placed with no  difficulty. We started with midline incisions, started from the xiphoid  process all the way two finger above the umbilicus. Abdomen was  entered. Exploration was performed. We could not feel any mass or  lesions or metastatic disease on top of the liver or in the peritoneum  that prevent us from proceeding. Starting with mobilizing, the right  gastrohepatic ligament was taken down by the harmonic scalpel. We could  see the edd clearly and Bovie was used to elevate the peritoneum off  the edd to be included in the specimen. We inserted the edd and we  went to the left side and we cleaned the peritoneum at that side too. Short gastric artery was taken down by using harmonic scalpel all the  way down to the left edd. The space behind the esophagus and aorta was  cleaned up and all the lymph nodes was included into the specimen.   Next  we used harmonic scalpel to take the rest of the omentum and we  preserved the right gastroepiploic all the way down to the pylorus. Next, the left gastric artery was dissected all the way to the origin  and then Endo BELINDA stapler white color was fired to complete removal of  all the lymph node with the specimen. Next, tubularization of the  stomach was performed by using an Endo BELINDA stapler purple color in five  positions, followed by identification of the pylorus, and injection of  400 International Units of Botox. Next we went to the ligament of  Treitz, identified and count 20 fingerbreadth from there and jejunostomy  feeding tube was placed by placing a pursestring in two layers and fixed  it to the abdominal wall in four positions using a red rubber catheter  with multiple holes 16-Georgian introduced through the skin and introduced  through the small intestine thereafter. Flushed our irrigation through,  we could see clearly particles unclean. Next, abdomen was closed in  cephalad and caudad by double looped PDS. Next, soft tissue was closed. The patient was turned to right side up. Right lung was dropped,  tolerated well by the patient. Posterolateral thoracotomy was performed  on entrance. Exploration was performed and there was no mass or lesion  or metastatic disease could be identified. There is significant amount  of radiation-induced changes could be seen. We started with mobilizing  the esophagus at the joel. Level 7 lymph node was identified and  esophagus was encircled. The level 7 lymph node was removed and sent  for pathology. Next, we tracked the esophagus down to the diaphragm  level by using harmonic scalpel and Bovie and we encircled all the soft  tissue including the thoracic duct of the aorta. The thoracic duct  itself was clipped and tied in multiple places to prevent further leak  by using a bundle tie.   The rest of the specimen pulled gently and  tubularized stomach was pulled into the chest.  Transection of the  esophagus at the level of the azygous, was performed by using scissor  and knife. The stomach was stapled off and specimen was sent for  pathology. Margin was negative.  _____ size 25 was introduced _____ was  introduced to the esophagus and the handle was introduced to the  stomach, brought down and fired. We could clearly visualize the  anastomosis from inside. There is a weak spot at the posterior layer,  was reinforced by through-and-through single layer 2-0 silk. The whole  anastomosis was after that reinforced by muscularis layer of 3-0 silk in  interrupted fashion. The entry point of the stomach was stapled off by  using an Endo BELINDA stapler. Scope was introduced and bubble test was  performed. No leak could be seen. Next, two chest tubes were placed,  one straight beside the anastomosis and one on top of the diaphragm to  drain the lymphoid tissue, lymphoid leak if present. Copious amounts of  irrigation was done. No active bleeding was seen and the scope was  removed. NG tube was placed. The patient was dispositioned to recovery  room in stable condition without complication.         Kunal Liz MD    D: 05/05/2019 19:28:58       T: 05/06/2019 2:44:09     [id number]  Job#: [id number]     Doc#: [id number]    CC:

## 2019-05-06 NOTE — PROGRESS NOTES
Status post esophagectomy for esophageal cancer  Dr. Ekaterina Mac  Patient tolerating his p.o.  Well  No major complaining from the surgery standpoint  Feeding tube in position  Vital signs stable afebrile  S1 plus S2 +0 no additional sounds  Bilaterally clear no additional sounds  Abdomen soft Nontender Bowel Sounds Positive    Plan  #1 pathology was discussed with the patient in detail I would refer back the patient to Dr. lee oncology for fear recommendations and treatment  #2 I'll see the patient in 3 weeks if he maintained his weight, long-term plan with chemo, I possibly could DC his feeding tube feed as needed

## 2019-05-06 NOTE — TELEPHONE ENCOUNTER
Writer contacted Dr. Emilia Chanel office to schedule ED f/u appt.  Spoke with Jennifer Arias, appt scheduled for Tuesday 5-7 @10:30am.

## 2019-05-06 NOTE — TELEPHONE ENCOUNTER
Writer attempted to contact pt to inform him of ED f/u appt. Appt scheduled for Tuesday 5-7 @10:30am with Dr. Rob Albarado, Attempt unsuccessful. Voicemail left stating time and date of appt.

## 2019-05-07 ENCOUNTER — TELEPHONE (OUTPATIENT)
Dept: CARDIOTHORACIC SURGERY | Age: 74
End: 2019-05-07

## 2019-05-07 NOTE — TELEPHONE ENCOUNTER
Patient was evaluated by Dr. John Campos in the office yesterday. Dr. John Campos would like to see him back in 3 weeks. In the meantime, he would like the patient to see Dr. Lily Ashraf for follow up. Scheduled him for an appt for 5/9/19 @ 1 pm at their [facility] location. Called patient and he is agreeable to OV date and time.

## 2019-05-08 ENCOUNTER — TELEPHONE (OUTPATIENT)
Dept: CARDIOTHORACIC SURGERY | Age: 74
End: 2019-05-08

## 2019-05-08 DIAGNOSIS — B37.0 THRUSH, ORAL: Primary | ICD-10-CM

## 2019-05-08 NOTE — TELEPHONE ENCOUNTER
Patient's wife called. Patient has developed white/yellow coating on his tongue. She reports that when they scrape it off, it comes right back. Ordered Nystatin mouth wash to pharmacy [facility] for ten days. They will call back if symptoms have not gone away after medication is completed. No further questions at this time.

## 2019-05-13 ENCOUNTER — OFFICE VISIT (OUTPATIENT)
Dept: CARDIOTHORACIC SURGERY | Age: 74
End: 2019-05-13

## 2019-05-13 VITALS
DIASTOLIC BLOOD PRESSURE: 62 MMHG | HEIGHT: 66 IN | SYSTOLIC BLOOD PRESSURE: 110 MMHG | HEART RATE: 75 BPM | WEIGHT: 178 LBS | BODY MASS INDEX: 28.61 KG/M2 | OXYGEN SATURATION: 97 %

## 2019-05-13 DIAGNOSIS — C77.0 MALIGNANT NEOPLASM METASTATIC TO LYMPH NODE OF HEAD (HCC): Primary | ICD-10-CM

## 2019-05-13 DIAGNOSIS — C15.5 MALIGNANT NEOPLASM OF LOWER THIRD OF ESOPHAGUS (HCC): ICD-10-CM

## 2019-05-13 PROCEDURE — 99024 POSTOP FOLLOW-UP VISIT: CPT | Performed by: THORACIC SURGERY (CARDIOTHORACIC VASCULAR SURGERY)

## 2019-05-13 NOTE — PROGRESS NOTES
Status post esophagectomy  Clinic follow-up  Dr. Pascal Her  Patient jejunostomy feeding tube fallout  Vital signs stable afebrile  S1 plus S2 +0 no additional sounds  Bilaterally clear no additional sounds  Abdomen soft nondistended nontender bowel sounds positive  Plan  Placement of jejunostomy tube secured with 3-0 silk patient was discharged no issues

## 2019-05-23 ENCOUNTER — HOSPITAL ENCOUNTER (OUTPATIENT)
Dept: CT IMAGING | Age: 74
Discharge: HOME | End: 2019-05-23
Payer: MEDICARE

## 2019-05-23 DIAGNOSIS — C77.1 SECONDARY AND UNSPECIFIED MALIGNANT NEOPLASM OF INTRATHORACIC LYMPH NODES (HCC): ICD-10-CM

## 2019-05-23 DIAGNOSIS — C15.5 MALIGNANT NEOPLASM OF ABDOMINAL ESOPHAGUS (HCC): ICD-10-CM

## 2019-05-23 PROCEDURE — 74177 CT ABD & PELVIS W/CONTRAST: CPT

## 2019-05-23 PROCEDURE — 71260 CT THORAX DX C+: CPT

## 2019-05-23 PROCEDURE — 6360000004 HC RX CONTRAST MEDICATION: Performed by: INTERNAL MEDICINE

## 2019-05-23 RX ADMIN — IOPAMIDOL 100 ML: 755 INJECTION, SOLUTION INTRAVENOUS at 10:22

## 2019-05-23 RX ADMIN — IOHEXOL 50 ML: 240 INJECTION, SOLUTION INTRATHECAL; INTRAVASCULAR; INTRAVENOUS; ORAL at 10:14

## 2019-05-30 ENCOUNTER — TELEPHONE (OUTPATIENT)
Dept: CARDIOTHORACIC SURGERY | Age: 74
End: 2019-05-30

## 2019-05-30 NOTE — TELEPHONE ENCOUNTER
Called lm/voicemail that patient needs to reschedule appointment with Dr. Quiles Siobhan and come in on 6/17.

## 2019-06-06 ENCOUNTER — TELEPHONE (OUTPATIENT)
Dept: CARDIOTHORACIC SURGERY | Age: 74
End: 2019-06-06

## 2019-06-17 ENCOUNTER — OFFICE VISIT (OUTPATIENT)
Dept: CARDIOTHORACIC SURGERY | Age: 74
End: 2019-06-17

## 2019-06-17 VITALS
SYSTOLIC BLOOD PRESSURE: 118 MMHG | HEART RATE: 79 BPM | OXYGEN SATURATION: 97 % | DIASTOLIC BLOOD PRESSURE: 68 MMHG | WEIGHT: 175 LBS | TEMPERATURE: 98.2 F | BODY MASS INDEX: 28.12 KG/M2 | HEIGHT: 66 IN

## 2019-06-17 DIAGNOSIS — C15.5 MALIGNANT NEOPLASM OF LOWER THIRD OF ESOPHAGUS (HCC): Primary | ICD-10-CM

## 2019-06-17 PROCEDURE — 99024 POSTOP FOLLOW-UP VISIT: CPT | Performed by: THORACIC SURGERY (CARDIOTHORACIC VASCULAR SURGERY)

## 2019-06-18 RX ORDER — DILTIAZEM HYDROCHLORIDE 120 MG/1
120 CAPSULE, EXTENDED RELEASE ORAL DAILY
COMMUNITY
End: 2020-09-01 | Stop reason: ALTCHOICE

## 2019-06-18 RX ORDER — GABAPENTIN 300 MG/1
300 CAPSULE ORAL 2 TIMES DAILY
COMMUNITY

## 2019-06-18 NOTE — PROGRESS NOTES
Chief Complaint: Difficulty swallowing     History of Present Illness: We are asked to see this patient in consultation by Dr. michelle Cason Joel a 68 y. o. male who 6 month with food stuck in his lower part of the esophagus initially he could pass it with some water subsequently progressed to he have to trough to release the pressure no pain about 10-15 pounds lost weight: 2.4 EGD which showed lower esophageal mass biopsy confirmed adenocarcinoma EUS confirmed the presence of positive paraesophageal lymph node PET scan is pending     Past Medical History:  Past Medical History           Past Medical History:   Diagnosis Date    Cancer [facility])       prostate    Diabetes mellitus ([facility] [address]      Hypertension              Past Surgical History:  Past Surgical History             Past Surgical History:   Procedure Laterality Date    CATARACT REMOVAL WITH IMPLANT   2003    COLONOSCOPY   2012     negative    COLONOSCOPY   09/14/2017     diverticulosis    HERNIA REPAIR        [place] OFFICE/OUTPT VISIT,PROCEDURE ONLY N/A 9/19/2018     EUS/ WITH ANESTHESIA performed by Kyara Kilgore MD at [facility]   9/19/2018     EGD BIOPSY performed by Kyara Kilgore MD at [address] [place] [address] Medications:   Home Medications                 Prior to Admission medications    Medication Sig Start Date End Date Taking? Authorizing Provider   diltiazem (CARDIZEM 12 HR) 120 MG extended release capsule Take 120 mg by mouth daily     Yes Historical Provider, MD   meloxicam (MOBIC) 15 MG tablet TAKE ONE TABLET BY MOUTH DAILY 9/10/18   Yes Silke Velasquez MD   gabapentin (NEURONTIN) 100 MG capsule Take 3 capsules by mouth every evening  Patient taking differently: Take by mouth 2 times daily.  . 5/3/16   Yes Silke Velasquez MD   metFORMIN (GLUCOPHAGE) 500 MG tablet 500 mg daily (with breakfast)  8/31/15   Yes Historical Provider, MD       Facility Administered Medications:      Allergies:            Allergies   Allergen Reactions    No Known Allergies           Social History:    Working:   Caffeine:   Lifestyle:    Social History   Social History                Social History    Marital status:        Spouse name: N/A    Number of children: N/A    Years of education: N/A            Occupational History    Not on file.                Social History Main Topics    Smoking status: Former Smoker       Types: Cigarettes       Quit date: 1972    Smokeless tobacco: Never Used    Alcohol use No    Drug use: No    Sexual activity: Yes              Other Topics Concern    Not on file            Social History Narrative    No narrative on file            Family History:  Heart Disease:   Stroke:   Cancer:   Diabetes:   Hypertension:   Aneurysm/PVD:      Family History             Family History   Problem Relation Age of Onset    Cancer Father              Review of Systems:  Constitutional:  No night sweats, headaches, weight loss. Eyes:  No glaucoma, cataracts. ENMT:  No nosebleeds, deviated septum. Cardiac:  No arrhythmias. Vascular:  No claudication, varicosities. GI:  No PUD, heartburn. :  No kidney stones, frequent UTIs  Musculoskeletal:  No arthritis, gout. Respiratory:  No SOB, emphysema, asthma. Integumentary:  No dermatitis, itching, rash. Neurological:  No stroke, TIAs, seizures. Psychiatric:  No depression, anxiety. Endocrine: No diabetes, thyroid issues. Hematologic:  No bleeding, easy bruising.   Immunologic:  No known cancer, steroid therapies.        Physical Examination:    BP (!) 154/82 (Site: Left Upper Arm, Position: Sitting, Cuff Size: Medium Adult)   Pulse 69   Temp 98.4 °F (36.9 °C) (Oral)   Wt 203 lb (92.1 kg)   SpO2 95%   BMI 32.77 kg/m²      BP RUE:          BP LUE:   Admission Weight: 203 lb (92.1 kg)   Hand dominance:     General appearance: NAD, well nourished  Eyes: anicteric, anthracosis,       negative for carcinoma     - GMS staining shows no definitive fungal organisms; AFB staining is       negative  C Esophagus and proximal stomach, esophagogastrectomy:     - Invasive moderately to poorly differentiated adenocarcinoma at the       gastroesophageal junction, 5.4 cm in greatest dimension, without       significant therapy related changes     - Tumor invades into the adventitial tissue, lying 1mm from the inked       deep margin     - Proximal and distal margins are negative     - Thirteen of sixteen lymph nodes positive for carcinoma (13/16)  D True gastric margin, excision:     - Gastric tissue with some reactive mucosal atypia, negative for       carcinoma  E True esophageal margin, excision:     - Benign squamous esophageal tissue     - Fragments of benign gastric tissue are also present     Labs:   CBC: No results for input(s): WBC, HGB, HCT, MCV, PLT in the last 72 hours. BMP: No results for input(s): NA, K, CL, CO2, PHOS, BUN, CREATININE, CALCIUM, MG in the last 72 hours. Cardiac Enzymes: No results for input(s): CKTOTAL, CKMB, CKMBINDEX, TROPONINI in the last 72 hours. PT/INR: No results for input(s): PROTIME, INR in the last 72 hours. APTT: No results for input(s): APTT in the last 72 hours.   Liver Profile:            Lab Results   Component Value Date     AST 20 11/21/2014     ALT 18 11/21/2014     BILITOT 0.7 11/21/2014     ALKPHOS 55 11/21/2014   No results found for: CHOL, HDL, TRIG  UA: No results found for: NITRITE, COLORU, PHUR, LABCAST, [facility], RBCUA, MUCUS, TRICHOMONAS, YEAST, BACTERIA, CLARITYU, SPECGRAV, LEUKOCYTESUR, UROBILINOGEN, BILIRUBINUR, BLOODU, GLUCOSEU, AMORPHOUS     History obtained: chart, pt     Risk factors: Significant reflux disease  Assessment and plan  Status post esophagectomy doing well from that standpoint tolerating his food still with feeding tube  Patient will require postop chemo starting in July 10 and will require PowerPort placement we will plan for it in the coming days I will replace his feeding tube at the same time

## 2019-06-24 ENCOUNTER — ANESTHESIA EVENT (OUTPATIENT)
Dept: OPERATING ROOM | Age: 74
End: 2019-06-24
Payer: MEDICARE

## 2019-06-25 ENCOUNTER — HOSPITAL ENCOUNTER (OUTPATIENT)
Age: 74
Setting detail: OUTPATIENT SURGERY
Discharge: HOME | End: 2019-06-25
Attending: THORACIC SURGERY (CARDIOTHORACIC VASCULAR SURGERY) | Admitting: THORACIC SURGERY (CARDIOTHORACIC VASCULAR SURGERY)
Payer: MEDICARE

## 2019-06-25 ENCOUNTER — HOSPITAL ENCOUNTER (OUTPATIENT)
Dept: GENERAL RADIOLOGY | Age: 74
Discharge: HOME | End: 2019-06-25
Attending: THORACIC SURGERY (CARDIOTHORACIC VASCULAR SURGERY)
Payer: MEDICARE

## 2019-06-25 ENCOUNTER — ANESTHESIA (OUTPATIENT)
Dept: OPERATING ROOM | Age: 74
End: 2019-06-25
Payer: MEDICARE

## 2019-06-25 VITALS
DIASTOLIC BLOOD PRESSURE: 66 MMHG | SYSTOLIC BLOOD PRESSURE: 97 MMHG | WEIGHT: 173.3 LBS | RESPIRATION RATE: 16 BRPM | TEMPERATURE: 97.6 F | HEIGHT: 66 IN | OXYGEN SATURATION: 99 % | BODY MASS INDEX: 27.85 KG/M2 | HEART RATE: 68 BPM

## 2019-06-25 VITALS
OXYGEN SATURATION: 94 % | SYSTOLIC BLOOD PRESSURE: 99 MMHG | DIASTOLIC BLOOD PRESSURE: 63 MMHG | RESPIRATION RATE: 13 BRPM

## 2019-06-25 DIAGNOSIS — C15.5 MALIGNANT NEOPLASM OF LOWER THIRD OF ESOPHAGUS (HCC): Primary | ICD-10-CM

## 2019-06-25 DIAGNOSIS — Z95.828 PORT-A-CATH IN PLACE: ICD-10-CM

## 2019-06-25 LAB
GLUCOSE BLD-MCNC: 99 MG/DL (ref 70–99)
PERFORMED ON: NORMAL

## 2019-06-25 PROCEDURE — 3600000012 HC SURGERY LEVEL 2 ADDTL 15MIN: Performed by: THORACIC SURGERY (CARDIOTHORACIC VASCULAR SURGERY)

## 2019-06-25 PROCEDURE — 49451 REPLACE DUOD/JEJ TUBE PERC: CPT | Performed by: THORACIC SURGERY (CARDIOTHORACIC VASCULAR SURGERY)

## 2019-06-25 PROCEDURE — 3600000002 HC SURGERY LEVEL 2 BASE: Performed by: THORACIC SURGERY (CARDIOTHORACIC VASCULAR SURGERY)

## 2019-06-25 PROCEDURE — 3700000000 HC ANESTHESIA ATTENDED CARE: Performed by: THORACIC SURGERY (CARDIOTHORACIC VASCULAR SURGERY)

## 2019-06-25 PROCEDURE — 36561 INSERT TUNNELED CV CATH: CPT | Performed by: THORACIC SURGERY (CARDIOTHORACIC VASCULAR SURGERY)

## 2019-06-25 PROCEDURE — 2709999900 HC NON-CHARGEABLE SUPPLY: Performed by: THORACIC SURGERY (CARDIOTHORACIC VASCULAR SURGERY)

## 2019-06-25 PROCEDURE — 2580000003 HC RX 258: Performed by: THORACIC SURGERY (CARDIOTHORACIC VASCULAR SURGERY)

## 2019-06-25 PROCEDURE — 6360000002 HC RX W HCPCS: Performed by: THORACIC SURGERY (CARDIOTHORACIC VASCULAR SURGERY)

## 2019-06-25 PROCEDURE — 77001 FLUOROGUIDE FOR VEIN DEVICE: CPT

## 2019-06-25 PROCEDURE — 77001 FLUOROGUIDE FOR VEIN DEVICE: CPT | Performed by: THORACIC SURGERY (CARDIOTHORACIC VASCULAR SURGERY)

## 2019-06-25 PROCEDURE — 7100000011 HC PHASE II RECOVERY - ADDTL 15 MIN: Performed by: THORACIC SURGERY (CARDIOTHORACIC VASCULAR SURGERY)

## 2019-06-25 PROCEDURE — 7100000010 HC PHASE II RECOVERY - FIRST 15 MIN: Performed by: THORACIC SURGERY (CARDIOTHORACIC VASCULAR SURGERY)

## 2019-06-25 PROCEDURE — 6360000002 HC RX W HCPCS: Performed by: NURSE ANESTHETIST, CERTIFIED REGISTERED

## 2019-06-25 PROCEDURE — 2500000003 HC RX 250 WO HCPCS: Performed by: NURSE ANESTHETIST, CERTIFIED REGISTERED

## 2019-06-25 PROCEDURE — 3700000001 HC ADD 15 MINUTES (ANESTHESIA): Performed by: THORACIC SURGERY (CARDIOTHORACIC VASCULAR SURGERY)

## 2019-06-25 PROCEDURE — C1788 PORT, INDWELLING, IMP: HCPCS | Performed by: THORACIC SURGERY (CARDIOTHORACIC VASCULAR SURGERY)

## 2019-06-25 PROCEDURE — 2500000003 HC RX 250 WO HCPCS: Performed by: THORACIC SURGERY (CARDIOTHORACIC VASCULAR SURGERY)

## 2019-06-25 PROCEDURE — 2580000003 HC RX 258: Performed by: ANESTHESIOLOGY

## 2019-06-25 DEVICE — PORT INFUS SGL LUMN ATTCH POLYUR OPN END CATH 8FR POWERPRT: Type: IMPLANTABLE DEVICE | Site: CHEST | Status: FUNCTIONAL

## 2019-06-25 RX ORDER — HYDRALAZINE HYDROCHLORIDE 20 MG/ML
5 INJECTION INTRAMUSCULAR; INTRAVENOUS EVERY 10 MIN PRN
Status: DISCONTINUED | OUTPATIENT
Start: 2019-06-25 | End: 2019-06-25 | Stop reason: HOSPADM

## 2019-06-25 RX ORDER — LIDOCAINE HYDROCHLORIDE 20 MG/ML
INJECTION, SOLUTION INFILTRATION; PERINEURAL PRN
Status: DISCONTINUED | OUTPATIENT
Start: 2019-06-25 | End: 2019-06-25 | Stop reason: SDUPTHER

## 2019-06-25 RX ORDER — OXYCODONE HYDROCHLORIDE AND ACETAMINOPHEN 5; 325 MG/1; MG/1
2 TABLET ORAL PRN
Status: DISCONTINUED | OUTPATIENT
Start: 2019-06-25 | End: 2019-06-25 | Stop reason: HOSPADM

## 2019-06-25 RX ORDER — PROPOFOL 10 MG/ML
INJECTION, EMULSION INTRAVENOUS PRN
Status: DISCONTINUED | OUTPATIENT
Start: 2019-06-25 | End: 2019-06-25 | Stop reason: SDUPTHER

## 2019-06-25 RX ORDER — PROMETHAZINE HYDROCHLORIDE 25 MG/ML
6.25 INJECTION, SOLUTION INTRAMUSCULAR; INTRAVENOUS
Status: DISCONTINUED | OUTPATIENT
Start: 2019-06-25 | End: 2019-06-25 | Stop reason: HOSPADM

## 2019-06-25 RX ORDER — ONDANSETRON 2 MG/ML
4 INJECTION INTRAMUSCULAR; INTRAVENOUS
Status: DISCONTINUED | OUTPATIENT
Start: 2019-06-25 | End: 2019-06-25 | Stop reason: HOSPADM

## 2019-06-25 RX ORDER — SODIUM CHLORIDE, SODIUM LACTATE, POTASSIUM CHLORIDE, CALCIUM CHLORIDE 600; 310; 30; 20 MG/100ML; MG/100ML; MG/100ML; MG/100ML
INJECTION, SOLUTION INTRAVENOUS CONTINUOUS
Status: DISCONTINUED | OUTPATIENT
Start: 2019-06-25 | End: 2019-06-25 | Stop reason: HOSPADM

## 2019-06-25 RX ORDER — CEFAZOLIN SODIUM 1 G/3ML
INJECTION, POWDER, FOR SOLUTION INTRAMUSCULAR; INTRAVENOUS
Status: DISCONTINUED
Start: 2019-06-25 | End: 2019-06-25 | Stop reason: HOSPADM

## 2019-06-25 RX ORDER — BUPIVACAINE HYDROCHLORIDE 2.5 MG/ML
INJECTION, SOLUTION EPIDURAL; INFILTRATION; INTRACAUDAL PRN
Status: DISCONTINUED | OUTPATIENT
Start: 2019-06-25 | End: 2019-06-25 | Stop reason: ALTCHOICE

## 2019-06-25 RX ORDER — MIDAZOLAM HYDROCHLORIDE 1 MG/ML
INJECTION INTRAMUSCULAR; INTRAVENOUS PRN
Status: DISCONTINUED | OUTPATIENT
Start: 2019-06-25 | End: 2019-06-25 | Stop reason: SDUPTHER

## 2019-06-25 RX ORDER — OXYCODONE HYDROCHLORIDE AND ACETAMINOPHEN 5; 325 MG/1; MG/1
1 TABLET ORAL PRN
Status: DISCONTINUED | OUTPATIENT
Start: 2019-06-25 | End: 2019-06-25 | Stop reason: HOSPADM

## 2019-06-25 RX ORDER — MEPERIDINE HYDROCHLORIDE 50 MG/ML
12.5 INJECTION INTRAMUSCULAR; INTRAVENOUS; SUBCUTANEOUS EVERY 5 MIN PRN
Status: DISCONTINUED | OUTPATIENT
Start: 2019-06-25 | End: 2019-06-25 | Stop reason: HOSPADM

## 2019-06-25 RX ORDER — FENTANYL CITRATE 50 UG/ML
25 INJECTION, SOLUTION INTRAMUSCULAR; INTRAVENOUS EVERY 5 MIN PRN
Status: DISCONTINUED | OUTPATIENT
Start: 2019-06-25 | End: 2019-06-25 | Stop reason: HOSPADM

## 2019-06-25 RX ADMIN — PROPOFOL 30 MG: 10 INJECTION, EMULSION INTRAVENOUS at 14:25

## 2019-06-25 RX ADMIN — LIDOCAINE HYDROCHLORIDE 60 MG: 20 INJECTION, SOLUTION INFILTRATION; PERINEURAL at 14:05

## 2019-06-25 RX ADMIN — PROPOFOL 30 MG: 10 INJECTION, EMULSION INTRAVENOUS at 14:16

## 2019-06-25 RX ADMIN — PROPOFOL 40 MG: 10 INJECTION, EMULSION INTRAVENOUS at 14:20

## 2019-06-25 RX ADMIN — SODIUM CHLORIDE, POTASSIUM CHLORIDE, SODIUM LACTATE AND CALCIUM CHLORIDE: 600; 310; 30; 20 INJECTION, SOLUTION INTRAVENOUS at 13:32

## 2019-06-25 RX ADMIN — MIDAZOLAM HYDROCHLORIDE 2 MG: 2 INJECTION, SOLUTION INTRAMUSCULAR; INTRAVENOUS at 14:01

## 2019-06-25 RX ADMIN — PROPOFOL 30 MG: 10 INJECTION, EMULSION INTRAVENOUS at 14:05

## 2019-06-25 RX ADMIN — Medication 2 G: at 13:59

## 2019-06-25 RX ADMIN — PROPOFOL 30 MG: 10 INJECTION, EMULSION INTRAVENOUS at 14:09

## 2019-06-25 RX ADMIN — PROPOFOL 30 MG: 10 INJECTION, EMULSION INTRAVENOUS at 14:37

## 2019-06-25 RX ADMIN — PROPOFOL 40 MG: 10 INJECTION, EMULSION INTRAVENOUS at 14:36

## 2019-06-25 ASSESSMENT — PULMONARY FUNCTION TESTS
PIF_VALUE: 0

## 2019-06-25 ASSESSMENT — ENCOUNTER SYMPTOMS: SHORTNESS OF BREATH: 0

## 2019-06-25 ASSESSMENT — PAIN - FUNCTIONAL ASSESSMENT: PAIN_FUNCTIONAL_ASSESSMENT: 0-10

## 2019-06-25 NOTE — PROGRESS NOTES
4 Eyes Skin Assessment     The patient is being assess for   Admission    I agree that 2 RN's have performed a thorough Head to Toe Skin Assessment on the patient. ALL assessment sites listed below have been assessed. Areas assessed by both nurses:   [x]   Head, Face, and Ears   [x]   Shoulders, Back, and Chest, Abdomen  [x]   Arms, Elbows, and Hands   [x]   Coccyx, Sacrum, and Ischium  [x]   Legs, Feet, and Heels        Scattered \"sun spots\" treated by Dermatologist on bilateral arms    **SHARE this note so that the co-signing nurse is able to place an eSignature**    Co-signer eSignature: Electronically signed by Doroteo Hahn RN on 6/25/19 at 1:20 PM    Does the Patient have Skin Breakdown?   No          Johann Prevention initiated:  No   Wound Care Orders initiated:  No      WOC nurse consulted for Pressure Injury (Stage 3,4, Unstageable, DTI, NWPT, Complex wounds)and New or Established Ostomies:  No      Primary Nurse eSignature: Electronically signed by Enzo Dunn RN on 6/25/19 at 12:47 PM

## 2019-06-25 NOTE — ANESTHESIA POSTPROCEDURE EVALUATION
Department of Anesthesiology  Postprocedure Note    Patient: Chris Cotter  MRN: [id number]  YOB: 1945  Date of evaluation: 6/25/2019  Time:  5:36 PM     Procedure Summary     Date:  06/25/19 Room / Location:  [facility] OR    Anesthesia Start:  1359 Anesthesia Stop:  1440    Procedure:  PORT A CATH PLACEMENT AND PEG TUBE EXCHANGE (N/A Chest) Diagnosis:       Malignant neoplasm of esophagus, unspecified location ([facility] [address]      (ESOPHAGEAL CANCER)    Surgeon:  Zo Orozco MD Responsible Provider:  Jonny Frausto MD    Anesthesia Type:  MAC, general, TIVA ASA Status:  3          Anesthesia Type: MAC, general, TIVA    Caleb Phase I: Caleb Score: 10    Caleb Phase II:      Last vitals: Reviewed and per EMR flowsheets.        Anesthesia Post Evaluation   Anesthetic Problems: no   Cardiovascular System Stable: yes  Respiratory Function: Airway Patent yes  ETT no  Ventilator no  Level of consciousness: awake, alert and oriented  Post-op pain: adequate analgesia  Hydration Adequate: yes  Nausea/Vomiting:no  Other Issues:     Julieta Ball MD

## 2019-06-25 NOTE — PROGRESS NOTES
Pt arrived to [facility], states Dr Jailene Flores is also supposed to exchange his feeding tube. Called back to OR 9, Per Dr. Jailene Flores added \"Peg Tube Exchange\" to consent form prior to pt signature.

## 2019-06-25 NOTE — ANESTHESIA PRE PROCEDURE
Department of Anesthesiology  Preprocedure Note       Name:  Frank Raya   Age:  68 y.o.  :  1945                                          MRN:  [id number]         Date:  2019      Surgeon:  Lexx Robison MD    Procedure: PORT A CATH PLACEMENT (N/A Chest)    HPI:   68 y.o. male with malfunctioning jejunostomy tube and need for Port for IV-chemotherapy. He was diagnosed with esophageal cancer and completed chemotherapy in 2018. EGD on 19 showed recurring esophageal carcinoma. Feeding tube placed on . Dr. Cherise Mueller performed an esophagectomy with mediastinal lymphadenectomy including thoracic duct, umbilical hernia repair, EGD and jejunostomy feeding tube placement. Medications prior to admission:    diltiazem (CARDIZEM 12 HR) 120 MG ER Take 120 mg by mouth daily   gabapentin (NEURONTIN) 300 MG capsule Take 300 mg by mouth 3 times daily.    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)    Mucositis due to radiation therapy    Type 2 diabetes mellitus ([address]    Undiagnosed cardiac murmurs     Past Medical History:     Diabetes mellitus ([address]     Esophageal cancer ([address]     H/O sleep apnea     resolved after surgery    History of esophageal reflux     Hypertension     Prostate cancer [facility])      Past Surgical History:     CATARACT REMOVAL WITH IMPLANT      COLONOSCOPY      negative    COLONOSCOPY  2017    diverticulosis    ESOPHAGEAL DILATATION N/A 1/3/2019    ESOPHAGOGASTRODUODENOSCOPY WITH BIOPSY  CPT CODE - 07313 performed by Lexx Robison MD at [facility] ESOPHAGECTOMY N/A 2019    [facility] ESOPHAGECTOMY WITH MEDIASTINAL LYMPHADENECTOMY INCLUDING THORACIC DUCT, UMBILICAL HERNIA REPAIR performed by Neelima Cuellar MD at [address]    [place] OFFICE/OUTPT VISIT,PROCEDURE ONLY N/A 2018    EUS/ WITH ANESTHESIA performed by Yola Portillo MD at [facility] ENDOSCOPY  2018    EGD BIOPSY performed by Yola Portillo MD at [place] [address] N/A 2019    ESOPHAGOGASTRODUODENOSCOPY WITH BIOPSY  CPT CODE - 25075 performed by Neelima Cuellar MD at [address] [place] [address]      hx of sleep apnea     Social History:     Smoking status: Former Smoker     Types: Cigarettes     Last attempt to quit: 1972     Years since quittin.5    Smokeless tobacco: Never Used   Substance Use Topics    Alcohol use: Not Currently     Alcohol/week: 0.0 oz     Comment: very occasional/social     Vital Signs (Current):   BP: 130/69 Pulse: 71   Resp: 16 SpO2: 98   Temp: 97.5 °F (36.4 °C)   Height: 5' 6\" (1.676 m)  (19) Weight: 173 lb 4.8 oz (78.6 kg)  (19)   BMI: 28           BP Readings from Last 3 Encounters:   19 130/69   19 118/68   19 110/62     NPO Status: Time of last liquid consumption:                         Time of last solid consumption:                         Date of last liquid consumption: 19                        Date of last solid food consumption: 19    CBC:    WBC 8.7 2019    HGB 12.0 2019    HCT 35.4 2019     2019     CMP:     2019    K 4.3 2019     2019    CO2 25 2019    BUN 18 2019    CREATININE 0.7 2019    GLUCOSE 133 2019    PROT 6.5 2019    CALCIUM 8.3 2019    BILITOT 0.8 2019    ALKPHOS 109 2019    AST 36 2019    ALT 27 2019     POC Tests:     19  1155   POCGLU 99     Anesthesia Evaluation  Patient summary reviewed and Nursing notes reviewed  Airway: Mallampati: I  TM distance: >3 FB   Neck ROM: full  Mouth opening: > = 3 FB Dental:          Pulmonary:   (+) sleep apnea:      (-) asthma and shortness of breath                          ROS comment: S/P UPPP with resolution of HÉCTOR   Cardiovascular:  Exercise tolerance: good (>4 METS),   (+) hypertension:,     (-)  angina and  EDOMNDSON                Neuro/Psych:      (-) seizures, TIA and CVA           GI/Hepatic/Renal:   (+) GERD:,      (-) no renal disease       Endo/Other:    (+) DiabetesType II DM, , malignancy/cancer. (-) hypothyroidism               Abdominal:           Vascular:     - DVT and PE. Anesthesia Plan      MAC, general and TIVA     ASA 3     (TIVA-> GA if fails )  Induction: intravenous. MIPS: Prophylactic antiemetics administered. Anesthetic plan and risks discussed with patient and spouse. Plan discussed with CRNA.             Juvenal Killian MD

## 2019-07-30 ENCOUNTER — OFFICE VISIT (OUTPATIENT)
Dept: CARDIOTHORACIC SURGERY | Age: 74
End: 2019-07-30

## 2019-07-30 VITALS
RESPIRATION RATE: 12 BRPM | HEART RATE: 75 BPM | BODY MASS INDEX: 27.29 KG/M2 | TEMPERATURE: 98.2 F | HEIGHT: 66 IN | DIASTOLIC BLOOD PRESSURE: 70 MMHG | WEIGHT: 169.8 LBS | OXYGEN SATURATION: 98 % | SYSTOLIC BLOOD PRESSURE: 104 MMHG

## 2019-07-30 DIAGNOSIS — C15.5 MALIGNANT NEOPLASM OF LOWER THIRD OF ESOPHAGUS (HCC): ICD-10-CM

## 2019-07-30 DIAGNOSIS — Z48.3 AFTERCARE FOLLOWING SURGERY FOR CANCER OR TUMOR: Primary | ICD-10-CM

## 2019-07-30 PROCEDURE — 99024 POSTOP FOLLOW-UP VISIT: CPT | Performed by: THORACIC SURGERY (CARDIOTHORACIC VASCULAR SURGERY)

## 2019-09-24 ENCOUNTER — HOSPITAL ENCOUNTER (OUTPATIENT)
Dept: CT IMAGING | Age: 74
Discharge: HOME | End: 2019-09-24
Payer: MEDICARE

## 2019-09-24 DIAGNOSIS — Z85.46 HISTORY OF MALIGNANT NEOPLASM OF PROSTATE: ICD-10-CM

## 2019-09-24 DIAGNOSIS — C77.1 SECONDARY AND UNSPECIFIED MALIGNANT NEOPLASM OF INTRATHORACIC LYMPH NODES (HCC): ICD-10-CM

## 2019-09-24 DIAGNOSIS — C15.5 PRIMARY MALIGNANT NEOPLASM OF LOWER THIRD OF ESOPHAGUS (HCC): ICD-10-CM

## 2019-09-24 PROCEDURE — 6360000004 HC RX CONTRAST MEDICATION: Performed by: INTERNAL MEDICINE

## 2019-09-24 PROCEDURE — 74177 CT ABD & PELVIS W/CONTRAST: CPT

## 2019-09-24 RX ADMIN — IOHEXOL 50 ML: 240 INJECTION, SOLUTION INTRATHECAL; INTRAVASCULAR; INTRAVENOUS; ORAL at 12:09

## 2019-09-24 RX ADMIN — IOPAMIDOL 75 ML: 755 INJECTION, SOLUTION INTRAVENOUS at 12:10

## 2019-10-06 ENCOUNTER — HOSPITAL ENCOUNTER (EMERGENCY)
Age: 74
Discharge: HOME | End: 2019-10-07
Attending: EMERGENCY MEDICINE
Payer: MEDICARE

## 2019-10-06 ENCOUNTER — APPOINTMENT (OUTPATIENT)
Dept: CT IMAGING | Age: 74
End: 2019-10-06
Payer: MEDICARE

## 2019-10-06 DIAGNOSIS — R10.84 GENERALIZED ABDOMINAL PAIN: Primary | ICD-10-CM

## 2019-10-06 DIAGNOSIS — R10.9 ABDOMINAL PAIN, UNSPECIFIED ABDOMINAL LOCATION: ICD-10-CM

## 2019-10-06 LAB
A/G RATIO: 1.2 (ref 1.1–2.2)
ALBUMIN SERPL-MCNC: 3.2 G/DL (ref 3.4–5)
ALP BLD-CCNC: 149 U/L (ref 40–129)
ALT SERPL-CCNC: 22 U/L (ref 10–40)
ANION GAP SERPL CALCULATED.3IONS-SCNC: 10 MMOL/L (ref 3–16)
AST SERPL-CCNC: 43 U/L (ref 15–37)
BASOPHILS ABSOLUTE: 0.1 K/UL (ref 0–0.2)
BASOPHILS RELATIVE PERCENT: 1 %
BILIRUB SERPL-MCNC: 0.8 MG/DL (ref 0–1)
BILIRUBIN URINE: NEGATIVE
BLOOD, URINE: NEGATIVE
BUN BLDV-MCNC: 10 MG/DL (ref 7–20)
CALCIUM SERPL-MCNC: 8.7 MG/DL (ref 8.3–10.6)
CHLORIDE BLD-SCNC: 108 MMOL/L (ref 99–110)
CLARITY: CLEAR
CO2: 20 MMOL/L (ref 21–32)
COLOR: YELLOW
CREAT SERPL-MCNC: <0.5 MG/DL (ref 0.8–1.3)
EOSINOPHILS ABSOLUTE: 0.1 K/UL (ref 0–0.6)
EOSINOPHILS RELATIVE PERCENT: 2.7 %
GFR AFRICAN AMERICAN: >60
GFR NON-AFRICAN AMERICAN: >60
GLOBULIN: 2.6 G/DL
GLUCOSE BLD-MCNC: 109 MG/DL (ref 70–99)
GLUCOSE URINE: 250 MG/DL
HCT VFR BLD CALC: 35.3 % (ref 40.5–52.5)
HEMOGLOBIN: 11.9 G/DL (ref 13.5–17.5)
KETONES, URINE: ABNORMAL MG/DL
LEUKOCYTE ESTERASE, URINE: NEGATIVE
LIPASE: 10 U/L (ref 13–60)
LYMPHOCYTES ABSOLUTE: 1.1 K/UL (ref 1–5.1)
LYMPHOCYTES RELATIVE PERCENT: 22.7 %
MCH RBC QN AUTO: 33.8 PG (ref 26–34)
MCHC RBC AUTO-ENTMCNC: 33.7 G/DL (ref 31–36)
MCV RBC AUTO: 100.3 FL (ref 80–100)
MICROSCOPIC EXAMINATION: ABNORMAL
MONOCYTES ABSOLUTE: 1 K/UL (ref 0–1.3)
MONOCYTES RELATIVE PERCENT: 19.4 %
NEUTROPHILS ABSOLUTE: 2.7 K/UL (ref 1.7–7.7)
NEUTROPHILS RELATIVE PERCENT: 54.2 %
NITRITE, URINE: NEGATIVE
PDW BLD-RTO: 23.9 % (ref 12.4–15.4)
PH UA: 5.5 (ref 5–8)
PLATELET # BLD: 85 K/UL (ref 135–450)
PLATELET SLIDE REVIEW: ABNORMAL
PMV BLD AUTO: 7.9 FL (ref 5–10.5)
POTASSIUM REFLEX MAGNESIUM: 4.1 MMOL/L (ref 3.5–5.1)
PROTEIN UA: NEGATIVE MG/DL
RBC # BLD: 3.52 M/UL (ref 4.2–5.9)
SODIUM BLD-SCNC: 138 MMOL/L (ref 136–145)
SPECIFIC GRAVITY UA: 1.02 (ref 1–1.03)
TOTAL PROTEIN: 5.8 G/DL (ref 6.4–8.2)
URINE TYPE: ABNORMAL
UROBILINOGEN, URINE: 1 E.U./DL
WBC # BLD: 5.1 K/UL (ref 4–11)

## 2019-10-06 PROCEDURE — 6360000004 HC RX CONTRAST MEDICATION: Performed by: EMERGENCY MEDICINE

## 2019-10-06 PROCEDURE — 81003 URINALYSIS AUTO W/O SCOPE: CPT

## 2019-10-06 PROCEDURE — 96360 HYDRATION IV INFUSION INIT: CPT

## 2019-10-06 PROCEDURE — 74177 CT ABD & PELVIS W/CONTRAST: CPT

## 2019-10-06 PROCEDURE — 99284 EMERGENCY DEPT VISIT MOD MDM: CPT

## 2019-10-06 PROCEDURE — 85025 COMPLETE CBC W/AUTO DIFF WBC: CPT

## 2019-10-06 PROCEDURE — 2580000003 HC RX 258: Performed by: EMERGENCY MEDICINE

## 2019-10-06 PROCEDURE — 83690 ASSAY OF LIPASE: CPT

## 2019-10-06 PROCEDURE — 80053 COMPREHEN METABOLIC PANEL: CPT

## 2019-10-06 RX ORDER — 0.9 % SODIUM CHLORIDE 0.9 %
1000 INTRAVENOUS SOLUTION INTRAVENOUS ONCE
Status: COMPLETED | OUTPATIENT
Start: 2019-10-06 | End: 2019-10-06

## 2019-10-06 RX ORDER — CAPECITABINE 500 MG/1
1250 TABLET, FILM COATED ORAL 2 TIMES DAILY
COMMUNITY
End: 2020-02-10

## 2019-10-06 RX ADMIN — SODIUM CHLORIDE 1000 ML: 9 INJECTION, SOLUTION INTRAVENOUS at 20:42

## 2019-10-06 RX ADMIN — IOPAMIDOL 75 ML: 755 INJECTION, SOLUTION INTRAVENOUS at 21:59

## 2019-10-07 ENCOUNTER — APPOINTMENT (OUTPATIENT)
Dept: ULTRASOUND IMAGING | Age: 74
End: 2019-10-07
Payer: MEDICARE

## 2019-10-07 VITALS
OXYGEN SATURATION: 98 % | RESPIRATION RATE: 16 BRPM | SYSTOLIC BLOOD PRESSURE: 113 MMHG | HEIGHT: 66 IN | DIASTOLIC BLOOD PRESSURE: 60 MMHG | HEART RATE: 80 BPM | TEMPERATURE: 98 F | WEIGHT: 161 LBS | BODY MASS INDEX: 25.88 KG/M2

## 2019-10-07 PROCEDURE — 76705 ECHO EXAM OF ABDOMEN: CPT

## 2019-10-22 ENCOUNTER — APPOINTMENT (OUTPATIENT)
Dept: CT IMAGING | Age: 74
End: 2019-10-22
Payer: MEDICARE

## 2019-10-22 ENCOUNTER — HOSPITAL ENCOUNTER (EMERGENCY)
Age: 74
Discharge: HOME | End: 2019-10-22
Attending: EMERGENCY MEDICINE
Payer: MEDICARE

## 2019-10-22 VITALS
HEIGHT: 66 IN | RESPIRATION RATE: 17 BRPM | DIASTOLIC BLOOD PRESSURE: 74 MMHG | HEART RATE: 68 BPM | TEMPERATURE: 97.6 F | OXYGEN SATURATION: 98 % | BODY MASS INDEX: 25.71 KG/M2 | WEIGHT: 160 LBS | SYSTOLIC BLOOD PRESSURE: 150 MMHG

## 2019-10-22 DIAGNOSIS — R10.32 ABDOMINAL PAIN, LEFT LOWER QUADRANT: Primary | ICD-10-CM

## 2019-10-22 LAB
A/G RATIO: 1.3 (ref 1.1–2.2)
ALBUMIN SERPL-MCNC: 3.5 G/DL (ref 3.4–5)
ALP BLD-CCNC: 174 U/L (ref 40–129)
ALT SERPL-CCNC: 23 U/L (ref 10–40)
ANION GAP SERPL CALCULATED.3IONS-SCNC: 9 MMOL/L (ref 3–16)
AST SERPL-CCNC: 36 U/L (ref 15–37)
BASOPHILS ABSOLUTE: 0 K/UL (ref 0–0.2)
BASOPHILS RELATIVE PERCENT: 0.7 %
BILIRUB SERPL-MCNC: 1 MG/DL (ref 0–1)
BUN BLDV-MCNC: 15 MG/DL (ref 7–20)
CALCIUM SERPL-MCNC: 9.1 MG/DL (ref 8.3–10.6)
CHLORIDE BLD-SCNC: 104 MMOL/L (ref 99–110)
CO2: 26 MMOL/L (ref 21–32)
CREAT SERPL-MCNC: 0.7 MG/DL (ref 0.8–1.3)
EOSINOPHILS ABSOLUTE: 0.1 K/UL (ref 0–0.6)
EOSINOPHILS RELATIVE PERCENT: 2.1 %
GFR AFRICAN AMERICAN: >60
GFR NON-AFRICAN AMERICAN: >60
GLOBULIN: 2.7 G/DL
GLUCOSE BLD-MCNC: 185 MG/DL (ref 70–99)
HCT VFR BLD CALC: 36.3 % (ref 40.5–52.5)
HEMOGLOBIN: 12.5 G/DL (ref 13.5–17.5)
LACTIC ACID: 1.2 MMOL/L (ref 0.4–2)
LYMPHOCYTES ABSOLUTE: 0.7 K/UL (ref 1–5.1)
LYMPHOCYTES RELATIVE PERCENT: 15.4 %
MCH RBC QN AUTO: 35.1 PG (ref 26–34)
MCHC RBC AUTO-ENTMCNC: 34.5 G/DL (ref 31–36)
MCV RBC AUTO: 101.8 FL (ref 80–100)
MONOCYTES ABSOLUTE: 0.9 K/UL (ref 0–1.3)
MONOCYTES RELATIVE PERCENT: 21.3 %
NEUTROPHILS ABSOLUTE: 2.6 K/UL (ref 1.7–7.7)
NEUTROPHILS RELATIVE PERCENT: 60.5 %
PDW BLD-RTO: 22.1 % (ref 12.4–15.4)
PLATELET # BLD: 73 K/UL (ref 135–450)
PLATELET SLIDE REVIEW: ABNORMAL
PMV BLD AUTO: 8 FL (ref 5–10.5)
POTASSIUM REFLEX MAGNESIUM: 4.2 MMOL/L (ref 3.5–5.1)
RBC # BLD: 3.57 M/UL (ref 4.2–5.9)
SLIDE REVIEW: ABNORMAL
SODIUM BLD-SCNC: 139 MMOL/L (ref 136–145)
TOTAL PROTEIN: 6.2 G/DL (ref 6.4–8.2)
WBC # BLD: 4.3 K/UL (ref 4–11)

## 2019-10-22 PROCEDURE — 85025 COMPLETE CBC W/AUTO DIFF WBC: CPT

## 2019-10-22 PROCEDURE — 80053 COMPREHEN METABOLIC PANEL: CPT

## 2019-10-22 PROCEDURE — 99283 EMERGENCY DEPT VISIT LOW MDM: CPT

## 2019-10-22 PROCEDURE — 83605 ASSAY OF LACTIC ACID: CPT

## 2019-10-22 PROCEDURE — 74177 CT ABD & PELVIS W/CONTRAST: CPT

## 2019-10-22 PROCEDURE — 6360000004 HC RX CONTRAST MEDICATION: Performed by: EMERGENCY MEDICINE

## 2019-10-22 RX ORDER — SODIUM CHLORIDE 0.9 % (FLUSH) 0.9 %
SYRINGE (ML) INJECTION
Status: DISCONTINUED
Start: 2019-10-22 | End: 2019-10-22 | Stop reason: WASHOUT

## 2019-10-22 RX ORDER — ONDANSETRON HYDROCHLORIDE 8 MG/1
8 TABLET, FILM COATED ORAL EVERY 8 HOURS PRN
COMMUNITY
End: 2020-02-10

## 2019-10-22 RX ADMIN — IOPAMIDOL 75 ML: 755 INJECTION, SOLUTION INTRAVENOUS at 12:36

## 2019-10-22 ASSESSMENT — PAIN DESCRIPTION - ORIENTATION: ORIENTATION: LEFT

## 2019-10-22 ASSESSMENT — PAIN DESCRIPTION - PAIN TYPE: TYPE: ACUTE PAIN

## 2019-10-22 ASSESSMENT — PAIN DESCRIPTION - LOCATION: LOCATION: GROIN

## 2019-10-22 ASSESSMENT — PAIN SCALES - GENERAL: PAINLEVEL_OUTOF10: 6

## 2020-01-16 ENCOUNTER — HOSPITAL ENCOUNTER (OUTPATIENT)
Dept: CT IMAGING | Age: 75
Discharge: HOME | End: 2020-01-16
Payer: MEDICARE

## 2020-01-16 PROCEDURE — 74177 CT ABD & PELVIS W/CONTRAST: CPT

## 2020-01-16 PROCEDURE — 6360000004 HC RX CONTRAST MEDICATION: Performed by: INTERNAL MEDICINE

## 2020-01-16 RX ADMIN — IOHEXOL 50 ML: 240 INJECTION, SOLUTION INTRATHECAL; INTRAVASCULAR; INTRAVENOUS; ORAL at 09:23

## 2020-01-16 RX ADMIN — IOPAMIDOL 75 ML: 755 INJECTION, SOLUTION INTRAVENOUS at 10:57

## 2020-01-30 ENCOUNTER — HOSPITAL ENCOUNTER (OUTPATIENT)
Dept: CT IMAGING | Age: 75
Discharge: HOME | End: 2020-01-30
Payer: MEDICARE

## 2020-01-30 ENCOUNTER — HOSPITAL ENCOUNTER (OUTPATIENT)
Dept: GENERAL RADIOLOGY | Age: 75
Discharge: HOME | End: 2020-01-30
Payer: MEDICARE

## 2020-01-30 ENCOUNTER — HOSPITAL ENCOUNTER (OUTPATIENT)
Age: 75
Discharge: HOME | End: 2020-01-30
Payer: MEDICARE

## 2020-01-30 VITALS
RESPIRATION RATE: 18 BRPM | OXYGEN SATURATION: 98 % | TEMPERATURE: 98.2 F | HEART RATE: 70 BPM | DIASTOLIC BLOOD PRESSURE: 78 MMHG | SYSTOLIC BLOOD PRESSURE: 135 MMHG

## 2020-01-30 LAB
APTT: 31.9 SEC (ref 24.2–36.2)
INR BLD: 0.96 (ref 0.86–1.14)
PROTHROMBIN TIME: 11.1 SEC (ref 10–13.2)

## 2020-01-30 PROCEDURE — 36415 COLL VENOUS BLD VENIPUNCTURE: CPT

## 2020-01-30 PROCEDURE — 71045 X-RAY EXAM CHEST 1 VIEW: CPT

## 2020-01-30 PROCEDURE — 85730 THROMBOPLASTIN TIME PARTIAL: CPT

## 2020-01-30 PROCEDURE — 77012 CT SCAN FOR NEEDLE BIOPSY: CPT

## 2020-01-30 PROCEDURE — 2709999900 CT BIOPSY RENAL

## 2020-01-30 PROCEDURE — 88333 PATH CONSLTJ SURG CYTO XM 1: CPT

## 2020-01-30 PROCEDURE — 6360000002 HC RX W HCPCS: Performed by: RADIOLOGY

## 2020-01-30 PROCEDURE — 88342 IMHCHEM/IMCYTCHM 1ST ANTB: CPT

## 2020-01-30 PROCEDURE — 85610 PROTHROMBIN TIME: CPT

## 2020-01-30 PROCEDURE — 88313 SPECIAL STAINS GROUP 2: CPT

## 2020-01-30 PROCEDURE — 88341 IMHCHEM/IMCYTCHM EA ADD ANTB: CPT

## 2020-01-30 PROCEDURE — 88305 TISSUE EXAM BY PATHOLOGIST: CPT

## 2020-01-30 RX ORDER — ACETAMINOPHEN 325 MG/1
650 TABLET ORAL EVERY 4 HOURS PRN
Status: DISCONTINUED | OUTPATIENT
Start: 2020-01-30 | End: 2020-01-31 | Stop reason: HOSPADM

## 2020-01-30 RX ORDER — ONDANSETRON 2 MG/ML
4 INJECTION INTRAMUSCULAR; INTRAVENOUS EVERY 8 HOURS PRN
Status: DISCONTINUED | OUTPATIENT
Start: 2020-01-30 | End: 2020-01-31 | Stop reason: HOSPADM

## 2020-01-30 RX ORDER — FENTANYL CITRATE 50 UG/ML
INJECTION, SOLUTION INTRAMUSCULAR; INTRAVENOUS
Status: COMPLETED | OUTPATIENT
Start: 2020-01-30 | End: 2020-01-30

## 2020-01-30 RX ORDER — MIDAZOLAM HYDROCHLORIDE 5 MG/ML
INJECTION INTRAMUSCULAR; INTRAVENOUS
Status: COMPLETED | OUTPATIENT
Start: 2020-01-30 | End: 2020-01-30

## 2020-01-30 RX ADMIN — FENTANYL CITRATE 50 MCG: 50 INJECTION INTRAMUSCULAR; INTRAVENOUS at 08:52

## 2020-01-30 RX ADMIN — MIDAZOLAM HYDROCHLORIDE 1 MG: 5 INJECTION, SOLUTION INTRAMUSCULAR; INTRAVENOUS at 08:52

## 2020-01-30 ASSESSMENT — PAIN - FUNCTIONAL ASSESSMENT: PAIN_FUNCTIONAL_ASSESSMENT: 0-10

## 2020-01-30 NOTE — PRE SEDATION
Sedation Pre-Procedure Note    Patient Name: Rachel Zaidi   YOB: 1945  Room/Bed: Room/bed info not found  Medical Record Number: [id number]  Date: 1/30/2020   Time: 8:46 AM       Indication:  Hx of malignancy with new right adrenal mass concerning for metastatic disease    Consent: I have discussed with the patient and/or the patient representative the indication, alternatives, and the possible risks and/or complications of the planned procedure and the anesthesia methods. The patient and/or patient representative appear to understand and agree to proceed. Vital Signs:   Vitals:    01/30/20 0806   BP:    Pulse: 71   Resp:    Temp:    SpO2:        Past Medical History:   has a past medical history of Diabetes mellitus ([facility] [address] Esophageal cancer ([facility] [address] H/O sleep apnea, History of esophageal reflux, Hypertension, and Prostate cancer ([facility] [address] Past Surgical History:   has a past surgical history that includes Colonoscopy (2012); Prostatectomy (2000); Cataract removal with implant (2003); Colonoscopy (09/14/2017); pr office/outpt visit,procedure only (N/A, 9/19/2018); Upper gastrointestinal endoscopy (9/19/2018); Uvulopalatopharygoplasty; Esophagus dilation (N/A, 1/3/2019); Upper gastrointestinal endoscopy (N/A, 4/2/2019); Inguinal hernia repair (Right, 1962); Esophagectomy (N/A, 4/18/2019); and INSERTION / REMOVAL / REPLACEMENT VENOUS ACCESS CATHETER (N/A, 6/25/2019). Medications:   Scheduled Meds:   Continuous Infusions:   PRN Meds:   Home Meds:   Prior to Admission medications    Medication Sig Start Date End Date Taking?  Authorizing Provider   ondansetron (ZOFRAN) 8 MG tablet Take 8 mg by mouth every 8 hours as needed for Nausea or Vomiting    Historical Provider, MD   capecitabine (XELODA) 500 MG chemo tablet Take 1,250 mg/m2 by mouth 2 times daily    Historical Provider, MD   diltiazem (CARDIZEM 12 HR) 120 MG extended release capsule Take 120 mg by mouth daily    Historical Provider, MD   gabapentin

## 2020-01-30 NOTE — BRIEF OP NOTE
Brief Postoperative Note    Susy Vale  YOB: 1945  [id number]    Pre-operative Diagnosis: right adrenal mass concerning for metastatic disease    Post-operative Diagnosis: Same    Procedure: CT guided core biopsy right adrenal mass    Anesthesia: Moderate Sedation    Surgeons/Assistants: Dr. Alexandra Johnson    Estimated Blood Loss: less than 5ml     Complications: None    Specimens: Was Obtained: right adrenal mass, two 18G core biopsies    Findings: no pneumothorax on post procedure CT.     Electronically signed by Wicho Jean MD on 1/30/2020 at 9:22 AM

## 2020-01-30 NOTE — PROGRESS NOTES
Pt arrived for image guided adrenal gland biopsy. Procedure explained including the risk and benefits of the procedure. All questions answered. Pt and wife verbalize understanding of the procedure and state no more questions. Consent signed. Vital signs stable see flow sheets. Labs, allergies, medications, and code status reviewed. No contraindications noted. Pre Caleb score 10.

## 2020-02-10 ENCOUNTER — APPOINTMENT (OUTPATIENT)
Dept: CT IMAGING | Age: 75
End: 2020-02-10
Payer: MEDICARE

## 2020-02-10 ENCOUNTER — HOSPITAL ENCOUNTER (EMERGENCY)
Age: 75
Discharge: HOME | End: 2020-02-10
Payer: MEDICARE

## 2020-02-10 VITALS
TEMPERATURE: 98.3 F | DIASTOLIC BLOOD PRESSURE: 69 MMHG | WEIGHT: 154 LBS | OXYGEN SATURATION: 96 % | HEIGHT: 66 IN | RESPIRATION RATE: 16 BRPM | HEART RATE: 74 BPM | SYSTOLIC BLOOD PRESSURE: 114 MMHG | BODY MASS INDEX: 24.75 KG/M2

## 2020-02-10 LAB
A/G RATIO: 1.2 (ref 1.1–2.2)
ALBUMIN SERPL-MCNC: 3.8 G/DL (ref 3.4–5)
ALP BLD-CCNC: 315 U/L (ref 40–129)
ALT SERPL-CCNC: 27 U/L (ref 10–40)
ANION GAP SERPL CALCULATED.3IONS-SCNC: 14 MMOL/L (ref 3–16)
AST SERPL-CCNC: 41 U/L (ref 15–37)
BASOPHILS ABSOLUTE: 0.1 K/UL (ref 0–0.2)
BASOPHILS RELATIVE PERCENT: 0.9 %
BILIRUB SERPL-MCNC: 1.3 MG/DL (ref 0–1)
BILIRUBIN URINE: NEGATIVE
BLOOD, URINE: NEGATIVE
BUN BLDV-MCNC: 11 MG/DL (ref 7–20)
CALCIUM SERPL-MCNC: 9.6 MG/DL (ref 8.3–10.6)
CHLORIDE BLD-SCNC: 100 MMOL/L (ref 99–110)
CLARITY: CLEAR
CO2: 27 MMOL/L (ref 21–32)
COLOR: YELLOW
CREAT SERPL-MCNC: 0.7 MG/DL (ref 0.8–1.3)
EOSINOPHILS ABSOLUTE: 0.2 K/UL (ref 0–0.6)
EOSINOPHILS RELATIVE PERCENT: 3.4 %
GFR AFRICAN AMERICAN: >60
GFR NON-AFRICAN AMERICAN: >60
GLOBULIN: 3.1 G/DL
GLUCOSE BLD-MCNC: 115 MG/DL (ref 70–99)
GLUCOSE URINE: NEGATIVE MG/DL
HCT VFR BLD CALC: 44.3 % (ref 40.5–52.5)
HEMOGLOBIN: 15 G/DL (ref 13.5–17.5)
KETONES, URINE: 15 MG/DL
LACTIC ACID, SEPSIS: 1.6 MMOL/L (ref 0.4–1.9)
LACTIC ACID: 3.2 MMOL/L (ref 0.4–2)
LEUKOCYTE ESTERASE, URINE: NEGATIVE
LIPASE: 7 U/L (ref 13–60)
LYMPHOCYTES ABSOLUTE: 1.9 K/UL (ref 1–5.1)
LYMPHOCYTES RELATIVE PERCENT: 32.9 %
MCH RBC QN AUTO: 35.2 PG (ref 26–34)
MCHC RBC AUTO-ENTMCNC: 33.8 G/DL (ref 31–36)
MCV RBC AUTO: 104.3 FL (ref 80–100)
MICROSCOPIC EXAMINATION: ABNORMAL
MONOCYTES ABSOLUTE: 0.8 K/UL (ref 0–1.3)
MONOCYTES RELATIVE PERCENT: 13.5 %
NEUTROPHILS ABSOLUTE: 2.9 K/UL (ref 1.7–7.7)
NEUTROPHILS RELATIVE PERCENT: 49.3 %
NITRITE, URINE: NEGATIVE
PDW BLD-RTO: 16.9 % (ref 12.4–15.4)
PH UA: 7.5 (ref 5–8)
PLATELET # BLD: 134 K/UL (ref 135–450)
PMV BLD AUTO: 8 FL (ref 5–10.5)
POTASSIUM REFLEX MAGNESIUM: 5 MMOL/L (ref 3.5–5.1)
PROTEIN UA: NEGATIVE MG/DL
RBC # BLD: 4.25 M/UL (ref 4.2–5.9)
SODIUM BLD-SCNC: 141 MMOL/L (ref 136–145)
SPECIFIC GRAVITY UA: 1.01 (ref 1–1.03)
TOTAL PROTEIN: 6.9 G/DL (ref 6.4–8.2)
URINE REFLEX TO CULTURE: ABNORMAL
URINE TYPE: ABNORMAL
UROBILINOGEN, URINE: 2 E.U./DL
WBC # BLD: 5.8 K/UL (ref 4–11)

## 2020-02-10 PROCEDURE — 80053 COMPREHEN METABOLIC PANEL: CPT

## 2020-02-10 PROCEDURE — 99284 EMERGENCY DEPT VISIT MOD MDM: CPT

## 2020-02-10 PROCEDURE — 83690 ASSAY OF LIPASE: CPT

## 2020-02-10 PROCEDURE — 6360000004 HC RX CONTRAST MEDICATION: Performed by: PHYSICIAN ASSISTANT

## 2020-02-10 PROCEDURE — 2580000003 HC RX 258: Performed by: PHYSICIAN ASSISTANT

## 2020-02-10 PROCEDURE — 81003 URINALYSIS AUTO W/O SCOPE: CPT

## 2020-02-10 PROCEDURE — 87040 BLOOD CULTURE FOR BACTERIA: CPT

## 2020-02-10 PROCEDURE — 96365 THER/PROPH/DIAG IV INF INIT: CPT

## 2020-02-10 PROCEDURE — 85025 COMPLETE CBC W/AUTO DIFF WBC: CPT

## 2020-02-10 PROCEDURE — 96375 TX/PRO/DX INJ NEW DRUG ADDON: CPT

## 2020-02-10 PROCEDURE — 83605 ASSAY OF LACTIC ACID: CPT

## 2020-02-10 PROCEDURE — 74177 CT ABD & PELVIS W/CONTRAST: CPT

## 2020-02-10 PROCEDURE — 6360000002 HC RX W HCPCS: Performed by: PHYSICIAN ASSISTANT

## 2020-02-10 RX ORDER — 0.9 % SODIUM CHLORIDE 0.9 %
1000 INTRAVENOUS SOLUTION INTRAVENOUS ONCE
Status: COMPLETED | OUTPATIENT
Start: 2020-02-10 | End: 2020-02-10

## 2020-02-10 RX ORDER — ONDANSETRON 4 MG/1
4 TABLET, ORALLY DISINTEGRATING ORAL EVERY 8 HOURS PRN
Qty: 20 TABLET | Refills: 0 | Status: SHIPPED | OUTPATIENT
Start: 2020-02-10 | End: 2020-10-15

## 2020-02-10 RX ORDER — MORPHINE SULFATE 4 MG/ML
4 INJECTION, SOLUTION INTRAMUSCULAR; INTRAVENOUS ONCE
Status: COMPLETED | OUTPATIENT
Start: 2020-02-10 | End: 2020-02-10

## 2020-02-10 RX ORDER — ONDANSETRON 2 MG/ML
4 INJECTION INTRAMUSCULAR; INTRAVENOUS ONCE
Status: COMPLETED | OUTPATIENT
Start: 2020-02-10 | End: 2020-02-10

## 2020-02-10 RX ORDER — HYDROCODONE BITARTRATE AND ACETAMINOPHEN 5; 325 MG/1; MG/1
1 TABLET ORAL EVERY 8 HOURS PRN
Qty: 9 TABLET | Refills: 0 | Status: SHIPPED | OUTPATIENT
Start: 2020-02-10 | End: 2020-02-10 | Stop reason: SDUPTHER

## 2020-02-10 RX ORDER — 0.9 % SODIUM CHLORIDE 0.9 %
30 INTRAVENOUS SOLUTION INTRAVENOUS ONCE
Status: COMPLETED | OUTPATIENT
Start: 2020-02-10 | End: 2020-02-10

## 2020-02-10 RX ORDER — HYDROCODONE BITARTRATE AND ACETAMINOPHEN 5; 325 MG/1; MG/1
1 TABLET ORAL EVERY 8 HOURS PRN
Qty: 9 TABLET | Refills: 0 | Status: SHIPPED | OUTPATIENT
Start: 2020-02-10 | End: 2020-02-13

## 2020-02-10 RX ORDER — ONDANSETRON 4 MG/1
4 TABLET, ORALLY DISINTEGRATING ORAL EVERY 8 HOURS PRN
Qty: 20 TABLET | Refills: 0 | Status: SHIPPED | OUTPATIENT
Start: 2020-02-10 | End: 2020-02-10 | Stop reason: SDUPTHER

## 2020-02-10 RX ADMIN — PIPERACILLIN AND TAZOBACTAM 4.5 G: 4; .5 INJECTION, POWDER, LYOPHILIZED, FOR SOLUTION INTRAVENOUS at 12:14

## 2020-02-10 RX ADMIN — ONDANSETRON HYDROCHLORIDE 4 MG: 2 INJECTION, SOLUTION INTRAMUSCULAR; INTRAVENOUS at 10:39

## 2020-02-10 RX ADMIN — SODIUM CHLORIDE 1914 ML: 9 INJECTION, SOLUTION INTRAVENOUS at 11:52

## 2020-02-10 RX ADMIN — IOPAMIDOL 75 ML: 755 INJECTION, SOLUTION INTRAVENOUS at 11:32

## 2020-02-10 RX ADMIN — MORPHINE SULFATE 4 MG: 4 INJECTION, SOLUTION INTRAMUSCULAR; INTRAVENOUS at 10:41

## 2020-02-10 RX ADMIN — SODIUM CHLORIDE 1000 ML: 9 INJECTION, SOLUTION INTRAVENOUS at 10:38

## 2020-02-10 ASSESSMENT — PAIN DESCRIPTION - ORIENTATION: ORIENTATION: LEFT

## 2020-02-10 ASSESSMENT — PAIN SCALES - GENERAL
PAINLEVEL_OUTOF10: 0
PAINLEVEL_OUTOF10: 7

## 2020-02-10 ASSESSMENT — PAIN DESCRIPTION - LOCATION: LOCATION: GROIN

## 2020-02-10 ASSESSMENT — PAIN DESCRIPTION - PAIN TYPE: TYPE: ACUTE PAIN

## 2020-02-10 ASSESSMENT — PAIN DESCRIPTION - DESCRIPTORS: DESCRIPTORS: CRAMPING

## 2020-02-10 NOTE — ED PROVIDER NOTES
[address] ED  EMERGENCY DEPARTMENT ENCOUNTER        Pt Name: Alvin Drake  MRN: [id number]  [place] 1945  Date of evaluation: 2/10/2020  Provider: Herb Ulloa PA-C  PCP: Sparkle Condon MD  ED Attending: No att. providers found      This patient was not seen and evaluated by the attending physician No att. providers found. I have independently evaluated this patient. CHIEF COMPLAINT       Chief Complaint   Patient presents with    Hernia     pt states hernia in left groin is swollen and painful this am, states has been unable to have hernia taken care of due to getting chemo, off of chemo now x 1 month, c/o lower abd and groin pain       HISTORY OF PRESENT ILLNESS   (Location/Symptom, Timing/Onset, Context/Setting, Quality, Duration, Modifying Factors, Severity)  Note limiting factors. Alvin Drake is a 76 y.o. male with PMH esophageal cancer completed chemotherapy, who presents to the ED complaining of abdominal pain. States history of hernia for > 1 year. One week ago Sunday, he developed pain across the lower abdomen and into the left groin and scrotum. Seen in ED 10/6 with similar symptoms that had then resolved, CT abdomen/pelvis at that time was more concerning for cholecystitis but not for incarcerated hernia. Discharged. Large bowel movement this morning and now with increased swelling and pain at the left groin. Denies fever, chest pain, SOB. Mild nausea he attributes to chemo. Denies emesis. Large BM this morning, nonbloody. Nursing Notes were all reviewed and agreed with or any disagreements were addressed  in the HPI. REVIEW OF SYSTEMS  (2-9 systems for level 4, 10 or more for level 5)     Review of Systems    Positivesand Pertinent negatives as per HPI. Except as noted above in the ROS, all other systems were reviewed and negative.        PAST MEDICAL HISTORY     Past Medical History:   Diagnosis Date    Diabetes mellitus ([address]     [facility]     H/O sleep apnea 1998    resolved after surgery    History of esophageal reflux     Hypertension     Prostate cancer [facility])          SURGICAL HISTORY       Past Surgical History:   Procedure Laterality Date    CATARACT REMOVAL WITH IMPLANT  2003    COLONOSCOPY  2012    negative    COLONOSCOPY  09/14/2017    diverticulosis    ESOPHAGEAL DILATATION N/A 1/3/2019    ESOPHAGOGASTRODUODENOSCOPY WITH BIOPSY  CPT CODE - 15898 performed by Sheree Santos MD at [facility] ESOPHAGECTOMY N/A 4/18/2019    [facility] ESOPHAGECTOMY WITH MEDIASTINAL LYMPHADENECTOMY INCLUDING THORACIC DUCT, UMBILICAL HERNIA REPAIR performed by Sheree Santos MD at [address] / REMOVAL / [address] N/A 6/25/2019    PORT A CATH PLACEMENT AND PEG TUBE EXCHANGE performed by Sheree Santos MD at [facility]/OUTPT [address] N/A 9/19/2018    EUS/ WITH ANESTHESIA performed by Sharyn Vazquez MD at [address] ENDOSCOPY  9/19/2018    [address]GD BIOPSY [address] Sharyn V[address]quez MD [address] N/A 4/2/2019    ESOPHAGOGASTRODUODENOSCOPY WITH BIOPSY  CPT CODE - 27981 performed by Sheree Santos MD at [address]      hx of sleep apnea         CURRENT MEDICATIONS       Discharge Medication List as of 2/10/2020  1:34 PM      CONTINUE these medications which have NOT CHANGED    Details   diltiazem (CARDIZEM 12 HR) 120 MG extended release capsule Take 120 mg by mouth dailyHistorical Med      gabapentin (NEURONTIN) 300 MG capsule Take 300 mg by mouth 3 times daily. Historical Med      metFORMIN (GLUCOPHAGE) 500 MG tablet Take 1 tablet by mouth Daily with supper Metformin must be crushed., Disp-30 tablet, R-0Print      ONE TOUCH ULTRA TEST strip DAWHistorical Med      ONETOUCH DELICA LANCETS 15P MISC Narrative:     Performed at:  [facility],  [place], [employer] [facility]   Phone [phone]   URINE RT REFLEX TO CULTURE - Abnormal; Notable for the following components:    Ketones, Urine 15 (*)     Urobilinogen, Urine 2.0 (*)     All other components within normal limits    Narrative:     Performed at:  [facility],  [place], [facility]   Phone [phone]   LACTIC ACID, PLASMA - Abnormal; Notable for the following components:    Lactic Acid 3.2 (*)     All other components within normal limits    Narrative:     Performed at:  [facility],  [place], [facility]   Phone [phone]   LIPASE - Abnormal; Notable for the following components:    Lipase 7.0 (*)     All other components within normal limits    Narrative:     Performed at:  [facility] [address],  [place], [place] [email or web address]   Phone [phone]   CULTURE BLOOD #1    Narrative:     ORDER#: [id number]                          ORDERED BY: RACH MIRZA  SOURCE: Blood                              COLLECTED:  02/10/20 11:40  ANTIBIOTICS AT [place]YRON.:                      RECEIVED :  02/10/20 19:15  If child <=2 yrs old please draw pediatric bottle. ~Blood Culture #1  Performed at:  [facility]  [address] [place], [place] [address]   Phone [phone]   CULTURE BLOOD #2    Narrative:     ORDER#: [id number]                          ORDERED BY: RACH MIRZA  SOURCE: Blood Antecubital-Rig              COLLECTED:  02/10/20 11:57  ANTIBIOTICS AT TYRON.:                      RECEIVED :  02/10/20 19:15  If child <=2 yrs old please draw pediatric bottle. ~Blood Culture #2  Performed at:  [facility]  [address] [facility] [employer] [address]   Phone [phone]   LACTATE, SEPSIS    Narrative:     Performed at:  [facility]  [address],  [place], [place]   Phone [phone]   LACTATE, SEPSIS       All other labs were within normal range or notreturned as of this dictation. EKG: All EKG's are interpreted by the Emergency Department Physician who either signs or Co-signs this chart in the absence of a cardiologist.  Please see their note for interpretation of EKG. RADIOLOGY:         Interpretation per the Radiologist below, if available at the time of this note:    CT ABDOMEN PELVIS W IV CONTRAST Additional Contrast? None   Final Result   1. Stable trace bilateral pleural effusions and chronic changes of the lung   bases. 2. Progressive heterogeneous 4.7 cm right adrenal mass consistent with   metastatic disease. 3. Postsurgical change with bowel adhesions. Left jejunostomy tube in   appropriate position. No evidence of bowel obstruction or perforation. 4. Stable mild left inguinal hernia containing indurated fat and ascitic   fluid. No bowel loops are contained. 5. Redemonstration of cholelithiasis with no evidence of acute cholecystitis. New mild intrahepatic biliary dilatation. 6. Trace ascites and mild progressive diffuse peritoneal/retroperitoneal   edema. No results found.       PROCEDURES   Unless otherwise noted below, none     Procedures    CRITICAL CARE TIME   N/A    CONSULTS:  None      EMERGENCY DEPARTMENT COURSE and DIFFERENTIAL DIAGNOSIS/MDM:   Vitals:    Vitals:    02/10/20 1159 02/10/20 1207 02/10/20 1237 02/10/20 1307   BP: 107/63 108/62 112/61 114/69   Pulse:   74    Resp:   16    Temp:       TempSrc:       SpO2: 96% 91% 97% 96%   Weight:       Height:           Patient was given the following medications:  Medications   0.9 % sodium chloride bolus (0 mLs Intravenous Stopped 2/10/20 1255)   morphine sulfate (PF) injection 4 mg (4 mg Intravenous Given 2/10/20 1041)   ondansetron (ZOFRAN) injection 4 mg (4 mg Intravenous Given 2/10/20 1039) iopamidol (ISOVUE-370) 76 % injection 75 mL (75 mLs Intravenous Given 2/10/20 1132)   0.9 % sodium chloride IV bolus 1,914 mL (0 mL/kg × 63.8 kg (Ideal) Intravenous Stopped 2/10/20 1255)   piperacillin-tazobactam (ZOSYN) 4.5 g in sodium chloride 0.9 % 100 mL IVPB (mini-bag) (0 g Intravenous Stopped 2/10/20 1255)         Afebrile, stable, patient presents to the ED for evaluation. Nontoxic patient provided with pain control which significantly improved his symptoms. Patient is some mild ketonuria encouraged to increase fluid intake patient has no elevation in lactic acid no leukocytosis. Hernia seen on CT imaging does not appear to be ischemic does not need immediate surgical intervention although quite uncomfortable for the patient we have encouraged him to follow-up closely with general surgery. Patients PO2 is 96% on room air they are not hypoxic, preoperative for discharge to home in stable condition. All questions are answered. Indications for return to the ED are discussed. Patient is advised if any new or worsening symptoms arise they should immediately return to the emergency room. Follow-up with primary care in 1-2 days. The patient tolerated their visit well. The patient and / or the family were informed of the results of any tests, a time was given to answer questions, a plan was proposed and they agreed Clara Cover. Results for orders placed or performed during the hospital encounter of 02/10/20   Culture blood #1   Result Value Ref Range    Blood Culture, Routine       No Growth to date. Any change in status will be called. Culture blood #2   Result Value Ref Range    Culture, Blood 2       No Growth to date. Any change in status will be called.    CBC Auto Differential   Result Value Ref Range    WBC 5.8 4.0 - 11.0 K/uL    RBC 4.25 4.20 - 5.90 M/uL    Hemoglobin 15.0 13.5 - 17.5 g/dL    Hematocrit 44.3 40.5 - 52.5 %    .3 (H) 80.0 - 100.0 fL    MCH 35.2 (H) 26.0 - 34.0 pg MCHC 33.8 31.0 - 36.0 g/dL    RDW 16.9 (H) 12.4 - 15.4 %    Platelets 235 (L) 679 - 450 K/uL    MPV 8.0 5.0 - 10.5 fL    Neutrophils % 49.3 %    Lymphocytes % 32.9 %    Monocytes % 13.5 %    Eosinophils % 3.4 %    Basophils % 0.9 %    Neutrophils Absolute 2.9 1.7 - 7.7 K/uL    Lymphocytes Absolute 1.9 1.0 - 5.1 K/uL    Monocytes Absolute 0.8 0.0 - 1.3 K/uL    Eosinophils Absolute 0.2 0.0 - 0.6 K/uL    Basophils Absolute 0.1 0.0 - 0.2 K/uL   Comprehensive Metabolic Panel w/ Reflex to MG   Result Value Ref Range    Sodium 141 136 - 145 mmol/L    Potassium reflex Magnesium 5.0 3.5 - 5.1 mmol/L    Chloride 100 99 - 110 mmol/L    CO2 27 21 - 32 mmol/L    Anion Gap 14 3 - 16    Glucose 115 (H) 70 - 99 mg/dL    BUN 11 7 - 20 mg/dL    CREATININE 0.7 (L) 0.8 - 1.3 mg/dL    GFR Non-African American >60 >60    GFR African American >60 >60    Calcium 9.6 8.3 - 10.6 mg/dL    Total Protein 6.9 6.4 - 8.2 g/dL    Alb 3.8 3.4 - 5.0 g/dL    Albumin/Globulin Ratio 1.2 1.1 - 2.2    Total Bilirubin 1.3 (H) 0.0 - 1.0 mg/dL    Alkaline Phosphatase 315 (H) 40 - 129 U/L    ALT 27 10 - 40 U/L    AST 41 (H) 15 - 37 U/L    Globulin 3.1 g/dL   Urinalysis Reflex to Culture   Result Value Ref Range    Color, UA Yellow Straw/Yellow    Clarity, UA Clear Clear    Glucose, Ur Negative Negative mg/dL    Bilirubin Urine Negative Negative    Ketones, Urine 15 (A) Negative mg/dL    Specific Gravity, UA 1.015 1.005 - 1.030    Blood, Urine Negative Negative    pH, UA 7.5 5.0 - 8.0    Protein, UA Negative Negative mg/dL    Urobilinogen, Urine 2.0 (A) <2.0 E.U./dL    Nitrite, Urine Negative Negative    Leukocyte Esterase, Urine Negative Negative    Microscopic Examination Not Indicated     Urine Type Cleancatch     Urine Reflex to Culture Not Indicated    Lactic Acid, Plasma   Result Value Ref Range    Lactic Acid 3.2 (H) 0.4 - 2.0 mmol/L   Lipase   Result Value Ref Range    Lipase 7.0 (L) 13.0 - 60.0 U/L   Lactate, Sepsis   Result Value Ref Range    Lactic tablet, R-0Print      ondansetron (ZOFRAN ODT) 4 MG disintegrating tablet Take 1 tablet by mouth every 8 hours as needed for Nausea, Disp-20 tablet, R-0Print             DISCONTINUED MEDICATIONS:  Discharge Medication List as of 2/10/2020  1:34 PM      STOP taking these medications       ondansetron (ZOFRAN) 8 MG tablet Comments:   Reason for Stopping:         capecitabine (XELODA) 500 MG chemo tablet Comments:   Reason for Stopping:                      (Please note that portions of this note were completed with a voice recognition program.  Efforts were made to edit the dictations but occasionally words are mis-transcribed.)    Herb Ulloa PA-C (electronically signed)        Herb Ulloa PA-C  02/13/20 2141 YASEMIN Davidson PA-C  03/06/20 0427

## 2020-02-14 LAB
BLOOD CULTURE, ROUTINE: NORMAL
CULTURE, BLOOD 2: NORMAL

## 2020-02-20 NOTE — PROGRESS NOTES
[facility]) Surgical Oncology  Michelle Pedro Back    HPI: Dear Dr. Angela Colindres, Thank you for referring Mr. 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 Michelle Pedro is followed by Dr Angela Colindres for a history of Malignant tumor of esophagus Stage IIIA (T3,N1,M0, Lower, G2), September 2018. Patient had reported several months of progressive dysphagia and sensation food gets stuck. It started with rice but progressed to many solid foods. He denied any weight loss. He has had some emesis. EGD revealed a GE junction mass and biopsy showed adenocarcinoma. He had EUS that revealed a T3 lesion and a positive lymph node by biopsy. He had chemoradiation and surgery. Recent surveillance imaging showed an enlarging right adrenal mass concerning for progressive metastatic disease. A CT guided biopsy shows adenocarcinoma. Michelle Pedro has completed 6 cycles of XELOX. Patient has lost 5-10 pounds over the last 6 month's. Patient also having nausea today. Believes it is associated to pain from his hernia. Patient denies constipation.     Past Medical History:   Diagnosis Date    Diabetes mellitus ([address]     Esophageal cancer ([address]     H/O sleep apnea 1998    resolved after surgery    History of esophageal reflux     Hypertension     Prostate cancer [facility])      Past Surgical History:   Procedure Laterality Date    CATARACT REMOVAL WITH IMPLANT  2003    COLONOSCOPY  2012    negative    COLONOSCOPY  09/14/2017    diverticulosis    ESOPHAGEAL DILATATION N/A 1/3/2019    ESOPHAGOGASTRODUODENOSCOPY WITH BIOPSY  CPT CODE - 63791 performed by Mo Santana MD at [facility] ESOPHAGECTOMY N/A 4/18/2019    [facility] ESOPHAGECTOMY WITH MEDIASTINAL LYMPHADENECTOMY INCLUDING THORACIC DUCT, UMBILICAL HERNIA REPAIR performed by Mo Santana MD at [address] [place] [address] Road / REMOVAL / REPLACEMENT VENOUS ACCESS CATHETER N/A 6/25/2019    PORT A CATH PLACEMENT AND PEG TUBE EXCHANGE performed

## 2020-02-25 ENCOUNTER — OFFICE VISIT (OUTPATIENT)
Dept: SURGERY | Age: 75
End: 2020-02-25
Payer: MEDICARE

## 2020-02-25 ENCOUNTER — HOSPITAL ENCOUNTER (OUTPATIENT)
Dept: PREADMISSION TESTING | Age: 75
Discharge: HOME | DRG: 614 | End: 2020-02-29
Payer: MEDICARE

## 2020-02-25 VITALS
WEIGHT: 156 LBS | TEMPERATURE: 97.6 F | BODY MASS INDEX: 25.07 KG/M2 | HEART RATE: 68 BPM | SYSTOLIC BLOOD PRESSURE: 136 MMHG | DIASTOLIC BLOOD PRESSURE: 69 MMHG | HEIGHT: 66 IN | RESPIRATION RATE: 16 BRPM | OXYGEN SATURATION: 96 %

## 2020-02-25 VITALS
BODY MASS INDEX: 25.07 KG/M2 | HEART RATE: 71 BPM | HEIGHT: 66 IN | WEIGHT: 156 LBS | OXYGEN SATURATION: 98 % | DIASTOLIC BLOOD PRESSURE: 78 MMHG | SYSTOLIC BLOOD PRESSURE: 143 MMHG | TEMPERATURE: 97.3 F

## 2020-02-25 LAB
A/G RATIO: 1.2 (ref 1.1–2.2)
ABO/RH: NORMAL
ALBUMIN SERPL-MCNC: 3.8 G/DL (ref 3.4–5)
ALP BLD-CCNC: 312 U/L (ref 40–129)
ALT SERPL-CCNC: 33 U/L (ref 10–40)
ANION GAP SERPL CALCULATED.3IONS-SCNC: 11 MMOL/L (ref 3–16)
ANTIBODY SCREEN: NORMAL
APTT: 31.5 SEC (ref 24.2–36.2)
AST SERPL-CCNC: 45 U/L (ref 15–37)
BILIRUB SERPL-MCNC: 1.1 MG/DL (ref 0–1)
BUN BLDV-MCNC: 15 MG/DL (ref 7–20)
CALCIUM SERPL-MCNC: 9.7 MG/DL (ref 8.3–10.6)
CHLORIDE BLD-SCNC: 101 MMOL/L (ref 99–110)
CO2: 27 MMOL/L (ref 21–32)
CREAT SERPL-MCNC: 0.7 MG/DL (ref 0.8–1.3)
EKG ATRIAL RATE: 73 BPM
EKG DIAGNOSIS: NORMAL
EKG P AXIS: 43 DEGREES
EKG P-R INTERVAL: 162 MS
EKG Q-T INTERVAL: 384 MS
EKG QRS DURATION: 68 MS
EKG QTC CALCULATION (BAZETT): 423 MS
EKG R AXIS: 77 DEGREES
EKG T AXIS: 36 DEGREES
EKG VENTRICULAR RATE: 73 BPM
GFR AFRICAN AMERICAN: >60
GFR NON-AFRICAN AMERICAN: >60
GLOBULIN: 3.1 G/DL
GLUCOSE BLD-MCNC: 181 MG/DL (ref 70–99)
HCT VFR BLD CALC: 45.2 % (ref 40.5–52.5)
HEMOGLOBIN: 15.1 G/DL (ref 13.5–17.5)
INR BLD: 0.98 (ref 0.86–1.14)
MCH RBC QN AUTO: 34.7 PG (ref 26–34)
MCHC RBC AUTO-ENTMCNC: 33.3 G/DL (ref 31–36)
MCV RBC AUTO: 104 FL (ref 80–100)
PDW BLD-RTO: 15.7 % (ref 12.4–15.4)
PLATELET # BLD: 153 K/UL (ref 135–450)
PMV BLD AUTO: 7.9 FL (ref 5–10.5)
POTASSIUM SERPL-SCNC: 5 MMOL/L (ref 3.5–5.1)
PROTHROMBIN TIME: 11.4 SEC (ref 10–13.2)
RBC # BLD: 4.34 M/UL (ref 4.2–5.9)
SODIUM BLD-SCNC: 139 MMOL/L (ref 136–145)
TOTAL PROTEIN: 6.9 G/DL (ref 6.4–8.2)
WBC # BLD: 6.7 K/UL (ref 4–11)

## 2020-02-25 PROCEDURE — 86901 BLOOD TYPING SEROLOGIC RH(D): CPT

## 2020-02-25 PROCEDURE — 85610 PROTHROMBIN TIME: CPT

## 2020-02-25 PROCEDURE — 1123F ACP DISCUSS/DSCN MKR DOCD: CPT | Performed by: SURGERY

## 2020-02-25 PROCEDURE — 3017F COLORECTAL CA SCREEN DOC REV: CPT | Performed by: SURGERY

## 2020-02-25 PROCEDURE — 86850 RBC ANTIBODY SCREEN: CPT

## 2020-02-25 PROCEDURE — G8427 DOCREV CUR MEDS BY ELIG CLIN: HCPCS | Performed by: SURGERY

## 2020-02-25 PROCEDURE — 1036F TOBACCO NON-USER: CPT | Performed by: SURGERY

## 2020-02-25 PROCEDURE — G8484 FLU IMMUNIZE NO ADMIN: HCPCS | Performed by: SURGERY

## 2020-02-25 PROCEDURE — 99205 OFFICE O/P NEW HI 60 MIN: CPT | Performed by: SURGERY

## 2020-02-25 PROCEDURE — G8417 CALC BMI ABV UP PARAM F/U: HCPCS | Performed by: SURGERY

## 2020-02-25 PROCEDURE — 80053 COMPREHEN METABOLIC PANEL: CPT

## 2020-02-25 PROCEDURE — 93010 ELECTROCARDIOGRAM REPORT: CPT | Performed by: INTERNAL MEDICINE

## 2020-02-25 PROCEDURE — 85027 COMPLETE CBC AUTOMATED: CPT

## 2020-02-25 PROCEDURE — 93005 ELECTROCARDIOGRAM TRACING: CPT | Performed by: SURGERY

## 2020-02-25 PROCEDURE — 85730 THROMBOPLASTIN TIME PARTIAL: CPT

## 2020-02-25 PROCEDURE — 4040F PNEUMOC VAC/ADMIN/RCVD: CPT | Performed by: SURGERY

## 2020-02-25 PROCEDURE — 86900 BLOOD TYPING SEROLOGIC ABO: CPT

## 2020-02-25 RX ORDER — DRONABINOL 2.5 MG/1
5 CAPSULE ORAL 2 TIMES DAILY WITH MEALS
COMMUNITY

## 2020-02-25 ASSESSMENT — PAIN DESCRIPTION - DESCRIPTORS: DESCRIPTORS: CONSTANT

## 2020-02-25 ASSESSMENT — PAIN SCALES - GENERAL: PAINLEVEL_OUTOF10: 5

## 2020-02-25 ASSESSMENT — PAIN DESCRIPTION - LOCATION: LOCATION: ABDOMEN

## 2020-02-25 NOTE — PROGRESS NOTES
below only)     5. MEDICATIONS    Take the following medications with a SMALL sip of water: DILTIAZEM. MAY HAVE GABAPENTIN    Use your usual dose of inhalers the morning of surgery. BRING your rescue inhaler with you to hospital.    Anesthesia does NOT want you to take insulin the morning of surgery. They will control your blood sugar while you are at the hospital. Please contact your ordering physician for instructions regarding your insulin the night before your procedure. If you have an insulin pump, please keep it set on basal rate. 6.  Do not swallow water when brushing teeth. No gum, candy, mints or ice chips. Refrain from smoking or at least decrease the amount. 7.  Dress in loose, comfortable clothing appropriate for redressing after your procedure. Do not wear jewelry (including body piercings), make-up (especially NO eye make-up), fingernail polish (NO toenail polish if foot/leg surgery), lotion, powders or metal hairclips. 8.  Dentures, glasses, or contacts will need to be removed before your procedure. Bring cases for your glasses, contacts, dentures, or hearing aids to protect them while you are in surgery. 9.  If you use a CPAP, please bring it with you on the day of your procedure. 10. We recommend that valuable personal  belongings, such as cash, cell phones, e-tablets or jewelry, be left at home during your stay. The hospital will not be responsible for valuables that are not secured in the hospital safe. However, if your insurance requires a co-pay, you may want to bring a method of payment, i.e. Check or credit card, if you wish to pay your co-pay the day of surgery. 11. If you are to stay overnight, you may bring a bag with personal items. Please have any large items you may need brought in by your family after your arrival to your hospital room. 12. If you have a Living Will or Durable Power of , please bring a copy on the day of your procedure.      13. discharge. 3. You and your family will be given written instructions about your diet, activity, dressing care, medications, and return visits. 4. Once at home, should issues with nausea, pain, or bleeding occur, or should you notice any signs of infection, you should call your surgeon. 5. Always clean your hands before and after caring for your wound. Do not let your family touch your surgery site without cleaning their hands. 6. Narcotic pain medications can cause significant constipation. You may want to add a stool softener to your postoperative medication schedule or speak to your surgeon on how best to manage this SIDE EFFECT. SPECIAL INSTRUCTIONS     Thank you for allowing us to care for you. We strive to exceed your expectations in the overall delivery of care and service provided to you and your family. If you need to contact us for any reason, please call us at [phone]. Instructions reviewed and copy given to patient during preadmission testing visit. Herber Sarah. 2/25/2020 .12:31 PM      ADDITIONAL EDUCATIONAL INFORMATION REVIEWED / PROVIDED TO YOU AND YOUR FAMILY:  Yes Taking Control of Your Pain   Yes FAQs about Surgical Site Infections    Antibacterial soap per surgeon instructions

## 2020-02-26 ENCOUNTER — ANESTHESIA EVENT (OUTPATIENT)
Dept: OPERATING ROOM | Age: 75
DRG: 614 | End: 2020-02-26
Payer: MEDICARE

## 2020-02-26 ENCOUNTER — TELEPHONE (OUTPATIENT)
Dept: SURGERY | Age: 75
End: 2020-02-26

## 2020-02-26 NOTE — TELEPHONE ENCOUNTER
The patient is scheduled to have surgery tomorrow morning with Dr. Marcella Gan. The patient would like to know if he should take his BP medication in the morning prior to surgery. Please call.

## 2020-02-27 ENCOUNTER — HOSPITAL ENCOUNTER (INPATIENT)
Age: 75
LOS: 2 days | Discharge: HOME | DRG: 614 | End: 2020-02-29
Attending: SURGERY | Admitting: SURGERY
Payer: MEDICARE

## 2020-02-27 ENCOUNTER — ANESTHESIA (OUTPATIENT)
Dept: OPERATING ROOM | Age: 75
DRG: 614 | End: 2020-02-27
Payer: MEDICARE

## 2020-02-27 VITALS — SYSTOLIC BLOOD PRESSURE: 113 MMHG | TEMPERATURE: 69.1 F | OXYGEN SATURATION: 100 % | DIASTOLIC BLOOD PRESSURE: 59 MMHG

## 2020-02-27 PROBLEM — E27.8 RIGHT ADRENAL MASS (HCC): Status: ACTIVE | Noted: 2020-02-27

## 2020-02-27 LAB
ABO/RH: NORMAL
ANTIBODY SCREEN: NORMAL
GLUCOSE BLD-MCNC: 125 MG/DL (ref 70–99)
GLUCOSE BLD-MCNC: 127 MG/DL (ref 70–99)
GLUCOSE BLD-MCNC: 99 MG/DL (ref 70–99)
PERFORMED ON: ABNORMAL
PERFORMED ON: ABNORMAL
PERFORMED ON: NORMAL

## 2020-02-27 PROCEDURE — 0FB04ZZ EXCISION OF LIVER, PERCUTANEOUS ENDOSCOPIC APPROACH: ICD-10-PCS | Performed by: SURGERY

## 2020-02-27 PROCEDURE — 6360000002 HC RX W HCPCS: Performed by: SURGERY

## 2020-02-27 PROCEDURE — 64488 TAP BLOCK BI INJECTION: CPT | Performed by: ANESTHESIOLOGY

## 2020-02-27 PROCEDURE — 3700000000 HC ANESTHESIA ATTENDED CARE: Performed by: SURGERY

## 2020-02-27 PROCEDURE — 2500000003 HC RX 250 WO HCPCS: Performed by: ANESTHESIOLOGY

## 2020-02-27 PROCEDURE — 49505 PRP I/HERN INIT REDUC >5 YR: CPT | Performed by: SURGERY

## 2020-02-27 PROCEDURE — 7100000000 HC PACU RECOVERY - FIRST 15 MIN: Performed by: SURGERY

## 2020-02-27 PROCEDURE — 7100000001 HC PACU RECOVERY - ADDTL 15 MIN: Performed by: SURGERY

## 2020-02-27 PROCEDURE — 2500000003 HC RX 250 WO HCPCS: Performed by: NURSE ANESTHETIST, CERTIFIED REGISTERED

## 2020-02-27 PROCEDURE — 6360000002 HC RX W HCPCS: Performed by: ANESTHESIOLOGY

## 2020-02-27 PROCEDURE — 2580000003 HC RX 258: Performed by: STUDENT IN AN ORGANIZED HEALTH CARE EDUCATION/TRAINING PROGRAM

## 2020-02-27 PROCEDURE — 0FT44ZZ RESECTION OF GALLBLADDER, PERCUTANEOUS ENDOSCOPIC APPROACH: ICD-10-PCS | Performed by: SURGERY

## 2020-02-27 PROCEDURE — 60659 UNLISTED LAPS PX ENDOC SYS: CPT | Performed by: SURGERY

## 2020-02-27 PROCEDURE — 88309 TISSUE EXAM BY PATHOLOGIST: CPT

## 2020-02-27 PROCEDURE — 0GT30ZZ RESECTION OF RIGHT ADRENAL GLAND, OPEN APPROACH: ICD-10-PCS | Performed by: SURGERY

## 2020-02-27 PROCEDURE — 2580000003 HC RX 258: Performed by: SURGERY

## 2020-02-27 PROCEDURE — S2900 ROBOTIC SURGICAL SYSTEM: HCPCS | Performed by: SURGERY

## 2020-02-27 PROCEDURE — 3E0T3BZ INTRODUCTION OF ANESTHETIC AGENT INTO PERIPHERAL NERVES AND PLEXI, PERCUTANEOUS APPROACH: ICD-10-PCS | Performed by: ANESTHESIOLOGY

## 2020-02-27 PROCEDURE — 6370000000 HC RX 637 (ALT 250 FOR IP): Performed by: STUDENT IN AN ORGANIZED HEALTH CARE EDUCATION/TRAINING PROGRAM

## 2020-02-27 PROCEDURE — 3600000019 HC SURGERY ROBOT ADDTL 15MIN: Performed by: SURGERY

## 2020-02-27 PROCEDURE — 86901 BLOOD TYPING SEROLOGIC RH(D): CPT

## 2020-02-27 PROCEDURE — 8E0W4CZ ROBOTIC ASSISTED PROCEDURE OF TRUNK REGION, PERCUTANEOUS ENDOSCOPIC APPROACH: ICD-10-PCS | Performed by: SURGERY

## 2020-02-27 PROCEDURE — 3700000001 HC ADD 15 MINUTES (ANESTHESIA): Performed by: SURGERY

## 2020-02-27 PROCEDURE — 2580000003 HC RX 258: Performed by: ANESTHESIOLOGY

## 2020-02-27 PROCEDURE — 2709999900 HC NON-CHARGEABLE SUPPLY: Performed by: SURGERY

## 2020-02-27 PROCEDURE — 6360000002 HC RX W HCPCS: Performed by: NURSE ANESTHETIST, CERTIFIED REGISTERED

## 2020-02-27 PROCEDURE — 86900 BLOOD TYPING SEROLOGIC ABO: CPT

## 2020-02-27 PROCEDURE — 88304 TISSUE EXAM BY PATHOLOGIST: CPT

## 2020-02-27 PROCEDURE — 2500000003 HC RX 250 WO HCPCS: Performed by: SURGERY

## 2020-02-27 PROCEDURE — 47562 LAPAROSCOPIC CHOLECYSTECTOMY: CPT | Performed by: SURGERY

## 2020-02-27 PROCEDURE — 47379 UNLISTED LAPS PX LIVER: CPT | Performed by: SURGERY

## 2020-02-27 PROCEDURE — 3600000009 HC SURGERY ROBOT BASE: Performed by: SURGERY

## 2020-02-27 PROCEDURE — C9290 INJ, BUPIVACAINE LIPOSOME: HCPCS | Performed by: ANESTHESIOLOGY

## 2020-02-27 PROCEDURE — 6360000002 HC RX W HCPCS: Performed by: STUDENT IN AN ORGANIZED HEALTH CARE EDUCATION/TRAINING PROGRAM

## 2020-02-27 PROCEDURE — 1200000000 HC SEMI PRIVATE

## 2020-02-27 PROCEDURE — 0YU60JZ SUPPLEMENT LEFT INGUINAL REGION WITH SYNTHETIC SUBSTITUTE, OPEN APPROACH: ICD-10-PCS | Performed by: SURGERY

## 2020-02-27 PROCEDURE — 49203 PR EXCISION/DESTRUCTION OPEN ABDOMINAL TUMORS 5 CM: CPT | Performed by: SURGERY

## 2020-02-27 PROCEDURE — 2720000010 HC SURG SUPPLY STERILE: Performed by: SURGERY

## 2020-02-27 PROCEDURE — C1781 MESH (IMPLANTABLE): HCPCS | Performed by: SURGERY

## 2020-02-27 PROCEDURE — 86850 RBC ANTIBODY SCREEN: CPT

## 2020-02-27 PROCEDURE — 88307 TISSUE EXAM BY PATHOLOGIST: CPT

## 2020-02-27 PROCEDURE — 88311 DECALCIFY TISSUE: CPT

## 2020-02-27 PROCEDURE — 6370000000 HC RX 637 (ALT 250 FOR IP): Performed by: SURGERY

## 2020-02-27 PROCEDURE — 0DNU0ZZ RELEASE OMENTUM, OPEN APPROACH: ICD-10-PCS | Performed by: SURGERY

## 2020-02-27 DEVICE — MESH HERN W7.5XL15CM INGUINAL POLYPR SYN FLAT L PORE MFIL: Type: IMPLANTABLE DEVICE | Site: ABDOMEN | Status: FUNCTIONAL

## 2020-02-27 RX ORDER — LABETALOL 20 MG/4 ML (5 MG/ML) INTRAVENOUS SYRINGE
5 EVERY 10 MIN PRN
Status: DISCONTINUED | OUTPATIENT
Start: 2020-02-27 | End: 2020-02-27 | Stop reason: HOSPADM

## 2020-02-27 RX ORDER — FENTANYL CITRATE 50 UG/ML
25 INJECTION, SOLUTION INTRAMUSCULAR; INTRAVENOUS EVERY 5 MIN PRN
Status: DISCONTINUED | OUTPATIENT
Start: 2020-02-27 | End: 2020-02-27 | Stop reason: HOSPADM

## 2020-02-27 RX ORDER — DILTIAZEM HYDROCHLORIDE 60 MG/1
120 CAPSULE, EXTENDED RELEASE ORAL DAILY
Status: DISCONTINUED | OUTPATIENT
Start: 2020-02-28 | End: 2020-02-29 | Stop reason: HOSPADM

## 2020-02-27 RX ORDER — ACETAMINOPHEN 500 MG
1000 TABLET ORAL EVERY 6 HOURS
Status: DISCONTINUED | OUTPATIENT
Start: 2020-02-27 | End: 2020-02-29 | Stop reason: HOSPADM

## 2020-02-27 RX ORDER — GABAPENTIN 300 MG/1
300 CAPSULE ORAL 3 TIMES DAILY
Status: DISCONTINUED | OUTPATIENT
Start: 2020-02-27 | End: 2020-02-29 | Stop reason: HOSPADM

## 2020-02-27 RX ORDER — SODIUM CHLORIDE, SODIUM LACTATE, POTASSIUM CHLORIDE, CALCIUM CHLORIDE 600; 310; 30; 20 MG/100ML; MG/100ML; MG/100ML; MG/100ML
INJECTION, SOLUTION INTRAVENOUS CONTINUOUS
Status: DISCONTINUED | OUTPATIENT
Start: 2020-02-27 | End: 2020-02-27 | Stop reason: SDUPTHER

## 2020-02-27 RX ORDER — DEXTROSE MONOHYDRATE 25 G/50ML
12.5 INJECTION, SOLUTION INTRAVENOUS PRN
Status: DISCONTINUED | OUTPATIENT
Start: 2020-02-27 | End: 2020-02-29 | Stop reason: HOSPADM

## 2020-02-27 RX ORDER — SODIUM CHLORIDE 0.9 % (FLUSH) 0.9 %
10 SYRINGE (ML) INJECTION EVERY 12 HOURS SCHEDULED
Status: DISCONTINUED | OUTPATIENT
Start: 2020-02-27 | End: 2020-02-29 | Stop reason: HOSPADM

## 2020-02-27 RX ORDER — 0.9 % SODIUM CHLORIDE 0.9 %
500 INTRAVENOUS SOLUTION INTRAVENOUS
Status: DISCONTINUED | OUTPATIENT
Start: 2020-02-27 | End: 2020-02-27 | Stop reason: HOSPADM

## 2020-02-27 RX ORDER — NICOTINE POLACRILEX 4 MG
15 LOZENGE BUCCAL PRN
Status: DISCONTINUED | OUTPATIENT
Start: 2020-02-27 | End: 2020-02-29 | Stop reason: HOSPADM

## 2020-02-27 RX ORDER — GLYCOPYRROLATE 1 MG/5 ML
SYRINGE (ML) INTRAVENOUS PRN
Status: DISCONTINUED | OUTPATIENT
Start: 2020-02-27 | End: 2020-02-27 | Stop reason: SDUPTHER

## 2020-02-27 RX ORDER — BUPIVACAINE HYDROCHLORIDE 5 MG/ML
INJECTION, SOLUTION EPIDURAL; INTRACAUDAL
Status: DISCONTINUED | OUTPATIENT
Start: 2020-02-27 | End: 2020-02-27 | Stop reason: SDUPTHER

## 2020-02-27 RX ORDER — BUPIVACAINE HYDROCHLORIDE 5 MG/ML
30 INJECTION, SOLUTION EPIDURAL; INTRACAUDAL ONCE
Status: DISCONTINUED | OUTPATIENT
Start: 2020-02-27 | End: 2020-02-29 | Stop reason: HOSPADM

## 2020-02-27 RX ORDER — SODIUM CHLORIDE, SODIUM LACTATE, POTASSIUM CHLORIDE, CALCIUM CHLORIDE 600; 310; 30; 20 MG/100ML; MG/100ML; MG/100ML; MG/100ML
INJECTION, SOLUTION INTRAVENOUS CONTINUOUS
Status: DISCONTINUED | OUTPATIENT
Start: 2020-02-27 | End: 2020-02-28

## 2020-02-27 RX ORDER — LIDOCAINE HYDROCHLORIDE 20 MG/ML
INJECTION, SOLUTION INTRAVENOUS PRN
Status: DISCONTINUED | OUTPATIENT
Start: 2020-02-27 | End: 2020-02-27 | Stop reason: SDUPTHER

## 2020-02-27 RX ORDER — MIDAZOLAM HYDROCHLORIDE 1 MG/ML
INJECTION INTRAMUSCULAR; INTRAVENOUS PRN
Status: DISCONTINUED | OUTPATIENT
Start: 2020-02-27 | End: 2020-02-27 | Stop reason: SDUPTHER

## 2020-02-27 RX ORDER — SODIUM CHLORIDE, SODIUM LACTATE, POTASSIUM CHLORIDE, CALCIUM CHLORIDE 600; 310; 30; 20 MG/100ML; MG/100ML; MG/100ML; MG/100ML
INJECTION, SOLUTION INTRAVENOUS CONTINUOUS
Status: DISCONTINUED | OUTPATIENT
Start: 2020-02-27 | End: 2020-02-27

## 2020-02-27 RX ORDER — MAGNESIUM HYDROXIDE 1200 MG/15ML
LIQUID ORAL CONTINUOUS PRN
Status: COMPLETED | OUTPATIENT
Start: 2020-02-27 | End: 2020-02-27

## 2020-02-27 RX ORDER — MIDAZOLAM HYDROCHLORIDE 1 MG/ML
INJECTION INTRAMUSCULAR; INTRAVENOUS PRN
Status: DISCONTINUED | OUTPATIENT
Start: 2020-02-27 | End: 2020-02-27

## 2020-02-27 RX ORDER — NEOSTIGMINE METHYLSULFATE 5 MG/5 ML
SYRINGE (ML) INTRAVENOUS PRN
Status: DISCONTINUED | OUTPATIENT
Start: 2020-02-27 | End: 2020-02-27 | Stop reason: SDUPTHER

## 2020-02-27 RX ORDER — DEXAMETHASONE SODIUM PHOSPHATE 4 MG/ML
4 INJECTION, SOLUTION INTRA-ARTICULAR; INTRALESIONAL; INTRAMUSCULAR; INTRAVENOUS; SOFT TISSUE
Status: DISCONTINUED | OUTPATIENT
Start: 2020-02-27 | End: 2020-02-27 | Stop reason: HOSPADM

## 2020-02-27 RX ORDER — HYDRALAZINE HYDROCHLORIDE 20 MG/ML
5 INJECTION INTRAMUSCULAR; INTRAVENOUS EVERY 10 MIN PRN
Status: DISCONTINUED | OUTPATIENT
Start: 2020-02-27 | End: 2020-02-27 | Stop reason: HOSPADM

## 2020-02-27 RX ORDER — DEXTROSE MONOHYDRATE 50 MG/ML
100 INJECTION, SOLUTION INTRAVENOUS PRN
Status: DISCONTINUED | OUTPATIENT
Start: 2020-02-27 | End: 2020-02-29 | Stop reason: HOSPADM

## 2020-02-27 RX ORDER — HYDROMORPHONE HCL 110MG/55ML
PATIENT CONTROLLED ANALGESIA SYRINGE INTRAVENOUS PRN
Status: DISCONTINUED | OUTPATIENT
Start: 2020-02-27 | End: 2020-02-27 | Stop reason: SDUPTHER

## 2020-02-27 RX ORDER — SODIUM CHLORIDE 0.9 % (FLUSH) 0.9 %
10 SYRINGE (ML) INJECTION PRN
Status: DISCONTINUED | OUTPATIENT
Start: 2020-02-27 | End: 2020-02-29 | Stop reason: HOSPADM

## 2020-02-27 RX ORDER — ONDANSETRON 2 MG/ML
4 INJECTION INTRAMUSCULAR; INTRAVENOUS
Status: DISCONTINUED | OUTPATIENT
Start: 2020-02-27 | End: 2020-02-27 | Stop reason: HOSPADM

## 2020-02-27 RX ORDER — ROCURONIUM BROMIDE 10 MG/ML
INJECTION, SOLUTION INTRAVENOUS PRN
Status: DISCONTINUED | OUTPATIENT
Start: 2020-02-27 | End: 2020-02-27 | Stop reason: SDUPTHER

## 2020-02-27 RX ORDER — ONDANSETRON 2 MG/ML
4 INJECTION INTRAMUSCULAR; INTRAVENOUS EVERY 6 HOURS PRN
Status: DISCONTINUED | OUTPATIENT
Start: 2020-02-27 | End: 2020-02-29 | Stop reason: HOSPADM

## 2020-02-27 RX ORDER — SODIUM CHLORIDE, SODIUM LACTATE, POTASSIUM CHLORIDE, AND CALCIUM CHLORIDE .6; .31; .03; .02 G/100ML; G/100ML; G/100ML; G/100ML
IRRIGANT IRRIGATION PRN
Status: DISCONTINUED | OUTPATIENT
Start: 2020-02-27 | End: 2020-02-27 | Stop reason: ALTCHOICE

## 2020-02-27 RX ORDER — BUPIVACAINE HYDROCHLORIDE AND EPINEPHRINE 5; 5 MG/ML; UG/ML
INJECTION, SOLUTION EPIDURAL; INTRACAUDAL; PERINEURAL PRN
Status: DISCONTINUED | OUTPATIENT
Start: 2020-02-27 | End: 2020-02-27 | Stop reason: ALTCHOICE

## 2020-02-27 RX ORDER — DRONABINOL 2.5 MG/1
2.5 CAPSULE ORAL 2 TIMES DAILY PRN
Status: DISCONTINUED | OUTPATIENT
Start: 2020-02-27 | End: 2020-02-29 | Stop reason: HOSPADM

## 2020-02-27 RX ORDER — PROPOFOL 10 MG/ML
INJECTION, EMULSION INTRAVENOUS PRN
Status: DISCONTINUED | OUTPATIENT
Start: 2020-02-27 | End: 2020-02-27 | Stop reason: SDUPTHER

## 2020-02-27 RX ADMIN — SODIUM CHLORIDE, SODIUM LACTATE, POTASSIUM CHLORIDE, AND CALCIUM CHLORIDE: 600; 310; 30; 20 INJECTION, SOLUTION INTRAVENOUS at 10:50

## 2020-02-27 RX ADMIN — HYDROMORPHONE HYDROCHLORIDE 0.5 MG: 2 INJECTION, SOLUTION INTRAMUSCULAR; INTRAVENOUS; SUBCUTANEOUS at 16:07

## 2020-02-27 RX ADMIN — METHOCARBAMOL 1000 MG: 100 INJECTION, SOLUTION INTRAMUSCULAR; INTRAVENOUS at 17:47

## 2020-02-27 RX ADMIN — PHENYLEPHRINE HYDROCHLORIDE 80 MCG: 10 INJECTION, SOLUTION INTRAMUSCULAR; INTRAVENOUS; SUBCUTANEOUS at 15:07

## 2020-02-27 RX ADMIN — BUPIVACAINE HYDROCHLORIDE 30 ML: 5 INJECTION, SOLUTION EPIDURAL; INTRACAUDAL; PERINEURAL at 18:04

## 2020-02-27 RX ADMIN — SODIUM CHLORIDE, SODIUM LACTATE, POTASSIUM CHLORIDE, AND CALCIUM CHLORIDE: 600; 310; 30; 20 INJECTION, SOLUTION INTRAVENOUS at 17:02

## 2020-02-27 RX ADMIN — CEFAZOLIN 2 G: 10 INJECTION, POWDER, FOR SOLUTION INTRAVENOUS at 16:42

## 2020-02-27 RX ADMIN — SODIUM CHLORIDE, SODIUM LACTATE, POTASSIUM CHLORIDE, AND CALCIUM CHLORIDE: 600; 310; 30; 20 INJECTION, SOLUTION INTRAVENOUS at 09:15

## 2020-02-27 RX ADMIN — SODIUM CHLORIDE, SODIUM LACTATE, POTASSIUM CHLORIDE, AND CALCIUM CHLORIDE: 600; 310; 30; 20 INJECTION, SOLUTION INTRAVENOUS at 13:59

## 2020-02-27 RX ADMIN — ROCURONIUM BROMIDE 20 MG: 10 INJECTION, SOLUTION INTRAVENOUS at 15:15

## 2020-02-27 RX ADMIN — SODIUM CHLORIDE, SODIUM LACTATE, POTASSIUM CHLORIDE, AND CALCIUM CHLORIDE: 600; 310; 30; 20 INJECTION, SOLUTION INTRAVENOUS at 15:04

## 2020-02-27 RX ADMIN — PROPOFOL 150 MG: 10 INJECTION, EMULSION INTRAVENOUS at 12:00

## 2020-02-27 RX ADMIN — ROCURONIUM BROMIDE 30 MG: 10 INJECTION, SOLUTION INTRAVENOUS at 13:46

## 2020-02-27 RX ADMIN — Medication 10 ML: at 21:30

## 2020-02-27 RX ADMIN — Medication 0.8 MG: at 17:13

## 2020-02-27 RX ADMIN — SODIUM CHLORIDE, SODIUM LACTATE, POTASSIUM CHLORIDE, AND CALCIUM CHLORIDE: 600; 310; 30; 20 INJECTION, SOLUTION INTRAVENOUS at 09:00

## 2020-02-27 RX ADMIN — PHENYLEPHRINE HYDROCHLORIDE 80 MCG: 10 INJECTION, SOLUTION INTRAMUSCULAR; INTRAVENOUS; SUBCUTANEOUS at 12:20

## 2020-02-27 RX ADMIN — CEFAZOLIN 2 G: 10 INJECTION, POWDER, FOR SOLUTION INTRAVENOUS at 12:00

## 2020-02-27 RX ADMIN — PHENYLEPHRINE HYDROCHLORIDE 80 MCG: 10 INJECTION, SOLUTION INTRAMUSCULAR; INTRAVENOUS; SUBCUTANEOUS at 14:43

## 2020-02-27 RX ADMIN — GABAPENTIN 300 MG: 300 CAPSULE ORAL at 21:30

## 2020-02-27 RX ADMIN — MIDAZOLAM HYDROCHLORIDE 2 MG: 2 INJECTION, SOLUTION INTRAMUSCULAR; INTRAVENOUS at 11:41

## 2020-02-27 RX ADMIN — ROCURONIUM BROMIDE 50 MG: 10 INJECTION, SOLUTION INTRAVENOUS at 12:00

## 2020-02-27 RX ADMIN — SODIUM CHLORIDE, SODIUM LACTATE, POTASSIUM CHLORIDE, AND CALCIUM CHLORIDE: 600; 310; 30; 20 INJECTION, SOLUTION INTRAVENOUS at 21:30

## 2020-02-27 RX ADMIN — LIDOCAINE HYDROCHLORIDE 100 MG: 20 INJECTION, SOLUTION INTRAVENOUS at 12:00

## 2020-02-27 RX ADMIN — BUPIVACAINE 20 ML: 13.3 INJECTION, SUSPENSION, LIPOSOMAL INFILTRATION at 18:04

## 2020-02-27 RX ADMIN — HYDROMORPHONE HYDROCHLORIDE 0.5 MG: 2 INJECTION, SOLUTION INTRAMUSCULAR; INTRAVENOUS; SUBCUTANEOUS at 15:30

## 2020-02-27 RX ADMIN — Medication 5 MG: at 17:13

## 2020-02-27 RX ADMIN — HYDROMORPHONE HYDROCHLORIDE 0.5 MG: 2 INJECTION, SOLUTION INTRAMUSCULAR; INTRAVENOUS; SUBCUTANEOUS at 11:55

## 2020-02-27 RX ADMIN — PHENYLEPHRINE HYDROCHLORIDE 80 MCG: 10 INJECTION, SOLUTION INTRAMUSCULAR; INTRAVENOUS; SUBCUTANEOUS at 15:03

## 2020-02-27 RX ADMIN — SODIUM CHLORIDE, SODIUM LACTATE, POTASSIUM CHLORIDE, AND CALCIUM CHLORIDE: 600; 310; 30; 20 INJECTION, SOLUTION INTRAVENOUS at 12:43

## 2020-02-27 RX ADMIN — ACETAMINOPHEN 1000 MG: 500 TABLET ORAL at 21:30

## 2020-02-27 RX ADMIN — SODIUM CHLORIDE, SODIUM LACTATE, POTASSIUM CHLORIDE, AND CALCIUM CHLORIDE: 600; 310; 30; 20 INJECTION, SOLUTION INTRAVENOUS at 15:50

## 2020-02-27 RX ADMIN — HYDROMORPHONE HYDROCHLORIDE 0.5 MG: 2 INJECTION, SOLUTION INTRAMUSCULAR; INTRAVENOUS; SUBCUTANEOUS at 13:03

## 2020-02-27 ASSESSMENT — PULMONARY FUNCTION TESTS
PIF_VALUE: 28
PIF_VALUE: 31
PIF_VALUE: 31
PIF_VALUE: 28
PIF_VALUE: 18
PIF_VALUE: 16
PIF_VALUE: 1
PIF_VALUE: 31
PIF_VALUE: 16
PIF_VALUE: 13
PIF_VALUE: 31
PIF_VALUE: 16
PIF_VALUE: 13
PIF_VALUE: 28
PIF_VALUE: 19
PIF_VALUE: 29
PIF_VALUE: 16
PIF_VALUE: 28
PIF_VALUE: 16
PIF_VALUE: 29
PIF_VALUE: 16
PIF_VALUE: 0
PIF_VALUE: 29
PIF_VALUE: 16
PIF_VALUE: 2
PIF_VALUE: 5
PIF_VALUE: 16
PIF_VALUE: 29
PIF_VALUE: 28
PIF_VALUE: 19
PIF_VALUE: 16
PIF_VALUE: 29
PIF_VALUE: 19
PIF_VALUE: 15
PIF_VALUE: 23
PIF_VALUE: 29
PIF_VALUE: 16
PIF_VALUE: 27
PIF_VALUE: 16
PIF_VALUE: 28
PIF_VALUE: 30
PIF_VALUE: 31
PIF_VALUE: 16
PIF_VALUE: 0
PIF_VALUE: 16
PIF_VALUE: 17
PIF_VALUE: 28
PIF_VALUE: 28
PIF_VALUE: 16
PIF_VALUE: 16
PIF_VALUE: 19
PIF_VALUE: 15
PIF_VALUE: 4
PIF_VALUE: 28
PIF_VALUE: 16
PIF_VALUE: 5
PIF_VALUE: 26
PIF_VALUE: 17
PIF_VALUE: 16
PIF_VALUE: 27
PIF_VALUE: 30
PIF_VALUE: 25
PIF_VALUE: 26
PIF_VALUE: 30
PIF_VALUE: 28
PIF_VALUE: 28
PIF_VALUE: 26
PIF_VALUE: 26
PIF_VALUE: 24
PIF_VALUE: 13
PIF_VALUE: 29
PIF_VALUE: 15
PIF_VALUE: 30
PIF_VALUE: 29
PIF_VALUE: 0
PIF_VALUE: 21
PIF_VALUE: 16
PIF_VALUE: 29
PIF_VALUE: 27
PIF_VALUE: 14
PIF_VALUE: 28
PIF_VALUE: 0
PIF_VALUE: 16
PIF_VALUE: 16
PIF_VALUE: 29
PIF_VALUE: 26
PIF_VALUE: 13
PIF_VALUE: 28
PIF_VALUE: 15
PIF_VALUE: 16
PIF_VALUE: 19
PIF_VALUE: 30
PIF_VALUE: 18
PIF_VALUE: 5
PIF_VALUE: 29
PIF_VALUE: 28
PIF_VALUE: 30
PIF_VALUE: 28
PIF_VALUE: 16
PIF_VALUE: 27
PIF_VALUE: 29
PIF_VALUE: 17
PIF_VALUE: 28
PIF_VALUE: 16
PIF_VALUE: 29
PIF_VALUE: 15
PIF_VALUE: 16
PIF_VALUE: 28
PIF_VALUE: 28
PIF_VALUE: 25
PIF_VALUE: 19
PIF_VALUE: 15
PIF_VALUE: 17
PIF_VALUE: 0
PIF_VALUE: 16
PIF_VALUE: 27
PIF_VALUE: 32
PIF_VALUE: 1
PIF_VALUE: 25
PIF_VALUE: 23
PIF_VALUE: 15
PIF_VALUE: 16
PIF_VALUE: 32
PIF_VALUE: 29
PIF_VALUE: 1
PIF_VALUE: 16
PIF_VALUE: 25
PIF_VALUE: 16
PIF_VALUE: 26
PIF_VALUE: 28
PIF_VALUE: 28
PIF_VALUE: 29
PIF_VALUE: 30
PIF_VALUE: 29
PIF_VALUE: 29
PIF_VALUE: 16
PIF_VALUE: 26
PIF_VALUE: 27
PIF_VALUE: 27
PIF_VALUE: 29
PIF_VALUE: 24
PIF_VALUE: 27
PIF_VALUE: 2
PIF_VALUE: 27
PIF_VALUE: 13
PIF_VALUE: 30
PIF_VALUE: 16
PIF_VALUE: 12
PIF_VALUE: 19
PIF_VALUE: 17
PIF_VALUE: 29
PIF_VALUE: 16
PIF_VALUE: 30
PIF_VALUE: 32
PIF_VALUE: 13
PIF_VALUE: 27
PIF_VALUE: 30
PIF_VALUE: 0
PIF_VALUE: 28
PIF_VALUE: 31
PIF_VALUE: 26
PIF_VALUE: 29
PIF_VALUE: 30
PIF_VALUE: 17
PIF_VALUE: 29
PIF_VALUE: 2
PIF_VALUE: 16
PIF_VALUE: 16
PIF_VALUE: 27
PIF_VALUE: 26
PIF_VALUE: 27
PIF_VALUE: 20
PIF_VALUE: 25
PIF_VALUE: 17
PIF_VALUE: 16
PIF_VALUE: 13
PIF_VALUE: 16
PIF_VALUE: 27
PIF_VALUE: 28
PIF_VALUE: 16
PIF_VALUE: 26
PIF_VALUE: 17
PIF_VALUE: 16
PIF_VALUE: 28
PIF_VALUE: 28
PIF_VALUE: 5
PIF_VALUE: 1
PIF_VALUE: 29
PIF_VALUE: 31
PIF_VALUE: 16
PIF_VALUE: 29
PIF_VALUE: 28
PIF_VALUE: 26
PIF_VALUE: 26
PIF_VALUE: 20
PIF_VALUE: 28
PIF_VALUE: 30
PIF_VALUE: 28
PIF_VALUE: 29
PIF_VALUE: 16
PIF_VALUE: 27
PIF_VALUE: 28
PIF_VALUE: 24
PIF_VALUE: 13
PIF_VALUE: 27
PIF_VALUE: 29
PIF_VALUE: 25
PIF_VALUE: 16
PIF_VALUE: 26
PIF_VALUE: 18
PIF_VALUE: 28
PIF_VALUE: 18
PIF_VALUE: 28
PIF_VALUE: 28
PIF_VALUE: 30
PIF_VALUE: 30
PIF_VALUE: 31
PIF_VALUE: 27
PIF_VALUE: 19
PIF_VALUE: 30
PIF_VALUE: 15
PIF_VALUE: 29
PIF_VALUE: 15
PIF_VALUE: 28
PIF_VALUE: 27
PIF_VALUE: 28
PIF_VALUE: 11
PIF_VALUE: 29
PIF_VALUE: 28
PIF_VALUE: 28
PIF_VALUE: 13
PIF_VALUE: 23
PIF_VALUE: 26
PIF_VALUE: 31
PIF_VALUE: 4
PIF_VALUE: 26
PIF_VALUE: 26
PIF_VALUE: 17
PIF_VALUE: 28
PIF_VALUE: 29
PIF_VALUE: 29
PIF_VALUE: 17
PIF_VALUE: 28
PIF_VALUE: 18
PIF_VALUE: 16
PIF_VALUE: 16
PIF_VALUE: 24
PIF_VALUE: 13
PIF_VALUE: 29
PIF_VALUE: 16
PIF_VALUE: 26
PIF_VALUE: 15
PIF_VALUE: 27
PIF_VALUE: 16
PIF_VALUE: 29
PIF_VALUE: 28
PIF_VALUE: 16
PIF_VALUE: 16
PIF_VALUE: 1
PIF_VALUE: 16
PIF_VALUE: 30
PIF_VALUE: 29
PIF_VALUE: 12
PIF_VALUE: 16
PIF_VALUE: 27
PIF_VALUE: 23
PIF_VALUE: 30
PIF_VALUE: 13
PIF_VALUE: 16
PIF_VALUE: 13
PIF_VALUE: 17
PIF_VALUE: 13
PIF_VALUE: 13
PIF_VALUE: 29
PIF_VALUE: 28
PIF_VALUE: 27
PIF_VALUE: 32
PIF_VALUE: 28
PIF_VALUE: 0
PIF_VALUE: 28
PIF_VALUE: 27
PIF_VALUE: 19
PIF_VALUE: 23
PIF_VALUE: 15
PIF_VALUE: 29
PIF_VALUE: 18
PIF_VALUE: 29
PIF_VALUE: 30
PIF_VALUE: 27
PIF_VALUE: 30
PIF_VALUE: 26
PIF_VALUE: 28
PIF_VALUE: 16
PIF_VALUE: 15
PIF_VALUE: 16
PIF_VALUE: 28
PIF_VALUE: 30
PIF_VALUE: 10
PIF_VALUE: 18
PIF_VALUE: 28
PIF_VALUE: 19
PIF_VALUE: 18
PIF_VALUE: 31
PIF_VALUE: 5
PIF_VALUE: 30
PIF_VALUE: 27
PIF_VALUE: 13
PIF_VALUE: 18
PIF_VALUE: 28
PIF_VALUE: 30
PIF_VALUE: 31
PIF_VALUE: 27
PIF_VALUE: 14
PIF_VALUE: 30
PIF_VALUE: 25
PIF_VALUE: 13
PIF_VALUE: 17
PIF_VALUE: 25
PIF_VALUE: 16
PIF_VALUE: 0
PIF_VALUE: 29
PIF_VALUE: 28
PIF_VALUE: 15
PIF_VALUE: 0
PIF_VALUE: 29
PIF_VALUE: 32
PIF_VALUE: 1
PIF_VALUE: 30
PIF_VALUE: 28
PIF_VALUE: 28
PIF_VALUE: 29
PIF_VALUE: 16
PIF_VALUE: 18
PIF_VALUE: 16
PIF_VALUE: 29
PIF_VALUE: 16
PIF_VALUE: 16
PIF_VALUE: 29

## 2020-02-27 ASSESSMENT — PAIN SCALES - GENERAL
PAINLEVEL_OUTOF10: 0
PAINLEVEL_OUTOF10: 0
PAINLEVEL_OUTOF10: 1
PAINLEVEL_OUTOF10: 0

## 2020-02-27 ASSESSMENT — PAIN DESCRIPTION - LOCATION: LOCATION: ABDOMEN

## 2020-02-27 ASSESSMENT — PAIN - FUNCTIONAL ASSESSMENT: PAIN_FUNCTIONAL_ASSESSMENT: 0-10

## 2020-02-27 ASSESSMENT — PAIN DESCRIPTION - PAIN TYPE: TYPE: SURGICAL PAIN

## 2020-02-27 ASSESSMENT — PAIN DESCRIPTION - DESCRIPTORS: DESCRIPTORS: ACHING

## 2020-02-27 ASSESSMENT — PAIN DESCRIPTION - ONSET: ONSET: ON-GOING

## 2020-02-27 ASSESSMENT — PAIN DESCRIPTION - FREQUENCY: FREQUENCY: CONTINUOUS

## 2020-02-27 ASSESSMENT — PAIN DESCRIPTION - ORIENTATION: ORIENTATION: MID

## 2020-02-27 ASSESSMENT — PAIN DESCRIPTION - PROGRESSION: CLINICAL_PROGRESSION: NOT CHANGED

## 2020-02-27 NOTE — ANESTHESIA PRE PROCEDURE
Mucositis due to radiation therapy K12.33    Type 2 diabetes mellitus (HCC) E11.9    Undiagnosed cardiac murmurs R01.1       Past Medical History:        Diagnosis Date    [facility]     Diabetes mellitus ([facility]     Esophageal [facility] ([facility]     H/O sleep apnea     resolved after surgery    History of esophageal reflux     Hypertension     Prostate cancer ([facility]     Weight loss        Past Surgical History:        Procedure Laterality Date    CATARACT REMOVAL WITH IMPLANT      COLONOSCOPY      negative    COLONOSCOPY  2017    diverticulosis    ESOPHAGEAL DILATATION N/A 1/3/2019    ESOPHAGOGASTRODUODENOSCOPY WITH BIOPSY  CPT CODE - 59658 performed by Lauren Elena MD at [place] [address] ESOPHAGECTOMY N/A 2019    [facility] ESOPHAGECTOMY WITH MEDIASTINAL LYMPHADENECTOMY INCLUDING THORACIC DUCT, UMBILICAL HERNIA REPAIR performed by Lauren Elena MD at [address] / REMOVAL / [address] N/A 2019    PORT A CATH PLACEMENT AND PEG TUBE EXCHANGE performed by Lauren Elena MD at [address] [facility] OFFICE/OUTPT [facility] N/A 2018    EUS/ WITH ANESTHESIA performed by Tyler Armenta MD at [address] [facility] ENDOSCOPY  2018    EGD BIOPSY performed by Tyler Armenta MD at [address] [facility] N/A 2019    ESOPHAGOGASTRODUODENOSCOPY WITH BIOPSY  CPT CODE - 02212 performed by Lauren Elena MD at [address]      hx of sleep apnea       Social History:    Social History     Tobacco Use    Smoking status: Former Smoker     Types: Cigarettes     Last attempt to quit: 1972     Years since quittin.1    Smokeless tobacco: Never Used   Substance Use Topics    Alcohol use:  Yes     Alcohol/week: 0.0 standard drinks     Comment: rarely Counseling given: Not Answered      Vital Signs (Current): There were no vitals filed for this visit.                                            BP Readings from Last 3 Encounters:   02/25/20 136/69   02/25/20 (!) 143/78   02/10/20 114/69       NPO Status: Time of last liquid consumption: 2200                        Time of last solid consumption: 1530                        Date of last liquid consumption: 02/26/20                        Date of last solid food consumption: 02/26/20    BMI:   Wt Readings from Last 3 Encounters:   02/25/20 156 lb (70.8 kg)   02/25/20 156 lb (70.8 kg)   02/10/20 154 lb (69.9 kg)     There is no height or weight on file to calculate BMI.    CBC:   Lab Results   Component Value Date    WBC 6.7 02/25/2020    RBC 4.34 02/25/2020    HGB 15.1 02/25/2020    HCT 45.2 02/25/2020    .0 02/25/2020    RDW 15.7 02/25/2020     02/25/2020       CMP:   Lab Results   Component Value Date     02/25/2020    K 5.0 02/25/2020    K 5.0 02/10/2020     02/25/2020    CO2 27 02/25/2020    BUN 15 02/25/2020    CREATININE 0.7 02/25/2020    GFRAA >60 02/25/2020    AGRATIO 1.2 02/25/2020    LABGLOM >60 02/25/2020    GLUCOSE 181 02/25/2020    PROT 6.9 02/25/2020    CALCIUM 9.7 02/25/2020    BILITOT 1.1 02/25/2020    ALKPHOS 312 02/25/2020    AST 45 02/25/2020    ALT 33 02/25/2020       POC Tests:   Recent Labs     02/27/20  0902   POCGLU 99       Coags:   Lab Results   Component Value Date    PROTIME 11.4 02/25/2020    INR 0.98 02/25/2020    APTT 31.5 02/25/2020       HCG (If Applicable): No results found for: PREGTESTUR, PREGSERUM, HCG, HCGQUANT     ABGs: No results found for: PHART, PO2ART, WRK2CCF, STQ7FMV, BEART, N7DGEVKV     Type & Screen (If Applicable):  No results found for: LABABO, [address]    Anesthesia Evaluation  Patient summary reviewed and Nursing notes reviewed  Airway: Mallampati: II  TM distance: >3 FB   Neck ROM: full  Mouth opening: > = 3 FB Dental: Pulmonary:Negative Pulmonary ROS and normal exam  breath sounds clear to auscultation            Patient did not smoke on day of surgery. Cardiovascular:  Exercise tolerance: good (>4 METS),   (+) hypertension: mild, valvular problems/murmurs: AS, murmur,       ECG reviewed  Rhythm: regular  Rate: normal  Echocardiogram reviewed         Beta Blocker:  Not on Beta Blocker         Neuro/Psych:   Negative Neuro/Psych ROS              GI/Hepatic/Renal: Neg GI/Hepatic/Renal ROS            Endo/Other:    (+) DiabetesType II DM, well controlled, , .                 Abdominal:       Abdomen: soft. Vascular: negative vascular ROS. Anesthesia Plan      general     ASA 3       Induction: intravenous. arterial line  MIPS: Postoperative opioids intended and Prophylactic antiemetics administered. Use of blood products discussed with patient whom consented to blood products. Plan discussed with attending and CRNA.     Attending anesthesiologist reviewed and agrees with Pre Eval content              Darien Alexis DO   2/27/2020

## 2020-02-27 NOTE — BRIEF OP NOTE
Brief Postoperative Note  ______________________________________________________________    Patient: Janessa Fuentes  YOB: 1945  MRN: [id number]  Date of Procedure: 2/27/2020    Pre-Op Diagnosis: Adrenal mass, right ([address] [E27.8] Inguinal hernia without obstruction or gangrene, recurrence not specified, unspecified laterality [K40.90]    Post-Op Diagnosis: Same       Procedure(s):  ROBOTIC LAPAROSCOPIC RIGHT RETROPERITONEAL MASS RESECTION WITH ADRENALECTOMY, LIVER NODULE WEDGE EXCISION AND ROBOTIC ASSISTED CHOLECYSTECTOMY AND OPEN LEFT INGUINAL HERNIA REPAIR WITH MESH    Anesthesia: General    Surgeon(s):  Pedro Oreilly MD    Assistant: Amara Grewal MD PGY5    Estimated Blood Loss (mL): 052 cc    Complications: None    Specimens:   ID Type Source Tests Collected by Time Destination   A : OMENTUM WITH MASS Tissue Tissue SURGICAL PATHOLOGY Pedro Oreilly MD 2/27/2020 1303    B : [address] [facility] Tissue Tissue SURGICAL PATHOLOGY Pedro Oreilly MD 2/27/2020 1306    C : C.  GALL BLADDER Tissue Tissue SURGICAL PATHOLOGY Pedro Oreilly MD 2/27/2020 1642    D : D. RIGHT RETROPERITONEAL TUMOR WITH ADRENAL GLAND Tissue Tissue SURGICAL PATHOLOGY Pedro Oreilly MD 2/27/2020 1644    E : [address] [place] [address] Tissue Tissue SURGICAL PATHOLOGY Pedro Oreilly MD 2/27/2020 1645        Implants:  * No implants in log *      Drains:   Gastrostomy/Enterostomy/Jejunostomy PEG-Jejunostomy LUQ 1 (Active)       Urethral Catheter Latex 16 fr (Active)       Findings: uneventful right adrenalectomy    Amara Grewal MD  Date: 2/27/2020  Time: 5:12 PM

## 2020-02-27 NOTE — H&P
Lawrence Zaidi    [id number]    [facility] Same Day Surgery Update H & P  Department of General Surgery   Surgical Service   Pre-operative History and Physical  Last H & P within the last 30 days. DIAGNOSIS:   Adrenal mass, right ([address] [E27.8]  Inguinal hernia without obstruction or gangrene, recurrence not specified, unspecified laterality [K40.90]    PROCEDURE:  MI LAP,ADRENALECTOMY [09546] (ROBOTIC LAPAROSCOPIC RIGHT ADRENALECTOMY AND LEFT INGUINAL HERNIA REPAIR)      HISTORY OF PRESENT ILLNESS:   Patient with esophageal cancer with metastasis to the right adrenal gland. He has completed 6 cycles of XELOX and presents today for the above procedure.       Past Medical History:        Diagnosis Date    Adrenal mass ([address]     Diabetes mellitus ([address]     Esophageal cancer ([address]     H/O sleep apnea 1998    resolved after surgery    History of esophageal reflux     Hypertension     Prostate cancer ([address]     Weight loss      Past Surgical History:        Procedure Laterality Date    CATARACT REMOVAL WITH IMPLANT  2003    COLONOSCOPY  2012    negative    COLONOSCOPY  09/14/2017    diverticulosis    ESOPHAGEAL DILATATION N/A 1/3/2019    ESOPHAGOGASTRODUODENOSCOPY WITH BIOPSY  CPT CODE - 37861 performed by Yesenia Dodge MD at [place] [address] ESOPHAGECTOMY N/A 4/18/2019    BENITO VALENTINA ESOPHAGECTOMY WITH MEDIASTINAL LYMPHADENECTOMY INCLUDING THORACIC DUCT, UMBILICAL HERNIA REPAIR performed by Yesenia Dodge MD at [address] / REMOVAL / [address] N/A 6/25/2019    PORT A CATH PLACEMENT AND PEG TUBE EXCHANGE performed by Yesenia Dodge MD at [address] [facility] OFFICE/OUTPT [facility] N/A 9/19/2018    EUS/ WITH ANESTHESIA performed by Thuy Ford MD at [facility] ENDOSCOPY  9/19/2018    EGD BIOPSY performed by Thuy Ford MD at [facility] ENDOSCOPY    UPPER GASTROINTESTINAL ENDOSCOPY N/A 2019    ESOPHAGOGASTRODUODENOSCOPY WITH BIOPSY  CPT CODE - 00425 performed by Autumn Pitts MD at [facility] [address]      hx of sleep apnea     Past Social History:  Social History     Socioeconomic History    Marital status:      Spouse name: None    Number of children: None    Years of education: None    Highest education level: None   Occupational History    None   Social Needs    Financial resource strain: None    Food insecurity:     Worry: None     Inability: None    Transportation needs:     Medical: None     Non-medical: None   Tobacco Use    Smoking status: Former Smoker     Types: Cigarettes     Last attempt to quit: 1972     Years since quittin.1    Smokeless tobacco: Never Used   Substance and Sexual Activity    Alcohol use: Yes     Alcohol/week: 0.0 standard drinks     Comment: rarely    Drug use: No    Sexual activity: Yes   Lifestyle    Physical activity:     Days per week: None     Minutes per session: None    Stress: None   Relationships    Social connections:     Talks on phone: None     Gets together: None     Attends [employer] service: None     Active member of club or organization: None     Attends meetings of clubs or organizations: None     Relationship status: None    Intimate partner violence:     Fear of current or ex partner: None     Emotionally abused: None     Physically abused: None     Forced sexual activity: None   Other Topics Concern    None   Social History Narrative    None         Medications Prior to Admission:      Prior to Admission medications    Medication Sig Start Date End Date Taking? Authorizing Provider   dronabinol (MARINOL) 2.5 MG capsule Take 2.5 mg by mouth 2 times daily as needed.     Yes Historical Provider, MD   ondansetron (ZOFRAN ODT) 4 MG disintegrating tablet Take 1 tablet by mouth every 8 hours as needed for Nausea 2/10/20  Yes Tatiana Gaona PA-C diltiazem (CARDIZEM 12 HR) 120 MG extended release capsule Take 120 mg by mouth daily   Yes Historical Provider, MD   gabapentin (NEURONTIN) 300 MG capsule Take 300 mg by mouth 3 times daily. Yes Historical Provider, MD   metFORMIN (GLUCOPHAGE) 500 MG tablet Take 1 tablet by mouth Daily with supper Metformin must be crushed. 4/24/19  Yes TASHI Young - CNP   ONE TOUCH ULTRA TEST strip  1/30/19   Historical Provider, MD Eugenio Khalil LANCETS [address]R [address] [facility]  1/30/19   Historical Provider, MD         Allergies:  No known allergies    PHYSICAL EXAM:      /68   Pulse 64   Temp 98.4 °F (36.9 °C) (Oral)   Resp 16   Ht 5' 6\" (1.676 m)   Wt 156 lb (70.8 kg)   SpO2 97%   BMI 25.18 kg/m²      Airway:  Airway patent with no audible stridor    Heart:  regular rate and rhythm, No murmur noted    Lungs:  No increased work of breathing, good air exchange, clear to auscultation bilaterally, no crackles or wheezing    Abdomen:  soft, non-distended, non-tender, no rebound tenderness or guarding, Feeding tube in place, ventral hernia    ASSESSMENT AND PLAN     Patient is a 76 y.o. male with above specified procedure planned. 1.  Patient seen and focused exam done today- no new changes since last physical exam on 2/13/20    2. Access to ancillary services are available per request of the provider.     TASHI Larson - RIKKI     2/27/2020

## 2020-02-28 PROBLEM — E44.0 MODERATE MALNUTRITION (HCC): Chronic | Status: ACTIVE | Noted: 2020-02-28

## 2020-02-28 LAB
ALBUMIN SERPL-MCNC: 2.8 G/DL (ref 3.4–5)
ANION GAP SERPL CALCULATED.3IONS-SCNC: 11 MMOL/L (ref 3–16)
BASOPHILS ABSOLUTE: 0 K/UL (ref 0–0.2)
BASOPHILS RELATIVE PERCENT: 0.6 %
BUN BLDV-MCNC: 9 MG/DL (ref 7–20)
CALCIUM SERPL-MCNC: 8.8 MG/DL (ref 8.3–10.6)
CHLORIDE BLD-SCNC: 103 MMOL/L (ref 99–110)
CO2: 27 MMOL/L (ref 21–32)
CREAT SERPL-MCNC: 0.7 MG/DL (ref 0.8–1.3)
EOSINOPHILS ABSOLUTE: 0.1 K/UL (ref 0–0.6)
EOSINOPHILS RELATIVE PERCENT: 1.9 %
GFR AFRICAN AMERICAN: >60
GFR NON-AFRICAN AMERICAN: >60
GLUCOSE BLD-MCNC: 101 MG/DL (ref 70–99)
GLUCOSE BLD-MCNC: 133 MG/DL (ref 70–99)
GLUCOSE BLD-MCNC: 177 MG/DL (ref 70–99)
GLUCOSE BLD-MCNC: 92 MG/DL (ref 70–99)
HCT VFR BLD CALC: 38.2 % (ref 40.5–52.5)
HEMOGLOBIN: 12.7 G/DL (ref 13.5–17.5)
LYMPHOCYTES ABSOLUTE: 0.9 K/UL (ref 1–5.1)
LYMPHOCYTES RELATIVE PERCENT: 15.8 %
MAGNESIUM: 1.6 MG/DL (ref 1.8–2.4)
MCH RBC QN AUTO: 34.8 PG (ref 26–34)
MCHC RBC AUTO-ENTMCNC: 33.4 G/DL (ref 31–36)
MCV RBC AUTO: 104.3 FL (ref 80–100)
MONOCYTES ABSOLUTE: 0.7 K/UL (ref 0–1.3)
MONOCYTES RELATIVE PERCENT: 12.5 %
NEUTROPHILS ABSOLUTE: 3.9 K/UL (ref 1.7–7.7)
NEUTROPHILS RELATIVE PERCENT: 69.2 %
PDW BLD-RTO: 15.6 % (ref 12.4–15.4)
PERFORMED ON: ABNORMAL
PERFORMED ON: ABNORMAL
PERFORMED ON: NORMAL
PHOSPHORUS: 3.6 MG/DL (ref 2.5–4.9)
PLATELET # BLD: 105 K/UL (ref 135–450)
PMV BLD AUTO: 8 FL (ref 5–10.5)
POTASSIUM SERPL-SCNC: 4.2 MMOL/L (ref 3.5–5.1)
RBC # BLD: 3.66 M/UL (ref 4.2–5.9)
SODIUM BLD-SCNC: 141 MMOL/L (ref 136–145)
WBC # BLD: 5.7 K/UL (ref 4–11)

## 2020-02-28 PROCEDURE — 6360000002 HC RX W HCPCS: Performed by: STUDENT IN AN ORGANIZED HEALTH CARE EDUCATION/TRAINING PROGRAM

## 2020-02-28 PROCEDURE — 80069 RENAL FUNCTION PANEL: CPT

## 2020-02-28 PROCEDURE — 94150 VITAL CAPACITY TEST: CPT

## 2020-02-28 PROCEDURE — 1200000000 HC SEMI PRIVATE

## 2020-02-28 PROCEDURE — 83735 ASSAY OF MAGNESIUM: CPT

## 2020-02-28 PROCEDURE — 94761 N-INVAS EAR/PLS OXIMETRY MLT: CPT

## 2020-02-28 PROCEDURE — 94669 MECHANICAL CHEST WALL OSCILL: CPT

## 2020-02-28 PROCEDURE — 85025 COMPLETE CBC W/AUTO DIFF WBC: CPT

## 2020-02-28 PROCEDURE — 2500000003 HC RX 250 WO HCPCS: Performed by: STUDENT IN AN ORGANIZED HEALTH CARE EDUCATION/TRAINING PROGRAM

## 2020-02-28 PROCEDURE — 6370000000 HC RX 637 (ALT 250 FOR IP): Performed by: STUDENT IN AN ORGANIZED HEALTH CARE EDUCATION/TRAINING PROGRAM

## 2020-02-28 PROCEDURE — 2580000003 HC RX 258: Performed by: STUDENT IN AN ORGANIZED HEALTH CARE EDUCATION/TRAINING PROGRAM

## 2020-02-28 PROCEDURE — 36415 COLL VENOUS BLD VENIPUNCTURE: CPT

## 2020-02-28 PROCEDURE — 99024 POSTOP FOLLOW-UP VISIT: CPT | Performed by: SURGERY

## 2020-02-28 RX ORDER — OXYCODONE HYDROCHLORIDE 5 MG/1
10 TABLET ORAL EVERY 4 HOURS PRN
Status: DISCONTINUED | OUTPATIENT
Start: 2020-02-28 | End: 2020-02-29 | Stop reason: HOSPADM

## 2020-02-28 RX ORDER — OXYCODONE HYDROCHLORIDE 5 MG/1
5 TABLET ORAL EVERY 6 HOURS PRN
Qty: 28 TABLET | Refills: 0 | Status: SHIPPED | OUTPATIENT
Start: 2020-02-28 | End: 2020-03-06

## 2020-02-28 RX ORDER — DEXTROSE, SODIUM CHLORIDE, AND POTASSIUM CHLORIDE 5; .45; .15 G/100ML; G/100ML; G/100ML
INJECTION INTRAVENOUS CONTINUOUS
Status: DISCONTINUED | OUTPATIENT
Start: 2020-02-28 | End: 2020-02-28

## 2020-02-28 RX ORDER — OXYCODONE HYDROCHLORIDE 5 MG/1
5 TABLET ORAL EVERY 4 HOURS PRN
Status: DISCONTINUED | OUTPATIENT
Start: 2020-02-28 | End: 2020-02-29 | Stop reason: HOSPADM

## 2020-02-28 RX ORDER — DOCUSATE SODIUM 100 MG/1
100 CAPSULE, LIQUID FILLED ORAL 2 TIMES DAILY
Qty: 14 CAPSULE | Refills: 1 | Status: SHIPPED | OUTPATIENT
Start: 2020-02-28 | End: 2020-03-06

## 2020-02-28 RX ORDER — MAGNESIUM SULFATE IN WATER 40 MG/ML
4 INJECTION, SOLUTION INTRAVENOUS ONCE
Status: COMPLETED | OUTPATIENT
Start: 2020-02-28 | End: 2020-02-28

## 2020-02-28 RX ADMIN — GABAPENTIN 300 MG: 300 CAPSULE ORAL at 08:17

## 2020-02-28 RX ADMIN — Medication 10 ML: at 20:21

## 2020-02-28 RX ADMIN — GABAPENTIN 300 MG: 300 CAPSULE ORAL at 20:21

## 2020-02-28 RX ADMIN — ACETAMINOPHEN 1000 MG: 500 TABLET ORAL at 20:21

## 2020-02-28 RX ADMIN — ACETAMINOPHEN 1000 MG: 500 TABLET ORAL at 14:35

## 2020-02-28 RX ADMIN — MAGNESIUM SULFATE 4 G: 4 INJECTION INTRAVENOUS at 09:17

## 2020-02-28 RX ADMIN — ENOXAPARIN SODIUM 40 MG: 40 INJECTION SUBCUTANEOUS at 08:18

## 2020-02-28 RX ADMIN — METHOCARBAMOL 1000 MG: 100 INJECTION, SOLUTION INTRAMUSCULAR; INTRAVENOUS at 01:54

## 2020-02-28 RX ADMIN — Medication 10 ML: at 08:18

## 2020-02-28 RX ADMIN — ACETAMINOPHEN 1000 MG: 500 TABLET ORAL at 08:17

## 2020-02-28 RX ADMIN — METHOCARBAMOL 1000 MG: 100 INJECTION, SOLUTION INTRAMUSCULAR; INTRAVENOUS at 16:59

## 2020-02-28 RX ADMIN — POTASSIUM CHLORIDE, DEXTROSE MONOHYDRATE AND SODIUM CHLORIDE: 150; 5; 450 INJECTION, SOLUTION INTRAVENOUS at 06:42

## 2020-02-28 RX ADMIN — METHOCARBAMOL 1000 MG: 100 INJECTION, SOLUTION INTRAMUSCULAR; INTRAVENOUS at 09:18

## 2020-02-28 RX ADMIN — HYDROMORPHONE HYDROCHLORIDE 0.5 MG: 1 INJECTION, SOLUTION INTRAMUSCULAR; INTRAVENOUS; SUBCUTANEOUS at 06:54

## 2020-02-28 RX ADMIN — DILTIAZEM HYDROCHLORIDE 120 MG: 60 CAPSULE, EXTENDED RELEASE ORAL at 08:17

## 2020-02-28 RX ADMIN — GABAPENTIN 300 MG: 300 CAPSULE ORAL at 14:35

## 2020-02-28 ASSESSMENT — PAIN SCALES - GENERAL
PAINLEVEL_OUTOF10: 5
PAINLEVEL_OUTOF10: 7
PAINLEVEL_OUTOF10: 2
PAINLEVEL_OUTOF10: 0

## 2020-02-28 NOTE — PLAN OF CARE
Problem: Nutrition  Goal: Optimal nutrition therapy  Outcome: Ongoing   Nutrition Problem:  Moderate Malnutrition   Intervention: Modify current diet  or Start ONS    Nutrition Goals:  Pt will tolerate diet and ONS to provide >75% of needs

## 2020-02-28 NOTE — ANESTHESIA POSTPROCEDURE EVALUATION
Department of Anesthesiology  Postprocedure Note    Patient: David Mckoy  MRN: [id number]  YOB: 1945  Date of evaluation: 2/27/2020  Time:  7:00 PM     Procedure Summary     Date:  02/27/20 Room / Location:  [address] [place] [address]    Anesthesia Start:  8953 Anesthesia Stop:  8256    Procedure:  ROBOTIC LAPAROSCOPIC RIGHT RETROPERITONEAL MASS RESECTION WITH ADRENALECTOMY, LIVER NODULE WEDGE EXCISION AND ROBOTIC ASSISTED CHOLECYSTECTOMY AND OPEN LEFT INGUINAL HERNIA REPAIR WITH MESH (N/A ) Diagnosis:       Adrenal mass, right ([address]      Inguinal hernia without obstruction or gangrene, recurrence not specified, unspecified laterality      (Adrenal mass, right ([facility]) [E27.8] Inguinal hernia without obstruction or gangrene, recurrence not specified, unspecified laterality [K40.90])    Surgeon:  Abiola Cleveland MD Responsible Provider:  Lo Sinclair MD    Anesthesia Type:  general ASA Status:  3          Anesthesia Type: general    Caleb Phase I: Caleb Score: 8    Caleb Phase II:      Last vitals: Reviewed and per EMR flowsheets.        Anesthesia Post Evaluation    Patient location during evaluation: PACU  Patient participation: complete - patient participated  Level of consciousness: awake  Pain score: 2  Airway patency: patent  Nausea & Vomiting: no nausea and no vomiting  Complications: no  Cardiovascular status: hemodynamically stable  Respiratory status: acceptable  Hydration status: euvolemic

## 2020-02-28 NOTE — CARE COORDINATION
prescription(s)  4. Cost of Rx cannot be added to hospital bill. If financial assistance is needed, please contact unit  or ;  or  CANNOT provide pharmacy voucher for patients co-pays  5. Patients can then  the prescription on their way out of the hospital at discharge, or pharmacy can deliver to the bedside if staff is available. (payment due at time of pick-up or delivery - cash, check, or card accepted)     Able to afford home medications/ co-pay costs: Yes    ADLS  Support Systems: Spouse/Significant Other    PT AM-PAC:   /24  OT AM-PAC:   /24    [place]: from home independent with wife  Steps:     Plans to RETURN to current housing: Yes  Barriers to RETURNING to current housing: none    [address]  Currently ACTIVE with [address]: No  Home Care Agency: Not Applicable    Currently ACTIVE with [place] [facility]: No      Durable Medical Equipment  DME Provider: none  Equipment:     Home Oxygen and [address] prior to admission: No  [address]: Not Applicable  Other Respiratory Equipment:       Dialysis  Active with HD/PD prior to admission: No  Nephrologist:     HD Center:  Not Applicable    DISCHARGE PLAN:  Disposition: Home- No Services Needed    Transportation PLAN for discharge: family     Factors facilitating achievement of predicted outcomes: Family support, Cooperative and Pleasant    Barriers to discharge: return GI function    Additional Case Management Notes: Pt from home with wife independent, plans to return home no needs at DC. Awaiting return GI function, FC removed, pt on CLD.     The Plan for Transition of Care is related to the following treatment goals of Adrenal mass, right ([address] [E27.8]  Inguinal hernia without obstruction or gangrene, recurrence not specified, unspecified laterality [K40.90]  Right adrenal mass ([address] [E27.8]    The Patient and/or patient representative Radha Rueda and his family were provided with a choice of provider and agrees with the discharge plan Yes    Freedom of choice list was provided with basic dialogue that supports the patient's individualized plan of care/goals and shares the quality data associated with the providers.  Not Indicated      Care Transition patient: Diana Burks RN  [facility]  Case Management Department  Ph: [phone]   Fax: [phone]

## 2020-02-28 NOTE — OP NOTE
[address]               [address] [place], [address]                                OPERATIVE REPORT    PATIENT NAME: Claudell Best                        :        1945  MED REC NO:   [id number]                          ROOM:       5309  ACCOUNT NO:   [de-identified]                           ADMIT DATE: 2020  PROVIDER:     Pelon Orellana MD    DATE OF PROCEDURE:  2020    PREOPERATIVE DIAGNOSIS:  History of esophageal cancer, status post  esophagectomy, right retroperitoneal/adrenal mass, cholelithiasis, left  inguinal hernia. POSTOPERATIVE DIAGNOSIS:  History of esophageal cancer, status post  esophagectomy, right retroperitoneal/adrenal mass, chronic cholecystitis with cholelithiasis, left inguinal hernia. PROCEDURE:  Robotic laparoscopic right retroperitoneal mass resection  with adrenalectomy, lymph node excision, liver nodule wedge excision, cholecystectomy and open omental mass excision and left inguinal hernia repair with mesh. ANESTHESIA:  General.    SURGEON:  Pelon Orellana MD    ASSISTANT:   Reese Pryor MD    ESTIMATED BLOOD LOSS:  100 mL. COMPLICATIONS:  None. SPECIMENS:  Omentum with mass, cord lipoma and hernia sac, gallbladder,  right-sided retroperitoneal tumor with adrenal gland, liver nodule. OPERATIVE FINDINGS:  Left inguinal hernia was extremely large and had  severe adhesions within the sac indicating chronicity and recurrent  incarceration, consistent with the patient's history. Tissues were  extremely weak. On laparoscopy, multiple severe adhesions all over the  abdomen consistent with previous surgeries. Large right retroperitoneal  mass. It was a very adherent to inferior vena cava and retroperitoneal  tissues. At one point, I did enter the tumor to avoid injury to the  inferior vena cava. Clinically, R0 resection, no residual tumor left  behind.  Procedure took 90 minutes more given adhesions and size. Multiple omental adhesions for gallbladder. OPERATIVE INDICATIONS:  The patient is a 70-year-old male who was  diagnosed with esophageal cancer and underwent esophagectomy. Recent  imaging study showed right retroperitoneal mass and biopsy confirmed  recurrence. The patient also has left inguinal hernia, which has been  incarcerating multiple times and the patient is actually currently  symptomatic for this significantly. The patient's CT scan also showed  multiple large gallstones. Risks, benefits and alternatives of surgery  were discussed with the patient, and the patient wished to proceed with  surgery. OPERATIVE PROCEDURE:  The patient was identified in the preoperative  area and brought to the operating room. General endotracheal anesthesia  was given. Abdomen was prepped and draped in a sterile manner. A right  side bump was placed. The patient was strapped well. Timeout procedure  was performed according to hospital policy. Left inguinal incision was  given. Skin was divided with scalpel. Subcutaneous tissue was divided  in the abdomen. External oblique aponeurosis was incised and divided  and external inguinal ring was opened. Spermatic cord  along  with the hernia sac, was looped with the Penrose drain. Careful  dissection was done. Spermatic cord structures were  from the  hernia sac. The hernia sac was going into the scrotum. Severe  adhesions were present to the surrounding cord structures. These were  all carefully released. Sac was entered. There were severe omental  adhesions inside. Omentum was transected. Hernia sac was transected at  this point. Omental adhesions within the proximal part of the sac were released  carefully. A pursestring suture was placed. A 12-mm port was placed  from here and pneumoperitoneum was created. A 5-mm port was placed under in  the right upper quadrant to be used later.   Port was removed and device and divided. Duct was carefully dissected out. After the anatomy was confirmed,  cystic duct was divided after placing clips. Carefully, the gallbladder  was taken off of the liver bed. Hemostasis was carefully checked. Gallbladder was placed in the organ removal bag. At the time of our  initial evaluation, there was a small liver nodule. I did proceed with  removing this by wedge resection of liver nodule. Liver capsule was scored  around with diathermy. Deep liver parenchyma was divided using the  bipolar device. In this, the entire liver nodule was excised and  removed from the patient and sent to Pathology. Pt may need radiation to retroperitoneal area and thus well vascularized omental flap created and placed in this area to prevent colon in this area. Now, we extend one of  the 8-mm ports for ~4 cm. Rectus fascia were divided. Gallbladder and  retroperitoneal tumor were removed. Posterior layer was closed with  running Vicryl suture. Anterior layer was closed with running loop PDS  suture. Pneumoperitoneum was created. All the sites were evaluated and  there was no evidence of any bleeding. All the port sites and skin were  closed with interrupted sutures. Skin glue was applied at all the port  sites. The patient extubated and transferred to the recovery room  uneventfully. The patient tolerated the procedure well. I was present  for the entire procedure.     Candis Huizar MD    D: 02/27/2020 19:25:34       T: 02/28/2020 4:58:59     [email or web address]  Job#: [id number]     Doc#: [id number]    CC:

## 2020-02-29 VITALS
WEIGHT: 156 LBS | OXYGEN SATURATION: 96 % | RESPIRATION RATE: 16 BRPM | BODY MASS INDEX: 25.07 KG/M2 | HEART RATE: 87 BPM | HEIGHT: 66 IN | DIASTOLIC BLOOD PRESSURE: 66 MMHG | TEMPERATURE: 97 F | SYSTOLIC BLOOD PRESSURE: 124 MMHG

## 2020-02-29 LAB
ALBUMIN SERPL-MCNC: 2.8 G/DL (ref 3.4–5)
ANION GAP SERPL CALCULATED.3IONS-SCNC: 9 MMOL/L (ref 3–16)
BASOPHILS ABSOLUTE: 0 K/UL (ref 0–0.2)
BASOPHILS RELATIVE PERCENT: 0.8 %
BUN BLDV-MCNC: 8 MG/DL (ref 7–20)
CALCIUM SERPL-MCNC: 8.4 MG/DL (ref 8.3–10.6)
CHLORIDE BLD-SCNC: 103 MMOL/L (ref 99–110)
CO2: 26 MMOL/L (ref 21–32)
CREAT SERPL-MCNC: 0.7 MG/DL (ref 0.8–1.3)
EOSINOPHILS ABSOLUTE: 0.5 K/UL (ref 0–0.6)
EOSINOPHILS RELATIVE PERCENT: 7.6 %
GFR AFRICAN AMERICAN: >60
GFR NON-AFRICAN AMERICAN: >60
GLUCOSE BLD-MCNC: 101 MG/DL (ref 70–99)
GLUCOSE BLD-MCNC: 124 MG/DL (ref 70–99)
HCT VFR BLD CALC: 34.9 % (ref 40.5–52.5)
HEMOGLOBIN: 12.2 G/DL (ref 13.5–17.5)
LYMPHOCYTES ABSOLUTE: 0.7 K/UL (ref 1–5.1)
LYMPHOCYTES RELATIVE PERCENT: 11.1 %
MAGNESIUM: 2.1 MG/DL (ref 1.8–2.4)
MCH RBC QN AUTO: 35.8 PG (ref 26–34)
MCHC RBC AUTO-ENTMCNC: 35.1 G/DL (ref 31–36)
MCV RBC AUTO: 102.2 FL (ref 80–100)
MONOCYTES ABSOLUTE: 0.7 K/UL (ref 0–1.3)
MONOCYTES RELATIVE PERCENT: 12.3 %
NEUTROPHILS ABSOLUTE: 4.1 K/UL (ref 1.7–7.7)
NEUTROPHILS RELATIVE PERCENT: 68.2 %
PDW BLD-RTO: 15 % (ref 12.4–15.4)
PERFORMED ON: ABNORMAL
PHOSPHORUS: 2.1 MG/DL (ref 2.5–4.9)
PLATELET # BLD: 91 K/UL (ref 135–450)
PMV BLD AUTO: 8.5 FL (ref 5–10.5)
POTASSIUM SERPL-SCNC: 3.9 MMOL/L (ref 3.5–5.1)
RBC # BLD: 3.41 M/UL (ref 4.2–5.9)
SODIUM BLD-SCNC: 138 MMOL/L (ref 136–145)
WBC # BLD: 6 K/UL (ref 4–11)

## 2020-02-29 PROCEDURE — 85025 COMPLETE CBC W/AUTO DIFF WBC: CPT

## 2020-02-29 PROCEDURE — 6370000000 HC RX 637 (ALT 250 FOR IP): Performed by: STUDENT IN AN ORGANIZED HEALTH CARE EDUCATION/TRAINING PROGRAM

## 2020-02-29 PROCEDURE — 80069 RENAL FUNCTION PANEL: CPT

## 2020-02-29 PROCEDURE — 6360000002 HC RX W HCPCS: Performed by: STUDENT IN AN ORGANIZED HEALTH CARE EDUCATION/TRAINING PROGRAM

## 2020-02-29 PROCEDURE — 83735 ASSAY OF MAGNESIUM: CPT

## 2020-02-29 PROCEDURE — 2580000003 HC RX 258: Performed by: STUDENT IN AN ORGANIZED HEALTH CARE EDUCATION/TRAINING PROGRAM

## 2020-02-29 PROCEDURE — 36415 COLL VENOUS BLD VENIPUNCTURE: CPT

## 2020-02-29 RX ADMIN — ENOXAPARIN SODIUM 40 MG: 40 INJECTION SUBCUTANEOUS at 08:38

## 2020-02-29 RX ADMIN — ACETAMINOPHEN 1000 MG: 500 TABLET ORAL at 08:38

## 2020-02-29 RX ADMIN — METHOCARBAMOL 1000 MG: 100 INJECTION, SOLUTION INTRAMUSCULAR; INTRAVENOUS at 08:37

## 2020-02-29 RX ADMIN — DILTIAZEM HYDROCHLORIDE 120 MG: 60 CAPSULE, EXTENDED RELEASE ORAL at 08:47

## 2020-02-29 RX ADMIN — GABAPENTIN 300 MG: 300 CAPSULE ORAL at 08:38

## 2020-02-29 RX ADMIN — METHOCARBAMOL 1000 MG: 100 INJECTION, SOLUTION INTRAMUSCULAR; INTRAVENOUS at 04:26

## 2020-02-29 RX ADMIN — DIBASIC SODIUM PHOSPHATE, MONOBASIC POTASSIUM PHOSPHATE AND MONOBASIC SODIUM PHOSPHATE 2 TABLET: 852; 155; 130 TABLET ORAL at 08:38

## 2020-02-29 RX ADMIN — Medication 10 ML: at 08:38

## 2020-02-29 RX ADMIN — ACETAMINOPHEN 1000 MG: 500 TABLET ORAL at 04:26

## 2020-02-29 ASSESSMENT — PAIN SCALES - GENERAL: PAINLEVEL_OUTOF10: 0

## 2020-02-29 NOTE — PROGRESS NOTES
1731 Admitted to PACU from OR. Connected to monitor. Report at bedside. Patient responds to voice and follows commands. No sign of pain or nausea. [place] [address] Dr Betito Haynes here to do TAP block.
Abnormal labs routed to surgeon
Department of Surgery:  Post-op Note    CC: adrenal metastatic adenoCA, cholelithiasis, liver mass hx Stage IIIA esophageal adenocarcinoma. Procedure(s) Performed: open L inguinal hernia repair, robotic retroperitoneal mass with adrenalectomy, liver nodule wedge excision, cholecystectomy    Subjective:   Pain is controlled, denies nausea or vomiting. Bustamante in place. Objective:  Anesthesia type: General      I/O    Intra op    Post op     Fluids  4000 mL 600 mL     EBL 50 mL 0 mL     Urine 300 mL 325 mL     Exam:  Vitals:    02/27/20 1935 02/27/20 1943 02/1945 02/27/20 2000   BP:   130/73 (!) 144/72   Pulse: 81 86 86 82   Resp: 11 13 13 11   Temp:       TempSrc:       SpO2: 98% 99% 99% 100%   Weight:       Height:           General appearance: alert, no acute distress, grooming appropriate  Chest/Lungs: normal effort, 1 L NC  Cardiovascular: RR  Abdomen: soft, appropriately tender, non-distended, incisions c/d/i, J tube in place  : bustamante in place  Extremities: no edema, no cyanosis  Neuro: A&Ox3, no focal deficits, sensation intact    Assessment and Plan  This is a 76y.o. year old male s/p open L inguinal hernia repair, robotic retroperitoneal mass with adrenalectomy, liver nodule wedge excision, cholecystectomy POD #0    Pain management: dilaudid PRN, tylenol tablet and gabapentin [id number]  Cardiovascular: continue home cardizem.   Respiratory: extubated, encourage hourly incentive spirometry and deep breathing  FEN:  Fluids: , Diet: sips of clears  : Urine output is adequate  Wound: local care, flush J tube BID  Ambulation: OOB to chair, encourage ambulation  Prophylaxis: SCDs, lovenox  - Remove a line      Domenic Thompson MD  PGY-2 General Surgery  02/27/20  8:11 PM  [phone]
Discharge summary and medications reviewed with patient and visitor. Both patient and visitor within agreement with discharge and medications. Patient received prescriptions for medications. Pt IV removed. Patient left with all his belongings.
NUTRITION ASSESSMENT  Admission Date: 2/27/2020     Type and Reason for Visit: Initial, Positive Nutrition Screen    NUTRITION RECOMMENDATIONS:   · Liberalize diet w/out carb control restriction to encourage PO intake and allow food options as pt only able to consume 1-2 items at a time 2/2 early satiety. Pt encouraged to order multiple items on tray to have small, frequent meals  · ONS: Ensure Enlive & Magic Cup  · Continue to monitor weight status and PO intake; encouraged pt to continue discussions w/ MD outpt re: need for TF via [employer] if wt loss continues     NUTRITION ASSESSMENT:  Pt is at nutritional compromise meeting criteria for moderate malnutrition in chronic illness. Pt w/ stage 3 esophageal cancer w/ feeding tube present. Pt reports stopped using feeding tube in May 2019 and has had ~20 lb wt loss since that time. Reports he takes small, frequent meals and protein shakes at home and continues to discuss the possible need to restart TF w/ MD outpatient. Pt is s/p open L inguinal hernia repair, robotic retroperitoneal mass with adrenalectomy, liver nodule wedge excision, cholecystectomy 2/27 and states feeling well, tolerating diet.  Adv to carb control diet however pt unable to order multiple items on tray- will liberalize to general. Added ONS Magic Cup & Ensure Enlive trial      MALNUTRITION ASSESSMENT  Context: Chronic illness   Malnutrition Status: Meets the criteria for moderate malnutrition  Findings of the 6 clinical characteristics of malnutrition (Minimum of 2 out of 6 clinical characteristics is required to make the diagnosis of moderate or severe Protein Calorie Malnutrition based on AND/ASPEN Guidelines):  Energy Intake %: Less than or equal to 75% of estimated energy requirement  Energy Intake Time: Greater than or equal to 3 months  Interpretation of Weight Loss %: 10% loss or greater  Interpretation of Weight Loss Time: in 1 year  Body Fat Status: Mild subcutaneous fat loss  Body Fat Loss
PACU Transfer Note    Vitals:    02/27/20 2041   BP: 126/67   Pulse: 87   Resp: 13   Temp: 99.7 °F (37.6 °C)   SpO2: 100%       In: 4060 [I.V.:4060]  Out: 600 [Urine:550]    Pain assessment:  none  Pain Level: 0    Report given to Receiving unit KESHA Skinner.     2/27/2020 8:54 PM
Pt arrived to room 5309 from PACU around 2100. Pt oriented to room with call light within reach, bed in lowest position with wheels locked, 2/4 side rails up, and bed alarm on. Pt denies pain. Pt bustamante in place with adequate urine output. Pt wife at bedside. Will continue to monitor.
Pt bustamante removed at 0645. Urinal placed at beside.
Surgery Post-Op Day #1 Note    CC: adrenal metastatic adenoCA, cholelithiasis, liver mass hx Stage IIIA esophageal adenocarcinoma. SUBJECTIVE:   No acute events overnight, pain controlled, tolerating diet, no N/V, no BM yesterday passing flatus, ambulating    ROS: A 14 point review of systems was conducted, significant findings as noted in HPI. All other systems negative. OBJECTIVE:     Exam:   Vitals:    02/28/20 1700 02/28/20 2020 02/28/20 2357 02/29/20 0424   BP: 115/67 107/64 (!) 97/55 132/72   Pulse: 72 83 100 84   Resp: 16 16 16 18   Temp: 98.1 °F (36.7 °C) 99.7 °F (37.6 °C) 98.2 °F (36.8 °C) 97.9 °F (36.6 °C)   TempSrc: Oral Oral Oral Oral   SpO2: 96% 95% 92% 96%   Weight:       Height:           General appearance: alert, no acute distress, grooming appropriate  Neuro: A&Ox3, no focal deficits, sensation intact  HEENT: trachea midline  Chest/Lungs: normal effort, no accessory muscle use, on room air  Cardiovascular: RRR  Abdomen: soft, appropriately-tender, non-distended, no guarding/rigidity, incisions - C/D/I, well approximated. J tube in place. : Bustamante in place - clear yellow urine/no bustamante  Extremities: no edema, no cyanosis        ASSESSMENT/PLAN:   This is a 76 y.o. male adrenal metastatic adenoCA, cholelithiasis, liver mass hx Stage IIIA esophageal adenocarcinoma. s/p open L inguinal hernia repair, robotic retroperitoneal mass with adrenalectomy, liver nodule wedge excision, cholecystectomy POD #1.    - Diet: general  - encourage OOB, ambulation  - encourage IS and deep breathing  - SLIV  - SSI for glucose control   - DC home today    Sheri Valentine DO PGY1  General Surgery Resident  02/29/20 6:38 AM  [phone]    Discussed management with the resident. Patient has been discharged. I reviewed the resident's note and agree with the documented findings and plan of care.     Tara Lui M.D.  2/29/20   2:50 PM
[facility] [place] [facility] [address] [facility], [address]    Acknowledgment of Informed Consent for Surgical or Medical Procedure and Sedation  I agree to allow doctor(s) Samir Frank and his/her associates or assistants, including residents and/or other qualified medical practitioner to perform the following medical treatment or procedure and to administer or direct the administration of sedation as necessary:  Procedure(s): ROBOTIC LAPAROSCOPIC RIGHT ADRENALECTOMY AND LEFT [address]   My doctor has explained the following regarding the proposed procedure:   the explanation of the procedure   the benefits of the procedure   the potential problems that might occur during recuperation   the risks and side effects of the procedure which could include but are not limited to severe blood loss, infection, stroke or death   the benefits, risks and side effect of alternative procedures including the consequences of declining this procedure or any alternative procedures   the likelihood of achieving satisfactory results. I acknowledge no guarantee or assurance has been made to me regarding the results. I understand that during the course of this treatment/procedure, unforeseen conditions can occur which require an additional or different procedure. I agree to allow my physician or assistants to perform such extension of the original procedure as they may find necessary. I understand that sedation will often result in temporary impairment of memory and fine motor skills and that sedation can occasionally progress to a state of deep sedation or general anesthesia. I understand the risks of anesthesia for surgery include, but are not limited to, sore throat, hoarseness, injury to face, mouth, or teeth; nausea; headache; injury to blood vessels or nerves; death, brain damage, or paralysis.     I understand that if I have a Limitation of Treatment order in effect
and agree with all pertinent clinical information unless otherwise noted.      Feeling very well  Petrona Pereira MD  Surgery Attending

## 2020-02-29 NOTE — PLAN OF CARE
Problem: Falls - Risk of:  Goal: Will remain free from falls  Outcome: Ongoing  Patient is free from falls, patient w steady gait, bed in lowest position, bed wheels locked, call light and bed table within reach, bathroom checked q2hrs and PRN. Chair wheels locked. Patient is educated to call for assistance.  Will continue to monitor

## 2020-02-29 NOTE — CARE COORDINATION
Case Management Assessment            Discharge Note                    Date / Time of Note: 2/29/2020 10:31 AM                  Discharge Note Completed by: Charlotte Post    Patient Name: Grady Pitts   YOB: 1945  Diagnosis: Adrenal mass, right ([facility] [address] [E27.8]  Inguinal hernia without obstruction or gangrene, recurrence not specified, unspecified laterality [K40.90]  Right adrenal mass ([address] [E27.8]   Date / Time: 2/27/2020  7:56 AM    Current PCP: Miryam Fernandez MD  Clinic patient: No    Hospitalization in the last 30 days: No    Advance Directives:  Code Status: Full Code  [place] DNR form completed and on chart: No    Financial:  Payor: Opal Sarabia / Plan: [facility] [employer] / Product Type: *No Product type* /      Pharmacy:    [address] [place] [address] [address] [place] [address] [phone] Corey Camacho [phone]  YUKI Enrique   Phone: [phone] Fax: [phone]      Assistance purchasing medications?: Potential Assistance Purchasing Medications: No  Assistance provided by Case Management: None at this time    Does patient want to participate in local refill/ meds to beds program?: Yes    Meds To Beds General Rules:  1. Can ONLY be done Monday- Friday between 8:30am-5pm  2. Prescription(s) must be in pharmacy by 3pm to be filled same day  3. Copy of patient's insurance/ prescription drug card and patient face sheet must be sent along with the prescription(s)  4. Cost of Rx cannot be added to hospital bill. If financial assistance is needed, please contact unit  or ;  or  CANNOT provide pharmacy voucher for patients co-pays  5.  Patients can then  the prescription on their way out of the hospital at discharge, or pharmacy can deliver to the bedside if staff is available. (payment due at time of pick-up or delivery - cash, check, or card accepted)     Able to afford home medications/ co-pay costs: Yes    ADLS:  Current PT AM-PAC Score:   /  Current OT AM-PAC Score:   /      DISCHARGE Disposition: Home- No Services Needed    LOC at discharge: Not Applicable  LYSSA Completed: Not Indicated    Notification completed in HENS/PAS?:  Not Applicable    IMM Completed:   Not Indicated    Transportation:  Transportation PLAN for discharge: family   Mode of Transport: Private Car    The Plan for Transition of Care is related to the following treatment goals of Adrenal mass, right ([address] [E27.8]  Inguinal hernia without obstruction or gangrene, recurrence not specified, unspecified laterality [K40.90]  Right adrenal mass ([address] [E27.8]    The Patient and/or patient representative Shania Morris and his family were provided with a choice of provider and agrees with the discharge plan Not Indicated    Freedom of choice list was provided with basic dialogue that supports the patient's individualized plan of care/goals and shares the quality data associated with the providers.  Not Indicated    Care Transitions patient: No    Jennifer Rea RN  [facility] [employer]  Case Management Department  Ph: [phone]  [phone]

## 2020-03-01 NOTE — DISCHARGE SUMMARY
Discharge Summary      Patient:    Sue Bledsoe Date:   2/27/2020  7:56 AM    Discharge Date:   02/29/20     Admitting Physician:   Woo Navarro MD     Discharge Physician:   same    Admitting Diagnosis:  right retroperitoneal/adrenal mass    Discharge Diagnosis:   same    Past Medical History:   Diagnosis Date    Ad[facility] [address]     Diabetes mellitus ([facility] [address]     Esophageal cancer ([facility] [address]     H/O sleep apnea 1998    resolved after surgery    History of esophageal reflux     Hypertension     Prostate cancer ([facility] [address]     Weight loss         Indication for Admission:   Patient underwent open L inguinal hernia repair, robotic retroperitoneal mass with adrenalectomy, liver nodule wedge excision, cholecystectomy. Hospital Course:   Patient tolerated the procedure well, the immediate postoperative course was uneventful. The patient was voiding with a bustamante in place and his pain was well controlled. The arterial line was removed the first night. POD1 Patient had an uneventful night, in the AM was advanced to CLD. His diet was advanced as tolerated. In the evening he was tolerating a general diet. He was ambulating. Bustamante was removed. POD2  Patient had an uneventful night, continued to tolerate his diet and was ambulating. It was determined the patient was stable for DC. Consulting Service:  None    Discharge physical exam:  General appearance: alert, no acute distress, grooming appropriate  Neuro: A&Ox3, no focal deficits, sensation intact  HEENT: trachea midline  Chest/Lungs: normal effort, no accessory muscle use, on room air  Cardiovascular: RRR  Abdomen: soft, appropriately-tender, non-distended, no guarding/rigidity, incisions - C/D/I, well approximated. J tube in place.    Extremities: no edema, no cyanosis    Disposition:    Home    Condition at discharge:  Stable    Discharge Instructions:  See separate form    Patient Instructions:      Medication List      START taking these

## 2020-03-06 ENCOUNTER — OFFICE VISIT (OUTPATIENT)
Dept: SURGERY | Age: 75
End: 2020-03-06

## 2020-03-06 VITALS
HEIGHT: 66 IN | WEIGHT: 151.6 LBS | DIASTOLIC BLOOD PRESSURE: 71 MMHG | SYSTOLIC BLOOD PRESSURE: 119 MMHG | HEART RATE: 76 BPM | OXYGEN SATURATION: 97 % | BODY MASS INDEX: 24.36 KG/M2 | TEMPERATURE: 97.4 F | RESPIRATION RATE: 16 BRPM

## 2020-03-06 PROCEDURE — 99024 POSTOP FOLLOW-UP VISIT: CPT | Performed by: SURGERY

## 2020-03-07 ENCOUNTER — HOSPITAL ENCOUNTER (EMERGENCY)
Age: 75
Discharge: HOME | End: 2020-03-07
Attending: EMERGENCY MEDICINE
Payer: MEDICARE

## 2020-03-07 VITALS
TEMPERATURE: 98.5 F | HEART RATE: 75 BPM | SYSTOLIC BLOOD PRESSURE: 117 MMHG | DIASTOLIC BLOOD PRESSURE: 62 MMHG | RESPIRATION RATE: 16 BRPM | OXYGEN SATURATION: 98 %

## 2020-03-07 LAB
BASOPHILS ABSOLUTE: 0.1 K/UL (ref 0–0.2)
BASOPHILS RELATIVE PERCENT: 1 %
EOSINOPHILS ABSOLUTE: 0.7 K/UL (ref 0–0.6)
EOSINOPHILS RELATIVE PERCENT: 9.2 %
HCT VFR BLD CALC: 36.3 % (ref 40.5–52.5)
HEMOGLOBIN: 12.2 G/DL (ref 13.5–17.5)
LYMPHOCYTES ABSOLUTE: 1.5 K/UL (ref 1–5.1)
LYMPHOCYTES RELATIVE PERCENT: 20.2 %
MCH RBC QN AUTO: 34.4 PG (ref 26–34)
MCHC RBC AUTO-ENTMCNC: 33.5 G/DL (ref 31–36)
MCV RBC AUTO: 102.8 FL (ref 80–100)
MONOCYTES ABSOLUTE: 1.1 K/UL (ref 0–1.3)
MONOCYTES RELATIVE PERCENT: 14.6 %
NEUTROPHILS ABSOLUTE: 4 K/UL (ref 1.7–7.7)
NEUTROPHILS RELATIVE PERCENT: 55 %
PDW BLD-RTO: 13.9 % (ref 12.4–15.4)
PLATELET # BLD: 220 K/UL (ref 135–450)
PMV BLD AUTO: 6.5 FL (ref 5–10.5)
RBC # BLD: 3.53 M/UL (ref 4.2–5.9)
WBC # BLD: 7.4 K/UL (ref 4–11)

## 2020-03-07 PROCEDURE — 85025 COMPLETE CBC W/AUTO DIFF WBC: CPT

## 2020-03-07 PROCEDURE — 99284 EMERGENCY DEPT VISIT MOD MDM: CPT

## 2020-03-08 NOTE — CONSULTS
INGUINAL HERNIA REPAIR Right 1962    INSERTION / REMOVAL / REPLACEMENT VENOUS ACCESS CATHETER N/A 6/25/2019    PORT A CATH PLACEMENT AND PEG TUBE EXCHANGE performed by Nestor Coppola MD at [facility] OFFICE/OUTPT [facility] N/A 9/19/2018    EUS/ WITH ANESTHESIA performed by Riccardo Rubio MD at [facility] ENDOSCOPY  9/19/2018    EGD BIOPSY performed by Riccardo Rubio MD at [facility] ENDOSCOPY N/A 4/2/2019    ESOPHAGOGASTRODUODENOSCOPY WITH BIOPSY  CPT CODE - 96462 performed by Nestor Coppola MD at [address] [place] [address]      hx of sleep apnea       Allergies:    No known allergies    Medications:   Home Meds  No current facility-administered medications on file prior to encounter. Current Outpatient Medications on File Prior to Encounter   Medication Sig Dispense Refill    dronabinol (MARINOL) 2.5 MG capsule Take 2.5 mg by mouth 2 times daily as needed.  ondansetron (ZOFRAN ODT) 4 MG disintegrating tablet Take 1 tablet by mouth every 8 hours as needed for Nausea 20 tablet 0    diltiazem (CARDIZEM 12 HR) 120 MG extended release capsule Take 120 mg by mouth daily      gabapentin (NEURONTIN) 300 MG capsule Take 300 mg by mouth 3 times daily.  ONE TOUCH ULTRA TEST strip       ONETOUCH DELICA LANCETS 00H MISC       metFORMIN (GLUCOPHAGE) 500 MG tablet Take 1 tablet by mouth Daily with supper Metformin must be crushed. 30 tablet 0       Current Meds  No current facility-administered medications for this encounter. Family History:   Family History   Problem Relation Age of Onset   [facility] Cancer Father         lung    Other Mother         blood disorder       Social History:   TOBACCO:   reports that he quit smoking about 48 years ago. His smoking use included cigarettes.  He has never used smokeless tobacco.  ETOH:   reports current alcohol use.  DRUGS:   reports no history of drug use. Review of Systems:      Constitutional: Negative. HENT: Negative. Eyes: Negative. Respiratory: Negative. Cardiovascular: Negative. Gastrointestinal: As above  Genitourinary: Negative. Musculoskeletal: Negative. Skin: Negative. Endocrine: Negative. Allergic/Immunologic: Negative. Neurological: Negative. Hematological: Negative. Psychiatric/Behavioral: Negative. Physical exam:    Vitals:    03/07/20 2048   BP: 117/62   Pulse: 75   Resp: 16   Temp: 98.5 °F (36.9 °C)   TempSrc: Oral   SpO2: 98%       General appearance: alert, resting in bed, in NAD  Eyes: PERRL, no scleral icterus  Neck: trachea midline, no JVD  Chest/Lungs: normal effort, no adventitious breath sounds, on RA  Cardiovascular: RRR  Abdomen: soft, non-tender, non-distended, lap sites healing well, left inguinal incision with mild erythema and induration consistent with post-surgical healing process, superior to incision palpable fluid collection, soft, non-indurated, no drainage from incision  Skin: warm and dry, no rashes  Extremities: no edema, no cyanosis  Neuro: A&Ox3, no focal deficits, sensation intact    Labs:    CBC:   Recent Labs     03/07/20  2239   WBC 7.4   HGB 12.2*   HCT 36.3*   .8*          Liver Profile:   Lab Results   Component Value Date    AST 45 02/25/2020    ALT 33 02/25/2020    BILITOT 1.1 02/25/2020    ALKPHOS 312 02/25/2020   No results found for: CHOL, HDL, TRIG  UA:   Lab Results   Component Value Date    COLORU Yellow 02/10/2020    PHUR 7.5 02/10/2020    CLARITYU Clear 02/10/2020    SPECGRAV 1.015 02/10/2020    LEUKOCYTESUR Negative 02/10/2020    UROBILINOGEN 2.0 02/10/2020    BILIRUBINUR Negative 02/10/2020    BLOODU Negative 02/10/2020    GLUCOSEU Negative 02/10/2020           Assessment/Plan:   This is a 76 y.o. male who recently underwent open L inguinal hernia repair, robotic retroperitoneal mass with adrenalectomy, liver nodule wedge excision,

## 2020-03-09 ENCOUNTER — TELEPHONE (OUTPATIENT)
Dept: SURGERY | Age: 75
End: 2020-03-09

## 2020-03-09 NOTE — TELEPHONE ENCOUNTER
Pt went to [address] [place] [address] ED on 3/7 for Abdominal Wall Seroma  Pt's wife, Xiomara Hancock, on HIPAA, called to make a follow up appt  She said his drain has some blood in it    Please call

## 2020-03-09 NOTE — TELEPHONE ENCOUNTER
Patient states that there were 2-3 drops of fluids on his bandage today. Denies fever, nausea and vomiting. Patient states he was calling to let us know about his ER visit from 3/7/20. RN will inform Dr Adrian Zuniga.

## 2020-04-22 ENCOUNTER — TELEPHONE (OUTPATIENT)
Dept: CARDIOTHORACIC SURGERY | Age: 75
End: 2020-04-22

## 2020-04-27 ENCOUNTER — OFFICE VISIT (OUTPATIENT)
Dept: CARDIOTHORACIC SURGERY | Age: 75
End: 2020-04-27

## 2020-04-27 VITALS
SYSTOLIC BLOOD PRESSURE: 110 MMHG | OXYGEN SATURATION: 97 % | BODY MASS INDEX: 24.91 KG/M2 | DIASTOLIC BLOOD PRESSURE: 68 MMHG | HEIGHT: 66 IN | HEART RATE: 68 BPM | TEMPERATURE: 97.9 F | WEIGHT: 155 LBS

## 2020-04-27 PROCEDURE — 99024 POSTOP FOLLOW-UP VISIT: CPT | Performed by: THORACIC SURGERY (CARDIOTHORACIC VASCULAR SURGERY)

## 2020-06-01 NOTE — PROGRESS NOTES
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 Jazmín Hyman is followed by Dr Oni Manzano for a history of Esophageal Cancer Stage IV (T3, N1, pM1). Feeding tube removed in April. Denies abdominal pain currently. No other complaints. Review of systems is otherwise negative    Past medical history, Past surgical history, Social history, Family history and allergies reviewed and updated. O/E:   Constitutional: Patient is oriented to person, place, and time. No distress. HENT: mucus membranes - moist. No scleral icterus. Neck: Supple and normal range of motion. No lymphadenopathy present. Pulmonary/Chest: Effort normal. No respiratory distress. Abdominal: Soft. No distension and no mass. No tenderness. No Hepatosplenomegaly. Extremities: no edema and no tenderness. Neurological: Grossly intact motor and sensory exam  Skin: Skin is warm and dry. No rash noted. He is not diaphoretic. Psychiatric: He has a normal mood and affect. His behavior is normal. Judgment and thought content normal.    PET 4/17/20 Impression:    - Resolution of the hypermetabolic activity within the mediastinal and hilar lymph nodes likely relates to response to complete response to therapy. Single mildly hypermetabolic lymph node is noted in the AP window as described smaller currently than previously with less hypermetabolic activity may relate to partial response in this particular lymph node. A/P: S/P Robotic laparoscopic right retroperitoneal mass resection  with adrenalectomy, liver nodule with excision, cholecystectomy and open  left inguinal hernia repair with mesh 2/28/20   Diagnosis Orders   1. Primary adenocarcinoma of esophagus with metastasis ([address]     2. Right adrenal mass ([address]     3.  Inguinal hernia without obstruction or gangrene, recurrence not specified, unspecified laterality       Jazmín yHman Back is a 76 y.o. male being evaluated by a Virtual Visit (video visit) encounter to address concerns as mentioned above. A caregiver was present when appropriate. Due to this being a TeleHealth encounter (During RRJNX-01 public health emergency), evaluation of the some organ systems was limited. This Virtual Visit was conducted with patient's (and/or legal guardian's) consent. The patient (and/or legal guardian) has also been advised to contact this office for worsening conditions or problems, and seek emergency medical treatment and/or call 911 if deemed necessary. Imaging studies reviewed with the patient  Follow up with Dr Jeremiah Hand  Follow up in 3 months     Jessie Cordoba MD  Surgery Attending    An electronic signature was used to authenticate this note.

## 2020-06-02 ENCOUNTER — TELEMEDICINE (OUTPATIENT)
Dept: SURGERY | Age: 75
End: 2020-06-02
Payer: MEDICARE

## 2020-06-02 PROCEDURE — 4040F PNEUMOC VAC/ADMIN/RCVD: CPT | Performed by: SURGERY

## 2020-06-02 PROCEDURE — G8427 DOCREV CUR MEDS BY ELIG CLIN: HCPCS | Performed by: SURGERY

## 2020-06-02 PROCEDURE — 1123F ACP DISCUSS/DSCN MKR DOCD: CPT | Performed by: SURGERY

## 2020-06-02 PROCEDURE — 99213 OFFICE O/P EST LOW 20 MIN: CPT | Performed by: SURGERY

## 2020-06-02 PROCEDURE — 3017F COLORECTAL CA SCREEN DOC REV: CPT | Performed by: SURGERY

## 2020-06-15 ENCOUNTER — HOSPITAL ENCOUNTER (OUTPATIENT)
Age: 75
Discharge: HOME | End: 2020-06-15
Payer: MEDICARE

## 2020-06-15 ENCOUNTER — HOSPITAL ENCOUNTER (OUTPATIENT)
Dept: CT IMAGING | Age: 75
Discharge: HOME | End: 2020-06-15
Payer: MEDICARE

## 2020-06-15 LAB
BUN BLDV-MCNC: 17 MG/DL (ref 7–20)
CREAT SERPL-MCNC: 0.8 MG/DL (ref 0.8–1.3)
GFR AFRICAN AMERICAN: >60
GFR NON-AFRICAN AMERICAN: >60

## 2020-06-15 PROCEDURE — 36415 COLL VENOUS BLD VENIPUNCTURE: CPT

## 2020-06-15 PROCEDURE — 74177 CT ABD & PELVIS W/CONTRAST: CPT

## 2020-06-15 PROCEDURE — 82565 ASSAY OF CREATININE: CPT

## 2020-06-15 PROCEDURE — 84520 ASSAY OF UREA NITROGEN: CPT

## 2020-06-15 PROCEDURE — 6360000004 HC RX CONTRAST MEDICATION: Performed by: NURSE PRACTITIONER

## 2020-06-15 RX ADMIN — IOHEXOL 50 ML: 240 INJECTION, SOLUTION INTRATHECAL; INTRAVASCULAR; INTRAVENOUS; ORAL at 11:16

## 2020-06-15 RX ADMIN — IOPAMIDOL 75 ML: 755 INJECTION, SOLUTION INTRAVENOUS at 11:16

## 2020-07-15 ENCOUNTER — HOSPITAL ENCOUNTER (OUTPATIENT)
Dept: MRI IMAGING | Age: 75
Discharge: HOME | End: 2020-07-15
Payer: MEDICARE

## 2020-07-15 PROCEDURE — A9579 GAD-BASE MR CONTRAST NOS,1ML: HCPCS | Performed by: INTERNAL MEDICINE

## 2020-07-15 PROCEDURE — 6360000004 HC RX CONTRAST MEDICATION: Performed by: INTERNAL MEDICINE

## 2020-07-15 PROCEDURE — 70553 MRI BRAIN STEM W/O & W/DYE: CPT

## 2020-07-15 RX ADMIN — GADOTERIDOL 14 ML: 279.3 INJECTION, SOLUTION INTRAVENOUS at 09:49

## 2020-07-21 RX ORDER — LEVETIRACETAM 500 MG/1
500 TABLET ORAL EVERY EVENING
Status: ON HOLD | COMMUNITY
End: 2020-08-19 | Stop reason: HOSPADM

## 2020-07-21 RX ORDER — DEXAMETHASONE 4 MG/1
4 TABLET ORAL 3 TIMES DAILY
COMMUNITY
End: 2020-08-13 | Stop reason: ALTCHOICE

## 2020-07-23 ENCOUNTER — PRE-PROCEDURE TELEPHONE (OUTPATIENT)
Dept: RADIATION ONCOLOGY | Age: 75
End: 2020-07-23

## 2020-07-23 RX ORDER — SODIUM BICARBONATE 42 MG/ML
1.5 INJECTION, SOLUTION INTRAVENOUS ONCE
Status: CANCELLED | OUTPATIENT
Start: 2020-07-23 | End: 2020-07-23

## 2020-07-23 RX ORDER — BACITRACIN, NEOMYCIN, POLYMYXIN B 400; 3.5; 5 [USP'U]/G; MG/G; [USP'U]/G
OINTMENT TOPICAL ONCE
Status: CANCELLED | OUTPATIENT
Start: 2020-07-23 | End: 2020-07-23

## 2020-07-23 RX ORDER — DEXAMETHASONE SODIUM PHOSPHATE 4 MG/ML
4 INJECTION, SOLUTION INTRA-ARTICULAR; INTRALESIONAL; INTRAMUSCULAR; INTRAVENOUS; SOFT TISSUE ONCE
Status: CANCELLED | OUTPATIENT
Start: 2020-07-23 | End: 2020-07-23

## 2020-07-23 RX ORDER — ONDANSETRON 2 MG/ML
4 INJECTION INTRAMUSCULAR; INTRAVENOUS EVERY 6 HOURS PRN
Status: CANCELLED | OUTPATIENT
Start: 2020-07-23

## 2020-07-23 RX ORDER — 0.9 % SODIUM CHLORIDE 0.9 %
500 INTRAVENOUS SOLUTION INTRAVENOUS ONCE
Status: CANCELLED | OUTPATIENT
Start: 2020-07-23 | End: 2020-07-23

## 2020-07-23 RX ORDER — BUPIVACAINE HYDROCHLORIDE 5 MG/ML
30 INJECTION, SOLUTION EPIDURAL; INTRACAUDAL ONCE
Status: CANCELLED | OUTPATIENT
Start: 2020-07-23 | End: 2020-07-23

## 2020-07-23 RX ORDER — LIDOCAINE HYDROCHLORIDE 10 MG/ML
30 INJECTION, SOLUTION EPIDURAL; INFILTRATION; INTRACAUDAL; PERINEURAL ONCE
Status: CANCELLED | OUTPATIENT
Start: 2020-07-23 | End: 2020-07-23

## 2020-07-23 NOTE — PROGRESS NOTES
INSTRUCTIONS FOR THE DAY OF GAMMA KNIFE PROCEDURE AT THE [facility]    1. Arrive at 6:00 a.m. to register. 2.  DO NOT eat or drink anything after midnight the night before your procedure. 3.  Take all of your usual morning medications the day of the procedure with just a sip of water including dexamethasone, Keppra, Prevacid, and metformin (according to PCP, patient is to double his metformin while on steroids). 4.  If you are on any blood thinners (Coumadin, Xarelto, Aspirin, Lovenox), you MAY TAKE those medication. There is no need to stop these medications for your procedure. 5.  If you need pain medication during the night before or the morning of your procedure, you may take them with a sip of water. 6.  Bring a current list of all of your medications with you. 7.  Do not wear any make-up. 8.  Wear comfortable, loose-fitting clothing. 9.  Do not wear jewelry, belts, or any clothing that contains metal.  10.  Visitors will be limited to 1 per patient. YOU MUST HAVE SOMEONE DRIVE YOU HOME AFTER YOUR PROCEDURE. Reviewed all pre-procedure instructions with patient via telephone. Answered all questions.     Xochitl Ward

## 2020-07-24 RX ORDER — SODIUM CHLORIDE 0.9 % (FLUSH) 0.9 %
10 SYRINGE (ML) INJECTION PRN
Status: CANCELLED | OUTPATIENT
Start: 2020-07-24

## 2020-07-24 RX ORDER — SODIUM CHLORIDE 0.9 % (FLUSH) 0.9 %
10 SYRINGE (ML) INJECTION EVERY 12 HOURS SCHEDULED
Status: CANCELLED | OUTPATIENT
Start: 2020-07-24

## 2020-07-24 RX ORDER — SODIUM CHLORIDE 9 MG/ML
INJECTION, SOLUTION INTRAVENOUS CONTINUOUS
Status: CANCELLED | OUTPATIENT
Start: 2020-07-24

## 2020-07-24 RX ORDER — SODIUM CHLORIDE, SODIUM LACTATE, POTASSIUM CHLORIDE, CALCIUM CHLORIDE 600; 310; 30; 20 MG/100ML; MG/100ML; MG/100ML; MG/100ML
INJECTION, SOLUTION INTRAVENOUS CONTINUOUS
Status: CANCELLED | OUTPATIENT
Start: 2020-07-24

## 2020-07-27 ENCOUNTER — HOSPITAL ENCOUNTER (OUTPATIENT)
Dept: RADIATION ONCOLOGY | Age: 75
Discharge: HOME | End: 2020-07-27
Payer: MEDICARE

## 2020-07-28 ENCOUNTER — APPOINTMENT (OUTPATIENT)
Dept: RADIATION ONCOLOGY | Age: 75
End: 2020-07-28
Payer: MEDICARE

## 2020-07-28 ENCOUNTER — ANESTHESIA EVENT (OUTPATIENT)
Dept: RADIATION ONCOLOGY | Age: 75
End: 2020-07-28

## 2020-07-28 ENCOUNTER — HOSPITAL ENCOUNTER (OUTPATIENT)
Dept: MRI IMAGING | Age: 75
Discharge: HOME | End: 2020-07-28
Payer: MEDICARE

## 2020-07-28 ENCOUNTER — ANESTHESIA (OUTPATIENT)
Dept: RADIATION ONCOLOGY | Age: 75
End: 2020-07-28

## 2020-07-28 ENCOUNTER — HOSPITAL ENCOUNTER (OUTPATIENT)
Dept: RADIATION ONCOLOGY | Age: 75
Discharge: HOME | End: 2020-07-28
Payer: MEDICARE

## 2020-07-28 VITALS
TEMPERATURE: 98.6 F | OXYGEN SATURATION: 100 % | BODY MASS INDEX: 23.74 KG/M2 | WEIGHT: 147.7 LBS | RESPIRATION RATE: 18 BRPM | HEART RATE: 66 BPM | DIASTOLIC BLOOD PRESSURE: 66 MMHG | SYSTOLIC BLOOD PRESSURE: 128 MMHG | HEIGHT: 66 IN

## 2020-07-28 VITALS — SYSTOLIC BLOOD PRESSURE: 85 MMHG | OXYGEN SATURATION: 100 % | DIASTOLIC BLOOD PRESSURE: 51 MMHG

## 2020-07-28 LAB
GLUCOSE BLD-MCNC: 184 MG/DL (ref 70–99)
PERFORMED ON: ABNORMAL

## 2020-07-28 PROCEDURE — 2580000003 HC RX 258: Performed by: ANESTHESIOLOGY

## 2020-07-28 PROCEDURE — 77373 STRTCTC BDY RAD THER TX DLVR: CPT

## 2020-07-28 PROCEDURE — 6360000004 HC RX CONTRAST MEDICATION: Performed by: NEUROLOGICAL SURGERY

## 2020-07-28 PROCEDURE — 70553 MRI BRAIN STEM W/O & W/DYE: CPT

## 2020-07-28 PROCEDURE — 6360000002 HC RX W HCPCS: Performed by: NURSE ANESTHETIST, CERTIFIED REGISTERED

## 2020-07-28 PROCEDURE — A9579 GAD-BASE MR CONTRAST NOS,1ML: HCPCS | Performed by: NEUROLOGICAL SURGERY

## 2020-07-28 PROCEDURE — 77295 3-D RADIOTHERAPY PLAN: CPT

## 2020-07-28 PROCEDURE — 2500000003 HC RX 250 WO HCPCS: Performed by: NEUROLOGICAL SURGERY

## 2020-07-28 PROCEDURE — 77334 RADIATION TREATMENT AID(S): CPT

## 2020-07-28 PROCEDURE — 2580000003 HC RX 258: Performed by: NEUROLOGICAL SURGERY

## 2020-07-28 PROCEDURE — 3700000000 HC ANESTHESIA ATTENDED CARE

## 2020-07-28 PROCEDURE — 77300 RADIATION THERAPY DOSE PLAN: CPT

## 2020-07-28 PROCEDURE — 6360000002 HC RX W HCPCS: Performed by: NEUROLOGICAL SURGERY

## 2020-07-28 PROCEDURE — 3700000001 HC ADD 15 MINUTES (ANESTHESIA)

## 2020-07-28 RX ORDER — BUPIVACAINE HYDROCHLORIDE 5 MG/ML
30 INJECTION, SOLUTION EPIDURAL; INTRACAUDAL ONCE
Status: COMPLETED | OUTPATIENT
Start: 2020-07-28 | End: 2020-07-28

## 2020-07-28 RX ORDER — SODIUM BICARBONATE 42 MG/ML
1.5 INJECTION, SOLUTION INTRAVENOUS ONCE
Status: COMPLETED | OUTPATIENT
Start: 2020-07-28 | End: 2020-07-28

## 2020-07-28 RX ORDER — SODIUM CHLORIDE 0.9 % (FLUSH) 0.9 %
10 SYRINGE (ML) INJECTION EVERY 12 HOURS SCHEDULED
Status: DISCONTINUED | OUTPATIENT
Start: 2020-07-28 | End: 2020-07-29 | Stop reason: HOSPADM

## 2020-07-28 RX ORDER — 0.9 % SODIUM CHLORIDE 0.9 %
500 INTRAVENOUS SOLUTION INTRAVENOUS ONCE
Status: COMPLETED | OUTPATIENT
Start: 2020-07-28 | End: 2020-07-28

## 2020-07-28 RX ORDER — DEXAMETHASONE SODIUM PHOSPHATE 4 MG/ML
4 INJECTION, SOLUTION INTRA-ARTICULAR; INTRALESIONAL; INTRAMUSCULAR; INTRAVENOUS; SOFT TISSUE ONCE
Status: COMPLETED | OUTPATIENT
Start: 2020-07-28 | End: 2020-07-28

## 2020-07-28 RX ORDER — LIDOCAINE HYDROCHLORIDE 10 MG/ML
30 INJECTION, SOLUTION EPIDURAL; INFILTRATION; INTRACAUDAL; PERINEURAL ONCE
Status: COMPLETED | OUTPATIENT
Start: 2020-07-28 | End: 2020-07-28

## 2020-07-28 RX ORDER — HEPARIN SODIUM (PORCINE) LOCK FLUSH IV SOLN 100 UNIT/ML 100 UNIT/ML
100 SOLUTION INTRAVENOUS PRN
Status: DISCONTINUED | OUTPATIENT
Start: 2020-07-28 | End: 2020-07-29 | Stop reason: HOSPADM

## 2020-07-28 RX ORDER — SODIUM CHLORIDE 9 MG/ML
INJECTION, SOLUTION INTRAVENOUS CONTINUOUS
Status: DISCONTINUED | OUTPATIENT
Start: 2020-07-28 | End: 2020-07-29 | Stop reason: HOSPADM

## 2020-07-28 RX ORDER — DEXAMETHASONE 4 MG/1
4 TABLET ORAL ONCE
Status: COMPLETED | OUTPATIENT
Start: 2020-07-28 | End: 2020-07-28

## 2020-07-28 RX ORDER — LIDOCAINE HYDROCHLORIDE 20 MG/ML
INJECTION, SOLUTION INTRAVENOUS PRN
Status: DISCONTINUED | OUTPATIENT
Start: 2020-07-28 | End: 2020-07-28 | Stop reason: SDUPTHER

## 2020-07-28 RX ORDER — BACITRACIN, NEOMYCIN, POLYMYXIN B 400; 3.5; 5 [USP'U]/G; MG/G; [USP'U]/G
OINTMENT TOPICAL ONCE
Status: DISCONTINUED | OUTPATIENT
Start: 2020-07-28 | End: 2020-07-29 | Stop reason: HOSPADM

## 2020-07-28 RX ORDER — PROPOFOL 10 MG/ML
INJECTION, EMULSION INTRAVENOUS PRN
Status: DISCONTINUED | OUTPATIENT
Start: 2020-07-28 | End: 2020-07-28 | Stop reason: SDUPTHER

## 2020-07-28 RX ORDER — SODIUM CHLORIDE, SODIUM LACTATE, POTASSIUM CHLORIDE, CALCIUM CHLORIDE 600; 310; 30; 20 MG/100ML; MG/100ML; MG/100ML; MG/100ML
INJECTION, SOLUTION INTRAVENOUS CONTINUOUS
Status: DISCONTINUED | OUTPATIENT
Start: 2020-07-28 | End: 2020-07-29 | Stop reason: HOSPADM

## 2020-07-28 RX ORDER — ONDANSETRON 2 MG/ML
4 INJECTION INTRAMUSCULAR; INTRAVENOUS EVERY 6 HOURS PRN
Status: DISCONTINUED | OUTPATIENT
Start: 2020-07-28 | End: 2020-07-29 | Stop reason: HOSPADM

## 2020-07-28 RX ORDER — ONDANSETRON 2 MG/ML
4 INJECTION INTRAMUSCULAR; INTRAVENOUS
Status: DISCONTINUED | OUTPATIENT
Start: 2020-07-28 | End: 2020-07-28 | Stop reason: HOSPADM

## 2020-07-28 RX ORDER — SODIUM CHLORIDE 0.9 % (FLUSH) 0.9 %
10 SYRINGE (ML) INJECTION PRN
Status: DISCONTINUED | OUTPATIENT
Start: 2020-07-28 | End: 2020-07-29 | Stop reason: HOSPADM

## 2020-07-28 RX ADMIN — GADOTERIDOL 28 ML: 279.3 INJECTION, SOLUTION INTRAVENOUS at 09:02

## 2020-07-28 RX ADMIN — DEXAMETHASONE 4 MG: 4 TABLET ORAL at 12:51

## 2020-07-28 RX ADMIN — ONDANSETRON 4 MG: 2 INJECTION INTRAMUSCULAR; INTRAVENOUS at 06:43

## 2020-07-28 RX ADMIN — DEXAMETHASONE SODIUM PHOSPHATE 4 MG: 4 INJECTION, SOLUTION INTRA-ARTICULAR; INTRALESIONAL; INTRAMUSCULAR; INTRAVENOUS; SOFT TISSUE at 12:28

## 2020-07-28 RX ADMIN — PROPOFOL 50 MG: 10 INJECTION, EMULSION INTRAVENOUS at 07:39

## 2020-07-28 RX ADMIN — Medication 10 ML: at 07:30

## 2020-07-28 RX ADMIN — LIDOCAINE HYDROCHLORIDE 30 ML: 10 INJECTION, SOLUTION EPIDURAL; INFILTRATION; INTRACAUDAL; PERINEURAL at 07:45

## 2020-07-28 RX ADMIN — BUPIVACAINE HYDROCHLORIDE 150 MG: 5 INJECTION, SOLUTION EPIDURAL; INTRACAUDAL at 07:45

## 2020-07-28 RX ADMIN — SODIUM BICARBONATE 1.5 MEQ: 42 INJECTION, SOLUTION INTRAVENOUS at 07:59

## 2020-07-28 RX ADMIN — Medication 100 UNITS: at 15:19

## 2020-07-28 RX ADMIN — PROPOFOL 20 MG: 10 INJECTION, EMULSION INTRAVENOUS at 07:43

## 2020-07-28 RX ADMIN — LIDOCAINE HYDROCHLORIDE 40 MG: 20 INJECTION, SOLUTION INTRAVENOUS at 07:39

## 2020-07-28 RX ADMIN — PROPOFOL 20 MG: 10 INJECTION, EMULSION INTRAVENOUS at 07:41

## 2020-07-28 RX ADMIN — SODIUM CHLORIDE: 9 INJECTION, SOLUTION INTRAVENOUS at 07:34

## 2020-07-28 RX ADMIN — SODIUM CHLORIDE 500 ML: 9 INJECTION, SOLUTION INTRAVENOUS at 06:43

## 2020-07-28 NOTE — PROGRESS NOTES
0600  Patient arrived to [address] [place] [address] alert and oriented x 4 with wife, Bryanna Wren. Patient is neurologically intact. PAC accessed without difficulty. Initial vitals WNL, and patient denies pain. Neel Lobato, RN     8720  Gamma Knife RN Pre-Procedure Checklist     1. Has the patient ever had any surgery on the head or neck? No    -If yes, is the surgery site marked? N/A    2. Has the patient ever received radiation treatment to the head or neck? No      -If yes, is the treatment summary and/or disk of treatment plan available? N/A      -If the patient has had previous Gamma Knife radiosurgery has the most recent imaging been reviewed in the Gamma Plan? N/A      3. Has the patient received chemotherapy or immunotherapy in the past month? Yes      -If yes, list the agent and date of last treatment.       -Pembrolizumab 7/14/2020    4. . List the procedure-specific medications taken by the patient this morning:   -Keppra 500mg   -Dexamethasone 4mg   -Prevacid 20mg    5. What is the patients GFR? 96 on 7/14/2020    -For GFR 0-30 => no contrast at MRI    -For GFR 31-59 => single dose contrast at MRI    -For GFR >60 => double dose contrast at MRI    6. Does the patient require a pregnancy test per [address]?  no     -If yes, the result is: jaison Shore, RN    3731  Pt escorted to [facility] suite for frameless mask making with Vineet Read, rad adarsh and Iman Chavez, medical physicist.    1327  Gamma Knife Frame Placement Time Out     1. Patient states name and birthdate correctly? Yes    2. Procedure listed on consent:  G-Frame placement and Gamma Knife radiosurgery for multiple brain metastases    3. Is this the correct procedure? Yes    4. Are the consents signed? Yes    5. Does the patient have only one benign target or lesion? No     -If yes, what side are we treating:  N/A     -Is the side marked for laterality? N/A    6. Have films been reviewed today? Yes    7.   Has the interim History and Physical form been completed? yes    8. Has the neurosurgeon reviewed the [facility] RN Pre-Procedure Checklist?  Yes    9. Does the patient require a pregnancy test per [address]? no     -If yes, the result was:  na    10. Are all present in agreement? Yes    Those present for time out:  Dr. David Melton RN, ESTEFANY Samano [place]  Patient received MAC under the supervision of Dr. Laura Mackenzie and Blake Myers CRNA. This RN was present throughout [place] [address] and assumed care from anesthesia for Phase II recovery. The patient is awake and breathing easily. Patient denies pain. See Flow Sheet for vitals and Caleb Score. Sandra Boswell RN     0900  After G-frame placement, patient was taken to MRI by this RN where SpO2 and pulse were monitored and remained stable throughout. Patient tolerated the study well. Mabel Epley, RN     9420  Gamma Knife Radiation Delivery Time Out    1. Patient states name and birthdate correctly? Yes    2. Procedure listed on consent? Stereotactic Radiosurgery, Gamma Knife for multiple brain metastases    3. Is this the correct procedure? Yes    4. Does the patient have only one benign target or lesion? No    -If yes, what side are we treating? N/A    -If yes, is the side marked for laterality? N/A    5. Has the final radiologist report been reviewed? yes    6. Has the patient received IV Dexamethasone prior to radiation delivery? yes    7. Does the patient require Keppra or Ativan prior to treatment delivery? yes    8. Have the pin torques been rechecked? Yes    9. Is a CBCT required prior to treatment delivery? Yes    10. Are all present in agreement? Yes    11. Those present for time out:  Dr. David Cote RN    Katrina Olson RN    8459  Patient brought to [facility] suite and placed on treatment couch.  Sequential Compression Devices placed on BLE's per [facility] Knife VTE Protocol. AV monitoring in place and verified verbally with patient from console. Continuous SpO2 and pulse monitor visible and monitored by this RN. Rhonda Coreas RN    1500  After [employer] procedure, the G-frame was removed by Georges Schneider RN and Pablo Sharma RN and fixation pin sites were observed for bleeding. None was noted. Pin sites were cleaned with alcohol and povidone-iodine. Dr. Rivas Ray then placed sutures at all four pin sites. Patient met Phase II discharge criteria. Baseline neurological status unchanged. See discharge Caleb score and vitals. Discharge instructions were reviewed with patient and pt's wife, Neville Bernheim, who verbalized understanding using teach back method. A written copy of the instructions was provided including times for the pt's two frameless treatments on 7/29 and 7/30. Steroid taper will be provided on day of discharge from frameless treatment, 7/30. Patient was accompanied to transportation by this RN.     Trina Bui RN

## 2020-07-28 NOTE — ANESTHESIA PRE PROCEDURE
Department of Anesthesiology  Preprocedure Note       Name:  Tuyet Harris   Age:  76 y.o.  :  1945                                          MRN:  [id number]         Date:  2020      Surgeon: * Surgery not found *    Procedure:     Medications prior to admission:   Prior to Admission medications    Medication Sig Start Date End Date Taking? Authorizing Provider   Lansoprazole (PREVACID 24HR PO) Take by mouth   Yes Historical Provider, MD   levETIRAcetam (KEPPRA) 500 MG tablet Take 500 mg by mouth 2 times daily   Yes Historical Provider, MD   dexamethasone (DECADRON) 4 MG tablet Take 4 mg by mouth 3 times daily    Yes Historical Provider, MD   dronabinol (MARINOL) 2.5 MG capsule Take 2.5 mg by mouth 2 times daily as needed. Yes Historical Provider, MD   diltiazem (CARDIZEM 12 HR) 120 MG extended release capsule Take 120 mg by mouth daily   Yes Historical Provider, MD   gabapentin (NEURONTIN) 300 MG capsule Take 300 mg by mouth 3 times daily. Yes Historical Provider, MD   metFORMIN (GLUCOPHAGE) 500 MG tablet Take 1 tablet by mouth Daily with supper Metformin must be crushed. 19  Yes Peg Richardson, APRN - CNP   ondansetron (ZOFRAN ODT) 4 MG disintegrating tablet Take 1 tablet by mouth every 8 hours as needed for Nausea 2/10/20   Tatiana Gaona PA-C   ONE TOUCH ULTRA TEST strip  19   Historical Provider, MD Choi Due LANCETS 32L [address] [facility]  19   Historical Provider, MD       Current medications:    Current Outpatient Medications   Medication Sig Dispense Refill    Lansoprazole (PREVACID 24HR PO) Take by mouth      levETIRAcetam (KEPPRA) 500 MG tablet Take 500 mg by mouth 2 times daily      dexamethasone (DECADRON) 4 MG tablet Take 4 mg by mouth 3 times daily       dronabinol (MARINOL) 2.5 MG capsule Take 2.5 mg by mouth 2 times daily as needed.        diltiazem (CARDIZEM 12 HR) 120 MG extended release capsule Take 120 mg by mouth daily      gabapentin (NEURONTIN) 300 MG capsule Take 300 mg by mouth 3 times daily.  metFORMIN (GLUCOPHAGE) 500 MG tablet Take 1 tablet by mouth Daily with supper Metformin must be crushed. 30 tablet 0    ondansetron (ZOFRAN ODT) 4 MG disintegrating tablet Take 1 tablet by mouth every 8 hours as needed for Nausea 20 tablet 0    ONE TOUCH ULTRA TEST strip       ONETOUCH DELICA LANCETS 80B MISC        Current Facility-Administered Medications   Medication Dose Route Frequency Provider Last Rate Last Dose    0.9 % sodium chloride infusion   Intravenous Continuous Antonio Epp, DO        lactated ringers infusion   Intravenous Continuous Antonio Epp, DO        sodium chloride flush 0.9 % injection 10 mL  10 mL Intravenous 2 times per day Antonio Epp, DO        sodium chloride flush 0.9 % injection 10 mL  10 mL Intravenous PRN Antonio Epp, DO        lidocaine PF 1 % injection 30 mL  30 mL Intradermal Once Chelo Lora MD        bupivacaine (PF) (MARCAINE) 0.5 % injection 150 mg  30 mL Intradermal Once Chelo Lora MD        [facility]dansetron [facility]) injection 4 mg  4 mg Intravenous Q6H PRN Chelo Lora MD   4 mg at 07/28/20 0643    0.9 % sodium chloride bolus  500 mL Intravenous Once Chelo Lora  mL/hr at 07/28/20 0643 500 mL at 07/28/20 4993    Dexamethasone Sodium Phosphate injection 4 mg  4 mg Intravenous Once Chelo Lora MD        neomycin-bacitracin-polymyxin (NEOSPORIN) ointment   Topical Once Chelo Lora MD        sodium bicarbonate 4.2 % injection 1.5 mEq  1.5 mEq Intradermal Once Chelo Lora MD           Allergies:     Allergies   Allergen Reactions    No Known Allergies        Problem List:    Patient Active Problem List   Diagnosis Code    Malignant neoplasm of lower third of esophagus ([address] C15.5    History of prostatectomy Z90.79    Dysphagia R13.10    History of prostate cancer Z85.46    Loss of appetite R63.0    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 ([address] C77.9    Mucositis due to radiation therapy K12.33    Type 2 diabetes mellitus ([facility] E11.9    Undiagnosed cardiac murmurs R01.1    Right adrenal mass (HCC) E27.8    Moderate malnutrition (HCC) E44.0    Retroperitoneal mass R19.00       Past Medical History:        Diagnosis Date    Adrenal mass ([facility]     Diabetes mellitus ([facility]     Esophageal cancer ([facility]     H/O sleep apnea 1998    resolved after surgery    History of esophageal reflux     Hypertension     Prostate cancer ([facility]     Weight loss        Past Surgical History:        Procedure Laterality Date    ADRENALECTOMY N/A 2/27/2020    ROBOTIC LAPAROSCOPIC RIGHT RETROPERITONEAL MASS RESECTION WITH ADRENALECTOMY, LIVER NODULE WEDGE EXCISION AND ROBOTIC ASSISTED CHOLECYSTECTOMY AND OPEN LEFT INGUINAL HERNIA REPAIR WITH MESH performed by Gricelda Sheth MD at [facility] 2003    COLONOSCOPY  2012    negative    COLONOSCOPY  09/14/2017    diverticulosis    ESOPHAGEAL DILATATION N/A 1/3/2019    ESOPHAGOGASTRODUODENOSCOPY WITH BIOPSY  CPT CODE - 27855 performed by Les Mesa MD at [place] [address] ESOPHAGECTOMY N/A 4/18/2019    BENITO VAL[place]INA ESOPHAGECTOMY WITH MEDIASTINAL LYMPHADENECTOMY INCLUDING THORACIC DUCT, UMBILICAL HERNIA REPAIR performed by Les Mesa MD at [address] [facility] / REMOVAL / [facility] David Emely Said N/A 6/25/2019    PORT A CATH PLACEMENT AND PEG TUBE EXCHANGE performed by Les Mesa MD at [address] [facility] OFFICE/OUTPT [facility] N/A 9/19/2018    EUS/ WITH ANESTHESIA performed by Ivan Saenz MD at [facility] ENDOSCOPY  9/19/2018    EGD BIOPSY performed by Ivan Saenz MD at [facility] N/A 4/2/2019    ESOPHAGOGASTRODUODENOSCOPY WITH BIOPSY  CPT CODE - 84949 performed by Les Mesa MD at [address] [place] [address] hx of sleep apnea       Social History:    Social History     Tobacco Use    Smoking status: Former Smoker     Types: Cigarettes     Last attempt to quit: 1972     Years since quittin.6    Smokeless tobacco: Never Used   Substance Use Topics    Alcohol use: Yes     Alcohol/week: 0.0 standard drinks     Comment: rarely                                Counseling given: Not Answered      Vital Signs (Current):   Vitals:    20 0600   BP: (!) 102/58   Pulse: 56   Resp: 18   Temp: 99 °F (37.2 °C)   TempSrc: Skin   SpO2: 96%   Weight: 147 lb 11.2 oz (67 kg)   Height: 5' 6\" (1.676 m)                                              BP Readings from Last 3 Encounters:   20 (!) 102/58   20 110/68   20 117/62       NPO Status:                                                                                 BMI:   Wt Readings from Last 3 Encounters:   20 147 lb 11.2 oz (67 kg)   20 155 lb (70.3 kg)   20 151 lb 9.6 oz (68.8 kg)     Body mass index is 23.84 kg/m².     CBC:   Lab Results   Component Value Date    WBC 7.4 2020    RBC 3.53 2020    HGB 12.2 2020    HCT 36.3 2020    .8 2020    RDW 13.9 2020     2020       CMP:   Lab Results   Component Value Date     2020    K 3.9 2020    K 5.0 02/10/2020     2020    CO2 26 2020    BUN 17 06/15/2020    CREATININE 0.8 06/15/2020    GFRAA >60 06/15/2020    AGRATIO 1.2 2020    LABGLOM >60 06/15/2020    GLUCOSE 124 2020    PROT 6.9 2020    CALCIUM 8.4 2020    BILITOT 1.1 2020    ALKPHOS 312 2020    AST 45 2020    ALT 33 2020       POC Tests:   Recent Labs     20  0605   POCGLU 184*       Coags:   Lab Results   Component Value Date    PROTIME 11.4 2020    INR 0.98 2020    APTT 31.5 2020       HCG (If Applicable): No results found for: PREGTESTUR, PREGSERUM, HCG, HCGQUANT     ABGs: No results found for: PHART, PO2ART, ZVK7ISE, ILZ1DRM, BEART, L6KMABLT     Type & Screen (If Applicable):  No results found for: LABABO, LABRH    Drug/Infectious Status (If Applicable):  No results found for: HIV, HEPCAB    COVID-19 Screening (If Applicable): No results found for: COVID19      Anesthesia Evaluation  Patient summary reviewed and Nursing notes reviewed  Airway: Mallampati: II  TM distance: >3 FB   Neck ROM: full  Mouth opening: > = 3 FB Dental:          Pulmonary:Negative Pulmonary ROS and normal exam  breath sounds clear to auscultation            Patient did not smoke on day of surgery. Cardiovascular:Negative CV ROS    (+) hypertension: mild,       ECG reviewed  Rhythm: regular  Rate: normal           Beta Blocker:  Not on Beta Blocker         Neuro/Psych:   Negative Neuro/Psych ROS              GI/Hepatic/Renal: Neg GI/Hepatic/Renal ROS            Endo/Other:    (+) DiabetesType II DM, well controlled, , .                 Abdominal:       Abdomen: soft. Vascular: negative vascular ROS. Anesthesia Plan      MAC     ASA 3       Induction: intravenous. MIPS: Postoperative opioids intended and Prophylactic antiemetics administered. Anesthetic plan and risks discussed with patient. Use of blood products discussed with patient whom consented to blood products. Plan discussed with CRNA.     Attending anesthesiologist reviewed and agrees with Pre Eval content              Case Bucio DO   7/28/2020

## 2020-07-28 NOTE — PROGRESS NOTES
[employer] [place]  GAMMA KNIFE RADIOSURGERY  PROCEDURE REPORT    PROCEDURE DATE:  7/28/2020              Gamma Knife Radiosurgery Procedure Note     Diagnosis: Brain metastasis    Description of procedure:     Nilsa Pitts was met at the [facility] today by Dr. Sidra Hernandez and myself. Conscious sedation was accomplished by anesthesia and frame placement was completed by Dr. Sidra Hernandez. A stereotactic MRI of the brain with double-dose contrast was then completed. A total of 3 lesions were identified and targeted. The two smaller lesions were treated definitively. The larger lesion received a dose of 9 Gy today and will be treated with 2 additional fractions of 9 Gy over the next two days. Dosimetry was reviewed by Dr. Britany Morales MS (special physics consult requested) and myself. Treatment was then given successfully. The frame was removed without complication. The patient was discharged home in stable condition with medication instructions and a follow appointment.     Juanita Lewis MD

## 2020-07-28 NOTE — OP NOTE
1 [facility]  PATIENT NAME:   Jennifer Hicks   MR #:  [id number]  YOB: 1945   ACCOUNT #:  [de-identified]  SURGEON:  Duong Narvaez M.D. ADMIT DATE:  7/28/2020  SERVICE:  Neurosurgery  DICTATED BY: Duong Narvaez M.D. SURGERY DATE:  7/28/2020           OPERATIVE REPORT       PREOPERATIVE DIAGNOSIS:     1. Multiple brain metastases (esophageal cancer)     POSTOPERATIVE DIAGNOSIS:     1. Same    PROCEDURE(S) PERFORMED:    1. Placement of stereotactic head frame   2. Gamma Knife radiosurgery for four brain metastases    NEUROSURGEON: Duong Narvaez MD       RADIATION ONCOLOGIST: Alicja Simental MD    ANESTHESIA: Moderate sedation    COMPLICATIONS: None    INDICATIONS: This is a 76year-old man with esophageal cancer who presented with headaches and imbalance. MRI scan revealed a larger right cerebellar metastasis and a smaller right occipital metastasis. We discussed various treatment options but ultimately recommended three-fraction, frameless Gamma Knife radiosurgery for the right cerebellar metastasis and single-fraction, frameless Gamma Knife radiosurgery for the right occipital metastasis. The patient was aware of the potential benefits in terms of tumor control and the possible risks including (but not limited to): pin site bleeding/swelling/infection, brain swelling, hemorrhage, seizures, new neurological symptoms, radiation injury (necrosis) of the brain, and/or secondary tumor formation. PERFORMANCE STATUS: 90%    SYSTEMIC DISEASE: Stable    DETAILS OF PROCEDURE: The four pin sites were cleaned with Chloraprep and injected with a mixture of Lidocaine 1%, Marcaine 0.5%, and sodium bicarbonate. The stereotactic head frame was secured with titanium screws. The patient underwent a stereotactic MRI scan with contrast.  These images were sent over the network to the [address] [place] [address]. The targets and critical structures were contoured.  An optimal treatment plan was developed by the neurosurgeon, radiation oncologist, and medical physicist.      The patient then underwent Gamma Knife radiosurgery using the following parameters:    Target Size (cm) Shape Shots Dose (Gy) Isodose (%)   Right occipital 0.71 Oval 3 22 50   Right cerebellar 3.00 Oval 17 9 50   Left frontal 0.71 Oval 3 20 50     The left frontal tumor is complex based on location within 5 mm of the left optic nerve. The right cerebellar tumor will also be treated on 7/29/2020 and 7/30/2020 with mask fixation (total of 9 Gy x 3 = 27 Gy). The patient tolerated the procedure without difficulty and the stereotactic frame was removed uneventfully. The patient was instructed on pin site care and medications.     SPECIMENS REMOVED: None    ESTIMATED BLOOD LOSS: None    TOTAL TIME SPENT WITH PATIENT: In conformance with CMS regulations, I affirm that I was present for the entire neurosurgical portion of the procedure including stereotactic frame placement, target definition, development of the treatment plan, treatment delivery, and stereotactic frame removal.     Joselyn Elizalde MD

## 2020-07-28 NOTE — H&P
There have been no changes in the patient's condition since the history and physical.    Blood sugar is 184. James Hernandez MD

## 2020-07-28 NOTE — ANESTHESIA POSTPROCEDURE EVALUATION
Department of Anesthesiology  Postprocedure Note    Patient: Weston Webster  MRN: [id number]  YOB: 1945  Date of evaluation: 7/28/2020  Time:  8:44 AM     Procedure Summary     Date:  07/28/20 Room / Location:  [facility]    Anesthesia Start:  [facility] Anesthesia Stop:  2014    Procedure:  GAMMAKNIFE W ANESTHESIA Diagnosis:  Secondary malignant neoplasm of brain    Scheduled Providers:  Vern Cardoso DO Responsible Provider:  Vern Cardoso DO    Anesthesia Type:  MAC ASA Status:  3          Anesthesia Type: MAC    Caleb Phase I:      Caleb Phase II:      Last vitals: Reviewed and per EMR flowsheets.        Anesthesia Post Evaluation    Patient location during evaluation: bedside  Patient participation: complete - patient participated  Level of consciousness: awake  Pain score: 0  Airway patency: patent  Nausea & Vomiting: no nausea and no vomiting  Complications: no  Cardiovascular status: blood pressure returned to baseline  Respiratory status: acceptable  Hydration status: euvolemic

## 2020-07-29 ENCOUNTER — HOSPITAL ENCOUNTER (OUTPATIENT)
Dept: RADIATION ONCOLOGY | Age: 75
Discharge: HOME | End: 2020-07-29
Payer: MEDICARE

## 2020-07-29 PROCEDURE — 77373 STRTCTC BDY RAD THER TX DLVR: CPT

## 2020-07-29 PROCEDURE — 77334 RADIATION TREATMENT AID(S): CPT

## 2020-07-29 NOTE — ONCOLOGY
Today the patient return for his second of 3 SRS treatments on the gamma knife machine. He was treated yesterday with the frame to a dose of 900 cGy to a right  cerebellar metastasis. He is doing well today with no complaints or concerns. Today he was treated with the icon maske,  frameless system where an additional 900 cGy  was given. Prior to treatment his plan was reviewed and signed. We also confirmed his identifications using 2 means. Cone beam CT scan was performed prior to treatment and approved. Radiosurgery was delivered uneventfully. The patient was monitored with the icon system throughout treatment. He will return tomorrow for his third and final SRS treatment to the right cerebellar metastasis. Dose to date is 1800 cGy delivered in 2 fractions. 900 cGy was delivered today. This was his second fraction. The total planned dose is 2700 cGy in 3 fractions.

## 2020-07-30 ENCOUNTER — HOSPITAL ENCOUNTER (OUTPATIENT)
Dept: RADIATION ONCOLOGY | Age: 75
Discharge: HOME | End: 2020-07-30
Payer: MEDICARE

## 2020-07-30 PROCEDURE — 77336 RADIATION PHYSICS CONSULT: CPT

## 2020-07-30 PROCEDURE — 77373 STRTCTC BDY RAD THER TX DLVR: CPT

## 2020-07-30 NOTE — PROGRESS NOTES
Today the patient return for his third of 3 SRS treatments on the gamma knife machine.       He was treated Tuesday with the frame to a dose of 900 cGy to a right  cerebellar metastasis an Wednesday with a mask to the same lesion.     He is doing well today with no complaints or concerns.     Today he was treated with the icon mask,  frameless system where an additional 900 cGy  was given.       Prior to treatment his plan was reviewed and signed. We also confirmed his identifications using 2 means.       Cone beam CT scan was performed prior to treatment and approved. Radiosurgery was delivered uneventfully. The patient was monitored with the icon system throughout treatment.     Dose to date is 2700 cGy delivered in 3 fractions.      The total planned dose is 2700 cGy in 3 fractions. Medication tapers were reviewed prior to discharge today. He will follow up with an MRI in 3 months.     Pipe Gregorio MD

## 2020-08-11 ENCOUNTER — POST-OP TELEPHONE (OUTPATIENT)
Dept: RADIATION ONCOLOGY | Age: 75
End: 2020-08-11

## 2020-08-11 ENCOUNTER — HOSPITAL ENCOUNTER (OUTPATIENT)
Dept: RADIATION ONCOLOGY | Age: 75
Discharge: HOME | End: 2020-08-11
Payer: MEDICARE

## 2020-08-11 NOTE — PROGRESS NOTES
Two week follow-up phone call with patient has been completed. 1. Pt denies any issues with the pin sites, states that the sutures have  dissolved. 2. Successfully completed steroid taper with no issues  3. Has been instructed on the Keppra taper and should complete on 8/19/20.  4.   Has been reminded of their next appointment with Dr. Alejandro Agudelo in October. 5.   Any other questions or further follow up will be with [facility]. Thank you for referring this patient to the [facility] Department for treatment, it has been a pleasure to participate in their care. If the patient requires future Gamma Knife procedures please contact the department directly at [phone].

## 2020-08-13 ENCOUNTER — APPOINTMENT (OUTPATIENT)
Dept: GENERAL RADIOLOGY | Age: 75
DRG: 643 | End: 2020-08-13
Payer: MEDICARE

## 2020-08-13 ENCOUNTER — HOSPITAL ENCOUNTER (INPATIENT)
Age: 75
LOS: 5 days | Discharge: HOME | DRG: 643 | End: 2020-08-19
Attending: EMERGENCY MEDICINE | Admitting: INTERNAL MEDICINE
Payer: MEDICARE

## 2020-08-13 ENCOUNTER — APPOINTMENT (OUTPATIENT)
Dept: CT IMAGING | Age: 75
DRG: 643 | End: 2020-08-13
Payer: MEDICARE

## 2020-08-13 PROBLEM — E87.1 HYPONATREMIA: Status: ACTIVE | Noted: 2020-08-13

## 2020-08-13 LAB
A/G RATIO: 1.4 (ref 1.1–2.2)
ALBUMIN SERPL-MCNC: 3.4 G/DL (ref 3.4–5)
ALP BLD-CCNC: 160 U/L (ref 40–129)
ALT SERPL-CCNC: 87 U/L (ref 10–40)
ANION GAP SERPL CALCULATED.3IONS-SCNC: 7 MMOL/L (ref 3–16)
APTT: 28.1 SEC (ref 24.2–36.2)
AST SERPL-CCNC: 69 U/L (ref 15–37)
BASOPHILS ABSOLUTE: 0 K/UL (ref 0–0.2)
BASOPHILS RELATIVE PERCENT: 0.5 %
BILIRUB SERPL-MCNC: 1.3 MG/DL (ref 0–1)
BUN BLDV-MCNC: 8 MG/DL (ref 7–20)
CALCIUM SERPL-MCNC: 8.9 MG/DL (ref 8.3–10.6)
CHLORIDE BLD-SCNC: 90 MMOL/L (ref 99–110)
CO2: 28 MMOL/L (ref 21–32)
CREAT SERPL-MCNC: 0.6 MG/DL (ref 0.8–1.3)
EOSINOPHILS ABSOLUTE: 0.1 K/UL (ref 0–0.6)
EOSINOPHILS RELATIVE PERCENT: 2.5 %
GFR AFRICAN AMERICAN: >60
GFR NON-AFRICAN AMERICAN: >60
GLOBULIN: 2.5 G/DL
GLUCOSE BLD-MCNC: 138 MG/DL (ref 70–99)
GLUCOSE BLD-MCNC: 163 MG/DL (ref 70–99)
HCT VFR BLD CALC: 43.5 % (ref 40.5–52.5)
HEMOGLOBIN: 14.5 G/DL (ref 13.5–17.5)
INR BLD: 0.92 (ref 0.86–1.14)
LACTIC ACID, SEPSIS: 1.2 MMOL/L (ref 0.4–1.9)
LYMPHOCYTES ABSOLUTE: 0.6 K/UL (ref 1–5.1)
LYMPHOCYTES RELATIVE PERCENT: 11.9 %
MCH RBC QN AUTO: 30.8 PG (ref 26–34)
MCHC RBC AUTO-ENTMCNC: 33.3 G/DL (ref 31–36)
MCV RBC AUTO: 92.2 FL (ref 80–100)
MONOCYTES ABSOLUTE: 0.5 K/UL (ref 0–1.3)
MONOCYTES RELATIVE PERCENT: 10.1 %
NEUTROPHILS ABSOLUTE: 3.6 K/UL (ref 1.7–7.7)
NEUTROPHILS RELATIVE PERCENT: 75 %
PDW BLD-RTO: 13.5 % (ref 12.4–15.4)
PERFORMED ON: ABNORMAL
PLATELET # BLD: 98 K/UL (ref 135–450)
PMV BLD AUTO: 7.3 FL (ref 5–10.5)
POTASSIUM REFLEX MAGNESIUM: 4.4 MMOL/L (ref 3.5–5.1)
PROTHROMBIN TIME: 10.7 SEC (ref 10–13.2)
RBC # BLD: 4.72 M/UL (ref 4.2–5.9)
SODIUM BLD-SCNC: 125 MMOL/L (ref 136–145)
TOTAL PROTEIN: 5.9 G/DL (ref 6.4–8.2)
WBC # BLD: 4.8 K/UL (ref 4–11)

## 2020-08-13 PROCEDURE — 96361 HYDRATE IV INFUSION ADD-ON: CPT

## 2020-08-13 PROCEDURE — 6360000004 HC RX CONTRAST MEDICATION: Performed by: PHYSICIAN ASSISTANT

## 2020-08-13 PROCEDURE — 6370000000 HC RX 637 (ALT 250 FOR IP): Performed by: INTERNAL MEDICINE

## 2020-08-13 PROCEDURE — 96375 TX/PRO/DX INJ NEW DRUG ADDON: CPT

## 2020-08-13 PROCEDURE — 96374 THER/PROPH/DIAG INJ IV PUSH: CPT

## 2020-08-13 PROCEDURE — 85730 THROMBOPLASTIN TIME PARTIAL: CPT

## 2020-08-13 PROCEDURE — 80053 COMPREHEN METABOLIC PANEL: CPT

## 2020-08-13 PROCEDURE — 2580000003 HC RX 258: Performed by: PHYSICIAN ASSISTANT

## 2020-08-13 PROCEDURE — 85025 COMPLETE CBC W/AUTO DIFF WBC: CPT

## 2020-08-13 PROCEDURE — 71045 X-RAY EXAM CHEST 1 VIEW: CPT

## 2020-08-13 PROCEDURE — 6370000000 HC RX 637 (ALT 250 FOR IP): Performed by: EMERGENCY MEDICINE

## 2020-08-13 PROCEDURE — 93005 ELECTROCARDIOGRAM TRACING: CPT | Performed by: PHYSICIAN ASSISTANT

## 2020-08-13 PROCEDURE — 2580000003 HC RX 258: Performed by: INTERNAL MEDICINE

## 2020-08-13 PROCEDURE — G0378 HOSPITAL OBSERVATION PER HR: HCPCS

## 2020-08-13 PROCEDURE — 99285 EMERGENCY DEPT VISIT HI MDM: CPT

## 2020-08-13 PROCEDURE — 85610 PROTHROMBIN TIME: CPT

## 2020-08-13 PROCEDURE — 87040 BLOOD CULTURE FOR BACTERIA: CPT

## 2020-08-13 PROCEDURE — 74177 CT ABD & PELVIS W/CONTRAST: CPT

## 2020-08-13 PROCEDURE — 2500000003 HC RX 250 WO HCPCS: Performed by: PHYSICIAN ASSISTANT

## 2020-08-13 PROCEDURE — 6360000002 HC RX W HCPCS: Performed by: PHYSICIAN ASSISTANT

## 2020-08-13 PROCEDURE — 83605 ASSAY OF LACTIC ACID: CPT

## 2020-08-13 RX ORDER — DILTIAZEM HYDROCHLORIDE 60 MG/1
120 CAPSULE, EXTENDED RELEASE ORAL DAILY
Status: DISCONTINUED | OUTPATIENT
Start: 2020-08-14 | End: 2020-08-18 | Stop reason: CLARIF

## 2020-08-13 RX ORDER — 0.9 % SODIUM CHLORIDE 0.9 %
500 INTRAVENOUS SOLUTION INTRAVENOUS ONCE
Status: COMPLETED | OUTPATIENT
Start: 2020-08-13 | End: 2020-08-13

## 2020-08-13 RX ORDER — ACETAMINOPHEN 325 MG/1
650 TABLET ORAL EVERY 6 HOURS PRN
Status: DISCONTINUED | OUTPATIENT
Start: 2020-08-13 | End: 2020-08-17

## 2020-08-13 RX ORDER — DRONABINOL 2.5 MG/1
2.5 CAPSULE ORAL 2 TIMES DAILY
Status: DISCONTINUED | OUTPATIENT
Start: 2020-08-13 | End: 2020-08-19 | Stop reason: HOSPADM

## 2020-08-13 RX ORDER — ACETAMINOPHEN 650 MG/1
650 SUPPOSITORY RECTAL EVERY 6 HOURS PRN
Status: DISCONTINUED | OUTPATIENT
Start: 2020-08-13 | End: 2020-08-17

## 2020-08-13 RX ORDER — SODIUM CHLORIDE 0.9 % (FLUSH) 0.9 %
10 SYRINGE (ML) INJECTION EVERY 12 HOURS SCHEDULED
Status: DISCONTINUED | OUTPATIENT
Start: 2020-08-13 | End: 2020-08-19 | Stop reason: HOSPADM

## 2020-08-13 RX ORDER — ONDANSETRON 2 MG/ML
4 INJECTION INTRAMUSCULAR; INTRAVENOUS ONCE
Status: COMPLETED | OUTPATIENT
Start: 2020-08-13 | End: 2020-08-13

## 2020-08-13 RX ORDER — SODIUM CHLORIDE 9 MG/ML
INJECTION, SOLUTION INTRAVENOUS CONTINUOUS
Status: DISCONTINUED | OUTPATIENT
Start: 2020-08-13 | End: 2020-08-14

## 2020-08-13 RX ORDER — MORPHINE SULFATE 4 MG/ML
4 INJECTION, SOLUTION INTRAMUSCULAR; INTRAVENOUS ONCE
Status: COMPLETED | OUTPATIENT
Start: 2020-08-13 | End: 2020-08-13

## 2020-08-13 RX ORDER — HYDROCODONE BITARTRATE AND ACETAMINOPHEN 5; 325 MG/1; MG/1
1 TABLET ORAL EVERY 6 HOURS PRN
Status: DISCONTINUED | OUTPATIENT
Start: 2020-08-13 | End: 2020-08-15

## 2020-08-13 RX ORDER — ONDANSETRON 2 MG/ML
4 INJECTION INTRAMUSCULAR; INTRAVENOUS EVERY 6 HOURS PRN
Status: DISCONTINUED | OUTPATIENT
Start: 2020-08-13 | End: 2020-08-19 | Stop reason: HOSPADM

## 2020-08-13 RX ORDER — PROMETHAZINE HYDROCHLORIDE 25 MG/1
12.5 TABLET ORAL EVERY 6 HOURS PRN
Status: DISCONTINUED | OUTPATIENT
Start: 2020-08-13 | End: 2020-08-19 | Stop reason: HOSPADM

## 2020-08-13 RX ORDER — SODIUM CHLORIDE 0.9 % (FLUSH) 0.9 %
10 SYRINGE (ML) INJECTION PRN
Status: DISCONTINUED | OUTPATIENT
Start: 2020-08-13 | End: 2020-08-19 | Stop reason: HOSPADM

## 2020-08-13 RX ORDER — LEVETIRACETAM 500 MG/1
500 TABLET ORAL 2 TIMES DAILY
Status: DISCONTINUED | OUTPATIENT
Start: 2020-08-13 | End: 2020-08-14

## 2020-08-13 RX ORDER — LEVETIRACETAM 500 MG/1
500 TABLET ORAL ONCE
Status: COMPLETED | OUTPATIENT
Start: 2020-08-13 | End: 2020-08-13

## 2020-08-13 RX ADMIN — SODIUM CHLORIDE: 9 INJECTION, SOLUTION INTRAVENOUS at 22:25

## 2020-08-13 RX ADMIN — Medication 10 ML: at 22:24

## 2020-08-13 RX ADMIN — DRONABINOL 2.5 MG: 2.5 CAPSULE ORAL at 22:24

## 2020-08-13 RX ADMIN — ONDANSETRON HYDROCHLORIDE 4 MG: 2 INJECTION, SOLUTION INTRAMUSCULAR; INTRAVENOUS at 15:41

## 2020-08-13 RX ADMIN — IOPAMIDOL 75 ML: 755 INJECTION, SOLUTION INTRAVENOUS at 17:19

## 2020-08-13 RX ADMIN — IOHEXOL 50 ML: 240 INJECTION, SOLUTION INTRATHECAL; INTRAVASCULAR; INTRAVENOUS; ORAL at 16:10

## 2020-08-13 RX ADMIN — HYDROCODONE BITARTRATE AND ACETAMINOPHEN 1 TABLET: 5; 325 TABLET ORAL at 22:24

## 2020-08-13 RX ADMIN — MORPHINE SULFATE 4 MG: 4 INJECTION INTRAVENOUS at 15:44

## 2020-08-13 RX ADMIN — SODIUM CHLORIDE 500 ML: 9 INJECTION, SOLUTION INTRAVENOUS at 15:45

## 2020-08-13 RX ADMIN — LEVETIRACETAM 500 MG: 500 TABLET ORAL at 20:29

## 2020-08-13 RX ADMIN — FAMOTIDINE 20 MG: 10 INJECTION, SOLUTION INTRAVENOUS at 15:45

## 2020-08-13 ASSESSMENT — PAIN SCALES - GENERAL
PAINLEVEL_OUTOF10: 6
PAINLEVEL_OUTOF10: 9
PAINLEVEL_OUTOF10: 7
PAINLEVEL_OUTOF10: 3
PAINLEVEL_OUTOF10: 1

## 2020-08-13 ASSESSMENT — ENCOUNTER SYMPTOMS
SINUS PRESSURE: 0
RHINORRHEA: 0
SINUS PAIN: 0
DIARRHEA: 0
EYE DISCHARGE: 0
SORE THROAT: 0
CHEST TIGHTNESS: 0
CONSTIPATION: 0
NAUSEA: 0
COUGH: 0
VOMITING: 0
EYE REDNESS: 0
SHORTNESS OF BREATH: 0
ABDOMINAL PAIN: 1

## 2020-08-13 ASSESSMENT — PAIN DESCRIPTION - PAIN TYPE
TYPE: ACUTE PAIN
TYPE: ACUTE PAIN

## 2020-08-13 ASSESSMENT — PAIN DESCRIPTION - LOCATION
LOCATION: ABDOMEN
LOCATION: ABDOMEN

## 2020-08-13 NOTE — ED PROVIDER NOTES
throat. Eyes: Negative for discharge, redness and visual disturbance. Respiratory: Negative for cough, chest tightness and shortness of breath. Cardiovascular: Negative for chest pain and palpitations. Gastrointestinal: Positive for abdominal pain. Negative for constipation, diarrhea, nausea and vomiting. Genitourinary: Negative for difficulty urinating, dysuria and frequency. Musculoskeletal: Negative. Skin: Negative. Neurological: Negative. Negative for dizziness, weakness, numbness and headaches. Psychiatric/Behavioral: Negative. All other systems reviewed and are negative. Positivesand Pertinent negatives as per HPI. Except as noted above in the ROS, all other systems were reviewed and negative.        PAST MEDICAL HISTORY     Past Medical History:   Diagnosis Date    [facility]     Diabetes mellitus ([facility]     Esophageal cancer ([facility]     H/O sleep apnea 1998    resolved after surgery    History of esophageal reflux     Hypertension     Prostate cancer ([facility]     Weight loss          SURGICAL HISTORY       Past Surgical History:   Procedure Laterality Date    ADRENALECTOMY N/A 2/27/2020    ROBOTIC LAPAROSCOPIC RIGHT RETROPERITONEAL MASS RESECTION WITH ADRENALECTOMY, LIVER NODULE WEDGE EXCISION AND ROBOTIC ASSISTED CHOLECYSTECTOMY AND OPEN LEFT INGUINAL HERNIA REPAIR WITH MESH performed by Darlene Kingston MD at [address]  2003    COLONOSCOPY  2012    negative    COLONOSCOPY  09/14/2017    diverticulosis    ESOPHAGEAL DILATATION N/A 1/3/2019    ESOPHAGOGASTRODUODENOSCOPY WITH BIOPSY  CPT CODE - 10832 performed by Fernie Welsh MD at [facility] ESOPHAGECTOMY N/A 4/18/2019    BENITO [place] ESOPHAGECTOMY WITH MEDIASTINAL LYMPHADENECTOMY INCLUDING THORACIC DUCT, UMBILICAL HERNIA REPAIR performed by Fernie Welsh MD at [address] / REMOVAL / [address] N/A 6/25/2019    PORT A CATH PLACEMENT AND PEG TUBE EXCHANGE performed by Damon Prieto MD at [facility] OFFICE/OUTPT VISIT,PROCEDURE ONLY N/A 9/19/2018    EUS/ WITH ANESTHESIA performed by Prabhakar Palacios MD at [facility] ENDOSCOPY  9/19/2018    EGD BIOPSY performed by Prabhakar Palacios MD at [facility] N/A 4/2/2019    ESOPHAGOGASTRODUODENOSCOPY WITH BIOPSY  CPT CODE - 29480 performed by Damon Prieto MD at [address] [place] [address]      hx of sleep apnea         CURRENT MEDICATIONS       Current Discharge Medication List      CONTINUE these medications which have NOT CHANGED    Details   Lansoprazole (PREVACID 24HR PO) Take by mouth      levETIRAcetam (KEPPRA) 500 MG tablet Take 500 mg by mouth every evening Patient on a weaning dose. Last dose to be taken on Wednesday 8/19      dronabinol (MARINOL) 2.5 MG capsule Take 2.5 mg by mouth 2 times daily as needed. ondansetron (ZOFRAN ODT) 4 MG disintegrating tablet Take 1 tablet by mouth every 8 hours as needed for Nausea  Qty: 20 tablet, Refills: 0      diltiazem (CARDIZEM 12 HR) 120 MG extended release capsule Take 120 mg by mouth daily      gabapentin (NEURONTIN) 300 MG capsule Take 300 mg by mouth 3 times daily. ONE TOUCH ULTRA TEST strip       ONETOUCH DELICA LANCETS 36L MISC       metFORMIN (GLUCOPHAGE) 500 MG tablet Take 1 tablet by mouth Daily with supper Metformin must be crushed.   Qty: 30 tablet, Refills: 0               ALLERGIES     No known allergies    FAMILY HISTORY       Family History   Problem Relation Age of Onset   [facility] Cancer Father         lung    Other Mother         blood disorder         SOCIAL HISTORY       Social History     Socioeconomic History    Marital status:      Spouse name: None    Number of children: None    Years of education: None    Highest education level: None Occupational History    None   Social Needs    Financial resource strain: None    Food insecurity     Worry: None     Inability: None    Transportation needs     Medical: None     Non-medical: None   Tobacco Use    Smoking status: Former Smoker     Types: Cigarettes     Last attempt to quit: 1972     Years since quittin.6    Smokeless tobacco: Never Used   Substance and Sexual Activity    Alcohol use: Yes     Alcohol/week: 0.0 standard drinks     Comment: rarely    Drug use: No    Sexual activity: Yes     Partners: Female   Lifestyle    Physical activity     Days per week: None     Minutes per session: None    Stress: None   Relationships    Social connections     Talks on phone: None     Gets together: None     Attends Episcopal service: None     Active member of club or organization: None     Attends meetings of clubs or organizations: None     Relationship status: None    Intimate partner violence     Fear of current or ex partner: None     Emotionally abused: None     Physically abused: None     Forced sexual activity: None   Other Topics Concern    None   Social History Narrative    None       SCREENINGS     NIH Score       [place] Coma Scale  Eye Opening: Spontaneous  Best Verbal Response: Oriented  Best Motor Response: Obeys commands  [place]essa Coma Scale Score: 15    [facility]     Heart Score         PHYSICAL EXAM    (up to 7 for level 4, 8 ormore for level 5)     ED Triage Vitals [20 1418]   BP Temp Temp src Pulse Resp SpO2 Height Weight   (!) 154/82 98 °F (36.7 °C) -- 74 18 99 % 5' 6\" (1.676 m) 138 lb (62.6 kg)       Physical Exam  Vitals signs and nursing note reviewed. Constitutional:       Appearance: He is well-developed. He is not diaphoretic. HENT:      Head: Normocephalic. Nose: Nose normal.      Mouth/Throat:      Pharynx: No oropharyngeal exudate. Eyes:      General:         Right eye: No discharge. Left eye: No discharge. Conjunctiva/sclera: Conjunctivae normal.      Pupils: Pupils are equal, round, and reactive to light. Neck:      Musculoskeletal: Normal range of motion. Cardiovascular:      Rate and Rhythm: Normal rate and regular rhythm. Heart sounds: Murmur present. No friction rub. No gallop. Pulmonary:      Effort: Pulmonary effort is normal. No respiratory distress. Breath sounds: Normal breath sounds. No wheezing. Chest:       Abdominal:      General: Bowel sounds are normal. There is no distension. Palpations: Abdomen is soft. Tenderness: There is no abdominal tenderness. Comments: Scarring well-healed. No point tenderness   Musculoskeletal: Normal range of motion. Skin:     General: Skin is warm and dry. Neurological:      Mental Status: He is alert.    Psychiatric:         Behavior: Behavior normal.         DIAGNOSTIC RESULTS   LABS:    Labs Reviewed   CBC WITH AUTO DIFFERENTIAL - Abnormal; Notable for the following components:       Result Value    Platelets 98 (*)     Lymphocytes Absolute 0.6 (*)     All other components within normal limits    Narrative:     Performed at:  [place] [address],  [facility], [facility]   Phone [phone]   COMPREHENSIVE METABOLIC PANEL W/ REFLEX TO MG FOR LOW K - Abnormal; Notable for the following components:    Sodium 125 (*)     Chloride 90 (*)     Glucose 163 (*)     CREATININE 0.6 (*)     Total Protein 5.9 (*)     Total Bilirubin 1.3 (*)     Alkaline Phosphatase 160 (*)     ALT 87 (*)     AST 69 (*)     All other components within normal limits    Narrative:     Performed at:  [place] [address],  [facility], [facility]   Phone [phone]   RENAL FUNCTION PANEL - Abnormal; Notable for the following components:    Sodium 121 (*)     Chloride 87 (*)     Glucose 134 (*)     CREATININE <0.5 (*)     Phosphorus 2.2 (*)     Alb 3.2 (*)     All other components within normal limits    Narrative:     Performed at:  [facility],  [place], [employer]   Phone [phone]   BASIC METABOLIC PANEL W/ REFLEX TO MG FOR LOW K - Abnormal; Notable for the following components:    Sodium 121 (*)     Chloride 88 (*)     Glucose 121 (*)     CREATININE <0.5 (*)     All other components within normal limits    Narrative:     Performed at:  [facility],  [employer]   Phone [phone]   HEPATIC FUNCTION PANEL - Abnormal; Notable for the following components:     Total Protein 5.9 (*)     Alb 3.3 (*)     Alkaline Phosphatase 177 (*)     ALT 97 (*)     AST 80 (*)     Total Bilirubin 1.6 (*)     Bilirubin, Direct 0.6 (*)     All other components within normal limits    Narrative:     Performed at:  [place] [address],  [employer]   Phone [phone]   TSH WITH REFLEX - Abnormal; Notable for the following components:    TSH 5.64 (*)     All other components within normal limits    Narrative:     Performed at:  [facility],  [employer], [employer]   Phone [phone]   BASIC METABOLIC PANEL W/ REFLEX TO MG FOR LOW K - Abnormal; Notable for the following components:    Sodium 121 (*)     Chloride 88 (*)     Glucose 128 (*)     CREATININE <0.5 (*)     All other components within normal limits    Narrative:     Performed at:  [facility]) [facility],  [place] [employer]   Phone [phone]   URIC ACID - Abnormal; Notable for the following components:    Uric Acid, Serum 2.3 (*)     All other components within normal limits    Narrative:     Performed at:  [facility]) [facility],  [place], [employer]   Phone [phone]   POCT GLUCOSE - Abnormal; Notable for the following components:    POC Glucose 138 (*)     All other components within normal limits    Narrative:     Performed at:  [facility]) - [address]   Phone [phone]   POCT GLUCOSE - Abnormal; Notable for the following components:    POC Glucose 119 (*)     All other components within normal limits    Narrative:     Performed at:  [address]   Phone [phone]   CULTURE, BLOOD 1    Narrative:     ORDER#: [id number]                          ORDERED BY: RACH MIRZA  SOURCE: Blood No site given                COLLECTED:  08/13/20 16:15  ANTIBIOTICS AT [place].:                      RECEIVED :  08/14/20 06:30  If child <=2 yrs old please draw pediatric bottle. ~Blood Culture #1  Performed at:  [address] [address]   Phone [phone]   CULTURE, BLOOD 2    Narrative:     ORDER#: [id number]                          ORDERED BY: RACH MIRZA  SOURCE: Blood No site given                COLLECTED:  08/13/20 16:20  ANTIBIOTICS AT [place].:                      RECEIVED :  08/14/20 06:30  If child <=2 yrs old please draw pediatric bottle. ~Blood Culture #2  Performed at:  [address] [address]   Phone [phone]   LACTATE, SEPSIS    Narrative:      16:30  08/13/2020  Performed at:  [address]   Phone [phone]   PROTIME-INR    Narrative:     Performed at:  [address]   Phone [phone]   APTT    Narrative:     Performed at:  [facility]) - [address]   Phone [phone]   URINE RT REFLEX TO CULTURE   OSMOLALITY, URINE   ELECTROLYTES URINE RANDOM   ELECTROLYTES URINE RANDOM   CREATININE, RANDOM URINE   BASIC METABOLIC PANEL W/ REFLEX TO MG FOR LOW K   HEPATIC FUNCTION PANEL   T4, FREE   BASIC METABOLIC PANEL W/ REFLEX TO MG FOR LOW K       All other labs were within normal range or notreturned as of this dictation. EKG: All EKG's are interpreted by the Emergency Department Physician who either signs or Co-signs this chart in the absence of a cardiologist.  Please see their note for interpretation of EKG. RADIOLOGY:         Interpretation per the Radiologist below, if available at the time of this note:    CT ABDOMEN PELVIS W IV CONTRAST Additional Contrast? Oral   Final Result   1. Status post esophagectomy with gastric pull-through   2. Interval decrease in para-aortic retroperitoneal adenopathy   3. Interval decrease in left perinephric tumor nodules   4. Stable left adrenal mass and celiac lymph node   5. No acute gastrointestinal abnormality   6. Colonic diverticulosis   7. Status post prostatectomy   8. Status post hysterectomy   9. Right nephrolithiasis         XR CHEST PORTABLE   Final Result   No acute findings           No results found.       PROCEDURES   Unless otherwise noted below, none     Procedures    CRITICAL CARE TIME   N/A    CONSULTS:  IP CONSULT TO HOSPITALIST  IP CONSULT TO NEPHROLOGY      EMERGENCY DEPARTMENT COURSE and DIFFERENTIAL DIAGNOSIS/MDM:   Vitals:    Vitals:    08/14/20 1315 08/14/20 1600 08/14/20 1654 08/14/20 2213   BP: (!) 156/78 (!) 142/84 (!) 142/86 112/63   Pulse: 75 79 79 89   Resp: 18 20 20 16   Temp: 98.3 °F (36.8 °C) 97.1 °F (36.2 °C) 97 °F (36.1 °C) 98.2 °F (36.8 °C)   TempSrc: Oral Oral Oral Oral   SpO2: 97% 96%  96%   Weight:       Height:           Patient was given the following medications:  Medications   dilTIAZem (CARDIZEM 12 HR) extended release capsule 120 mg (120 mg Oral Given 8/14/20 0841)   dronabinol (MARINOL) capsule 2.5 mg (2.5 mg Oral Not Given 8/14/20 2139)   sodium chloride flush 0.9 % injection 10 mL (10 mLs Intravenous Given 8/14/20 2217)   sodium chloride flush 0.9 % injection 10 mL (has no administration in time range)   acetaminophen (TYLENOL) tablet 650 mg (has no administration in time range)     Or   acetaminophen (TYLENOL) suppository 650 mg (has no administration in time range)   promethazine (PHENERGAN) tablet 12.5 mg (has no administration in time range)     Or   ondansetron (ZOFRAN) injection 4 mg (has no administration in time range)   enoxaparin (LOVENOX) injection 40 mg (40 mg Subcutaneous Not Given 8/14/20 0841)   HYDROmorphone (DILAUDID) injection 0.5 mg (0.5 mg Intravenous Given 8/14/20 2203)   HYDROcodone-acetaminophen (NORCO) 5-325 MG per tablet 1 tablet (1 tablet Oral Given 8/14/20 0841)   melatonin tablet 6 mg (6 mg Oral Given 8/14/20 0240)   levETIRAcetam (KEPPRA) tablet 500 mg (has no administration in time range)   hydrALAZINE (APRESOLINE) injection 10 mg (has no administration in time range)   sodium chloride 100 mEq in sodium chloride 0.9 % 1,000 mL infusion ( Intravenous New Bag 8/14/20 1419)   0.9 % sodium chloride bolus (500 mLs Intravenous New Bag 8/13/20 1545)   ondansetron (ZOFRAN) injection 4 mg (4 mg Intravenous Given 8/13/20 1541)   famotidine (PEPCID) injection 20 mg (20 mg Intravenous Given 8/13/20 1545)   morphine sulfate (PF) injection 4 mg (4 mg Intravenous Given 8/13/20 1544)   iohexol (OMNIPAQUE 240) injection 50 mL (50 mLs Oral Given 8/13/20 1610)   iopamidol (ISOVUE-370) 76 % injection 75 mL (75 mLs Intravenous Given 8/13/20 1719)   levETIRAcetam (KEPPRA) tablet 500 mg (500 mg Oral Given 8/13/20 2029)   potassium phosphate 15 mmol in dextrose 5 % 250 mL IVPB (0 mmol Intravenous Stopped 8/14/20 1819)   LORazepam (ATIVAN) injection 1 mg (1 mg Intravenous Given 8/14/20 1701)         Afebrile, stable, patient presents to the ED for evaluation. Seen in conjunction with attending ED provider who agrees with assessment and plan. Patient's SPO2 of 96% he is not hypoxic.   He has guarding throughout his abdomen and tenderness. Imaging is ordered to rule out small bowel obstruction, ischemic bowel, or other cause of patient's acute symptoms. Patient is provided with nausea medication and Pepcid medication in addition to morphine for pain control on labs patient's sodium is 125. And has elevated liver enzymes both of which are new. I did place the patient on seizure precautions we will admit the patient for further evaluation and treatment consult to the hospitalist who agrees to admit the patient in stable condition patient is a full code. All questions are answered. The patient tolerated their visit well. They were seen and evaluated by the attendingphysician, No att. providers found who agreed with the assessment and plan. The patient and / or the family were informed of the results of any tests, a time was given to answer questions, a plan was proposed and they agreed Render Cari. FINAL IMPRESSION      1. Hyponatremia    2. Lower abdominal pain    3.  Elevated liver enzymes          DISPOSITION/PLAN   DISPOSITION  admit      PATIENT REFERRED TO:  Jordon Blackwell MD  [address] [place] [address]  [phone]            DISCHARGE MEDICATIONS:  Current Discharge Medication List          DISCONTINUED MEDICATIONS:  Current Discharge Medication List                 (Please note that portions of this note were completed with a voice recognition program.  Efforts were made to edit the dictations but occasionally words are mis-transcribed.)    Adilson Leach PA-C (electronically signed)        Adilson Leach PA-C  08/15/20 0032

## 2020-08-14 LAB
ALBUMIN SERPL-MCNC: 3.2 G/DL (ref 3.4–5)
ALBUMIN SERPL-MCNC: 3.3 G/DL (ref 3.4–5)
ALP BLD-CCNC: 177 U/L (ref 40–129)
ALT SERPL-CCNC: 97 U/L (ref 10–40)
ANION GAP SERPL CALCULATED.3IONS-SCNC: 8 MMOL/L (ref 3–16)
ANION GAP SERPL CALCULATED.3IONS-SCNC: 9 MMOL/L (ref 3–16)
ANION GAP SERPL CALCULATED.3IONS-SCNC: 9 MMOL/L (ref 3–16)
AST SERPL-CCNC: 80 U/L (ref 15–37)
BILIRUB SERPL-MCNC: 1.6 MG/DL (ref 0–1)
BILIRUBIN DIRECT: 0.6 MG/DL (ref 0–0.3)
BILIRUBIN, INDIRECT: 1 MG/DL (ref 0–1)
BUN BLDV-MCNC: 7 MG/DL (ref 7–20)
BUN BLDV-MCNC: 8 MG/DL (ref 7–20)
BUN BLDV-MCNC: 8 MG/DL (ref 7–20)
CALCIUM SERPL-MCNC: 8.4 MG/DL (ref 8.3–10.6)
CALCIUM SERPL-MCNC: 8.5 MG/DL (ref 8.3–10.6)
CALCIUM SERPL-MCNC: 8.9 MG/DL (ref 8.3–10.6)
CHLORIDE BLD-SCNC: 87 MMOL/L (ref 99–110)
CHLORIDE BLD-SCNC: 88 MMOL/L (ref 99–110)
CHLORIDE BLD-SCNC: 88 MMOL/L (ref 99–110)
CO2: 24 MMOL/L (ref 21–32)
CO2: 24 MMOL/L (ref 21–32)
CO2: 26 MMOL/L (ref 21–32)
CREAT SERPL-MCNC: <0.5 MG/DL (ref 0.8–1.3)
EKG ATRIAL RATE: 72 BPM
EKG DIAGNOSIS: NORMAL
EKG P AXIS: 38 DEGREES
EKG P-R INTERVAL: 170 MS
EKG Q-T INTERVAL: 392 MS
EKG QRS DURATION: 72 MS
EKG QTC CALCULATION (BAZETT): 429 MS
EKG R AXIS: 4 DEGREES
EKG T AXIS: 22 DEGREES
EKG VENTRICULAR RATE: 72 BPM
GFR AFRICAN AMERICAN: >60
GFR NON-AFRICAN AMERICAN: >60
GLUCOSE BLD-MCNC: 119 MG/DL (ref 70–99)
GLUCOSE BLD-MCNC: 121 MG/DL (ref 70–99)
GLUCOSE BLD-MCNC: 128 MG/DL (ref 70–99)
GLUCOSE BLD-MCNC: 134 MG/DL (ref 70–99)
PERFORMED ON: ABNORMAL
PHOSPHORUS: 2.2 MG/DL (ref 2.5–4.9)
POTASSIUM REFLEX MAGNESIUM: 4.1 MMOL/L (ref 3.5–5.1)
POTASSIUM REFLEX MAGNESIUM: 4.6 MMOL/L (ref 3.5–5.1)
POTASSIUM SERPL-SCNC: 4.8 MMOL/L (ref 3.5–5.1)
SODIUM BLD-SCNC: 121 MMOL/L (ref 136–145)
TOTAL PROTEIN: 5.9 G/DL (ref 6.4–8.2)
TSH REFLEX: 5.64 UIU/ML (ref 0.27–4.2)
URIC ACID, SERUM: 2.3 MG/DL (ref 3.5–7.2)

## 2020-08-14 PROCEDURE — 2500000003 HC RX 250 WO HCPCS: Performed by: INTERNAL MEDICINE

## 2020-08-14 PROCEDURE — 93010 ELECTROCARDIOGRAM REPORT: CPT | Performed by: INTERNAL MEDICINE

## 2020-08-14 PROCEDURE — 2500000003 HC RX 250 WO HCPCS: Performed by: PHYSICIAN ASSISTANT

## 2020-08-14 PROCEDURE — 96375 TX/PRO/DX INJ NEW DRUG ADDON: CPT

## 2020-08-14 PROCEDURE — 6370000000 HC RX 637 (ALT 250 FOR IP): Performed by: INTERNAL MEDICINE

## 2020-08-14 PROCEDURE — 96361 HYDRATE IV INFUSION ADD-ON: CPT

## 2020-08-14 PROCEDURE — 80069 RENAL FUNCTION PANEL: CPT

## 2020-08-14 PROCEDURE — 2580000003 HC RX 258: Performed by: INTERNAL MEDICINE

## 2020-08-14 PROCEDURE — 6360000002 HC RX W HCPCS: Performed by: INTERNAL MEDICINE

## 2020-08-14 PROCEDURE — 99232 SBSQ HOSP IP/OBS MODERATE 35: CPT | Performed by: INTERNAL MEDICINE

## 2020-08-14 PROCEDURE — 2580000003 HC RX 258: Performed by: PHYSICIAN ASSISTANT

## 2020-08-14 PROCEDURE — 36591 DRAW BLOOD OFF VENOUS DEVICE: CPT

## 2020-08-14 PROCEDURE — 36415 COLL VENOUS BLD VENIPUNCTURE: CPT

## 2020-08-14 PROCEDURE — 80076 HEPATIC FUNCTION PANEL: CPT

## 2020-08-14 PROCEDURE — 84439 ASSAY OF FREE THYROXINE: CPT

## 2020-08-14 PROCEDURE — 84550 ASSAY OF BLOOD/URIC ACID: CPT

## 2020-08-14 PROCEDURE — 84443 ASSAY THYROID STIM HORMONE: CPT

## 2020-08-14 PROCEDURE — 2060000000 HC ICU INTERMEDIATE R&B

## 2020-08-14 RX ORDER — HYDRALAZINE HYDROCHLORIDE 20 MG/ML
10 INJECTION INTRAMUSCULAR; INTRAVENOUS EVERY 6 HOURS PRN
Status: DISCONTINUED | OUTPATIENT
Start: 2020-08-14 | End: 2020-08-19 | Stop reason: HOSPADM

## 2020-08-14 RX ORDER — LORAZEPAM 2 MG/ML
1 INJECTION INTRAMUSCULAR ONCE
Status: COMPLETED | OUTPATIENT
Start: 2020-08-14 | End: 2020-08-14

## 2020-08-14 RX ORDER — LEVETIRACETAM 500 MG/1
500 TABLET ORAL
Status: DISCONTINUED | OUTPATIENT
Start: 2020-08-15 | End: 2020-08-19

## 2020-08-14 RX ORDER — LANOLIN ALCOHOL/MO/W.PET/CERES
6 CREAM (GRAM) TOPICAL NIGHTLY
Status: DISCONTINUED | OUTPATIENT
Start: 2020-08-14 | End: 2020-08-19 | Stop reason: HOSPADM

## 2020-08-14 RX ADMIN — HYDROMORPHONE HYDROCHLORIDE 0.5 MG: 1 INJECTION, SOLUTION INTRAMUSCULAR; INTRAVENOUS; SUBCUTANEOUS at 05:43

## 2020-08-14 RX ADMIN — Medication 10 ML: at 22:17

## 2020-08-14 RX ADMIN — HYDROCODONE BITARTRATE AND ACETAMINOPHEN 1 TABLET: 5; 325 TABLET ORAL at 08:41

## 2020-08-14 RX ADMIN — SODIUM CHLORIDE: 234 INJECTION INTRAMUSCULAR; INTRAVENOUS; SUBCUTANEOUS at 14:19

## 2020-08-14 RX ADMIN — Medication 10 ML: at 08:42

## 2020-08-14 RX ADMIN — HYDROMORPHONE HYDROCHLORIDE 0.5 MG: 1 INJECTION, SOLUTION INTRAMUSCULAR; INTRAVENOUS; SUBCUTANEOUS at 14:09

## 2020-08-14 RX ADMIN — MELATONIN TAB 3 MG 6 MG: 3 TAB at 02:40

## 2020-08-14 RX ADMIN — DRONABINOL 2.5 MG: 2.5 CAPSULE ORAL at 08:41

## 2020-08-14 RX ADMIN — HYDROMORPHONE HYDROCHLORIDE 0.5 MG: 1 INJECTION, SOLUTION INTRAMUSCULAR; INTRAVENOUS; SUBCUTANEOUS at 18:08

## 2020-08-14 RX ADMIN — SODIUM CHLORIDE: 9 INJECTION, SOLUTION INTRAVENOUS at 06:43

## 2020-08-14 RX ADMIN — HYDROMORPHONE HYDROCHLORIDE 0.5 MG: 1 INJECTION, SOLUTION INTRAMUSCULAR; INTRAVENOUS; SUBCUTANEOUS at 22:03

## 2020-08-14 RX ADMIN — POTASSIUM PHOSPHATE, MONOBASIC AND POTASSIUM PHOSPHATE, DIBASIC 15 MMOL: 224; 236 INJECTION, SOLUTION, CONCENTRATE INTRAVENOUS at 14:19

## 2020-08-14 RX ADMIN — DILTIAZEM HYDROCHLORIDE 120 MG: 60 CAPSULE, EXTENDED RELEASE ORAL at 08:41

## 2020-08-14 RX ADMIN — LEVETIRACETAM 500 MG: 500 TABLET ORAL at 08:41

## 2020-08-14 RX ADMIN — LORAZEPAM 1 MG: 2 INJECTION INTRAMUSCULAR; INTRAVENOUS at 17:01

## 2020-08-14 ASSESSMENT — PAIN SCALES - GENERAL
PAINLEVEL_OUTOF10: 6
PAINLEVEL_OUTOF10: 0
PAINLEVEL_OUTOF10: 10
PAINLEVEL_OUTOF10: 4
PAINLEVEL_OUTOF10: 8
PAINLEVEL_OUTOF10: 2

## 2020-08-14 ASSESSMENT — PAIN DESCRIPTION - DESCRIPTORS
DESCRIPTORS: DISCOMFORT
DESCRIPTORS: DISCOMFORT

## 2020-08-14 ASSESSMENT — PAIN DESCRIPTION - PAIN TYPE
TYPE: ACUTE PAIN
TYPE: ACUTE PAIN

## 2020-08-14 ASSESSMENT — PAIN DESCRIPTION - LOCATION
LOCATION: ABDOMEN
LOCATION: ABDOMEN

## 2020-08-14 NOTE — ED PROVIDER NOTES
The Ekg interpreted by me shows  Sinus rhythm with PACs with a rate of 72  Axis is normal  QTc is normal  MT prolonged  ST Segments: Nonspecific ST changes           Ambrocio Almaraz MD  08/13/20 3928

## 2020-08-14 NOTE — PLAN OF CARE
Problem: Infection:  Goal: Will remain free from infection  Description: Will remain free from infection  8/14/2020 1002 by Everardo Fong RN  Outcome: Ongoing  8/13/2020 2310 by Demetri Fuentes RN  Outcome: Ongoing     Problem: Safety:  Goal: Free from accidental physical injury  Description: Free from accidental physical injury  8/14/2020 1002 by Everardo Fong RN  Outcome: Ongoing  8/13/2020 2310 by Demetri Fuentes RN  Outcome: Ongoing  Goal: Free from intentional harm  Description: Free from intentional harm  8/14/2020 1002 by Everardo Fong RN  Outcome: Ongoing  8/13/2020 2310 by Demetri Fuentes RN  Outcome: Ongoing     Problem: Daily Care:  Goal: Daily care needs are met  Description: Daily care needs are met  8/14/2020 1002 by Everardo Fong RN  Outcome: Ongoing  8/13/2020 2310 by Demetri Fuentes RN  Outcome: Ongoing     Problem: Pain:  Description: Pain management should include both nonpharmacologic and pharmacologic interventions.   Goal: Patient's pain/discomfort is manageable  Description: Patient's pain/discomfort is manageable  8/14/2020 1002 by Everardo Fong RN  Outcome: Ongoing  8/13/2020 2310 by Demetri Fuentes RN  Outcome: Ongoing  Goal: Pain level will decrease  Description: Pain level will decrease  Outcome: Ongoing  Goal: Control of acute pain  Description: Control of acute pain  Outcome: Ongoing  Goal: Control of chronic pain  Description: Control of chronic pain  Outcome: Ongoing     Problem: Skin Integrity:  Goal: Skin integrity will stabilize  Description: Skin integrity will stabilize  8/14/2020 1002 by Everardo Fong RN  Outcome: Ongoing  8/13/2020 2310 by Demetri Fuentes RN  Outcome: Ongoing     Problem: Discharge Planning:  Goal: Patients continuum of care needs are met  Description: Patients continuum of care needs are met  8/14/2020 1002 by Everardo Fong RN  Outcome: Ongoing  8/13/2020 2310 by Demetri Fuentes RN  Outcome: Ongoing     Problem: Falls - Risk of:  Goal: Will remain free from falls  Description: Will remain free from falls  Outcome: Ongoing  Goal: Absence of physical injury  Description: Absence of physical injury  Outcome: Ongoing

## 2020-08-14 NOTE — PROGRESS NOTES
I have reviewed and agree with all documentation, plan of care and assessments completed by  Kaylin Guevara 8/14/2020 6:56 PM'

## 2020-08-14 NOTE — ED NOTES
Report given to Gonzalo kebede at bedside. Patient taken up to floor via wheelchair at this time.       Jimmie Guadalupe RN  08/13/20 1071

## 2020-08-14 NOTE — PROGRESS NOTES
Patient is able to demonstrated the ability to move from a reclining position to an upright position within the recliner. Erendira Dos Santos RN]

## 2020-08-14 NOTE — CARE COORDINATION
Case Management Assessment  Initial Evaluation    Date/Time of Evaluation: 8/14/2020 10:26 AM  Assessment Completed by: Aurora Solis    Patient Name: Ramos Isaac  YOB: 1945  Diagnosis: Hyponatremia [E87.1]  Date / Time: 8/13/2020  2:05 PM  Admission status/Date:8/13/2020 1405 inpt  Chart Reviewed: Yes      Patient Interviewed: Yes   Family Interviewed:  No      Hospitalization in the last 30 days:  No    Contact  Primary Contact Information:     Met with:    Current PCP  [address] Medicare  Precert required for SNF : Y, N        3 night stay required - Y, N    ADLS  Support Systems: Spouse/Significant Other, Family Members  Transportation: self    Meal Preparation: self    Housing  Living Arrangements: ranch with wife  Steps: 0  Plans to Return to Present Housing: Yes  Other Identified Issues:     [address] 78  Currently active with [address] : No  Type of Home Care Services: None  Passport/Waiver : No  :                      Phone Number:    Passport/Waiver Services:           [facility] Provider: none  Equipment: noneWalker___Cane___RTS___ BSC___Shower Chair___  02__ at ____Liter(s)---Uses________HHN___ CPAP___ BiPap___ Hospital Bed___W/C____Other________      Has Home O2 in place on admit:  No    If above answer is No ---is pt presently on O2 during hospitalization:  No  if yes CM to follow for potential DC O2 need  Informed of need to bring portable home O2 tank on day of discharge for nursing to connect prior to leaving:   Not Indicated  Verbalized agreement/Understanding:   Not Indicated    Community Service Affiliation  Dialysis:  No    · Name:  · Location  · Dialysis Schedule:  · Phone:   · Fax:       Other services in the Community(ex:PT/OT,Mental Health,Wound Clinic, [facility] Amanda Beard)  Outpatient PT/OT: No    Cancer Center: No     CHF Clinic: No     Pulmonary Rehab: No  Pain Clinic:

## 2020-08-14 NOTE — CONSULTS
[facility]    Consult Note           Reason for Consult:  Hyponatremia  Requesting Physician:  Dr. Prakash West    Chief Complaint:    Chief Complaint   Patient presents with    Abdominal Pain     Pt has extensive cancer history with current cancer treatment with primary in prostate. Currently being treated for esophageal cancer. C/o nausea, no vomiting or diarrhea. Pain across lower abdomen.         History of Present Illness on 8/14/20:    76 y.o. yo male with PMH of esophageal cancer with brain mets s/p who is admitted for abd pain and was noted to have hyponatremia  Not been eating well for few days  No n/v/d  Sodium dropped from 125 to 121 on NS    Past Medical History:        Diagnosis Date    [facility]     Diabetes mellitus ([facility]     Esophageal cancer ([address]     H/O sleep apnea 1998    resolved after surgery    History of esophageal reflux     Hypertension     Prostate cancer ([facility]     Weight loss        Past Surgical History:        Procedure Laterality Date    ADRENALECTOMY N/A 2/27/2020    ROBOTIC LAPAROSCOPIC RIGHT RETROPERITONEAL MASS RESECTION WITH ADRENALECTOMY, LIVER NODULE WEDGE EXCISION AND ROBOTIC ASSISTED CHOLECYSTECTOMY AND OPEN LEFT INGUINAL HERNIA REPAIR WITH MESH performed by Michael Salazar MD at [address] [facility]  2003    COLONOSCOPY  2012    negative    COLONOSCOPY  09/14/2017    diverticulosis    ESOPHAGEAL DILATATION N/A 1/3/2019    ESOPHAGOGASTRODUODENOSCOPY WITH BIOPSY  CPT CODE - 26676 performed by Rufus Arana MD at [facility] ESOPHAGECTOMY N/A 4/18/2019    [facility] ESOPHAGECTOMY WITH MEDIASTINAL LYMPHADENECTOMY INCLUDING THORACIC DUCT, UMBILICAL HERNIA REPAIR performed by Rufus Arana MD at [address] [facility] / REMOVAL / REPLACEMENT VENOUS ACCESS CATHETER N/A 6/25/2019    PORT A CATH PLACEMENT AND PEG TUBE EXCHANGE performed by Rufus Arana MD at [address]  [facility]IT,PROCEDURE ONLY N/A 9/19/2018    EUS/ WITH ANESTHESIA performed by Marily Wise MD at [address] [place] [address] ENDOSCOPY  9/19/2018    EGD BIOPSY performed by Marily Wise MD at [facility] ENDOSCOPY N/A 4/2/2019    ESOPHAGOGASTRODUODENOSCOPY WITH BIOPSY  CPT CODE - 20865 performed by Vida Kramer MD at [address] [place] [address]      hx of sleep apnea       Home Medications:    No current facility-administered medications on file prior to encounter. Current Outpatient Medications on File Prior to Encounter   Medication Sig Dispense Refill    Lansoprazole (PREVACID 24HR PO) Take by mouth      levETIRAcetam (KEPPRA) 500 MG tablet Take 500 mg by mouth every evening Patient on a weaning dose. Last dose to be taken on Wednesday 8/19      dronabinol (MARINOL) 2.5 MG capsule Take 2.5 mg by mouth 2 times daily as needed.  ondansetron (ZOFRAN ODT) 4 MG disintegrating tablet Take 1 tablet by mouth every 8 hours as needed for Nausea 20 tablet 0    diltiazem (CARDIZEM 12 HR) 120 MG extended release capsule Take 120 mg by mouth daily      gabapentin (NEURONTIN) 300 MG capsule Take 300 mg by mouth 3 times daily.  ONE TOUCH ULTRA TEST strip       ONETOUCH DELICA LANCETS 39K MISC       metFORMIN (GLUCOPHAGE) 500 MG tablet Take 1 tablet by mouth Daily with supper Metformin must be crushed.  30 tablet 0       Allergies:  No known allergies    Social History:    Social History     Socioeconomic History    Marital status:      Spouse name: Not on file    Number of children: Not on file    Years of education: Not on file    Highest education level: Not on file   Occupational History    Not on file   Social Needs    Financial resource strain: Not on file    Food insecurity     Worry: Not on file     Inability: Not on file   [employer] needs Medical: Not on file     Non-medical: Not on file   Tobacco Use    Smoking status: Former Smoker     Types: Cigarettes     Last attempt to quit: 1972     Years since quittin.6    Smokeless tobacco: Never Used   Substance and Sexual Activity    Alcohol use: Yes     Alcohol/week: 0.0 standard drinks     Comment: rarely    Drug use: No    Sexual activity: Yes     Partners: Female   Lifestyle    Physical activity     Days per week: Not on file     Minutes per session: Not on file    Stress: Not on file   Relationships    Social connections     Talks on phone: Not on file     Gets together: Not on file     Attends [employer] service: Not on file     Active member of club or organization: Not on file     Attends meetings of clubs or organizations: Not on file     Relationship status: Not on file    Intimate partner violence     Fear of current or ex partner: Not on file     Emotionally abused: Not on file     Physically abused: Not on file     Forced sexual activity: Not on file   Other Topics Concern    Not on file   Social History Narrative    Not on file       Family History:   Family History   Problem Relation Age of Onset    Cancer Father         lung    Other Mother         blood disorder       Review of Systems:   Pertinent positives stated above in HPI. All other 10 systems were reviewed and were negative.      Physical exam:   Constitutional:  VITALS:  BP (!) 142/84   Pulse 79   Temp 97.1 °F (36.2 °C) (Oral)   Resp 20   Ht 5' 6\" (1.676 m)   Wt 143 lb 1.6 oz (64.9 kg)   SpO2 96%   BMI 23.10 kg/m²   Gen: alert, awake, nad  Skin: no rash, turgor wnl  Heent:  eomi, mmm  Neck: no bruits or jvd noted, thyroid normal  Cardiovascular:  S1, S2 without m/r/g  Respiratory: CTA B without w/r/r; respiratory effort normal  Abdomen:  +bs, soft, nt, nd, no hepatosplenomegaly  Ext: no lower extremity edema  Neuro/Psy: AAoriented times 3 ; moves all 4 ext  Musculoskeletal:  Rom, muscular strength intact; digits, nails normal    Data/  Recent Labs     08/13/20  1620   WBC 4.8   HGB 14.5   HCT 43.5   MCV 92.2   PLT 98*     Recent Labs     08/13/20  1620 08/14/20  0455 08/14/20  1240   * 121* 121*   K 4.4 4.8 4.1   CL 90* 87* 88*   CO2 28 26 24   GLUCOSE 163* 134* 121*   PHOS  --  2.2*  --    BUN 8 7 8   CREATININE 0.6* <0.5* <0.5*   LABGLOM >60 >60 >60   GFRAA >60 >60 >60       Assessment  -Hyponatremia, 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 Also w decreased solute   -abd paiin  -metastatic esoph cancer w brain mets   -HTN  -DM    Plan  -urine electrolytes, osmolality  - TSH   -BMP q6h  -cont antihypertensives  -Fluid restriction 1200 ml/day  -strict intake and out put  -1.5 %saline at 65 ml/h  -goal of 130 by 0600 on 8/15        Thank you for the consultation. Please do not hesitate to call with questions.     Yolanda Pate  [facility]  Office: [phone]  Fax:    [phone]

## 2020-08-14 NOTE — PROGRESS NOTES
Pt showing change in mentation. Pt suddenly more confused, unable to recognize wife in room and very irritated. Charge RN notified. MD paged, awaiting call back.

## 2020-08-14 NOTE — PROGRESS NOTES
Patient admitted to PCU room 324 from ER. Patient oriented to room, call light, bed rails, phone, lights and bathroom. Patient instructed about the schedule of the day including: vital sign frequency, lab draws, possible tests, frequency of MD and staff rounds, daily weights, I &O's and prescribed diet. No bed alarm in place, patient is low fall risk, A&O x 4, uses call light appropriately; able to make needs known. PCU Telemetry box in place, patient aware of placement and reason. Bed locked, in lowest position, side rails up 2/4, call light within reach. Recliner Assessment  Patient is not able to demonstrated the ability to move from a reclining position to an upright position within the recliner. however patient is alert, oriented and able to provide informed consent    4 Eyes Skin Assessment     The patient is being assess for   Admission    I agree that 2 RN's have performed a thorough Head to Toe Skin Assessment on the patient. ALL assessment sites listed below have been assessed. Areas assessed by both nurses:   [x]   Head, Face, and Ears   [x]   Shoulders, Back, and Chest, Abdomen  [x]   Arms, Elbows, and Hands   []   Coccyx, Sacrum, and Ischium  [x]   Legs, Feet, and Heels        Scattered bruising, old surgical scars observed. Patient refused full skin assessment of bottom. Pt denies skin impairment to buttocks and groin area. **SHARE this note so that the co-signing nurse is able to place an eSignature**    Co-signer eSignature: Electronically signed by Bk Archer RN on 8/14/20 at 2:56 AM EDT    Does the Patient have Skin Breakdown?   No          Johann Prevention initiated:  No   Wound Care Orders initiated:  No      [facility] nurse consulted for Pressure Injury (Stage 3,4, Unstageable, DTI, NWPT, Complex wounds)and New or Established Ostomies:  NA      Primary Nurse eSignature: Electronically signed by Maury Leavitt RN on 8/13/20 at 11:17 PM EDT

## 2020-08-14 NOTE — PROGRESS NOTES
Am assessment completed. See flowsheet. Pt with delayed responses at this time. Pt's wife at bedside. Medicated with norco for abdominal pain. See mar. Pt denies additional needs at this time. Bed alarm on for safety. Call light within reach. Franci Morillo RN\

## 2020-08-14 NOTE — PROGRESS NOTES
Progress Note    Admit Date:  8/13/2020    Admitted for hyponatremia    Subjective:  Mr. Aung Sarabia reports he is no longer having any abdominal pain but does feel generally tired     Objective:   Patient Vitals for the past 4 hrs:   BP Temp Temp src Pulse Resp SpO2   08/14/20 0846 -- 98.2 °F (36.8 °C) Oral -- -- --   08/14/20 0815 (!) 176/105 97.1 °F (36.2 °C) Oral 84 18 97 %       Intake/Output Summary (Last 24 hours) at 8/14/2020 1056  Last data filed at 8/14/2020 0846  Gross per 24 hour   Intake 1186 ml   Output 550 ml   Net 636 ml       Physical Exam:  Gen: No distress. Alert. Eyes:  No conjunctival injection. ENT: No discharge. Pharynx clear. Neck: Trachea midline. Resp: No accessory muscle use. No crackles. No wheezes. No rhonchi. CV: Regular rate. Regular rhythm. + murmur. No rub. No edema. Capillary Refill: Brisk,< 3 seconds   Peripheral Pulses: +2 palpable, equal bilaterally   GI: Non-tender. Non-distended. Normal bowel sounds. Skin: Warm and dry. M/S: No cyanosis. No joint deformity. No clubbing. Neuro: Awake. Grossly nonfocal, generalized weakness  Psych: Oriented x 3. No anxiety or agitation. LAURA Negro have reviewed the chart on [facility] and personally interviewed and examined patient, reviewed the data (labs and imaging) and after discussion with my PA formulated the plan. Agree with note with the following edits. HPI:     I reviewed the patient's Past Medical History, Past Surgical History, Medications, and Allergies. Elderly male up in chair, feels ok today . Fatigued   No abd pain    Appears to be not in any distress  Mucous Membranes:  Pink , anicteric  Neck: No JVD, no carotid bruit, no thyromegaly  Chest:  Clear to auscultation bilaterally, no added sounds  Cardiovascular:  RRR S1S2 heard, no murmurs or gallops  Abdomen:  Soft, undistended, non tender, no organomegaly, BS present  Extremities: No edema or cyanosis.  Distal pulses well felt  Neurological : grossly normal                Scheduled Meds:   melatonin  6 mg Oral Nightly    [START ON 8/15/2020] levETIRAcetam  500 mg Oral Dinner    dilTIAZem  120 mg Oral Daily    dronabinol  2.5 mg Oral BID    sodium chloride flush  10 mL Intravenous 2 times per day    enoxaparin  40 mg Subcutaneous Daily       Continuous Infusions:      PRN Meds:  sodium chloride flush, acetaminophen **OR** acetaminophen, promethazine **OR** ondansetron, HYDROmorphone, HYDROcodone 5 mg - acetaminophen      Data:  CBC:   Recent Labs     08/13/20  1620   WBC 4.8   HGB 14.5   HCT 43.5   MCV 92.2   PLT 98*     BMP:   Recent Labs     08/13/20  1620 08/14/20  0455   * 121*   K 4.4 4.8   CL 90* 87*   CO2 28 26   PHOS  --  2.2*   BUN 8 7   CREATININE 0.6* <0.5*     LIVER PROFILE:   Recent Labs     08/13/20  1620   AST 69*   ALT 87*   BILITOT 1.3*   ALKPHOS 160*     PT/INR:   Recent Labs     08/13/20  1620   PROTIME 10.7   INR 0.92       CULTURES  Blood Cx: pending      RADIOLOGY  CT ABDOMEN PELVIS W IV CONTRAST Additional Contrast? Oral   Final Result   1. Status post esophagectomy with gastric pull-through   2. Interval decrease in para-aortic retroperitoneal adenopathy   3. Interval decrease in left perinephric tumor nodules   4. Stable left adrenal mass and celiac lymph node   5. No acute gastrointestinal abnormality   6. Colonic diverticulosis   7. Status post prostatectomy   8. Status post hysterectomy   9. Right nephrolithiasis         XR CHEST PORTABLE   Final Result   No acute findings             Assessment/Plan:    Intractable abdominal pain  - CT A/P without acute concerning findings  - Abdominal pain resolved with pain control in Er.  No recurrence   - Tolerating PO intake but minimal appetite  - Need to check UA    Hyponatremia  -  likely secondary to decreased solute intake and SIADH with hx of metastatic cancer  - Was given IVF and Na dropped, IVF stopped  - Na on admission 125--> 121-->121  - Tele monitor   - Seizure

## 2020-08-14 NOTE — H&P
09/14/2017    diverticulosis    ESOPHAGEAL DILATATION N/A 1/3/2019    ESOPHAGOGASTRODUODENOSCOPY WITH BIOPSY  CPT CODE - 89216 performed by Joseph Wang MD at [place] [address] ESOPHAGECTOMY N/A 4/18/2019    [facility] ESOPHAGECTOMY WITH MEDIASTINAL LYMPHADENECTOMY INCLUDING THORACIC DUCT, UMBILICAL HERNIA REPAIR performed by Joseph Wang MD at [address] / REMOVAL / REPLACEMENT VENOUS ACCESS CATHETER N/A 6/25/2019    PORT A CATH PLACEMENT AND PEG TUBE EXCHANGE performed by Joseph Wang MD at [address] [facility] OFFICE/OUTPT [facility] N/A 9/19/2018    EUS/ WITH ANESTHESIA performed by Feng Levy MD at [address] [facility] ENDOSCOPY  9/19/2018    EGD BIOPSY performed by Feng Levy MD at [address] N/A 4/2/2019    ESOPHAGOGASTRODUODENOSCOPY WITH BIOPSY  CPT CODE - 70919 performed by Joseph Wang MD at [address] [facility] [address]      hx of sleep apnea       Medications Prior to Admission:      Prior to Admission medications    Medication Sig Start Date End Date Taking? Authorizing Provider   Lansoprazole (PREVACID 24HR PO) Take by mouth    Historical Provider, MD   levETIRAcetam (KEPPRA) 500 MG tablet Take 500 mg by mouth 2 times daily    Historical Provider, MD   dexamethasone (DECADRON) 4 MG tablet Take 4 mg by mouth 3 times daily     Historical Provider, MD   dronabinol (MARINOL) 2.5 MG capsule Take 2.5 mg by mouth 2 times daily as needed. Historical Provider, MD   ondansetron (ZOFRAN ODT) 4 MG disintegrating tablet Take 1 tablet by mouth every 8 hours as needed for Nausea 2/10/20   Tatiana Gaona PA-C   diltiazem (CARDIZEM 12 HR) 120 MG extended release capsule Take 120 mg by mouth daily    Historical Provider, MD   gabapentin (NEURONTIN) 300 MG capsule Take 300 mg by mouth 3 times daily.     Historical Provider, MD ONE TOUCH ULTRA TEST strip  1/30/19   Historical Provider, MD Stephanie Cortez LANCETS 09W [address] [facility]  1/30/19   Historical Provider, MD   metFORMIN (GLUCOPHAGE) 500 MG tablet Take 1 tablet by mouth Daily with supper Metformin must be crushed. 4/24/19   Kathleen Alan, APRN - CNP       Allergies:  No known allergies    Social History:      TOBACCO:   reports that he quit smoking about 48 years ago. His smoking use included cigarettes. He has never used smokeless tobacco.  ETOH:   reports current alcohol use. Family History:        Problem Relation Age of Onset   Yumi Manriquez Cancer Father         lung    Other Mother         blood disorder       REVIEW OF SYSTEMS:   Constitutional:  Negative for fever,chills or night sweats  ENT:  Negative for rhinorrhea, epistaxis, hoarseness, sore throat. Respiratory: Negative for SOB or wheezing   Cardiovascular:   Negative for  chest pain, palpitations   Gastrointestinal:   Positive for abdominal pain and anorexia  Genitourinary:  Negative for polyuria, dysuria   Hematologic/Lymphatic:  Negative for  bleeding tendency, easy bruising  Musculoskeletal:  Negative for myalgias and arthralgias  Neurologic:  Negative for  confusion,dysarthria. Skin:  Negative for itching,rash  Psychiatric:  Negative for depression,anxiety, agitation. Endocrine:  Negative for polydipsia,polyuria,heat /cold intolerance. PHYSICAL EXAM:    BP (!) 136/95   Pulse 89   Temp 98 °F (36.7 °C)   Resp 23   Ht 5' 6\" (1.676 m)   Wt 138 lb (62.6 kg)   SpO2 100%   BMI 22.27 kg/m²     General appearance:  No apparent distress, appears stated age and cooperative. HEENT:  Normal cephalic, atraumatic without obvious deformity. Pupils equal, round, and reactive to light. Extra ocular muscles intact. Conjunctivae/corneas clear. Neck: Supple, with full range of motion. No jugular venous distention. Trachea midline. Respiratory:  Normal respiratory effort.  Clear to auscultation, bilaterally without Rales/Wheezes/Rhonchi. Cardiovascular:  Regular rate and rhythm with normal S1/S2 without murmurs, rubs or gallops. Abdomen: Soft, non-tender, non-distended with normal bowel sounds. Musculoskeletal:  No clubbing, cyanosis or edema bilaterally. Full range of motion without deformity. Skin: Skin color, texture, turgor normal.  No rashes or lesions. Neurologic:  Neurovascularly intact without any focal sensory/motor deficits. Cranial nerves: II-XII intact, grossly non-focal.  Psychiatric:  Alert and oriented, thought content appropriate, normal insight  Capillary Refill: Brisk,< 3 seconds   Peripheral Pulses: +2 palpable, equal bilaterally       Labs:   Recent Labs     08/13/20  1620   WBC 4.8   HGB 14.5   HCT 43.5   PLT 98*     Recent Labs     08/13/20  1620   *   K 4.4   CL 90*   CO2 28   BUN 8   CREATININE 0.6*   CALCIUM 8.9     Recent Labs     08/13/20  1620   AST 69*   ALT 87*   BILITOT 1.3*   ALKPHOS 160*     Recent Labs     08/13/20  1620   INR 0.92     No results for input(s): CKTOTAL, TROPONINI in the last 72 hours. Urinalysis:      Lab Results   Component Value Date    NITRU Negative 02/10/2020    BLOODU Negative 02/10/2020    SPECGRAV 1.015 02/10/2020    GLUCOSEU Negative 02/10/2020       Radiology:     CT ABDOMEN PELVIS W IV CONTRAST Additional Contrast? Oral   Final Result   1. Status post esophagectomy with gastric pull-through   2. Interval decrease in para-aortic retroperitoneal adenopathy   3. Interval decrease in left perinephric tumor nodules   4. Stable left adrenal mass and celiac lymph node   5. No acute gastrointestinal abnormality   6. Colonic diverticulosis   7. Status post prostatectomy   8. Status post hysterectomy   9.  Right nephrolithiasis         XR CHEST PORTABLE   Final Result   No acute findings             ASSESSMENT:  Intractable abdominal pain  Hyponatremia, likely secondary to decreased solute intake  Metastatic esophageal cancer with brain metastases  Hypertension  History of prostate cancer  Diabetes type 2, diet-controlled    PLAN:  Admit for pain control, IV fluids. Pain had pretty much resolved by the time I saw him in the emergency department. Resume home medications and recheck renal panel in the a.m. If sodium improving he can be discharged to follow-up with oncology as an outpatient. A CT scan of his abdomen does not show any acute findings different from his last scan. We will reevaluate in the a.m.  -Continue Keppra, Cardizem and other home medications. -Melatonin for sleep, PRN Dilaudid as necessary    DVT Prophylaxis: Lovenox  Diet: No diet orders on file  Code Status: Prior    Dispo -admit to [facility]      Thank you for the opportunity to be involved in this patient's care.       (Please note that portions of this note were completed with a voice recognition program. Efforts were made to edit the dictations but occasionally words are mis-transcribed.)

## 2020-08-15 LAB
ALBUMIN SERPL-MCNC: 2.9 G/DL (ref 3.4–5)
ALP BLD-CCNC: 160 U/L (ref 40–129)
ALT SERPL-CCNC: 111 U/L (ref 10–40)
ANION GAP SERPL CALCULATED.3IONS-SCNC: 10 MMOL/L (ref 3–16)
ANION GAP SERPL CALCULATED.3IONS-SCNC: 12 MMOL/L (ref 3–16)
ANION GAP SERPL CALCULATED.3IONS-SCNC: 17 MMOL/L (ref 3–16)
ANION GAP SERPL CALCULATED.3IONS-SCNC: 6 MMOL/L (ref 3–16)
AST SERPL-CCNC: 107 U/L (ref 15–37)
BACTERIA: ABNORMAL /HPF
BILIRUB SERPL-MCNC: 1.7 MG/DL (ref 0–1)
BILIRUBIN DIRECT: 0.7 MG/DL (ref 0–0.3)
BILIRUBIN URINE: ABNORMAL
BILIRUBIN, INDIRECT: 1 MG/DL (ref 0–1)
BLOOD, URINE: ABNORMAL
BUN BLDV-MCNC: 10 MG/DL (ref 7–20)
BUN BLDV-MCNC: 10 MG/DL (ref 7–20)
BUN BLDV-MCNC: 9 MG/DL (ref 7–20)
BUN BLDV-MCNC: 9 MG/DL (ref 7–20)
CALCIUM SERPL-MCNC: 8 MG/DL (ref 8.3–10.6)
CALCIUM SERPL-MCNC: 8.3 MG/DL (ref 8.3–10.6)
CALCIUM SERPL-MCNC: 8.3 MG/DL (ref 8.3–10.6)
CALCIUM SERPL-MCNC: 8.4 MG/DL (ref 8.3–10.6)
CHLORIDE BLD-SCNC: 86 MMOL/L (ref 99–110)
CHLORIDE BLD-SCNC: 87 MMOL/L (ref 99–110)
CHLORIDE BLD-SCNC: 89 MMOL/L (ref 99–110)
CHLORIDE BLD-SCNC: 92 MMOL/L (ref 99–110)
CHLORIDE URINE RANDOM: 147 MMOL/L
CLARITY: CLEAR
CO2: 22 MMOL/L (ref 21–32)
CO2: 24 MMOL/L (ref 21–32)
CO2: 25 MMOL/L (ref 21–32)
CO2: 25 MMOL/L (ref 21–32)
COLOR: YELLOW
CREAT SERPL-MCNC: <0.5 MG/DL (ref 0.8–1.3)
CREATININE URINE: 82.8 MG/DL (ref 39–259)
GFR AFRICAN AMERICAN: >60
GFR NON-AFRICAN AMERICAN: >60
GLUCOSE BLD-MCNC: 104 MG/DL (ref 70–99)
GLUCOSE BLD-MCNC: 109 MG/DL (ref 70–99)
GLUCOSE BLD-MCNC: 128 MG/DL (ref 70–99)
GLUCOSE BLD-MCNC: 99 MG/DL (ref 70–99)
GLUCOSE URINE: NEGATIVE MG/DL
KETONES, URINE: >=80 MG/DL
LEUKOCYTE ESTERASE, URINE: NEGATIVE
MAGNESIUM: 1.7 MG/DL (ref 1.8–2.4)
MICROSCOPIC EXAMINATION: YES
MUCUS: ABNORMAL /LPF
NITRITE, URINE: NEGATIVE
OSMOLALITY URINE: 672 MOSM/KG (ref 390–1070)
PH UA: 5.5 (ref 5–8)
POTASSIUM REFLEX MAGNESIUM: 3.5 MMOL/L (ref 3.5–5.1)
POTASSIUM REFLEX MAGNESIUM: 3.6 MMOL/L (ref 3.5–5.1)
POTASSIUM REFLEX MAGNESIUM: 3.7 MMOL/L (ref 3.5–5.1)
POTASSIUM REFLEX MAGNESIUM: 4.2 MMOL/L (ref 3.5–5.1)
POTASSIUM, UR: 64.1 MMOL/L
PROTEIN UA: ABNORMAL MG/DL
RBC UA: ABNORMAL /HPF (ref 0–4)
SODIUM BLD-SCNC: 120 MMOL/L (ref 136–145)
SODIUM BLD-SCNC: 123 MMOL/L (ref 136–145)
SODIUM BLD-SCNC: 126 MMOL/L (ref 136–145)
SODIUM BLD-SCNC: 126 MMOL/L (ref 136–145)
SODIUM URINE: 124 MMOL/L
SPECIFIC GRAVITY UA: >=1.03 (ref 1–1.03)
T4 FREE: 1.5 NG/DL (ref 0.9–1.8)
TOTAL PROTEIN: 5.1 G/DL (ref 6.4–8.2)
URINE REFLEX TO CULTURE: ABNORMAL
URINE TYPE: ABNORMAL
UROBILINOGEN, URINE: 0.2 E.U./DL
WBC UA: ABNORMAL /HPF (ref 0–5)

## 2020-08-15 PROCEDURE — 83735 ASSAY OF MAGNESIUM: CPT

## 2020-08-15 PROCEDURE — 2500000003 HC RX 250 WO HCPCS: Performed by: INTERNAL MEDICINE

## 2020-08-15 PROCEDURE — 80048 BASIC METABOLIC PNL TOTAL CA: CPT

## 2020-08-15 PROCEDURE — 6370000000 HC RX 637 (ALT 250 FOR IP): Performed by: INTERNAL MEDICINE

## 2020-08-15 PROCEDURE — 81001 URINALYSIS AUTO W/SCOPE: CPT

## 2020-08-15 PROCEDURE — 82570 ASSAY OF URINE CREATININE: CPT

## 2020-08-15 PROCEDURE — 83935 ASSAY OF URINE OSMOLALITY: CPT

## 2020-08-15 PROCEDURE — 84300 ASSAY OF URINE SODIUM: CPT

## 2020-08-15 PROCEDURE — 2580000003 HC RX 258: Performed by: INTERNAL MEDICINE

## 2020-08-15 PROCEDURE — 2060000000 HC ICU INTERMEDIATE R&B

## 2020-08-15 PROCEDURE — 36591 DRAW BLOOD OFF VENOUS DEVICE: CPT

## 2020-08-15 PROCEDURE — 80076 HEPATIC FUNCTION PANEL: CPT

## 2020-08-15 PROCEDURE — 82436 ASSAY OF URINE CHLORIDE: CPT

## 2020-08-15 PROCEDURE — 99232 SBSQ HOSP IP/OBS MODERATE 35: CPT | Performed by: PHYSICIAN ASSISTANT

## 2020-08-15 PROCEDURE — 6360000002 HC RX W HCPCS: Performed by: INTERNAL MEDICINE

## 2020-08-15 PROCEDURE — 84133 ASSAY OF URINE POTASSIUM: CPT

## 2020-08-15 RX ORDER — OXYCODONE HYDROCHLORIDE AND ACETAMINOPHEN 5; 325 MG/1; MG/1
2 TABLET ORAL EVERY 4 HOURS PRN
Status: DISCONTINUED | OUTPATIENT
Start: 2020-08-15 | End: 2020-08-17

## 2020-08-15 RX ORDER — OXYCODONE HYDROCHLORIDE AND ACETAMINOPHEN 5; 325 MG/1; MG/1
1 TABLET ORAL EVERY 4 HOURS PRN
Status: DISCONTINUED | OUTPATIENT
Start: 2020-08-15 | End: 2020-08-17

## 2020-08-15 RX ORDER — HALOPERIDOL 5 MG/ML
5 INJECTION INTRAMUSCULAR EVERY 6 HOURS PRN
Status: DISCONTINUED | OUTPATIENT
Start: 2020-08-15 | End: 2020-08-19 | Stop reason: HOSPADM

## 2020-08-15 RX ADMIN — HYDROMORPHONE HYDROCHLORIDE 0.5 MG: 1 INJECTION, SOLUTION INTRAMUSCULAR; INTRAVENOUS; SUBCUTANEOUS at 13:43

## 2020-08-15 RX ADMIN — DRONABINOL 2.5 MG: 2.5 CAPSULE ORAL at 20:13

## 2020-08-15 RX ADMIN — LEVETIRACETAM 500 MG: 500 TABLET ORAL at 17:27

## 2020-08-15 RX ADMIN — MELATONIN TAB 3 MG 6 MG: 3 TAB at 20:13

## 2020-08-15 RX ADMIN — SODIUM CHLORIDE: 234 INJECTION INTRAMUSCULAR; INTRAVENOUS; SUBCUTANEOUS at 06:19

## 2020-08-15 RX ADMIN — HALOPERIDOL LACTATE 5 MG: 5 INJECTION, SOLUTION INTRAMUSCULAR at 01:31

## 2020-08-15 RX ADMIN — Medication 10 ML: at 08:14

## 2020-08-15 RX ADMIN — DRONABINOL 2.5 MG: 2.5 CAPSULE ORAL at 08:26

## 2020-08-15 RX ADMIN — DILTIAZEM HYDROCHLORIDE 120 MG: 60 CAPSULE, EXTENDED RELEASE ORAL at 08:26

## 2020-08-15 RX ADMIN — SODIUM CHLORIDE: 234 INJECTION INTRAMUSCULAR; INTRAVENOUS; SUBCUTANEOUS at 22:58

## 2020-08-15 RX ADMIN — HYDROMORPHONE HYDROCHLORIDE 0.5 MG: 1 INJECTION, SOLUTION INTRAMUSCULAR; INTRAVENOUS; SUBCUTANEOUS at 08:12

## 2020-08-15 RX ADMIN — HYDROMORPHONE HYDROCHLORIDE 0.5 MG: 1 INJECTION, SOLUTION INTRAMUSCULAR; INTRAVENOUS; SUBCUTANEOUS at 18:04

## 2020-08-15 ASSESSMENT — PAIN DESCRIPTION - PROGRESSION: CLINICAL_PROGRESSION: NOT CHANGED

## 2020-08-15 ASSESSMENT — PAIN SCALES - GENERAL
PAINLEVEL_OUTOF10: 9
PAINLEVEL_OUTOF10: 7
PAINLEVEL_OUTOF10: 7
PAINLEVEL_OUTOF10: 10

## 2020-08-15 ASSESSMENT — PAIN DESCRIPTION - DESCRIPTORS
DESCRIPTORS: CONSTANT;DULL
DESCRIPTORS: DISCOMFORT

## 2020-08-15 ASSESSMENT — PAIN DESCRIPTION - FREQUENCY: FREQUENCY: CONTINUOUS

## 2020-08-15 ASSESSMENT — PAIN DESCRIPTION - LOCATION
LOCATION: ABDOMEN

## 2020-08-15 ASSESSMENT — PAIN DESCRIPTION - ONSET: ONSET: ON-GOING

## 2020-08-15 ASSESSMENT — PAIN DESCRIPTION - PAIN TYPE
TYPE: ACUTE PAIN

## 2020-08-15 ASSESSMENT — PAIN - FUNCTIONAL ASSESSMENT: PAIN_FUNCTIONAL_ASSESSMENT: ACTIVITIES ARE NOT PREVENTED

## 2020-08-15 NOTE — PLAN OF CARE
Nutrition Problem #1: Inadequate oral intake  Intervention: Food and/or Nutrient Delivery: Continue Current Diet, Start Oral Nutrition Supplement, Snacks (Comment)  Nutritional Goals: wt maintenance during admission, PO intake > 75% of meals & supplement

## 2020-08-15 NOTE — PROGRESS NOTES
Early during shift pt was rolling around in bed moving legs over rails. Telesitter alarming frequently. Pt easily redirected and able to follow commands. Pt found to be guarding abdomen and was able to tell RN that his stomach was hurting. PRN IV dilaudid administered. RN reassessed x15 min later. Pt lying still /calm in bed. Pt able to answer orientation questions appropriately except for thinking that he was at home. Wife is at bedside and states that patient is doing much better than yesterday.

## 2020-08-15 NOTE — PROGRESS NOTES
Progress Note    Admit Date:  8/13/2020    Admitted for hyponatremia    Subjective:  Mr. Jacque Pino reports no pain. Feeling better today     Confused yesterday evening, today he is alert and oriented. Wife at bedside states he is \"1 million times better\"  Na improving     Objective:   Patient Vitals for the past 4 hrs:   BP Temp Temp src Pulse Resp   08/15/20 0815 118/64 97.9 °F (36.6 °C) Axillary 86 16       Intake/Output Summary (Last 24 hours) at 8/15/2020 1007  Last data filed at 8/15/2020 1591  Gross per 24 hour   Intake 1445 ml   Output --   Net 1445 ml       Physical Exam:    Gen: No distress. Alert. Eyes:  No conjunctival injection. ENT: No discharge. Pharynx clear. Neck: Trachea midline. Resp: No accessory muscle use. No crackles. No wheezes. No rhonchi. CV: Regular rate. Regular rhythm. + murmur. No rub. No edema. GI: Non-tender. Non-distended. Normal bowel sounds. Skin: Warm and dry. M/S: No cyanosis. No joint deformity. No clubbing. Neuro: Awake. Grossly nonfocal, generalized weakness  Psych: Oriented x 3. No anxiety or agitation.      Scheduled Meds:   melatonin  6 mg Oral Nightly    levETIRAcetam  500 mg Oral Dinner    dilTIAZem  120 mg Oral Daily    dronabinol  2.5 mg Oral BID    sodium chloride flush  10 mL Intravenous 2 times per day    enoxaparin  40 mg Subcutaneous Daily       Continuous Infusions:   IV infusion builder 65 mL/hr at 08/15/20 0619       PRN Meds:  haloperidol lactate, hydrALAZINE, sodium chloride flush, acetaminophen **OR** acetaminophen, promethazine **OR** ondansetron, HYDROmorphone, HYDROcodone 5 mg - acetaminophen      Data:  CBC:   Recent Labs     08/13/20  1620   WBC 4.8   HGB 14.5   HCT 43.5   MCV 92.2   PLT 98*     BMP:   Recent Labs     08/14/20  0455  08/14/20  1808 08/15/20  0109 08/15/20  0654   *   < > 121* 120* 126*   K 4.8   < > 4.6 4.2 3.7   CL 87*   < > 88* 86* 87*   CO2 26   < > 24 24 22   PHOS 2.2*  --   --   --   --    BUN 7   < > 8 9 doses)    #History of prostate cancer  - s/p prostatectomy     #Diabetes type 2  - diet-controlled    #Hypophosphatemia  - replacement     #Elevated LFTs  - mildly trending up   - Repeat labs and monitor      #Nephrolithiasis   - 3 mm R renal stone noted     DVT Prophylaxis: Lovenox   Diet: DIET GENERAL; Daily Fluid Restriction: 1200 ml  Code Status: Full Code    Jody Ritchie PA-C  8/15/2020 10:12 AM

## 2020-08-15 NOTE — PROGRESS NOTES
Pt is lying in bed with their eyes closed. Respirations are easy and even. Call light within reach bed in lowest position with the wheels locked. Will continue to monitor.  Blake Zelaya

## 2020-08-15 NOTE — PROGRESS NOTES
Comprehensive Nutrition Assessment    Type and Reason for Visit:  Initial, Consult(supplement recommendation)    Nutrition Recommendations/Plan:   1. Continue General Diet as tolerated  2. Add standard high kcal ONS (Ensure Enlive) po TID. 3. Add snacks at 10a and 2p   4. Please continue to document %PO in EMR  5. Will continue to monitor PO intake, nutrition adequacy, labs, bowel function and F/U PRN. Nutrition Assessment:  Pt nutirtionally compromised PTA AEB esohphagel ca with brain mets undergoing radiation, with decreased appetite, altered taste, poor PO intake on appetite stimulant, significant wt loss x 3mo. Remains at risk r/t current clinical status, continued poor PO intake. Will add ONS po TID and snacks BID, continue to monitor PO intake. Malnutrition Assessment:  Malnutrition Status:  Mild malnutrition(Suspect higher level of malnutrition, no NFPE performed as Res resting, bundled in blanket)    Context:  Chronic Illness     Findings of the 6 clinical characteristics of malnutrition:  Energy Intake:  7 - 75% or less estimated energy requirements for 1 month or longer  Weight Loss:  7 - Greater than 7.5% over 3 months(wt loss 5#/3.4% x 1mo; wt loss 8.4% x 3mo (sig); wt loss 9% x 6mo; wt loss 16% x 1yr (sig))     Body Fat Loss:  Unable to assess   Muscle Mass Loss:  Unable to assess   Fluid Accumulation:  No significant fluid accumulation    Strength:  Not Performed    Estimated Daily Nutrient Needs:  Energy (kcal):  7708-3436TWFD (32-35 kcal/kg/CBW); Weight Used for Energy Requirements:  Current     Protein (g):  83-96g (1.3-1.5g/kg/CBW); Weight Used for Protein Requirements:  Current        Fluid (ml/day):  1200ml FR per MD; Weight Used for Fluid Requirements:  Current      Nutrition Related Findings:  Res sleepy at time of visit, wife at bedside. Esophageal ca with brain mets undergoing gamma knife radiation, on [place]  Reports decreased appetitie, poor PO intake, on marinol; acute abdominal pain, abdomen soft, guarded +BS x4; no edema, no PIs, hyponatremia improving, abnormal liver function tests. Reviewed supplements - Pt agreeable to strawberry standard high kcal supplement. Wounds:  None(scattered bruises per nrsg)       Current Nutrition Therapies:    DIET GENERAL; Daily Fluid Restriction: 1200 ml  Dietary Nutrition Supplements: Standard High Calorie Oral Supplement  Dietary Nutrition Supplements: Snack (see comment)    Anthropometric Measures:  · Height: 5' 6\" (167.6 cm)  · Current Body Weight: 142 lb 6 oz (64.6 kg)   · Admission Body Weight: 143 lb 1 oz (64.9 kg)(standing)    · Usual Body Weight: (UBW range 142-169 x 1yr)     · Ideal Body Weight: 142 lbs; % Ideal Body Weight 100.3 %   · BMI: 23  · Adjusted Body Weight:     · BMI Categories: Normal Weight (BMI 22.0 to 24.9) age over 72       Nutrition Diagnosis:   · Inadequate oral intake related to catabolic illness, increase demand for energy/nutrients as evidenced by weight loss 7.5% in 3 months, lab values, nausea, poor intake prior to admission, intake 0-25%, BMI      Nutrition Interventions:   Food and/or Nutrient Delivery:  Continue Current Diet, Start Oral Nutrition Supplement, Snacks (Comment)  Nutrition Education/Counseling:  Counseling initiated, No recommendation at this time   Coordination of Nutrition Care:  Continued Inpatient Monitoring, Coordination of Community Care    Goals:  wt maintenance during admission, PO intake > 75% of meals & supplement       Nutrition Monitoring and Evaluation:   Food/Nutrient Intake Outcomes:  Food and Nutrient Intake, Supplement Intake  Physical Signs/Symptoms Outcomes:  Biochemical Data, Chewing or Swallowing, Nausea or Vomiting, Meal Time Behavior, Nutrition Focused Physical Findings, Skin, Weight     Discharge Planning:     Too soon to determine     Electronically signed by William Velasquez RD, LD on 8/15/20 at 5:13 PM EDT    Contact: [phone]

## 2020-08-15 NOTE — PROGRESS NOTES
Bedside report and Pt care transferred to [facility]. Pt denies any assistance at this time.

## 2020-08-15 NOTE — FLOWSHEET NOTE
08/14/20 2213   Vitals   Temp 98.2 °F (36.8 °C)   Temp Source Oral   Pulse 89   Heart Rate Source Monitor   Resp 16   /63   MAP (mmHg) 79   Level of Consciousness 0   MEWS Score 1   Oxygen Therapy   SpO2 96 %   O2 Device None (Room air)   Vital signs stable. Pt is alert but remains nonverbal at the moment. Nothing new noted on head to toe assessment. Pt is NSR on the monitor. Evening medication administration completed. 100 meq of NaCL in NS continues to infuse at 65 ml/hr. Pt incontinent of large amount of urine. Linens changed and Pt cleaned accordingly. Tele-sitter at bedside for Pt safety. Bed alarm remain in place. Pt denies any further assistance at the moment. Will continue to monitor.

## 2020-08-15 NOTE — PROGRESS NOTES
[facility]    Follow-up note           Reason for Consult:  Hyponatremia  Requesting Physician:  Dr. Noman Downs history  Patient feels better  He prefers to eat Premier protein shakes  Sodium up to 126    Last 24 h uop 550 mL charted      ROS: No chest pain/shortness of breath/fever/nausea/vomiting  PSFH: No visitor    Scheduled Meds:   melatonin  6 mg Oral Nightly    levETIRAcetam  500 mg Oral Dinner    dilTIAZem  120 mg Oral Daily    dronabinol  2.5 mg Oral BID    sodium chloride flush  10 mL Intravenous 2 times per day    enoxaparin  40 mg Subcutaneous Daily     Continuous Infusions:   IV infusion builder 65 mL/hr at 08/15/20 0619     PRN Meds:.haloperidol lactate, hydrALAZINE, sodium chloride flush, acetaminophen **OR** acetaminophen, promethazine **OR** ondansetron, HYDROmorphone, HYDROcodone 5 mg - acetaminophen     History of Present Illness on 8/14/20:    76 y.o. yo male with PMH of esophageal cancer with brain mets s/p who is admitted for abd pain and was noted to have hyponatremia  Not been eating well for few days  No n/v/d  Sodium dropped from 125 to 121 on NS    Physical exam:   Constitutional:  VITALS:  /64   Pulse 86   Temp 97.9 °F (36.6 °C) (Axillary)   Resp 16   Ht 5' 6\" (1.676 m)   Wt 142 lb 6.4 oz (64.6 kg)   SpO2 97%   BMI 22.98 kg/m²   Gen: alert, awake, nad  Skin: no rash, turgor wnl  Heent:  eomi, mmm  Neck: no bruits or jvd noted, thyroid normal  Cardiovascular:  S1, S2 without m/r/g  Respiratory: CTA B without w/r/r; respiratory effort normal  Abdomen:  +bs, soft, nt, nd, no hepatosplenomegaly  Ext: no lower extremity edema  Neuro/Psy: AAoriented times 3 ; moves all 4 ext  Musculoskeletal:  Rom, muscular strength intact; digits, nails normal    Data/  Recent Labs     08/13/20  1620   WBC 4.8   HGB 14.5   HCT 43.5   MCV 92.2   PLT 98*     Recent Labs     08/14/20  0455  08/14/20  1808 08/15/20  0109 08/15/20  0654   *   < > 121* 120* 126*   K 4.8   < > 4.6 4.2 3.7   CL 87*   < > 88* 86* 87*   CO2 26   < > 24 24 22   GLUCOSE 134*   < > 128* 109* 99   PHOS 2.2*  --   --   --   --    BUN 7   < > 8 9 10   CREATININE <0.5*   < > <0.5* <0.5* <0.5*   LABGLOM >60   < > >60 >60 >60   GFRAA >60   < > >60 >60 >60    < > = values in this interval not displayed. 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 Also w decreased solute   -abd paiin  -metastatic esoph cancer w brain mets   -HTN  -DM    Plan  -Serial BMPs as ordered  -1.5 %saline at 65 ml/h  -cont antihypertensives  -Fluid restriction 1200 ml/day  -strict intake and out put, encourage protein  -goal of 132-134 by 0600 on 8/16        Thank you for the consultation. Please do not hesitate to call with questions.     Katherine Hooker  [facility]  Office: [phone]  Fax:    [phone]

## 2020-08-16 LAB
ALBUMIN SERPL-MCNC: 3 G/DL (ref 3.4–5)
ALP BLD-CCNC: 231 U/L (ref 40–129)
ALT SERPL-CCNC: 181 U/L (ref 10–40)
ANION GAP SERPL CALCULATED.3IONS-SCNC: 8 MMOL/L (ref 3–16)
ANION GAP SERPL CALCULATED.3IONS-SCNC: 9 MMOL/L (ref 3–16)
AST SERPL-CCNC: 202 U/L (ref 15–37)
BASOPHILS ABSOLUTE: 0 K/UL (ref 0–0.2)
BASOPHILS RELATIVE PERCENT: 0.6 %
BILIRUB SERPL-MCNC: 2 MG/DL (ref 0–1)
BILIRUBIN DIRECT: 1 MG/DL (ref 0–0.3)
BILIRUBIN, INDIRECT: 1 MG/DL (ref 0–1)
BUN BLDV-MCNC: 11 MG/DL (ref 7–20)
BUN BLDV-MCNC: 9 MG/DL (ref 7–20)
CALCIUM SERPL-MCNC: 8.5 MG/DL (ref 8.3–10.6)
CALCIUM SERPL-MCNC: 8.6 MG/DL (ref 8.3–10.6)
CHLORIDE BLD-SCNC: 93 MMOL/L (ref 99–110)
CHLORIDE BLD-SCNC: 96 MMOL/L (ref 99–110)
CO2: 25 MMOL/L (ref 21–32)
CO2: 26 MMOL/L (ref 21–32)
CREAT SERPL-MCNC: <0.5 MG/DL (ref 0.8–1.3)
CREAT SERPL-MCNC: <0.5 MG/DL (ref 0.8–1.3)
EOSINOPHILS ABSOLUTE: 0.1 K/UL (ref 0–0.6)
EOSINOPHILS RELATIVE PERCENT: 2.1 %
GFR AFRICAN AMERICAN: >60
GFR AFRICAN AMERICAN: >60
GFR NON-AFRICAN AMERICAN: >60
GFR NON-AFRICAN AMERICAN: >60
GLUCOSE BLD-MCNC: 139 MG/DL (ref 70–99)
GLUCOSE BLD-MCNC: 99 MG/DL (ref 70–99)
HCT VFR BLD CALC: 36.2 % (ref 40.5–52.5)
HEMOGLOBIN: 12.8 G/DL (ref 13.5–17.5)
LYMPHOCYTES ABSOLUTE: 0.6 K/UL (ref 1–5.1)
LYMPHOCYTES RELATIVE PERCENT: 18.6 %
MAGNESIUM: 1.8 MG/DL (ref 1.8–2.4)
MCH RBC QN AUTO: 31.9 PG (ref 26–34)
MCHC RBC AUTO-ENTMCNC: 35.2 G/DL (ref 31–36)
MCV RBC AUTO: 90.7 FL (ref 80–100)
MONOCYTES ABSOLUTE: 0.4 K/UL (ref 0–1.3)
MONOCYTES RELATIVE PERCENT: 12.6 %
NEUTROPHILS ABSOLUTE: 2.1 K/UL (ref 1.7–7.7)
NEUTROPHILS RELATIVE PERCENT: 66.1 %
PDW BLD-RTO: 13.2 % (ref 12.4–15.4)
PLATELET # BLD: 56 K/UL (ref 135–450)
PLATELET SLIDE REVIEW: ABNORMAL
PMV BLD AUTO: 7.2 FL (ref 5–10.5)
POTASSIUM REFLEX MAGNESIUM: 3.4 MMOL/L (ref 3.5–5.1)
POTASSIUM REFLEX MAGNESIUM: 3.8 MMOL/L (ref 3.5–5.1)
RBC # BLD: 4 M/UL (ref 4.2–5.9)
SLIDE REVIEW: ABNORMAL
SODIUM BLD-SCNC: 126 MMOL/L (ref 136–145)
SODIUM BLD-SCNC: 131 MMOL/L (ref 136–145)
TOTAL PROTEIN: 5 G/DL (ref 6.4–8.2)
WBC # BLD: 3.1 K/UL (ref 4–11)

## 2020-08-16 PROCEDURE — 6370000000 HC RX 637 (ALT 250 FOR IP): Performed by: INTERNAL MEDICINE

## 2020-08-16 PROCEDURE — 99232 SBSQ HOSP IP/OBS MODERATE 35: CPT | Performed by: INTERNAL MEDICINE

## 2020-08-16 PROCEDURE — 2500000003 HC RX 250 WO HCPCS: Performed by: INTERNAL MEDICINE

## 2020-08-16 PROCEDURE — 83735 ASSAY OF MAGNESIUM: CPT

## 2020-08-16 PROCEDURE — 2580000003 HC RX 258: Performed by: INTERNAL MEDICINE

## 2020-08-16 PROCEDURE — 85025 COMPLETE CBC W/AUTO DIFF WBC: CPT

## 2020-08-16 PROCEDURE — 6360000002 HC RX W HCPCS: Performed by: INTERNAL MEDICINE

## 2020-08-16 PROCEDURE — 80048 BASIC METABOLIC PNL TOTAL CA: CPT

## 2020-08-16 PROCEDURE — 6370000000 HC RX 637 (ALT 250 FOR IP): Performed by: PHYSICIAN ASSISTANT

## 2020-08-16 PROCEDURE — 2060000000 HC ICU INTERMEDIATE R&B

## 2020-08-16 PROCEDURE — 80076 HEPATIC FUNCTION PANEL: CPT

## 2020-08-16 RX ORDER — 0.9 % SODIUM CHLORIDE 0.9 %
500 INTRAVENOUS SOLUTION INTRAVENOUS ONCE
Status: COMPLETED | OUTPATIENT
Start: 2020-08-16 | End: 2020-08-16

## 2020-08-16 RX ORDER — TOLVAPTAN 15 MG/1
7.5 TABLET ORAL ONCE
Status: COMPLETED | OUTPATIENT
Start: 2020-08-16 | End: 2020-08-16

## 2020-08-16 RX ADMIN — HYDROMORPHONE HYDROCHLORIDE 0.5 MG: 1 INJECTION, SOLUTION INTRAMUSCULAR; INTRAVENOUS; SUBCUTANEOUS at 01:22

## 2020-08-16 RX ADMIN — HYDROMORPHONE HYDROCHLORIDE 0.5 MG: 1 INJECTION, SOLUTION INTRAMUSCULAR; INTRAVENOUS; SUBCUTANEOUS at 20:31

## 2020-08-16 RX ADMIN — POTASSIUM BICARBONATE 40 MEQ: 782 TABLET, EFFERVESCENT ORAL at 09:21

## 2020-08-16 RX ADMIN — DILTIAZEM HYDROCHLORIDE 120 MG: 60 CAPSULE, EXTENDED RELEASE ORAL at 08:38

## 2020-08-16 RX ADMIN — OXYCODONE HYDROCHLORIDE AND ACETAMINOPHEN 1 TABLET: 5; 325 TABLET ORAL at 10:14

## 2020-08-16 RX ADMIN — SODIUM CHLORIDE 500 ML: 9 INJECTION, SOLUTION INTRAVENOUS at 14:55

## 2020-08-16 RX ADMIN — Medication 10 ML: at 20:32

## 2020-08-16 RX ADMIN — LEVETIRACETAM 500 MG: 500 TABLET ORAL at 17:14

## 2020-08-16 RX ADMIN — DRONABINOL 2.5 MG: 2.5 CAPSULE ORAL at 20:31

## 2020-08-16 RX ADMIN — Medication 10 ML: at 08:39

## 2020-08-16 RX ADMIN — TOLVAPTAN 7.5 MG: 15 TABLET ORAL at 00:26

## 2020-08-16 RX ADMIN — MELATONIN TAB 3 MG 6 MG: 3 TAB at 20:31

## 2020-08-16 RX ADMIN — OXYCODONE HYDROCHLORIDE AND ACETAMINOPHEN 1 TABLET: 5; 325 TABLET ORAL at 23:18

## 2020-08-16 RX ADMIN — HYDROMORPHONE HYDROCHLORIDE 0.5 MG: 1 INJECTION, SOLUTION INTRAMUSCULAR; INTRAVENOUS; SUBCUTANEOUS at 08:39

## 2020-08-16 ASSESSMENT — PAIN SCALES - GENERAL
PAINLEVEL_OUTOF10: 5
PAINLEVEL_OUTOF10: 5
PAINLEVEL_OUTOF10: 4
PAINLEVEL_OUTOF10: 7
PAINLEVEL_OUTOF10: 6
PAINLEVEL_OUTOF10: 8

## 2020-08-16 ASSESSMENT — PAIN DESCRIPTION - LOCATION: LOCATION: ABDOMEN

## 2020-08-16 ASSESSMENT — PAIN DESCRIPTION - PAIN TYPE: TYPE: ACUTE PAIN

## 2020-08-16 ASSESSMENT — PAIN DESCRIPTION - FREQUENCY: FREQUENCY: CONTINUOUS

## 2020-08-16 ASSESSMENT — PAIN DESCRIPTION - DESCRIPTORS: DESCRIPTORS: CONSTANT;DULL

## 2020-08-16 ASSESSMENT — PAIN DESCRIPTION - PROGRESSION: CLINICAL_PROGRESSION: NOT CHANGED

## 2020-08-16 ASSESSMENT — PAIN DESCRIPTION - ONSET: ONSET: ON-GOING

## 2020-08-16 ASSESSMENT — PAIN - FUNCTIONAL ASSESSMENT: PAIN_FUNCTIONAL_ASSESSMENT: ACTIVITIES ARE NOT PREVENTED

## 2020-08-16 NOTE — PLAN OF CARE
Problem: Infection:  Goal: Will remain free from infection  Description: Will remain free from infection  Outcome: Ongoing     Problem: Safety:  Goal: Free from accidental physical injury  Description: Free from accidental physical injury  Outcome: Ongoing  Goal: Free from intentional harm  Description: Free from intentional harm  Outcome: Ongoing     Problem: Daily Care:  Goal: Daily care needs are met  Description: Daily care needs are met  Outcome: Ongoing     Problem: Pain:  Goal: Patient's pain/discomfort is manageable  Description: Patient's pain/discomfort is manageable  Outcome: Ongoing  Goal: Pain level will decrease  Description: Pain level will decrease  Outcome: Ongoing  Goal: Control of acute pain  Description: Control of acute pain  Outcome: Ongoing  Goal: Control of chronic pain  Description: Control of chronic pain  Outcome: Ongoing     Problem: Skin Integrity:  Goal: Skin integrity will stabilize  Description: Skin integrity will stabilize  Outcome: Ongoing     Problem: Discharge Planning:  Goal: Patients continuum of care needs are met  Description: Patients continuum of care needs are met  Outcome: Ongoing     Problem: Falls - Risk of:  Goal: Will remain free from falls  Description: Will remain free from falls  Outcome: Ongoing  Goal: Absence of physical injury  Description: Absence of physical injury  Outcome: Ongoing     Problem: Restraint Use - Nonviolent/Non-Self-Destructive Behavior:  Goal: Absence of restraint indications  Description: Absence of restraint indications  Outcome: Ongoing  Goal: Absence of restraint-related injury  Description: Absence of restraint-related injury  Outcome: Ongoing     Problem: Nutrition  Goal: Optimal nutrition therapy  Outcome: Ongoing

## 2020-08-16 NOTE — PROGRESS NOTES
Patient is resting showing no s/s of distress. Patient is alert and oriented. Meds were given, see MAR. Patient is denying any needs. Bed is in lowest position and call light is within reach. Will continue to monitor. Shift assessment complete, see flowsheets. 0.5 mg dilaudid Pain medication given at this time.

## 2020-08-16 NOTE — PROGRESS NOTES
Pt is lying in bed with their eyes closed. Respirations are easy and even. Call light within reach bed in lowest position with the wheels locked. Will continue to monitor.  Ray Fishers

## 2020-08-16 NOTE — FLOWSHEET NOTE
08/15/20 1926   Vital Signs   Temp 98.7 °F (37.1 °C)   Temp Source Oral   Pulse 80   Heart Rate Source Monitor   Resp 18   /62   BP Location Right upper arm   BP Upper/Lower Upper   Patient Position Semi fowlers   Level of Consciousness 0   MEWS Score 1   Oxygen Therapy   SpO2 94 %   O2 Device None (Room air)   Pt assessmentt complete. Pt lying quietly in bed. Lung sounds diminished. No s/s of distress noted. IV fluids infusing at 65ml/hr. Pt repositioned for comfort. Nightly medications given with applesauce. Telesitter in place for patient safety. Denies needs. Call light within reach. Bed exit alarm on.

## 2020-08-16 NOTE — PROGRESS NOTES
PRN dilaudid given for body pain. Pt rates pain 7/10. No other needs at this time. Call light within reach. Bed exit alarm on.

## 2020-08-16 NOTE — PROGRESS NOTES
Progress Note    Admit Date:  8/13/2020    Admitted for hyponatremia    Subjective:  Mr. Lukas Jacobson is feeling better today . He has metastatic esophageal cancer , significant pain issues, abdominal pain is much better today . Sodium level slowly improving   - he was on hypertonic saline , sodium level dropped last night and Samsca given , sodium up to 126 today . Overall he is looking better . Mental status is clear now he is awake alert and oriented. Spoke to patient's wife at bedside spoke to patient's nurse. Objective:   Patient Vitals for the past 4 hrs:   BP Temp Temp src Pulse Resp SpO2   08/16/20 0830 122/65 96.9 °F (36.1 °C) Oral 85 18 97 %       Intake/Output Summary (Last 24 hours) at 8/16/2020 1008  Last data filed at 8/16/2020 0920  Gross per 24 hour   Intake 420 ml   Output 1450 ml   Net -1030 ml       Physical Exam:    Gen: No distress. Alert. Awake and well-oriented  Eyes:  No conjunctival injection. ENT: No discharge. Pharynx clear. Neck: Trachea midline. Resp: No accessory muscle use. No crackles. No wheezes. No rhonchi. CV: Regular rate. Regular rhythm. + murmur. No rub. No edema. GI: Non-tender. Non-distended. Normal bowel sounds. Skin: Warm and dry. M/S: No cyanosis. No joint deformity. No clubbing. Neuro: Awake. Grossly nonfocal, generalized weakness  Psych: Oriented x 3. No anxiety or agitation.      Scheduled Meds:   melatonin  6 mg Oral Nightly    levETIRAcetam  500 mg Oral Dinner    dilTIAZem  120 mg Oral Daily    dronabinol  2.5 mg Oral BID    sodium chloride flush  10 mL Intravenous 2 times per day    enoxaparin  40 mg Subcutaneous Daily       Continuous Infusions:      PRN Meds:  haloperidol lactate, oxyCODONE-acetaminophen **OR** oxyCODONE-acetaminophen, hydrALAZINE, sodium chloride flush, acetaminophen **OR** acetaminophen, promethazine **OR** ondansetron, HYDROmorphone      Data:  CBC:   Recent Labs     08/13/20  1620 08/16/20  0510   WBC 4.8 Samsca yesterday,  sodium level is 126,  continue to monitor closely,  continue fluid restriction. #Metastatic esophageal cancer with brain metastases  - Follows closely OP. Undergoing gamma knife radiation.  Currently on Keytruda   - Continue marinol with poor appetite, Keppra for seizure prevention    # Acute metabolic encephalopathy  Resolved    #HTN  - BP is significantly elevated on admit, now much better controlled   - Continue home Cardizem 120 mg daily   - Added PRN hydralazine (has not received any doses)    #History of prostate cancer  - s/p prostatectomy     #Diabetes type 2  - diet-controlled    #Hypophosphatemia  - replacement     #Elevated LFTs  - mildly trending up   - Repeat labs and monitor      #Nephrolithiasis   - 3 mm R renal stone noted     DVT Prophylaxis: Lovenox   Diet: Dietary Nutrition Supplements: Standard High Calorie Oral Supplement  Dietary Nutrition Supplements: Snack (see comment)  DIET GENERAL;  Code Status: Full Code    Patient can hopefully be discharged tomorrow if sodium levels remained stable and pain is well controlled,  with close follow-up with his primary oncologist     Charanjit Pickens MD  8/16/2020 10:08 AM

## 2020-08-16 NOTE — PROGRESS NOTES
[facility]    Follow-up note           Reason for Consult:  Hyponatremia  Requesting Physician:  Dr. Merlyn Bashir history  Patient feels better  Sodium has improved to 131, received tolvaptan 7.5 mg at night  Last 24 h uop 1.5L charted      ROS: No chest pain/shortness of breath/fever/nausea/vomiting  PSFH: No visitor    Scheduled Meds:   melatonin  6 mg Oral Nightly    levETIRAcetam  500 mg Oral Dinner    dilTIAZem  120 mg Oral Daily    dronabinol  2.5 mg Oral BID    sodium chloride flush  10 mL Intravenous 2 times per day    enoxaparin  40 mg Subcutaneous Daily     Continuous Infusions:    PRN Meds:.haloperidol lactate, oxyCODONE-acetaminophen **OR** oxyCODONE-acetaminophen, hydrALAZINE, sodium chloride flush, acetaminophen **OR** acetaminophen, promethazine **OR** ondansetron, HYDROmorphone     History of Present Illness on 8/14/20:    76 y.o. yo male with PMH of esophageal cancer with brain mets s/p who is admitted for abd pain and was noted to have hyponatremia  Not been eating well for few days  No n/v/d  Sodium dropped from 125 to 121 on NS    Physical exam:   Constitutional:  VITALS:  /65   Pulse 85   Temp 96.9 °F (36.1 °C) (Oral)   Resp 18   Ht 5' 6\" (1.676 m)   Wt 137 lb 3 oz (62.2 kg)   SpO2 97%   BMI 22.14 kg/m²   Gen: alert, awake, nad  Skin: no rash, turgor wnl  Heent:  eomi, mmm  Neck: no bruits or jvd noted, thyroid normal  Cardiovascular:  S1, S2 without m/r/g  Respiratory: CTA B without w/r/r; respiratory effort normal  Abdomen:  +bs, soft, nt, nd, no hepatosplenomegaly  Ext: no lower extremity edema  Neuro/Psy: AAoriented times 3 ; moves all 4 ext  Musculoskeletal:  Rom, muscular strength intact; digits, nails normal    Data/  Recent Labs     08/13/20  1620 08/16/20  0510   WBC 4.8 3.1*   HGB 14.5 12.8*   HCT 43.5 36.2*   MCV 92.2 90.7   PLT 98* 56*     Recent Labs     08/14/20  0455  08/15/20  1446 08/15/20  2250 08/16/20  0510 08/16/20  1053   *   < > 126* 123* 126* 131*   K 4.8   < > 3.5 3.6 3.4* 3.8   CL 87*   < > 89* 92* 93* 96*   CO2 26   < > 25 25 25 26   GLUCOSE 134*   < > 128* 104* 99 139*   PHOS 2.2*  --   --   --   --   --    MG  --   --  1.70*  --  1.80  --    BUN 7   < > 9 10 9 11   CREATININE <0.5*   < > <0.5* <0.5* <0.5* <0.5*   LABGLOM >60   < > >60 >60 >60 >60   GFRAA >60   < > >60 >60 >60 >60    < > = values in this interval not displayed. 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 Also w decreased solute   -abd paiin  -metastatic esoph cancer w brain mets   -HTN  -DM    Plan  -Encouraged to eat protein shake 1 bottle 3 times a day  -cont antihypertensives  -Resume fluid restriction 1200 ml/day later tonight  -strict intake and out put, encourage protein        Thank you for the consultation. Please do not hesitate to call with questions.     Vance Martins  [facility]  Office: [phone]  Fax:    [phone]

## 2020-08-16 NOTE — PROGRESS NOTES
Per PCA patient was ambulating to bathroom and not using urinal. Asked for her to either use urinal/hat in bathroom and place occurrences of times that he walked to the bathroom and urine was not measured.

## 2020-08-17 LAB
ALBUMIN SERPL-MCNC: 2.9 G/DL (ref 3.4–5)
ALBUMIN SERPL-MCNC: 3 G/DL (ref 3.4–5)
ALP BLD-CCNC: 460 U/L (ref 40–129)
ALT SERPL-CCNC: 350 U/L (ref 10–40)
ANION GAP SERPL CALCULATED.3IONS-SCNC: 8 MMOL/L (ref 3–16)
AST SERPL-CCNC: 454 U/L (ref 15–37)
BASOPHILS ABSOLUTE: 0 K/UL (ref 0–0.2)
BASOPHILS RELATIVE PERCENT: 0.4 %
BILIRUB SERPL-MCNC: 2.8 MG/DL (ref 0–1)
BILIRUBIN DIRECT: 1.9 MG/DL (ref 0–0.3)
BILIRUBIN, INDIRECT: 0.9 MG/DL (ref 0–1)
BLOOD CULTURE, ROUTINE: NORMAL
BUN BLDV-MCNC: 17 MG/DL (ref 7–20)
CALCIUM SERPL-MCNC: 8.7 MG/DL (ref 8.3–10.6)
CHLORIDE BLD-SCNC: 97 MMOL/L (ref 99–110)
CO2: 27 MMOL/L (ref 21–32)
CREAT SERPL-MCNC: 0.7 MG/DL (ref 0.8–1.3)
CULTURE, BLOOD 2: NORMAL
EOSINOPHILS ABSOLUTE: 0.1 K/UL (ref 0–0.6)
EOSINOPHILS RELATIVE PERCENT: 3 %
GFR AFRICAN AMERICAN: >60
GFR NON-AFRICAN AMERICAN: >60
GLUCOSE BLD-MCNC: 155 MG/DL (ref 70–99)
HCT VFR BLD CALC: 33.3 % (ref 40.5–52.5)
HEMOGLOBIN: 11.4 G/DL (ref 13.5–17.5)
INR BLD: 1.06 (ref 0.86–1.14)
LYMPHOCYTES ABSOLUTE: 0.8 K/UL (ref 1–5.1)
LYMPHOCYTES RELATIVE PERCENT: 36.4 %
MCH RBC QN AUTO: 31.6 PG (ref 26–34)
MCHC RBC AUTO-ENTMCNC: 34.4 G/DL (ref 31–36)
MCV RBC AUTO: 91.8 FL (ref 80–100)
MONOCYTES ABSOLUTE: 0.2 K/UL (ref 0–1.3)
MONOCYTES RELATIVE PERCENT: 8 %
NEUTROPHILS ABSOLUTE: 1.2 K/UL (ref 1.7–7.7)
NEUTROPHILS RELATIVE PERCENT: 52.2 %
PDW BLD-RTO: 13.4 % (ref 12.4–15.4)
PHOSPHORUS: 2.7 MG/DL (ref 2.5–4.9)
PLATELET # BLD: 69 K/UL (ref 135–450)
PMV BLD AUTO: 7.6 FL (ref 5–10.5)
POTASSIUM SERPL-SCNC: 3.8 MMOL/L (ref 3.5–5.1)
PROTHROMBIN TIME: 12.3 SEC (ref 10–13.2)
RBC # BLD: 3.62 M/UL (ref 4.2–5.9)
SODIUM BLD-SCNC: 132 MMOL/L (ref 136–145)
TOTAL PROTEIN: 5 G/DL (ref 6.4–8.2)
WBC # BLD: 2.3 K/UL (ref 4–11)

## 2020-08-17 PROCEDURE — 82390 ASSAY OF CERULOPLASMIN: CPT

## 2020-08-17 PROCEDURE — 36415 COLL VENOUS BLD VENIPUNCTURE: CPT

## 2020-08-17 PROCEDURE — 2580000003 HC RX 258: Performed by: INTERNAL MEDICINE

## 2020-08-17 PROCEDURE — 83516 IMMUNOASSAY NONANTIBODY: CPT

## 2020-08-17 PROCEDURE — 82105 ALPHA-FETOPROTEIN SERUM: CPT

## 2020-08-17 PROCEDURE — 99232 SBSQ HOSP IP/OBS MODERATE 35: CPT | Performed by: INTERNAL MEDICINE

## 2020-08-17 PROCEDURE — 2060000000 HC ICU INTERMEDIATE R&B

## 2020-08-17 PROCEDURE — 6360000002 HC RX W HCPCS: Performed by: INTERNAL MEDICINE

## 2020-08-17 PROCEDURE — 85610 PROTHROMBIN TIME: CPT

## 2020-08-17 PROCEDURE — 6370000000 HC RX 637 (ALT 250 FOR IP): Performed by: INTERNAL MEDICINE

## 2020-08-17 PROCEDURE — 80076 HEPATIC FUNCTION PANEL: CPT

## 2020-08-17 PROCEDURE — 82104 ALPHA-1-ANTITRYPSIN PHENO: CPT

## 2020-08-17 PROCEDURE — 80074 ACUTE HEPATITIS PANEL: CPT

## 2020-08-17 PROCEDURE — 83550 IRON BINDING TEST: CPT

## 2020-08-17 PROCEDURE — 85025 COMPLETE CBC W/AUTO DIFF WBC: CPT

## 2020-08-17 PROCEDURE — 82728 ASSAY OF FERRITIN: CPT

## 2020-08-17 PROCEDURE — 80069 RENAL FUNCTION PANEL: CPT

## 2020-08-17 PROCEDURE — 83540 ASSAY OF IRON: CPT

## 2020-08-17 PROCEDURE — 82103 ALPHA-1-ANTITRYPSIN TOTAL: CPT

## 2020-08-17 PROCEDURE — 86038 ANTINUCLEAR ANTIBODIES: CPT

## 2020-08-17 PROCEDURE — 6370000000 HC RX 637 (ALT 250 FOR IP): Performed by: PHYSICIAN ASSISTANT

## 2020-08-17 RX ORDER — HYDROXYZINE PAMOATE 25 MG/1
25 CAPSULE ORAL EVERY 6 HOURS PRN
Status: DISCONTINUED | OUTPATIENT
Start: 2020-08-17 | End: 2020-08-19 | Stop reason: HOSPADM

## 2020-08-17 RX ORDER — POLYETHYLENE GLYCOL 3350 17 G/17G
17 POWDER, FOR SOLUTION ORAL DAILY PRN
Status: DISCONTINUED | OUTPATIENT
Start: 2020-08-17 | End: 2020-08-18

## 2020-08-17 RX ORDER — OXYCODONE HYDROCHLORIDE 5 MG/1
5 TABLET ORAL EVERY 4 HOURS PRN
Status: DISCONTINUED | OUTPATIENT
Start: 2020-08-17 | End: 2020-08-19 | Stop reason: HOSPADM

## 2020-08-17 RX ORDER — OXYCODONE HYDROCHLORIDE AND ACETAMINOPHEN 5; 325 MG/1; MG/1
1 TABLET ORAL EVERY 6 HOURS PRN
Qty: 28 TABLET | Refills: 0 | Status: SHIPPED
Start: 2020-08-17 | End: 2020-08-17 | Stop reason: HOSPADM

## 2020-08-17 RX ORDER — OXYCODONE HYDROCHLORIDE 5 MG/1
10 TABLET ORAL EVERY 4 HOURS PRN
Status: DISCONTINUED | OUTPATIENT
Start: 2020-08-17 | End: 2020-08-19 | Stop reason: HOSPADM

## 2020-08-17 RX ADMIN — DRONABINOL 2.5 MG: 2.5 CAPSULE ORAL at 09:42

## 2020-08-17 RX ADMIN — OXYCODONE HYDROCHLORIDE 10 MG: 5 TABLET ORAL at 23:55

## 2020-08-17 RX ADMIN — DRONABINOL 2.5 MG: 2.5 CAPSULE ORAL at 21:18

## 2020-08-17 RX ADMIN — NYSTATIN 5 ML: 500000 SUSPENSION ORAL at 15:00

## 2020-08-17 RX ADMIN — MELATONIN TAB 3 MG 6 MG: 3 TAB at 21:18

## 2020-08-17 RX ADMIN — DILTIAZEM HYDROCHLORIDE 120 MG: 60 CAPSULE, EXTENDED RELEASE ORAL at 09:42

## 2020-08-17 RX ADMIN — Medication 10 ML: at 09:50

## 2020-08-17 RX ADMIN — LEVETIRACETAM 500 MG: 500 TABLET ORAL at 18:40

## 2020-08-17 RX ADMIN — OXYCODONE HYDROCHLORIDE AND ACETAMINOPHEN 2 TABLET: 5; 325 TABLET ORAL at 09:41

## 2020-08-17 RX ADMIN — Medication 10 ML: at 21:18

## 2020-08-17 ASSESSMENT — PAIN SCALES - GENERAL
PAINLEVEL_OUTOF10: 7
PAINLEVEL_OUTOF10: 7

## 2020-08-17 NOTE — CONSULTS
Gastroenterology Consult Note    Patient:   Brian Mercado   :    1945   Facility:   [facility]  Referring/PCP: Dex Lopez MD  Date:     2020  Consultant:   Toi Salazar PA-C      Chief Complaint   Patient presents with    Abdominal Pain     Pt has extensive cancer history with current cancer treatment with primary in prostate. Currently being treated for esophageal cancer. C/o nausea, no vomiting or diarrhea. Pain across lower abdomen. History of Present illness   76year old male with a history of adrenal mass, diabetes, esophageal cancer with brain metastases, HTN, prostate cancer s/p prostatectomy and nephrolithiasis admitted on 2020 with abdominal pain and hyponatremia. GI was consulted for evaluation of elevated LFTs. CT A/P without acute findings. His abdominal pain is currently controlled on PO pain management. His LFTs have continued to trend up throughout admission. He admits to nausea, lower abdominal pain, constipation, GERD, decreased appetite, and weight loss. He denies vomiting, dysphagia, cramping, bloating, rectal bleeding, and melena. He denies alcohol, tobacco, marijuana, illicit substance, Ibuprofen, Tylenol, and herbal supplement use. Surgical history is significant for cholecystectomy and appendectomy. His last colonoscopy in 2017 showed diverticulosis. 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 His wife states he recently stopped taking steroids and has plans to wean off seizure medication this Wednesday.     Past Medical History:   Diagnosis Date    [facility]     Diabetes mellitus ([facility]     Esophageal cancer [facility]     H/O sleep apnea     resolved after surgery    History of esophageal reflux     Hypertension     Prostate cancer ([facility]     Weight loss      Past Surgical History:   Procedure Laterality Date    ADRENALECTOMY N/A 2020    ROBOTIC LAPAROSCOPIC RIGHT RETROPERITONEAL MASS RESECTION WITH ADRENALECTOMY, LIVER NODULE WEDGE EXCISION AND ROBOTIC ASSISTED CHOLECYSTECTOMY AND OPEN LEFT INGUINAL HERNIA REPAIR WITH MESH performed by Odette Elizondo MD at [address] [facility]      COLONOSCOPY      negative    COLONOSCOPY  2017    diverticulosis    ESOPHAGEAL DILATATION N/A 1/3/2019    ESOPHAGOGASTRODUODENOSCOPY WITH BIOPSY  CPT CODE - 04591 performed by Bert Lay MD at [facility] ESOPHAGECTOMY N/A 2019    BENITO [place] ESOPHAGECTOMY WITH MEDIASTINAL LYMPHADENECTOMY INCLUDING THORACIC DUCT, UMBILICAL HERNIA REPAIR performed by Bert Lay MD at [address] / REMOVAL / REPLACEMENT VENOUS ACCESS CATHETER N/A 2019    PORT A CATH PLACEMENT AND PEG TUBE EXCHANGE performed by Bert Lay MD at [address] OFFICE/OUTPT [address] [place] [address] N/A 2018    EUS/ WITH ANESTHESIA performed by Mya Lam MD at [facility] ENDOSCOPY  2018    EGD BIOPSY performed by Mya Lam MD at [facility] N/A 2019    ESOPHAGOGASTRODUODENOSCOPY WITH BIOPSY  CPT CODE - 46176 performed by Bert Lay MD at [address] [facility] [address]      hx of sleep apnea       Social:   Social History     Tobacco Use    Smoking status: Former Smoker     Types: Cigarettes     Last attempt to quit: 1972     Years since quittin.6    Smokeless tobacco: Never Used   Substance Use Topics    Alcohol use: Yes     Alcohol/week: 0.0 standard drinks     Comment: rarely     Family:   Family History   Problem Relation Age of Onset   Jessica Mcrae Cancer Father         lung    Other Mother         blood disorder     No current facility-administered medications on file prior to encounter.       Current Outpatient Medications on File Prior to Encounter   Medication Sig Dispense Refill    Lansoprazole (PREVACID 24HR PO) Take by mouth      levETIRAcetam (KEPPRA) 500 MG tablet Take 500 mg by mouth every evening Patient on a weaning dose. Last dose to be taken on Wednesday 8/19      dronabinol (MARINOL) 2.5 MG capsule Take 2.5 mg by mouth 2 times daily as needed.  ondansetron (ZOFRAN ODT) 4 MG disintegrating tablet Take 1 tablet by mouth every 8 hours as needed for Nausea 20 tablet 0    diltiazem (CARDIZEM 12 HR) 120 MG extended release capsule Take 120 mg by mouth daily      gabapentin (NEURONTIN) 300 MG capsule Take 300 mg by mouth 3 times daily.  ONE TOUCH ULTRA TEST strip       ONETOUCH DELICA LANCETS 97O MISC       metFORMIN (GLUCOPHAGE) 500 MG tablet Take 1 tablet by mouth Daily with supper Metformin must be crushed. 30 tablet 0      Infusions:   PRN Medications: magic (miracle) mouthwash with nystatin, oxyCODONE **OR** oxyCODONE, hydrOXYzine, haloperidol lactate, hydrALAZINE, sodium chloride flush, promethazine **OR** ondansetron, HYDROmorphone  Allergies:    Allergies   Allergen Reactions    No Known Allergies        ROS:   Constitutional: negative for chills, fevers and sweats  Eyes: negative for cataracts, icterus and redness  Ears, nose, mouth, throat, and face: negative for epistaxis, hearing loss and sore throat  Respiratory: negative for cough, hemoptysis and sputum  Cardiovascular: negative for chest pain, dyspnea and lower extremity edema  Gastrointestinal: as per HPI  Genitourinary:negative for dysuria, frequency and hematuria  Neurological: negative for coordination problems, dizziness and gait problems  Behavioral/Psych: negative for anxiety, depression and mood swings    Physical Exam   BP (!) 92/57   Pulse 80   Temp 97.6 °F (36.4 °C) (Oral)   Resp 16   Ht 5' 6\" (1.676 m)   Wt 137 lb 8 oz (62.4 kg)   SpO2 96%   BMI 22.19 kg/m²     General appearance: alert, appears older than stated age, cooperative, fatigued, pale and syndromic appearance - ill appearing  Head: Normocephalic, without obvious abnormality, atraumatic  Eyes: conjunctivae/corneas clear. PERRL, EOM's intact. Fundi benign. Neck: no adenopathy and supple, symmetrical, trachea midline  Lungs: clear to auscultation bilaterally  Heart: regular rate and rhythm, S1, S2 normal, no murmur, click, rub or gallop  Abdomen: soft, non-tender; bowel sounds normal; no masses,  no organomegaly  Extremities: extremities normal, atraumatic, no cyanosis or edema    Lab and Imaging Review   Labs:  CBC:   Recent Labs     08/16/20  0510 08/17/20  1512   WBC 3.1* 2.3*   HGB 12.8* 11.4*   HCT 36.2* 33.3*   MCV 90.7 91.8   PLT 56* 69*     BMP:   Recent Labs     08/16/20  0510 08/16/20  1053 08/17/20  0450   * 131* 132*   K 3.4* 3.8 3.8   CL 93* 96* 97*   CO2 25 26 27   PHOS  --   --  2.7   BUN 9 11 17   CREATININE <0.5* <0.5* 0.7*     LIVER PROFILE:   Recent Labs     08/15/20  0654 08/16/20  0510 08/17/20  0450   * 202* 454*   * 181* 350*   PROT 5.1* 5.0* 5.0*   BILIDIR 0.7* 1.0* 1.9*   BILITOT 1.7* 2.0* 2.8*   ALKPHOS 160* 231* 460*     PT/INR: No results for input(s): INR in the last 72 hours. Invalid input(s): PT    IMAGING:  XR CHEST PORTABLE - 8/13/2020  Impression    No acute findings      CT ABDOMEN PELVIS W IV CONTRAST - 8/13/2020  Impression    1. Status post esophagectomy with gastric pull-through    2. Interval decrease in para-aortic retroperitoneal adenopathy    3. Interval decrease in left perinephric tumor nodules    4. Stable left adrenal mass and celiac lymph node    5. No acute gastrointestinal abnormality    6. Colonic diverticulosis    7. Status post prostatectomy    8. Status post hysterectomy    9. Right nephrolithiasis      Attending Supervising [de-identified] Attestation Statement  The patient is a 76 y.o. male. I have performed a history and physical examination of the patient.  I discussed the case with my physician assistant Chaz Bee PA-C    I reviewed the patient's Past Medical History, Past Surgical History, Medications, and Allergies. Physical Exam:  Vitals:    08/16/20 2014 08/17/20 0236 08/17/20 0826 08/17/20 1458   BP: 115/69 99/62 96/63 (!) 92/57   Pulse: 85 85 101 80   Resp: 16 16 18 16   Temp: 98.2 °F (36.8 °C) 97.5 °F (36.4 °C) 98 °F (36.7 °C) 97.6 °F (36.4 °C)   TempSrc: Oral Oral Oral Oral   SpO2: 96% 96% 97% 96%   Weight:  137 lb 8 oz (62.4 kg)     Height:           Physical Examination: General appearance - chronically ill appearing  Mental status - drowsy  Eyes - pupils equal and reactive, extraocular eye movements intact  Neck - supple, no significant adenopathy  Chest - clear to auscultation, no wheezes, rales or rhonchi, symmetric air entry  Heart - normal rate, regular rhythm, normal S1, S2, no murmurs, rubs, clicks or gallops  Abdomen - soft, nontender, nondistended, no masses or organomegaly  Extremities - no pedal edema noted        Assessment:   76year old male with a history of adrenal mass, diabetes, esophageal cancer with brain metastases, HTN, prostate cancer s/p prostatectomy and nephrolithiasis admitted on 8/13/2020 with abdominal pain and hyponatremia. GI was consulted for evaluation of elevated LFTs. Differential includes ischemic hepatitis, drug-induced hepatitis, acute viral hepatitis, vs autoimmune hepatitis. Plan:   1. Continue supportive care  2. Monitor LFTs  3. Monitor and document output  4. Continue diet as tolerated  5. Encourage nutrition  6. Check RUQ US  7. Check liver serologies  8. PT/OT  9. Ambulate BID  10. Up in chair BID  11.  Will follow    Yesenia Caputo PA-C  3:24 PM 8/17/2020                      76year old male with a history of adrenal mass, diabetes, HTN, prostate cancer s/p prostatectomy, metastatic esophageal cancer s/p esophagectomy (04/19), and recent robotic retroperitoneal mass resection, adrenalectomy, liver wedge resection, cholecystectomy and inguinal hernia repair (02/20) c/b brain metastases s/p gamma knife radiation admitted with abdominal pain and elevated LFTs. Acute hepatitis likely secondary to medication (keppra) but cannot exclude viral infection, liver meds, biliary injury from recent surgery. Monitor LFTs. Check liver serologies. Await RUQ US. Will follow.     Tabby Godfrey MD          [phone]  [phone]

## 2020-08-17 NOTE — PROGRESS NOTES
PRN percocet 1 tab given for back pain. No other needs at this time. Call light within reach. Bed exit alarm on.

## 2020-08-17 NOTE — FLOWSHEET NOTE
08/16/20 2014   Vital Signs   Temp 98.2 °F (36.8 °C)   Temp Source Oral   Pulse 85   Heart Rate Source Monitor   Resp 16   /69   BP Location Right upper arm   BP Upper/Lower Upper   Level of Consciousness 0   MEWS Score 1   Oxygen Therapy   SpO2 96 %   O2 Device None (Room air)   Pt assessment complete. Pt lying in bed quietly. Lung sounds diminished. Pt on room air at this time. Nightly medications given. PRN dilaudid given for body pain. Pt rates pain 5/10. Nightly medications given with applesauce. Denies any further needs. Bed exit alarm on and working properly. Telesitter in place for patient safety. Call light within reach.

## 2020-08-17 NOTE — PROGRESS NOTES
Progress Note    Admit Date:  8/13/2020    Admitted for hyponatremia, abd pain    Subjective:  Mr. La Nena Eaton is the same. Abd pain decently controlled with PO percocet, has not received dilaudid today      Objective:   BP 96/63   Pulse 101   Temp 98 °F (36.7 °C) (Oral)   Resp 18   Ht 5' 6\" (1.676 m)   Wt 137 lb 8 oz (62.4 kg)   SpO2 97%   BMI 22.19 kg/m²       Intake/Output Summary (Last 24 hours) at 8/17/2020 1333  Last data filed at 8/17/2020 1144  Gross per 24 hour   Intake 300 ml   Output 350 ml   Net -50 ml       Physical Exam:    Gen: No distress. Alert. Ronne Im and well-oriented  Eyes:  No conjunctival injection. ENT: No discharge. Pharynx clear. Neck: Trachea midline. Resp: No accessory muscle use. No crackles. No wheezes. No rhonchi. CV: Regular rate. Regular rhythm. + murmur.  No rub. No edema. GI: Non-tender. Non-distended.  Normal bowel sounds. Skin: Warm and dry. M/S: No cyanosis. No joint deformity. No clubbing. Neuro: Awake. Grossly nonfocal, generalized weakness  Psych: Oriented  To person, month, year, president. Initially states he is at his home in [place] but quickly reorients. No anxiety or agitation. I Song Mottae have reviewed the chart on [employer] and personally interviewed and examined patient, reviewed the data (labs and imaging) and after discussion with my PA formulated the plan. Agree with note with the following edits. Physical exam:    BP 96/63   Pulse 101   Temp 98 °F (36.7 °C) (Oral)   Resp 18   Ht 5' 6\" (1.676 m)   Wt 137 lb 8 oz (62.4 kg)   SpO2 97%   BMI 22.19 kg/m²     Gen: No distress. Alert. Eyes: PERRL. No sclera icterus. No conjunctival injection. ENT: No discharge. Pharynx clear. Neck: Trachea midline. Normal thyroid. Resp: No accessory muscle use. No crackles. No wheezes. No rhonchi. No dullness on percussion. CV: Regular rate. Regular rhythm. No murmur or rub. No edema. GI: Non-tender. Non-distended.  No masses. No organomegaly. Normal bowel sounds. No hernia. Skin: papular rash all over the arms,chest wall. Lymph: No cervical LAD. No supraclavicular LAD. M/S: No cyanosis. No joint deformity. No clubbing. Neuro: Awake. Moves all 4 extremities, non focal  Psych: Oriented x 3. No anxiety or agitation. LAZ Meza         Scheduled Meds:   melatonin  6 mg Oral Nightly    levETIRAcetam  500 mg Oral Dinner    dilTIAZem  120 mg Oral Daily    dronabinol  2.5 mg Oral BID    sodium chloride flush  10 mL Intravenous 2 times per day    enoxaparin  40 mg Subcutaneous Daily       Continuous Infusions:      PRN Meds:  magic (miracle) mouthwash with nystatin, haloperidol lactate, oxyCODONE-acetaminophen **OR** oxyCODONE-acetaminophen, hydrALAZINE, sodium chloride flush, acetaminophen **OR** acetaminophen, promethazine **OR** ondansetron, HYDROmorphone      Data:  CBC:   Recent Labs     08/16/20  0510   WBC 3.1*   HGB 12.8*   HCT 36.2*   MCV 90.7   PLT 56*     BMP:   Recent Labs     08/16/20  0510 08/16/20  1053 08/17/20  0450   * 131* 132*   K 3.4* 3.8 3.8   CL 93* 96* 97*   CO2 25 26 27   PHOS  --   --  2.7   BUN 9 11 17   CREATININE <0.5* <0.5* 0.7*     LIVER PROFILE:   Recent Labs     08/15/20  0654 08/16/20  0510 08/17/20  0450   * 202* 454*   * 181* 350*   BILIDIR 0.7* 1.0* 1.9*   BILITOT 1.7* 2.0* 2.8*   ALKPHOS 160* 231* 460*     PT/INR:   No results for input(s): PROTIME, INR in the last 72 hours. CULTURES  Blood: NGTD     RADIOLOGY  CT ABDOMEN PELVIS W IV CONTRAST Additional Contrast? Oral   Final Result   1. Status post esophagectomy with gastric pull-through   2. Interval decrease in para-aortic retroperitoneal adenopathy   3. Interval decrease in left perinephric tumor nodules   4. Stable left adrenal mass and celiac lymph node   5. No acute gastrointestinal abnormality   6. Colonic diverticulosis   7. Status post prostatectomy   8. Status post hysterectomy   9. Right nephrolithiasis         XR CHEST PORTABLE   Final Result   No acute findings             Assessment/Plan:    #Intractable abdominal pain  - CT A/P as above without acute concerning findings  - Abdominal pain resolved with IV pain control in Er. Transitioned to PO percocet after admission with good control. Will dc with rx for percocet. Close f/u hem/onc for further management   - Tolerating PO intake but minimal appetite.       #Hyponatremia  -  likely secondary to decreased solute intake and SIADH with hx of metastatic cancer  - Was given IV NS and Na dropped: Na 125->121.  Nephro consulted and managing fluids, placed on fluid restriction 1200 mL per day and 1.5%saline at 65mL/hr. He received samsca x 1. Na 132 at discharge. nephro recommending 1200 cc fluid restriction on discharge      #Elevated LFTs  - monitored throughout admission- and continued to trend up  - sp cholecystectomy   - he had liver nodule excised during surg with Dr. Cammie Roe in Feb 2020- path showed hyalinized nodule      #Metastatic esophageal cancer with brain metastases  - Follows closely OP. Undergoing gamma knife radiation. Currently on Keytruda   - Continue marinol with poor appetite, Keppra for seizure prevention     # Acute metabolic encephalopathy  - Resolved     #HTN  - BP is significantly elevated on admit, now much better controlled   - Continued home Cardizem 120 mg daily      #History of prostate cancer  - s/p prostatectomy      #Diabetes type 2  - diet-controlled     #Hypophosphatemia  - replaced     #Nephrolithiasis   - 3 mm R renal stone noted      #Thrombocytopenia   - Chronic. Plt count ordered       DVT Prophylaxis: Lovenox   Diet: Dietary Nutrition Supplements: Standard High Calorie Oral Supplement  Dietary Nutrition Supplements: Snack (see comment)  DIET GENERAL;  Code Status: Full Code     Lucio Juarez PA-C  8/17/2020 1:33 PM      1. Intractable abdominal pain.   Metastatic esophageal cancer with brain mets.  Initial CT was unremarkable. But his LFTs have been steadily increasing. Likely related to drug mediated hepatitis. Keytruda could be the culprit. No abdominal pain or tenderness. Obtain right upper quad ultrasound. Plan GI consult. NILSA Meza.

## 2020-08-17 NOTE — PROGRESS NOTES
Report given to Allyssa Hubbard RN at bedside for transfer of care. Pt denies needs at this time, call light within reach.

## 2020-08-17 NOTE — FLOWSHEET NOTE
08/17/20 0826   Vital Signs   Temp 98 °F (36.7 °C)   Temp Source Oral   Pulse 101   Heart Rate Source Monitor   Resp 18   BP 96/63   BP Location Right Arm   BP Upper/Lower Upper   MAP (mmHg) 74   Patient Position Sitting   Level of Consciousness 0   MEWS Score 3   Oxygen Therapy   SpO2 97 %   O2 Device None (Room air)   Patient resting quietly in bed. No s/s of distress noted. Shift assessment complete, see flow sheet. Denies needs at this time. Call light within reach. Will continue to monitor.

## 2020-08-18 ENCOUNTER — APPOINTMENT (OUTPATIENT)
Dept: ULTRASOUND IMAGING | Age: 75
DRG: 643 | End: 2020-08-18
Payer: MEDICARE

## 2020-08-18 LAB
A/G RATIO: 1.4 (ref 1.1–2.2)
ALBUMIN SERPL-MCNC: 2.8 G/DL (ref 3.4–5)
ALP BLD-CCNC: 534 U/L (ref 40–129)
ALT SERPL-CCNC: 425 U/L (ref 10–40)
ANION GAP SERPL CALCULATED.3IONS-SCNC: 9 MMOL/L (ref 3–16)
ANTI-NUCLEAR ANTIBODY (ANA): NEGATIVE
AST SERPL-CCNC: 413 U/L (ref 15–37)
ATYPICAL LYMPHOCYTE RELATIVE PERCENT: 16 % (ref 0–6)
BANDED NEUTROPHILS RELATIVE PERCENT: 15 % (ref 0–7)
BASOPHILS ABSOLUTE: 0 K/UL (ref 0–0.2)
BASOPHILS RELATIVE PERCENT: 0 %
BILIRUB SERPL-MCNC: 5.4 MG/DL (ref 0–1)
BUN BLDV-MCNC: 21 MG/DL (ref 7–20)
CALCIUM SERPL-MCNC: 8.5 MG/DL (ref 8.3–10.6)
CHLORIDE BLD-SCNC: 93 MMOL/L (ref 99–110)
CO2: 29 MMOL/L (ref 21–32)
CREAT SERPL-MCNC: 0.6 MG/DL (ref 0.8–1.3)
EOSINOPHILS ABSOLUTE: 0.1 K/UL (ref 0–0.6)
EOSINOPHILS RELATIVE PERCENT: 2 %
FERRITIN: 5748 NG/ML (ref 30–400)
GFR AFRICAN AMERICAN: >60
GFR NON-AFRICAN AMERICAN: >60
GLOBULIN: 2 G/DL
GLUCOSE BLD-MCNC: 112 MG/DL (ref 70–99)
HCT VFR BLD CALC: 35.4 % (ref 40.5–52.5)
HEMATOLOGY PATH CONSULT: NORMAL
HEMATOLOGY PATH CONSULT: YES
HEMOGLOBIN: 12.1 G/DL (ref 13.5–17.5)
HYPOCHROMIA: ABNORMAL
IRON SATURATION: 35 % (ref 20–50)
IRON: 48 UG/DL (ref 59–158)
LYMPHOCYTES ABSOLUTE: 1.2 K/UL (ref 1–5.1)
LYMPHOCYTES RELATIVE PERCENT: 18 %
MCH RBC QN AUTO: 31.5 PG (ref 26–34)
MCHC RBC AUTO-ENTMCNC: 34.2 G/DL (ref 31–36)
MCV RBC AUTO: 92.4 FL (ref 80–100)
MONOCYTES ABSOLUTE: 0.2 K/UL (ref 0–1.3)
MONOCYTES RELATIVE PERCENT: 6 %
NEUTROPHILS ABSOLUTE: 2 K/UL (ref 1.7–7.7)
NEUTROPHILS RELATIVE PERCENT: 43 %
PDW BLD-RTO: 13.8 % (ref 12.4–15.4)
PLATELET # BLD: 94 K/UL (ref 135–450)
PLATELET SLIDE REVIEW: ABNORMAL
PMV BLD AUTO: 7.5 FL (ref 5–10.5)
POIKILOCYTES: ABNORMAL
POTASSIUM SERPL-SCNC: 3.8 MMOL/L (ref 3.5–5.1)
RBC # BLD: 3.84 M/UL (ref 4.2–5.9)
SLIDE REVIEW: ABNORMAL
SODIUM BLD-SCNC: 131 MMOL/L (ref 136–145)
TOTAL IRON BINDING CAPACITY: 138 UG/DL (ref 260–445)
TOTAL PROTEIN: 4.8 G/DL (ref 6.4–8.2)
WBC # BLD: 3.5 K/UL (ref 4–11)

## 2020-08-18 PROCEDURE — 97116 GAIT TRAINING THERAPY: CPT

## 2020-08-18 PROCEDURE — 97535 SELF CARE MNGMENT TRAINING: CPT

## 2020-08-18 PROCEDURE — 6370000000 HC RX 637 (ALT 250 FOR IP): Performed by: INTERNAL MEDICINE

## 2020-08-18 PROCEDURE — 97166 OT EVAL MOD COMPLEX 45 MIN: CPT

## 2020-08-18 PROCEDURE — 6370000000 HC RX 637 (ALT 250 FOR IP): Performed by: PHYSICIAN ASSISTANT

## 2020-08-18 PROCEDURE — 36592 COLLECT BLOOD FROM PICC: CPT

## 2020-08-18 PROCEDURE — 85025 COMPLETE CBC W/AUTO DIFF WBC: CPT

## 2020-08-18 PROCEDURE — 2060000000 HC ICU INTERMEDIATE R&B

## 2020-08-18 PROCEDURE — 2580000003 HC RX 258: Performed by: INTERNAL MEDICINE

## 2020-08-18 PROCEDURE — 80053 COMPREHEN METABOLIC PANEL: CPT

## 2020-08-18 PROCEDURE — 36415 COLL VENOUS BLD VENIPUNCTURE: CPT

## 2020-08-18 PROCEDURE — 97530 THERAPEUTIC ACTIVITIES: CPT

## 2020-08-18 PROCEDURE — 76705 ECHO EXAM OF ABDOMEN: CPT

## 2020-08-18 PROCEDURE — 99232 SBSQ HOSP IP/OBS MODERATE 35: CPT | Performed by: INTERNAL MEDICINE

## 2020-08-18 PROCEDURE — 97161 PT EVAL LOW COMPLEX 20 MIN: CPT

## 2020-08-18 PROCEDURE — 6360000002 HC RX W HCPCS: Performed by: INTERNAL MEDICINE

## 2020-08-18 RX ORDER — DOCUSATE SODIUM 100 MG/1
100 CAPSULE, LIQUID FILLED ORAL DAILY
Status: DISCONTINUED | OUTPATIENT
Start: 2020-08-18 | End: 2020-08-19 | Stop reason: HOSPADM

## 2020-08-18 RX ORDER — DILTIAZEM HYDROCHLORIDE 120 MG/1
120 CAPSULE, COATED, EXTENDED RELEASE ORAL DAILY
Status: DISCONTINUED | OUTPATIENT
Start: 2020-08-19 | End: 2020-08-19 | Stop reason: HOSPADM

## 2020-08-18 RX ORDER — POLYETHYLENE GLYCOL 3350 17 G/17G
17 POWDER, FOR SOLUTION ORAL DAILY
Status: DISCONTINUED | OUTPATIENT
Start: 2020-08-18 | End: 2020-08-19 | Stop reason: HOSPADM

## 2020-08-18 RX ORDER — SENNA PLUS 8.6 MG/1
1 TABLET ORAL NIGHTLY PRN
Status: DISCONTINUED | OUTPATIENT
Start: 2020-08-18 | End: 2020-08-19 | Stop reason: HOSPADM

## 2020-08-18 RX ORDER — METHYLPREDNISOLONE SODIUM SUCCINATE 40 MG/ML
40 INJECTION, POWDER, LYOPHILIZED, FOR SOLUTION INTRAMUSCULAR; INTRAVENOUS EVERY 8 HOURS
Status: DISCONTINUED | OUTPATIENT
Start: 2020-08-18 | End: 2020-08-19 | Stop reason: HOSPADM

## 2020-08-18 RX ADMIN — METHYLPREDNISOLONE SODIUM SUCCINATE 40 MG: 40 INJECTION, POWDER, FOR SOLUTION INTRAMUSCULAR; INTRAVENOUS at 10:03

## 2020-08-18 RX ADMIN — DRONABINOL 2.5 MG: 2.5 CAPSULE ORAL at 20:56

## 2020-08-18 RX ADMIN — NYSTATIN 5 ML: 500000 SUSPENSION ORAL at 18:36

## 2020-08-18 RX ADMIN — MELATONIN TAB 3 MG 6 MG: 3 TAB at 20:56

## 2020-08-18 RX ADMIN — DRONABINOL 2.5 MG: 2.5 CAPSULE ORAL at 10:03

## 2020-08-18 RX ADMIN — DILTIAZEM HYDROCHLORIDE 120 MG: 60 CAPSULE, EXTENDED RELEASE ORAL at 10:03

## 2020-08-18 RX ADMIN — DOCUSATE SODIUM 100 MG: 100 CAPSULE, LIQUID FILLED ORAL at 10:20

## 2020-08-18 RX ADMIN — NYSTATIN 5 ML: 500000 SUSPENSION ORAL at 11:25

## 2020-08-18 RX ADMIN — LEVETIRACETAM 500 MG: 500 TABLET ORAL at 17:48

## 2020-08-18 RX ADMIN — METHYLPREDNISOLONE SODIUM SUCCINATE 40 MG: 40 INJECTION, POWDER, FOR SOLUTION INTRAMUSCULAR; INTRAVENOUS at 17:48

## 2020-08-18 RX ADMIN — OXYCODONE HYDROCHLORIDE 10 MG: 5 TABLET ORAL at 10:09

## 2020-08-18 RX ADMIN — Medication 10 ML: at 20:56

## 2020-08-18 RX ADMIN — Medication 10 ML: at 10:04

## 2020-08-18 RX ADMIN — POLYETHYLENE GLYCOL (3350) 17 G: 17 POWDER, FOR SOLUTION ORAL at 10:48

## 2020-08-18 RX ADMIN — HYDROXYZINE PAMOATE 25 MG: 25 CAPSULE ORAL at 18:01

## 2020-08-18 ASSESSMENT — PAIN SCALES - GENERAL: PAINLEVEL_OUTOF10: 7

## 2020-08-18 NOTE — PROGRESS NOTES
Inpatient Occupational Therapy  Evaluation and Treatment    Unit: PCU  Date:  8/18/2020  Patient Name:    Seward Boeck  Admitting diagnosis:  Hyponatremia [E87.1]  Hyponatremia [E87.1]  Admit Date:  8/13/2020  Precautions/Restrictions/WB Status/ Lines/ Wounds/ Oxygen:   Fall risk, Bed/chair alarm, Telemetry and Telesitter  Treatment Time:  9102-3223  Treatment Number: 1   Timed code treatment minutes 33minutes   Total Treatment minutes:   43   minutes    Patient Goals for Therapy:  \" go home  \"      Discharge Recommendations: Home 24 hr assist  home OT   DME needs for discharge: Shower Chair       Therapy recommendations for staff:   Stand by assist with use of No AD for all ambulation within halls with gait belt     History of Present Illness:  Per H&P on 8-13-20     76 y.o. male who presented to the hospital with a chief complaint of generalized abdominal pain that started yesterday. The patient has a history of esophageal cancer and metastases to the brain. He follows with oncology, neurosurgery and other subspecialty groups. He actually is currently getting gamma knife radiation for his brain metastases. He presented to the emergency room today with abdominal pain that started yesterday. He is on pain medications at home which she rarely takes but today the pain was intolerable and he had a come to the emergency department to get evaluated. In the emergency department a CT scan of his abdomen did not show any new findings but lab work did show hyponatremia. He endorses decreased p.o. intake due to lack of appetite. He is on Marinol and other medications to stimulate appetite. .Home Health S4 Level Recommendation:  Level 1 Standard  AM-PAC Score: 21    Preadmission Environment    Pt.  Lives with spouse  Home environment:  one story home  Steps to enter first floor: 2  steps to enter  Steps to second floor: N/A  Bathroom: tub/shower unit and standard height commode  Equipment owned: none    Preadmission Status:  Pt. Able to drive: Yes  Pt Fully independent with ADLs: Yes  Pt. Required assistance from family for: Cleaning, Cooking and Laundry   Pt. independent for transfers and gait and walked with No Device  History of falls No    Pain  No  Rating:NA  Location:NA  Pain Medicine Status: No request made      Cognition    A&O x4   Able to follow 2 step commands    Subjective  Patient lying supine in bed with spouse present. Pt agreeable to this OT eval & tx. Upper Extremity ROM:    WFL    Upper Extremity Strength:    WFL    Upper Extremity Sensation    WFL    Upper Extremity Proprioception:  WFL    Coordination and Tone  WFL    Balance  Functional Sitting Balance:  WFL  Functional Standing Balance:Diminished    Bed mobility:    Supine to sit: Independent  Sit to supine:   Independent  Rolling:    Independent  Scooting in sitting:  Independent  Scooting to head of bed: Independent    Bridging:   Not Tested    Transfers:    Sit to stand:  Supervision  Stand to sit:  Supervision  Bed to chair:   Not Tested  Standard toilet: Supervision  Bed to [facility]:  Not Tested    Dressing:      UE:   Not Tested  LE:    Independent donning and doffing socks    Bathing:    UE:  Not Tested  LE:  Not Tested    Eating:   Not Tested    Toileting:  Supervision when standing     Activity Tolerance   Pt completed therapy session with decreased balance, confusion  Bp supine 90/61   BP supine after ambulation 99/60   Positioning Needs:   Pt in bed, alarm set, positioned in proper neutral alignment and pressure relief provided. Exercise / Activities Initiated:   N/A    Patient/Family Education:   Role of OT    Assessment of Deficits: Pt seen for Occupational therapy evaluation in acute care setting. Pt demonstrated decreased Activity tolerance, ADLs, Balance , Transfers and Cognition. Pt functioning below baseline and will likely benefit from skilled occupational therapy services to maximize safety and independence. Goal(s) :    To be met in 3 Visits:  1). Bed to toilet/BSC: Supervision    To be met in 5 Visits:  1). Supine to/from Sit:  Independent  2). Upper Body Bathing:   Independent  3). Lower Body Bathing:   Supervision  4). Upper Body Dressing:  Independent  5). Lower Body Dressing:  Independent and Supervision  6). Pt to demonstrate UE exs x 15 reps with minimal cues    Rehabilitation Potential:  Good for goals listed above. Strengths for achieving goals include: Pt cooperative  Barriers to achieving goals include:  Complexity of condition     Plan: To be seen 3-5 x/wk while in acute care setting for therapeutic exercises, bed mobility, transfers, dressing, bathing, family/patient education, ADL/IADL retraining, energy conservation training.    Thompson Reeves OTR/L [id number]          If patient discharges from this facility prior to next visit, this note will serve as the Discharge Summary

## 2020-08-18 NOTE — PROGRESS NOTES
PROGRESS NOTE  S:74 yrs Patient  admitted on 8/13/2020 with Hyponatremia [E87.1]  Hyponatremia [E87.1] . Today he complains of Constipation, Nausea, LUQ and lower Abdominal Pain and early satiety. Exam:   Vitals:    08/18/20 0320   BP: 100/61   Pulse: 85   Resp: 16   Temp: 98 °F (36.7 °C)   SpO2: 96%      General appearance: alert, appears older than stated age, cooperative, fatigued, no distress and syndromic appearance - ill appearing  HEENT: Oropharynx clear, no lesions  Neck: no adenopathy and supple, symmetrical, trachea midline  Lungs: clear to auscultation bilaterally  Heart: regular rate and rhythm, S1, S2 normal, no murmur, click, rub or gallop  Abdomen: normal findings: bowel sounds normal, no masses palpable and symmetric and abnormal findings:  tenderness mild in the LUQ and in the lower abdomen  Extremities: extremities normal, atraumatic, no cyanosis or edema     Medications: Reviewed    Labs:  CBC:   Recent Labs     08/16/20  0510 08/17/20  1512 08/18/20  0625   WBC 3.1* 2.3* 3.5*   HGB 12.8* 11.4* 12.1*   HCT 36.2* 33.3* 35.4*   MCV 90.7 91.8 92.4   PLT 56* 69* 94*     BMP:   Recent Labs     08/16/20  1053 08/17/20  0450 08/18/20  0625   * 132* 131*   K 3.8 3.8 3.8   CL 96* 97* 93*   CO2 26 27 29   PHOS  --  2.7  --    BUN 11 17 21*   CREATININE <0.5* 0.7* 0.6*     LIVER PROFILE:   Recent Labs     08/16/20  0510 08/17/20  0450 08/18/20  0625   * 454* 413*   * 350* 425*   PROT 5.0* 5.0* 4.8*   BILIDIR 1.0* 1.9*  --    BILITOT 2.0* 2.8* 5.4*   ALKPHOS 231* 460* 534*     PT/INR:   Recent Labs     08/17/20  1846   INR 1.06       IMAGING:  US GALLBLADDER RUQ   Impression    Limited study.  Fatty infiltration of the visualized liver. Attending Supervising [de-identified] Attestation Statement  The patient is a 76 y.o. male. I have performed a history and physical examination of the patient.  I discussed the case with my physician assistant prostate cancer s/p prostatectomy, metastatic esophageal cancer s/p esophagectomy (04/19), and recent robotic retroperitoneal mass resection, adrenalectomy, liver wedge resection, cholecystectomy and inguinal hernia repair (02/20) c/b brain metastases s/p gamma knife radiation admitted with abdominal pain and elevated LFTs. Acute hepatitis likely secondary to medication Osito Tracey). Continue supportive care. Monitor LFTs. Consider alternatives to [place]ump. Encourage nutrition with supplements.  Will follow    Maribel Umaña MD          [phone]  [facility]

## 2020-08-18 NOTE — PROGRESS NOTES
Inpatient Physical Therapy Evaluation and Treatment    Unit: PCU  Date:  8/18/2020  Patient Name:    Brayan Gee  Admitting diagnosis:  Hyponatremia [E87.1]  Hyponatremia [E87.1]  Admit Date:  8/13/2020  Precautions/Restrictions/WB Status/ Lines/ Wounds/ Oxygen: Fall risk, Bed/chair alarm, Telemetry and Telesitter    Treatment Time:  15:10-15:53  Treatment Number:  1   Timed Code Treatment Minutes: 33 minutes  Total Treatment Minutes:  43  minutes    Patient Goals for Therapy: \" go home \"          Discharge Recommendations: Home 24 hr supervision  and Home PT  DME needs for discharge: single point cane       Therapy recommendation for EMS Transport: can transport by wheelchair    Therapy recommendations for staff:   Stand by assist with use of No AD for all transfers and ambulation to/from bathroom    History of Present Illness: (Per H&P by Dr. Juan José Piña on 8/13/20)   76 y.o. male who presented to the hospital with a chief complaint of generalized abdominal pain that started yesterday. The patient has a history of esophageal cancer and metastases to the brain. He follows with oncology, neurosurgery and other subspecialty groups. He actually is currently getting gamma knife radiation for his brain metastases. He is on pain medications at home which she rarely takes but today the pain was intolerable and he had a come to the emergency department to get evaluated. In the emergency department a CT scan of his abdomen did not show any new findings but lab work did show hyponatremia. He endorses decreased p.o. intake due to lack of appetite  8/18 - last BM several weeks ago  Additional PMH: prostate CA, DM, HTN    Home Health S4 Level Recommendation:  Level 1 Standard  AM-PAC Mobility Score    AM-PAC Inpatient Mobility Raw Score : 19       Preadmission Environment    Pt.  Lives with spouse  Home environment:    one story home  Steps to enter first floor: 2  steps to enter  Steps to second floor: N/A  Bathroom: tub/shower unit and standard height commode  Equipment owned: none     Preadmission Status:  Pt. Able to drive: Yes  Pt Fully independent with ADLs: Yes  Pt. Required assistance from family for: Cleaning, Cooking and Laundry   Pt. independent for transfers and gait and walked with No Device  History of falls No     Pain  No      Cognition    A&O x4   Able to follow 2 step commands     Subjective  Patient lying supine in bed with spouse present  Pt agreeable to this PT eval & tx. Upper Extremity ROM/Strength  Please see OT evaluation.       Lower Extremity ROM / Strength   AROM WFL: Yes    Strength Assessment (measured on a 0-5 scale):  R LE   Quad   5/5   Ant Tib  5/5   Hamstring 5/5   Iliopsoas 4  L LE  Quad   5/5   Ant Tib  5/5   Hamstring 5/5   Iliopsoas 4    Lower Extremity Sensation    WNL    Lower Extremity Proprioception:   WNL    Coordination and Tone  WNL    Balance  Sitting:  Good - ; Supervision  Comments: slight posterior lean with dynamic activities and use of unilateral-bilateral UE support to maintain balance    Standing: Fair +; CGA  Comments: 1 minute to wash hands with wide HAYLEY and no AD, mild sway noted    Bed Mobility   Supine to Sit:    Independent  Sit to Supine:   Not Tested  Rolling:   Independent  Scooting in sitting: Independent  Scooting in supine:  Not Tested    Transfer Training     Sit to stand:   CGA  Stand to sit:   CGA  Bed to Chair:   Not Tested with use of N/A    Gait gait completed as indicated below  Distance:      240 ft  Deviations (firm surface/linoleum):  decreased esteban, increased HAYLEY, decreased step length bilaterally and decreased toe-off bilaterally  Assistive Device Used:    gait belt  Level of Assist:    CGA  Comment: pt unsteady and with several small LOB (mostly to R posterior), pt recovers independently with good stepping reaction    Pt with improved balance with increased esteban and step length (only 1 LOB), pt does fatigue faster at this pace    [facility] [facility] deferred, pt unsafe/ not appropriate to complete stairs at this time    Activity Tolerance   Pt completed therapy session with No adverse symptoms noted w/activity   Supine (at rest) SpO2: >90%  HR: 79 bpm  BP: 90/61  Supine (end of session) SpO2: 95% on RA  HR: 80  BP: 99/60     Positioning Needs   Pt in bed, alarm set, positioned in proper neutral alignment and pressure relief provided. Exercises Initiated  Sridhar deferred secondary to treatment focus on functional mobility    Other  See OT note for assist with lower body dressing. Patient/Family Education   Pt educated on role of inpatient PT, POC, importance of continued activity, DC recommendations, safety awareness, transfer techniques and calling for assist with mobility. Assessment  Pt seen for Physical Therapy evaluation in acute care setting. Pt demonstrated decreased Activity tolerance, Balance and Safety as well as decreased independence with Ambulation and Transfers. Recommending Home 24 hr assist and with home PT upon discharge as patient functioning close to baseline level and would benefit from continued therapy services    Goals : To be met in 3 visits:  1). Independent with LE Ex x 10 reps    To be met in 6 visits:  1). Supine to/from sit: N/A  2). Sit to/from stand: Independent  3). Bed to chair: Independent  4). Gait: Ambulate Dyllan's  with  Modified Independent and use of LRAD (least restrictive assistive device)  5). Tolerate B LE exercises 3 sets of 10-15 reps  6).   Ascend/descend 2 steps with Modified Independent with use of no handrail and LRAD (least restrictive assistive device)    Rehabilitation Potential: Good  Strengths for achieving goals include:   Pt motivated, PLOF, Family Support and Pt cooperative   Barriers to achieving goals include:    Complexity of condition and CA treatments    Plan    To be seen 2-3 x / week  while in acute care setting for therapeutic exercises, bed mobility, transfers, progressive gait training, balance training, and family/patient education. Signature: Julian Valencia, PT, DPT #[id number]    If patient discharges from this facility prior to next visit, this note will serve as the Discharge Summary.

## 2020-08-18 NOTE — PROGRESS NOTES
Bedside report and Pt care transferred to [facility]. Pt denies any assistance at this time.

## 2020-08-18 NOTE — PROGRESS NOTES
[facility]    Follow-up note           Reason for Consult:  Hyponatremia  Requesting Physician:  Dr. Rangel Meeks history  Patient feels better  Sodium has improved   received tolvaptan 7.5 mg at night 8/15        ROS: No chest pain/shortness of breath/fever/nausea/vomiting  PSFH: + visitor    Scheduled Meds:   methylPREDNISolone  40 mg Intravenous Q8H    polyethylene glycol  17 g Oral Daily    docusate sodium  100 mg Oral Daily    [START ON 8/19/2020] dilTIAZem  120 mg Oral Daily    melatonin  6 mg Oral Nightly    levETIRAcetam  500 mg Oral Dinner    dronabinol  2.5 mg Oral BID    sodium chloride flush  10 mL Intravenous 2 times per day    enoxaparin  40 mg Subcutaneous Daily     Continuous Infusions:    PRN Meds:.senna, magic (miracle) mouthwash with nystatin, oxyCODONE **OR** oxyCODONE, hydrOXYzine, haloperidol lactate, hydrALAZINE, sodium chloride flush, promethazine **OR** ondansetron, HYDROmorphone     History of Present Illness on 8/14/20:    76 y.o. yo male with PMH of esophageal cancer with brain mets s/p who is admitted for abd pain and was noted to have hyponatremia  Not been eating well for few days  No n/v/d  Sodium dropped from 125 to 121 on NS    Physical exam:   Constitutional:  VITALS:  /67   Pulse 90   Temp 97.4 °F (36.3 °C) (Oral)   Resp 16   Ht 5' 6\" (1.676 m)   Wt 135 lb 5 oz (61.4 kg)   SpO2 97%   BMI 21.84 kg/m²   Gen: alert, awake, nad  Skin: no rash, turgor wnl  Heent:  eomi, mmm  Neck: no bruits or jvd noted, thyroid normal  Cardiovascular:  S1, S2 without m/r/g  Respiratory: CTA B without w/r/r; respiratory effort normal  Abdomen:  +bs, soft, nt, nd, no hepatosplenomegaly  Ext: no lower extremity edema  Neuro/Psy: AAoriented times 3 ; moves all 4 ext  Musculoskeletal:  Rom, muscular strength intact; digits, nails normal    Data/  Recent Labs     08/16/20  0510 08/17/20  1512 08/18/20  0625   WBC 3.1* 2.3* 3.5*   HGB 12.8* 11.4* 12.1*   HCT 36.2* 33.3* 35.4*   MCV 90.7 91.8 92.4   PLT 56* 69* 94*     Recent Labs     08/16/20  0510 08/16/20  1053 08/17/20  0450 08/18/20  0625   * 131* 132* 131*   K 3.4* 3.8 3.8 3.8   CL 93* 96* 97* 93*   CO2 25 26 27 29   GLUCOSE 99 139* 155* 112*   PHOS  --   --  2.7  --    MG 1.80  --   --   --    BUN 9 11 17 21*   CREATININE <0.5* <0.5* 0.7* 0.6*   LABGLOM >60 >60 >60 >60   GFRAA >60 >60 >60 >60     UA with micro with trace protein 5-10 RBCs  Urine sodium 124 urine osmolality 672  TSH 5.62    Assessment  -Hyponatremia, 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 Also w decreased solute   -abd paiin  -metastatic esoph cancer w brain mets   -HTN  -DM    Plan  -Encouraged to eat protein shake 1 bottle 3 times a day  -cont antihypertensives  -Resume fluid restriction 1200 ml/day later tonight  -strict intake and out put, encourage protein    Will sign off  Thank you for the consultation. Please do not hesitate to call with questions.     Vance Martins  [facility]  Office: [phone]  Fax:    [phone]

## 2020-08-18 NOTE — PROGRESS NOTES
Progress Note    Admit Date:  8/13/2020    Admitted for hyponatremia, abd pain    Subjective:  Mr. Back complains of generally feeling unwell. Denies significant abdominal pain. Reports a pruritic, papular rash on chest that has spread to his back. Started last week after being started on Keppra. Endorses constipation. Last BM a couple weeks ago. Does have poor PO intake which is not new. Requesting meal supplements. Objective:   /61   Pulse 85   Temp 98 °F (36.7 °C) (Oral)   Resp 16   Ht 5' 6\" (1.676 m)   Wt 135 lb 5 oz (61.4 kg)   SpO2 96%   BMI 21.84 kg/m²       Intake/Output Summary (Last 24 hours) at 8/18/2020 0943  Last data filed at 8/17/2020 1334  Gross per 24 hour   Intake 360 ml   Output --   Net 360 ml       Physical Exam:  Gen: No distress. Alert. Blanca Getting and well-oriented  Eyes:  No conjunctival injection. Neck: Trachea midline. Resp: No accessory muscle use. No crackles. No wheezes. No rhonchi. CV: Regular rate. Regular rhythm. + murmur.  No rub. No edema. GI: Non-tender. Non-distended.  Normal bowel sounds. Skin: Warm and dry. Diffuse papular rash with crusting to anterior chest and back  M/S: No cyanosis. No joint deformity. No clubbing. Neuro: Awake. Grossly nonfocal, generalized weakness  Psych: Oriented  To person, month, year, president. Initially states he is at his home in [place] but quickly reorients. No anxiety or agitation. LAURA Deleon M.D.  have reviewed the chart on [employer] and personally interviewed and examined patient, reviewed the data (labs and imaging) and after discussion with my PA formulated the plan. Agree with note with the following edits. Physical exam:    /61   Pulse 85   Temp 98 °F (36.7 °C) (Oral)   Resp 16   Ht 5' 6\" (1.676 m)   Wt 135 lb 5 oz (61.4 kg)   SpO2 96%   BMI 21.84 kg/m²     Gen: No distress. Alert. Eyes: PERRL. No sclera icterus. No conjunctival injection. ENT: No discharge. Pharynx clear. Neck: Trachea midline. Normal thyroid. Resp: No accessory muscle use. No crackles. No wheezes. No rhonchi. No dullness on percussion. CV: Regular rate. Regular rhythm. No murmur or rub. No edema. GI: Non-tender. Non-distended. No masses. No organomegaly. Normal bowel sounds. No hernia. Skin: papular rash all over the arms,chest wall. Lymph: No cervical LAD. No supraclavicular LAD. M/S: No cyanosis. No joint deformity. No clubbing. Neuro: Awake. Moves all 4 extremities, non focal  Psych: Oriented x 3. No anxiety or agitation. LAZ Meza         Scheduled Meds:   methylPREDNISolone  40 mg Intravenous Q8H    melatonin  6 mg Oral Nightly    levETIRAcetam  500 mg Oral Dinner    dilTIAZem  120 mg Oral Daily    dronabinol  2.5 mg Oral BID    sodium chloride flush  10 mL Intravenous 2 times per day    enoxaparin  40 mg Subcutaneous Daily     PRN Meds:  magic (miracle) mouthwash with nystatin, oxyCODONE **OR** oxyCODONE, hydrOXYzine, polyethylene glycol, haloperidol lactate, hydrALAZINE, sodium chloride flush, promethazine **OR** ondansetron, HYDROmorphone      Data:  CBC:   Recent Labs     08/16/20  0510 08/17/20  1512 08/18/20  0625   WBC 3.1* 2.3* 3.5*   HGB 12.8* 11.4* 12.1*   HCT 36.2* 33.3* 35.4*   MCV 90.7 91.8 92.4   PLT 56* 69* 94*     BMP:   Recent Labs     08/16/20  1053 08/17/20  0450 08/18/20  0625   * 132* 131*   K 3.8 3.8 3.8   CL 96* 97* 93*   CO2 26 27 29   PHOS  --  2.7  --    BUN 11 17 21*   CREATININE <0.5* 0.7* 0.6*     LIVER PROFILE:   Recent Labs     08/16/20  0510 08/17/20  0450 08/18/20  0625   * 454* 413*   * 350* 425*   BILIDIR 1.0* 1.9*  --    BILITOT 2.0* 2.8* 5.4*   ALKPHOS 231* 460* 534*     PT/INR:   Recent Labs     08/17/20  1846   PROTIME 12.3   INR 1.06       CULTURES  Blood: NGTD     RADIOLOGY  US GALLBLADDER RUQ   Final Result   Limited study. Fatty infiltration of the visualized liver.          CT ABDOMEN PELVIS W IV CONTRAST Additional Contrast? Oral   Final Result   1. Status post esophagectomy with gastric pull-through   2. Interval decrease in para-aortic retroperitoneal adenopathy   3. Interval decrease in left perinephric tumor nodules   4. Stable left adrenal mass and celiac lymph node   5. No acute gastrointestinal abnormality   6. Colonic diverticulosis   7. Status post prostatectomy   8. Status post hysterectomy   9. Right nephrolithiasis         XR CHEST PORTABLE   Final Result   No acute findings             Assessment/Plan:    Intractable abdominal pain  - CT A/P as above without acute concerning findings  - Abdominal pain resolved with IV pain control in Er. Transitioned to PO percocet after admission with good control. Will dc with rx for percocet  - Close f/u hem/onc for further management   - Tolerating PO intake but minimal appetite.    - add boost supplements    Prutitic Papular Rash  - diffuse, noted on chest and back,   - reports symptoms came on last week after starting Keppra  - prn vistaril    tomorrow is the last day of Keppra(started after gamma knife)     Hyponatremia  -  likely 2/2 decreased solute intake & SIADH w/ hx of metastatic cancer  - given IV NS & Na dropped: Na 125->121.  Nephro consulted and managing fluids, placed on fluid restriction 1200 mL per day and 1.5% saline at 65mL/hr. He received samsca x 1  - Na 131 at discharge. nephro recommending 1200 cc fluid restriction on discharge      Elevated LFTs  Acute Hepatitis  - monitored throughout admission and continued to trend up  - s/p cholecystectomy   - he had liver nodule excised during surg with Dr. Cammie Roe in Feb 2020 - path showed hyalinized nodule  - US GB: Limited study, Fatty infiltration of the visualized liver.    - liver serologies pending  - etiology: poss medication induced 2/2 Keytruda  Induced autoimmune hepatitis   - start Solumedrol 40 mg q8hr,     Constipation  - add colace and [address] Miralax  - PRN Sennakot    Metastatic esophageal cancer with brain metastases  - Follows closely OP. Undergoing gamma knife radiation. Currently on Keytruda   - Continue marinol with poor appetite, Keppra for seizure prevention  - oncology involved     Acute metabolic encephalopathy  - Resolved     HTN  - BP is significantly elevated on admit, now well controlled   - Continued home Cardizem 120 mg daily      History of prostate cancer  - s/p prostatectomy      Diabetes type 2  - diet-controlled     Hypophosphatemia - Resolved  - replaced     Nephrolithiasis   - 3 mm R renal stone noted      Thrombocytopenia   - Chronic. Plt count ordered - 94 today     DVT Prophylaxis: Lovenox   Diet: Dietary Nutrition Supplements: Standard High Calorie Oral Supplement  Dietary Nutrition Supplements: Snack (see comment)  Diet NPO, After Midnight Exceptions are: Sips with Meds  Code Status: DNR-CCA    Dispo: [facility] care    Arsalan Choudhury PA-C 10:11 AM 8/18/2020     1. Intractable abdominal pain. Metastatic esophageal cancer with brain mets. Initial CT was unremarkable. But his LFTs have been steadily increasing. Likely related to drug mediated hepatitis. 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 Obtain right upper quad ultrasound. GI consult. start solumedrle Meza M.D.

## 2020-08-18 NOTE — FLOWSHEET NOTE
08/17/20 1950   Vitals   Temp 98.3 °F (36.8 °C)   Temp Source Axillary   Pulse 90   Heart Rate Source Monitor   Resp 16   /65   BP Location Right upper arm   BP Upper/Lower Upper   BP Method Automatic   Patient Position Semi fowlers   Level of Consciousness 0   MEWS Score 1   Oxygen Therapy   SpO2 96 %   O2 Device None (Room air)     Vital signs stable. Pt is alert and oriented to self only per baseline. Nothing new noted on head to toe assessment. Pt denies any pain or nausea at the moment. Pt is NSR on the monitor. Evening medication administration completed in applesauce. Tele-sitter at bedside for Pt safety. Bed alarm remains in place. Pt denies any further assistance at the moment. Will continue to monitor.

## 2020-08-18 NOTE — CONSULTS
HEMATOLOGY/ONCOLOGY CONSULTATION:     8/18/2020 8:07 AM    REASON FOR CONSULT:  Esophageal cancer    PROVIDERS:  Denise Triplett MD    CHIEF COMPLAINT:     Chief Complaint   Patient presents with    Abdominal Pain     Pt has extensive cancer history with current cancer treatment with primary in prostate. Currently being treated for esophageal cancer. C/o nausea, no vomiting or diarrhea. Pain across lower abdomen. 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 He is s/p adrenalectomy and is on 3rd line therapy with Keytruda. His last treatment was 2 weeks ago. He just completed gamma knife treatment for his brain metastasis. He was admitted 8/13 for hyponatremia and abdominal pain. His Na has improved but LFT have been increasing.  He has been taken down for an USN this AM.    PAST MEDICAL HISTORY:     Past Medical History:   Diagnosis Date    Adrenal mass ([address]     Diabetes mellitus ([address]     Esophageal cancer ([address]     H/O sleep apnea 1998    resolved after surgery    History of esophageal reflux     Hypertension     Prostate cancer ([address]     Weight loss        PAST SURGICAL HISTORY:        Past Surgical History:   Procedure Laterality Date    ADRENALECTOMY N/A 2/27/2020    ROBOTIC LAPAROSCOPIC RIGHT RETROPERITONEAL MASS RESECTION WITH ADRENALECTOMY, LIVER NODULE WEDGE EXCISION AND ROBOTIC ASSISTED CHOLECYSTECTOMY AND OPEN LEFT INGUINAL HERNIA REPAIR WITH MESH performed by Kate Bronson MD at [address] [place] [address]  2003    COLONOSCOPY  2012    negative    COLONOSCOPY  09/14/2017    diverticulosis    ESOPHAGEAL DILATATION N/A 1/3/2019    ESOPHAGOGASTRODUODENOSCOPY WITH BIOPSY  CPT CODE - 94354 performed by Sneha Sullivan MD at [place] [address] ESOPHAGECTOMY N/A 4/18/2019    BENITO [facility] ESOPHAGECTOMY WITH MEDIASTINAL LYMPHADENECTOMY INCLUDING THORACIC DUCT, UMBILICAL HERNIA REPAIR performed by [facility] Ganesh Roldan MD at [address] / REMOVAL / REPLACEMENT VENOUS ACCESS CATHETER N/A 2019    PORT A CATH PLACEMENT AND PEG TUBE EXCHANGE performed by Marilin Antony MD at [address] [facility] OFFICE/OUTPT [facility] N/A 2018    EUS/ WITH ANESTHESIA performed by Kylee Duenas MD at [facility]    UPPER GASTROINTESTINAL ENDOSCOPY  2018    EGD BIOPSY performed by Kylee Duenas MD at [facility] ENDOSCOPY N/A 2019    ESOPHAGOGASTRODUODENOSCOPY WITH BIOPSY  CPT CODE - 12057 performed by Marilin Antony MD at [facility]      hx of sleep apnea       SOCIAL HISTORY:     Social History     Socioeconomic History    Marital status:      Spouse name: Not on file    Number of children: Not on file    Years of education: Not on file    Highest education level: Not on file   Occupational History    Not on file   Social Needs    Financial resource strain: Not on file    Food insecurity     Worry: Not on file     Inability: Not on file    Transportation needs     Medical: Not on file     Non-medical: Not on file   Tobacco Use    Smoking status: Former Smoker     Types: Cigarettes     Last attempt to quit: 1972     Years since quittin.6    Smokeless tobacco: Never Used   Substance and Sexual Activity    Alcohol use:  Yes     Alcohol/week: 0.0 standard drinks     Comment: rarely    Drug use: No    Sexual activity: Yes     Partners: Female   Lifestyle    Physical activity     Days per week: Not on file     Minutes per session: Not on file    Stress: Not on file   Relationships    Social connections     Talks on phone: Not on file     Gets together: Not on file     Attends Orthodoxy service: Not on file     Active member of club or organization: Not on file     Attends meetings of clubs or organizations: Not on file Relationship status: Not on file    Intimate partner violence     Fear of current or ex partner: Not on file     Emotionally abused: Not on file     Physically abused: Not on file     Forced sexual activity: Not on file   Other Topics Concern    Not on file   Social History Narrative    Not on file       FAMILY HISTORY:     Family History   Problem Relation Age of Onset    Cancer Father         lung    Other Mother         blood disorder       ALLERGIES:     Allergies as of 08/13/2020 - Review Complete 08/13/2020   Allergen Reaction Noted    No known allergies  11/20/2014       MEDICATIONS:     No current facility-administered medications on file prior to encounter. Current Outpatient Medications on File Prior to Encounter   Medication Sig Dispense Refill    Lansoprazole (PREVACID 24HR PO) Take by mouth      levETIRAcetam (KEPPRA) 500 MG tablet Take 500 mg by mouth every evening Patient on a weaning dose. Last dose to be taken on Wednesday 8/19      dronabinol (MARINOL) 2.5 MG capsule Take 2.5 mg by mouth 2 times daily as needed.  ondansetron (ZOFRAN ODT) 4 MG disintegrating tablet Take 1 tablet by mouth every 8 hours as needed for Nausea 20 tablet 0    diltiazem (CARDIZEM 12 HR) 120 MG extended release capsule Take 120 mg by mouth daily      gabapentin (NEURONTIN) 300 MG capsule Take 300 mg by mouth 3 times daily.  ONE TOUCH ULTRA TEST strip       ONETOUCH DELICA LANCETS 71X MISC       metFORMIN (GLUCOPHAGE) 500 MG tablet Take 1 tablet by mouth Daily with supper Metformin must be crushed.  30 tablet 0     REVIEW OF SYSTEMS:       Unable to assess - pt down for USN    PHYSICAL EXAM:       Vitals:    08/18/20 0320   BP: 100/61   Pulse: 85   Resp: 16   Temp: 98 °F (36.7 °C)   SpO2: 96%     Unable to examine - pt down for USN    LABS:     Lab Results   Component Value Date    WBC 3.5 (L) 08/18/2020    HGB 12.1 (L) 08/18/2020    HCT 35.4 (L) 08/18/2020    MCV 92.4 08/18/2020    PLT 94 (L) 08/18/2020       Lab Results   Component Value Date    GLUCOSE 112 (H) 08/18/2020    BUN 21 (H) 08/18/2020    CREATININE 0.6 (L) 08/18/2020    K 3.8 08/18/2020    PHOS 2.7 08/17/2020       Lab Results   Component Value Date    ALKPHOS 534 (H) 08/18/2020     (H) 08/18/2020     (H) 08/18/2020    BILITOT 5.4 (H) 08/18/2020    BILIDIR 1.9 (H) 08/17/2020    PROT 4.8 (L) 08/18/2020       IMAGING:     Ct Abdomen Pelvis W Iv Contrast Additional Contrast? Oral  Result Date: 8/13/2020  1. Status post esophagectomy with gastric pull-through 2. Interval decrease in para-aortic retroperitoneal adenopathy 3. Interval decrease in left perinephric tumor nodules 4. Stable left adrenal mass and celiac lymph node 5. No acute gastrointestinal abnormality 6. Colonic diverticulosis 7. Status post prostatectomy 8. Status post hysterectomy 9. Right nephrolithiasis     Xr Chest Portable  Result Date: 8/13/2020  No acute findings     Mri Brain W Wo Contrast  Result Date: 7/28/2020  Total of 3 intra-axial metastases       STAGING:     Cancer Staging  IV    ASSESSMENT:     Problem List Items Addressed This Visit     Hyponatremia - Primary    Elevated liver enzymes    Lower abdominal pain    Primary malignant neoplasm of esophagus with metastasis to other site [facility])    Relevant Medications    promethazine (PHENERGAN) tablet 12.5 mg    HYDROmorphone (DILAUDID) injection 0.5 mg    levETIRAcetam (KEPPRA) tablet 500 mg    magic (miracle) mouthwash with nystatin    oxyCODONE (ROXICODONE) immediate release tablet 5 mg    oxyCODONE (ROXICODONE) immediate release tablet 10 mg                PLAN:     1. Elevated LFT  - trending up this admission  - no known liver metastasis  - RUQ USN and serologies pending  - immunotherapy (Keytruda) could cause LFT elevation  - would be reasonable to start steroids if workup otherwise negative    2.  Stage IV Esophageal Cancer  - s/p chemoradiation,   Esophagectomy, FOLFOX, and now on Keytruda  - last tx 2 weeks ago  - appears to be showing response on CT scan this admission  - alternate therapy will need to be considered if this has caused his hepatitis  - did discuss his current condition with his wife - we did start to discuss his goals of care as well as Hospice - she is very realistic of his prognosis but does not want to consult Hospice yet    3. Brain Metastasis  - planning gamma knife    4. Hyponatremia  - per nephrology  - Na improved    5. Abdominal Pain  - prn oxycodone    6.  Hx Prostate Cancer  - no acute issues    Pastor Nancy MD

## 2020-08-19 VITALS
OXYGEN SATURATION: 96 % | WEIGHT: 134.2 LBS | SYSTOLIC BLOOD PRESSURE: 113 MMHG | BODY MASS INDEX: 21.57 KG/M2 | DIASTOLIC BLOOD PRESSURE: 65 MMHG | RESPIRATION RATE: 16 BRPM | HEART RATE: 84 BPM | TEMPERATURE: 98 F | HEIGHT: 66 IN

## 2020-08-19 LAB
A/G RATIO: 1.4 (ref 1.1–2.2)
ALBUMIN SERPL-MCNC: 2.7 G/DL (ref 3.4–5)
ALP BLD-CCNC: 492 U/L (ref 40–129)
ALT SERPL-CCNC: 299 U/L (ref 10–40)
ANION GAP SERPL CALCULATED.3IONS-SCNC: 3 MMOL/L (ref 3–16)
AST SERPL-CCNC: 200 U/L (ref 15–37)
BASOPHILS ABSOLUTE: 0 K/UL (ref 0–0.2)
BASOPHILS RELATIVE PERCENT: 0.3 %
BILIRUB SERPL-MCNC: 4.2 MG/DL (ref 0–1)
BUN BLDV-MCNC: 19 MG/DL (ref 7–20)
CALCIUM SERPL-MCNC: 8.5 MG/DL (ref 8.3–10.6)
CHLORIDE BLD-SCNC: 93 MMOL/L (ref 99–110)
CO2: 30 MMOL/L (ref 21–32)
CREAT SERPL-MCNC: <0.5 MG/DL (ref 0.8–1.3)
EOSINOPHILS ABSOLUTE: 0 K/UL (ref 0–0.6)
EOSINOPHILS RELATIVE PERCENT: 0 %
GFR AFRICAN AMERICAN: >60
GFR NON-AFRICAN AMERICAN: >60
GLOBULIN: 1.9 G/DL
GLUCOSE BLD-MCNC: 182 MG/DL (ref 70–99)
HAV IGM SER IA-ACNC: NORMAL
HCT VFR BLD CALC: 30.6 % (ref 40.5–52.5)
HEMOGLOBIN: 10.4 G/DL (ref 13.5–17.5)
HEPATITIS B CORE IGM ANTIBODY: NORMAL
HEPATITIS B SURFACE ANTIGEN INTERPRETATION: NORMAL
HEPATITIS C ANTIBODY INTERPRETATION: NORMAL
LYMPHOCYTES ABSOLUTE: 1.3 K/UL (ref 1–5.1)
LYMPHOCYTES RELATIVE PERCENT: 29.2 %
MCH RBC QN AUTO: 30.7 PG (ref 26–34)
MCHC RBC AUTO-ENTMCNC: 33.9 G/DL (ref 31–36)
MCV RBC AUTO: 90.5 FL (ref 80–100)
MONOCYTES ABSOLUTE: 0.4 K/UL (ref 0–1.3)
MONOCYTES RELATIVE PERCENT: 9.4 %
NEUTROPHILS ABSOLUTE: 2.8 K/UL (ref 1.7–7.7)
NEUTROPHILS RELATIVE PERCENT: 61.1 %
PDW BLD-RTO: 13.7 % (ref 12.4–15.4)
PLATELET # BLD: 140 K/UL (ref 135–450)
PMV BLD AUTO: 7.7 FL (ref 5–10.5)
POTASSIUM SERPL-SCNC: 4 MMOL/L (ref 3.5–5.1)
RBC # BLD: 3.38 M/UL (ref 4.2–5.9)
SODIUM BLD-SCNC: 126 MMOL/L (ref 136–145)
TOTAL PROTEIN: 4.6 G/DL (ref 6.4–8.2)
WBC # BLD: 4.5 K/UL (ref 4–11)

## 2020-08-19 PROCEDURE — 36591 DRAW BLOOD OFF VENOUS DEVICE: CPT

## 2020-08-19 PROCEDURE — 6370000000 HC RX 637 (ALT 250 FOR IP): Performed by: INTERNAL MEDICINE

## 2020-08-19 PROCEDURE — 6360000002 HC RX W HCPCS: Performed by: INTERNAL MEDICINE

## 2020-08-19 PROCEDURE — 97116 GAIT TRAINING THERAPY: CPT

## 2020-08-19 PROCEDURE — 6370000000 HC RX 637 (ALT 250 FOR IP): Performed by: PHYSICIAN ASSISTANT

## 2020-08-19 PROCEDURE — 2580000003 HC RX 258: Performed by: INTERNAL MEDICINE

## 2020-08-19 PROCEDURE — 97530 THERAPEUTIC ACTIVITIES: CPT

## 2020-08-19 PROCEDURE — 99239 HOSP IP/OBS DSCHRG MGMT >30: CPT | Performed by: INTERNAL MEDICINE

## 2020-08-19 PROCEDURE — 85025 COMPLETE CBC W/AUTO DIFF WBC: CPT

## 2020-08-19 PROCEDURE — 80053 COMPREHEN METABOLIC PANEL: CPT

## 2020-08-19 RX ORDER — PREDNISONE 20 MG/1
60 TABLET ORAL DAILY
Qty: 30 TABLET | Refills: 0 | Status: SHIPPED | OUTPATIENT
Start: 2020-08-19 | End: 2020-08-29

## 2020-08-19 RX ORDER — HYDROXYZINE PAMOATE 25 MG/1
25 CAPSULE ORAL 3 TIMES DAILY PRN
Qty: 15 CAPSULE | Refills: 0 | Status: SHIPPED | OUTPATIENT
Start: 2020-08-19 | End: 2020-10-15 | Stop reason: ALTCHOICE

## 2020-08-19 RX ORDER — OXYCODONE HYDROCHLORIDE 5 MG/1
5 TABLET ORAL EVERY 4 HOURS PRN
Qty: 20 TABLET | Refills: 0 | Status: SHIPPED | OUTPATIENT
Start: 2020-08-19 | End: 2020-08-24

## 2020-08-19 RX ADMIN — DOCUSATE SODIUM 100 MG: 100 CAPSULE, LIQUID FILLED ORAL at 09:21

## 2020-08-19 RX ADMIN — DRONABINOL 2.5 MG: 2.5 CAPSULE ORAL at 09:21

## 2020-08-19 RX ADMIN — Medication 10 ML: at 09:22

## 2020-08-19 RX ADMIN — METHYLPREDNISOLONE SODIUM SUCCINATE 40 MG: 40 INJECTION, POWDER, FOR SOLUTION INTRAMUSCULAR; INTRAVENOUS at 09:21

## 2020-08-19 RX ADMIN — DILTIAZEM HYDROCHLORIDE 120 MG: 120 CAPSULE, COATED, EXTENDED RELEASE ORAL at 09:21

## 2020-08-19 RX ADMIN — POLYETHYLENE GLYCOL (3350) 17 G: 17 POWDER, FOR SOLUTION ORAL at 09:21

## 2020-08-19 RX ADMIN — METHYLPREDNISOLONE SODIUM SUCCINATE 40 MG: 40 INJECTION, POWDER, FOR SOLUTION INTRAMUSCULAR; INTRAVENOUS at 00:31

## 2020-08-19 NOTE — PROGRESS NOTES
PROGRESS NOTE  S:74 yrs Patient  admitted on 8/13/2020 with Hyponatremia [E87.1]  Hyponatremia [E87.1] . Today he complains of mild Abdominal Pain and tolerating diet. His last BM was this AM.    Exam:   Vitals:    08/19/20 0900   BP: 113/65   Pulse: 84   Resp: 16   Temp: 98 °F (36.7 °C)   SpO2: 96%      General appearance: alert, appears older than stated age, cooperative, fatigued and no distress  HEENT: Oropharynx clear, no lesions  Neck: no adenopathy and supple, symmetrical, trachea midline  Lungs: clear to auscultation bilaterally  Heart: regular rate and rhythm, S1, S2 normal, no murmur, click, rub or gallop  Abdomen: soft, non-tender; bowel sounds normal; no masses,  no organomegaly  Extremities: extremities normal, atraumatic, no cyanosis or edema     Medications: Reviewed    Labs:  CBC:   Recent Labs     08/17/20  1512 08/18/20  0625 08/19/20  0740   WBC 2.3* 3.5* 4.5   HGB 11.4* 12.1* 10.4*   HCT 33.3* 35.4* 30.6*   MCV 91.8 92.4 90.5   PLT 69* 94* 140     BMP:   Recent Labs     08/17/20  0450 08/18/20  0625 08/19/20  0740   * 131* 126*   K 3.8 3.8 4.0   CL 97* 93* 93*   CO2 27 29 30   PHOS 2.7  --   --    BUN 17 21* 19   CREATININE 0.7* 0.6* <0.5*     LIVER PROFILE:   Recent Labs     08/17/20  0450 08/18/20  0625 08/19/20  0740   * 413* 200*   * 425* 299*   PROT 5.0* 4.8* 4.6*   BILIDIR 1.9*  --   --    BILITOT 2.8* 5.4* 4.2*   ALKPHOS 460* 534* 492*     PT/INR:   Recent Labs     08/17/20  1846   INR 1.06       IMAGING:  US GALLBLADDER RUQ - 8/18/2020  Impression    Limited study.  Fatty infiltration of the visualized liver.         Impression: 76year old male with a history of adrenal mass, diabetes, HTN, prostate cancer s/p prostatectomy, metastatic esophageal cancer s/p esophagectomy (04/19), and recent robotic retroperitoneal mass resection, adrenalectomy, liver wedge resection, cholecystectomy and inguinal hernia repair (02/20) c/b brain Problem: Physical Therapy Goal  Goal: Physical Therapy Goal  Goals to be met by: 18     Patient will increase functional independence with mobility by performin. Supine to sit with Supervision  2. Sit to supine with Supervision  3. Sit to stand transfer with Stand-by Assistance  4. Gait  x 150 feet with Stand-by Assistance using Rolling Walker.   5. Ascend/Descend 6 inch curb step with Contact Guard Assistance using Rolling Walker.  6. Lower extremity exercise program x 30 reps per handout, with independence     Patient goals remain appropriate.  Patient will continue to benefit from further PT services.  Matty Valderrama, PTA         metastases s/p gamma knife radiation admitted with abdominal pain and elevated LFTs. Acute hepatitis likely secondary to medication Rebecca Erm). Recommendation:  1. Continue supportive care  2. Monitor LFTs  3. Monitor and document output  4. Continue diet as tolerated  5. Encourage nutrition with supplementation  6. Await liver serologies  7. Solumedrol started 8/18/2020  8. PT/OT  9. Up in chair BID  10. Ok to d/c from GI standpoint  11. Repeat CMP in 4 weeks upon d/c      Jennifer Fletcher PA-C  2:51 PM 8/19/2020                      I reviewed and agree with the findings and plan documented in her note with the appropriate changes made to it.     Electronically signed by Kirk Barfield MD on 8/21/20 at 5:42 PM EDT          (O) [phone]  [phone]

## 2020-08-19 NOTE — PROGRESS NOTES
Discharge instructions reviewed with the patient and patient's wife at bedside. Verbalized understanding of all including prescribed medication, medication side effects/restrictions, and follow up care. Denies questions/concerns. Patient is alert/oriented, stable, and ambulatory at the time of discharge.

## 2020-08-19 NOTE — FLOWSHEET NOTE
08/18/20 2054   Vitals   Temp 98.1 °F (36.7 °C)   Temp Source Oral   Pulse 73   Heart Rate Source Monitor   Resp 16   BP (!) 102/59   MAP (mmHg) 73   Level of Consciousness 0   MEWS Score 1   Cardiac Rhythm NSR   Oxygen Therapy   SpO2 95 %   O2 Device None (Room air)     Vital signs stable. Pt is alert and oriented to self only per baseline. Nothing new noted on head to toe assessment. Pt denies any pain or nausea at the moment. Pt is NSR on the monitor. Evening medication administration completed in applesauce. Tele-sitter at bedside for Pt safety. Bed alarm remains in place. Pt denies any further assistance at the moment.  Will continue to monitor.

## 2020-08-19 NOTE — PROGRESS NOTES
Progress Note    Admit Date:  8/13/2020    Admitted for hyponatremia, abd pain    Subjective:  Mr. Bethany Hernandez says that his rash is worse   Itching however is better       Objective:   /61   Pulse 76   Temp 97.9 °F (36.6 °C) (Oral)   Resp 16   Ht 5' 6\" (1.676 m)   Wt 134 lb 3.2 oz (60.9 kg)   SpO2 94%   BMI 21.66 kg/m²       Intake/Output Summary (Last 24 hours) at 8/19/2020 0732  Last data filed at 8/19/2020 0448  Gross per 24 hour   Intake 440 ml   Output 400 ml   Net 40 ml       Physical Exam:  Gen: No distress. Alert. Germania Block and well-oriented  Eyes:  No conjunctival injection. Neck: Trachea midline. Resp: No accessory muscle use. No crackles. No wheezes. No rhonchi. CV: Regular rate. Regular rhythm. + murmur.  No rub. No edema. GI: Non-tender. Non-distended.  Normal bowel sounds. Skin: Warm and dry. Diffuse papular rash with crusting to anterior chest and back  M/S: No cyanosis. No joint deformity. No clubbing. Neuro: Awake. Grossly nonfocal, generalized weakness  Psych: Oriented  To person, month, year, president. Initially states he is at his home in [place] but quickly reorients.  No anxiety or agitation.          Scheduled Meds:   methylPREDNISolone  40 mg Intravenous Q8H    polyethylene glycol  17 g Oral Daily    docusate sodium  100 mg Oral Daily    dilTIAZem  120 mg Oral Daily    melatonin  6 mg Oral Nightly    dronabinol  2.5 mg Oral BID    sodium chloride flush  10 mL Intravenous 2 times per day    enoxaparin  40 mg Subcutaneous Daily     PRN Meds:  senna, magic (miracle) mouthwash with nystatin, oxyCODONE **OR** oxyCODONE, hydrOXYzine, haloperidol lactate, hydrALAZINE, sodium chloride flush, promethazine **OR** ondansetron      Data:  CBC:   Recent Labs     08/17/20  1512 08/18/20  0625   WBC 2.3* 3.5*   HGB 11.4* 12.1*   HCT 33.3* 35.4*   MCV 91.8 92.4   PLT 69* 94*     BMP:   Recent Labs     08/16/20  1053 08/17/20  0450 08/18/20  0625   * 132* 131*   K 3.8 3.8 3.8 CL 96* 97* 93*   CO2 26 27 29   PHOS  --  2.7  --    BUN 11 17 21*   CREATININE <0.5* 0.7* 0.6*     LIVER PROFILE:   Recent Labs     08/17/20  0450 08/18/20  0625   * 413*   * 425*   BILIDIR 1.9*  --    BILITOT 2.8* 5.4*   ALKPHOS 460* 534*     PT/INR:   Recent Labs     08/17/20  1846   PROTIME 12.3   INR 1.06       CULTURES  Blood: NGTD     RADIOLOGY  US GALLBLADDER RUQ   Final Result   Limited study. Fatty infiltration of the visualized liver. CT ABDOMEN PELVIS W IV CONTRAST Additional Contrast? Oral   Final Result   1. Status post esophagectomy with gastric pull-through   2. Interval decrease in para-aortic retroperitoneal adenopathy   3. Interval decrease in left perinephric tumor nodules   4. Stable left adrenal mass and celiac lymph node   5. No acute gastrointestinal abnormality   6. Colonic diverticulosis   7. Status post prostatectomy   8. Status post hysterectomy   9. Right nephrolithiasis         XR CHEST PORTABLE   Final Result   No acute findings             Assessment/Plan:    Intractable abdominal pain  - CT A/P as above without acute concerning findings  - Abdominal pain resolved with IV pain control in Er. Transitioned to PO percocet after admission with good control. Will dc with rx for percocet  - Close f/u hem/onc for further management   - Tolerating PO intake but minimal appetite.    - add boost supplements    Prutitic Papular Rash  - diffuse, noted on chest and back,   - reports symptoms came on last week after starting Keppra  - prn vistaril    today is the last day of Keppra(started after gamma knife)  Stop Keppra      Hyponatremia  -  likely 2/2 decreased solute intake & SIADH w/ hx of metastatic cancer  - given IV NS & Na dropped: Na 125->121-- 130 --126.  Nephro consulted and managing fluids, placed on fluid restriction 1200 mL per day and 1.5% saline at 65mL/hr.   He received samsca x 1   nephro recommending 1200 cc fluid restriction o ndischarge      Elevated

## 2020-08-19 NOTE — PROGRESS NOTES
Physical Therapy  Inpatient Physical Therapy Daily Treatment Note    Unit: PCU  Date:  8/19/2020  Patient Name:    Palak Cordoba  Admitting diagnosis:  Hyponatremia [E87.1]  Hyponatremia [E87.1]  Admit Date:  8/13/2020  Precautions/Restrictions:  Bed/chair alarm      Discharge Recommendations: Home 24 hr supervision and with home PT  and Home PT  DME needs for discharge: single point cane       Therapy recommendation for EMS Transport: can transport by wheelchair    Therapy recommendations for staff:   Supervision with use of gait belt for all transfers and ambulation to/from chair  to/from bathroom  within room  within halls    History of Present Illness: (Per H&P by Dr. Seretha Schirmer on 8/13/20)   76 y. o. male who presented to the hospital with a chief complaint of generalized abdominal pain that started yesterday.  The patient has a history of esophageal cancer and metastases to the brain. [facility] follows with oncology, neurosurgery and other subspecialty groups. [facility] actually is currently getting gamma knife radiation for his brain metastases. He is on pain medications at home which she rarely takes but today the pain was intolerable and he had a come to the emergency department to get evaluated.  In the emergency department a CT scan of his abdomen did not show any new findings but lab work did show hyponatremia. [facility] endorses decreased p.o. intake due to lack of appetite  8/18 - last BM several weeks ago  Additional PMH: prostate CA, DM, HTN    Home Health S4 Level Recommendation: Level 1 Standard  AM-PAC Mobility Score   AM-PAC Inpatient Mobility Raw Score : 22       Treatment Time:  8411-8580  Treatment number: 2  Timed Code Treatment Minutes: 26 minutes  Total Treatment Minutes:  26  minutes    Cognition    A&O x4   Able to follow 2 step commands    Subjective  Patient lying supine in bed with spouse present   Pt agreeable to this PT tx.      Pain   No  Location:   Rating:    NA/10  Pain Medicine Status: Denies need Bed Mobility   Supine to Sit:    Independent  Sit to Supine:   Independent  Rolling:   Independent  Scooting:   Independent    Transfer Training     Sit to stand:   Supervision  Stand to sit:   Supervision  Bed to Chair:   Supervision with use of No AD    Gait Training gait completed as indicated below  Distance:      600 ft  Deviations (firm surface/linoleum):  none  Assistive Device Used:    gait belt  Level of Assist:    SBA  Comment: with slower speeds had mild LOB but able to self correct; improves at faster speeds still within safe boundaries    Stair Training deferred, pt does not have stairs in the home environment  # of Steps:   N/A  Level of Assist:  Not Tested  UE Support:  NA  Assistive Device:  N/A  Pattern:   N/A  Comments:     Therapeutic Exercise Sridhar deferred secondary to treatment focus on functional mobility  NA    Balance  Sitting:  Normal; Independent  Comments:     Standing: Good ; Supervision  Comments: no asst device; able to don his pants without LOB and to secure    Patient Education      Role of PT, POC, Discharge recommendations, DC recommendations, pacing activity and calling for assist with mobility. Positioning Needs       Pt in bed, alarm set, positioned in proper neutral alignment and pressure relief provided. Call light provided and all needs within reach    ROM Measurements N/A  Knee Flexion:  Knee Extension:     Activity Tolerance   Pt completed therapy session with No adverse symptoms noted w/activity  Spo2 = 92%  HR = 81 BPM  BP = 111/63. Other  SBA to supervision with all mobility skills today; ambulated 2 loops of floor ~600 ft w/varying speeds to work on dynamic balance. Returned to room and he requested to lay/sit in bed vs chair as he was up in chair all am.  MD came in and possible d/c to home today. Assessment :  Patient improved on all planes and is doing better with gait stability and endurance.   He did have a couple of episodes of LOB with self correction at slower speeds so will benefit with Home Health PT to address higher level gait and balance issues as well as endurance. Recommending Home 24 hr supervision and with home PT upon discharge as patient functioning close to baseline level    Goals (all goals ongoing unless otherwise indicated)  To be met in 3 visits:  1). Independent with LE Ex x 10 reps     To be met in 6 visits:  1). Supine to/from sit: N/A  2). Sit to/from stand: Independent  3). Bed to chair: Independent  4). Gait: Ambulate Dyllan's  with  Modified Independent and use of LRAD (least restrictive assistive device)  5). Tolerate B LE exercises 3 sets of 10-15 reps  6). Ascend/descend 2 steps with Modified Independent with use of no handrail and LRAD (least restrictive assistive device)    Plan   Continue with plan of care. Signature: Will Kaur, [address] [facility] [id number]    If patient discharges from this facility prior to next visit, this note will serve as the Discharge Summary.

## 2020-08-19 NOTE — PROGRESS NOTES
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 Afebrile this AM. Still has rash. REVIEW OF SYSTEMS:       10 point ROS completed. Pertinent positives in HPI, otherwise negative.      PHYSICAL EXAM:       /61   Pulse 76   Temp 97.9 °F (36.6 °C) (Oral)   Resp 16   Ht 5' 6\" (1.676 m)   Wt 134 lb 3.2 oz (60.9 kg)   SpO2 94%   BMI 21.66 kg/m²     General appearance: alert and cooperative  Head: Normocephalic, without obvious abnormality, atraumatic  Neck: No palpable lymphadenopathy in supraclavicular or cervical chains  Lungs: Clear to auscultation bilaterally, no audible rales, wheezes or crackles  Heart: Regular rate and rhythm, S1, S2 normal  Abdomen: Soft, non-tender; bowel sounds normal; no masses,  no organomegaly  Extremities: without cyanosis, clubbing, edema or asymmetry  Skin: generalized rash on scalp trunk and arms    LABS:     Lab Results   Component Value Date    WBC 3.5 (L) 08/18/2020    HGB 12.1 (L) 08/18/2020    HCT 35.4 (L) 08/18/2020    MCV 92.4 08/18/2020    PLT 94 (L) 08/18/2020       Lab Results   Component Value Date    GLUCOSE 112 (H) 08/18/2020    BUN 21 (H) 08/18/2020    CREATININE 0.6 (L) 08/18/2020    K 3.8 08/18/2020    PHOS 2.7 08/17/2020       Lab Results   Component Value Date    ALKPHOS 534 (H) 08/18/2020     (H) 08/18/2020     (H) 08/18/2020    BILITOT 5.4 (H) 08/18/2020    BILIDIR 1.9 (H) 08/17/2020    PROT 4.8 (L) 08/18/2020       IMAGING:     Ct Abdomen Pelvis W Iv Contrast Additional Contrast? Oral  Result Date: 8/13/2020  1. Status post esophagectomy with gastric pull-through 2. Interval decrease in para-aortic retroperitoneal adenopathy 3. Interval decrease in left perinephric tumor nodules 4. Stable left adrenal mass and celiac lymph node 5. No acute gastrointestinal abnormality 6. Colonic diverticulosis 7. Status post prostatectomy 8. Status post hysterectomy 9. Right nephrolithiasis     Xr Chest Portable  Result Date: 8/13/2020  No acute findings     Mri Brain W Wo Contrast  Result Date: 7/28/2020  Total of 3 intra-axial metastases     RUQ USN 8/18/2020:  Limited study.  Fatty infiltration of the visualized liver. STAGING:     Cancer Staging  IV    ASSESSMENT:     Problem List Items Addressed This Visit     Hyponatremia - Primary    Elevated liver enzymes    Lower abdominal pain    Primary malignant neoplasm of esophagus with metastasis to other site [facility])    Relevant Medications    promethazine (PHENERGAN) tablet 12.5 mg    magic (miracle) mouthwash with nystatin    oxyCODONE (ROXICODONE) immediate release tablet 5 mg    oxyCODONE (ROXICODONE) immediate release tablet 10 mg    methylPREDNISolone sodium (SOLU-MEDROL) injection 40 mg                PLAN:     1. Elevated LFT/Rash  - trending up this admission  - no known liver metastasis  - GI following  - RUQ USN unremarkable and serologies pending  - immunotherapy (Keytruda) could cause LFT elevation and rash  - started solumedrol 8/18/2020  - CMP pending    2.  Stage IV Esophageal Cancer  - s/p chemoradiation,   Esophagectomy, FOLFOX, and now on Keytruda  - last tx 2 weeks ago  - appears to be showing response on CT scan this admission  - alternate therapy will need to be considered if this has caused his hepatitis  - did discuss his current condition with his wife - we did start to discuss his goals of care as well as Hospice - she is very realistic of his prognosis but does not want to consult Hospice yet    3. Brain Metastasis  - recently completed gamma knife    4. Hyponatremia  - per nephrology  - Na improved    5. Abdominal Pain  - prn oxycodone    6.  Hx Prostate Cancer  - no acute issues    Tesfaye Perez MD

## 2020-08-19 NOTE — DISCHARGE SUMMARY
Name:  Diana Patel  Room:  /8331-37  MRN:    [id number]    Discharge Summary      This discharge summary is in conjunction with a complete physical exam done on the day of discharge. Discharging Physician: Ely Gracia MD       Admit: 8/13/2020  Discharge:  8/19/2020    HPI taken from admission H&P:      76 y.o. male who presented to the hospital with a chief complaint of generalized abdominal pain that started yesterday. The patient has a history of esophageal cancer and metastases to the brain. He follows with oncology, neurosurgery and other subspecialty groups. He actually is currently getting gamma knife radiation for his brain metastases. He presented to the emergency room today with abdominal pain that started yesterday. He is on pain medications at home which she rarely takes but today the pain was intolerable and he had a come to the emergency department to get evaluated. In the emergency department a CT scan of his abdomen did not show any new findings but lab work did show hyponatremia. He endorses decreased p.o. intake due to lack of appetite. He is on Marinol and other medications to stimulate appetite. In the emergency department he received pain medications that helped with his symptoms. Because of his hyponatremia he will be admitted for hydration and for observation.      Diagnoses this Admission and Hospital Course     Intractable abdominal pain - Resolved  - CT A/P as above without acute concerning findings  - Abdominal pain resolved with IV pain control in Er. Transitioned to PO percocet after admission with good control.  Will dc with rx for percocet  - Close f/u hem/onc for further management   - Tolerating PO intake but minimal appetite.    - add boost supplements  - pain much improved     Prutitic Papular Rash  - diffuse, noted on chest and back,   - reports symptoms came on last week after starting Keppra  - prn vistaril  - 8/19 = last day of Keppra(started after gamma knife)  - Stop Keppra at d/c     Hyponatremia  -  likely 2/2 decreased solute intake & SIADH w/ hx of metastatic cancer  - given IV NS & Na dropped: Na 125->121-- 130 --126 at d/c.  Nephro consulted and managing fluids, placed on fluid restriction 1200 mL per day and 1.5% saline at 65mL/hr.  He received samsca x 1   nephro recommended 1200 cc fluid restriction on discharge      Elevated LFTs  Acute Hepatitis  - monitored throughout admission and continued to trend up  - s/p cholecystectomy   - he had liver nodule excised during surg with Dr. Matilde Tineo in Feb 2020 - path showed hyalinized nodule  - US GB: Limited study, Fatty infiltration of the visualized liver. - liver serologies pending  - etiology: poss medication induced 2/2 Keytruda  Induced autoimmune hepatitis   - started Solumedrol 40 mg q8hr -> D/c when ready on prednisone 1 mg/ kg - will be tapered by oncology  - LFTs improved following steroids      Constipation - Resolved  - add colace and [address] Miralax  - PRN Sennakot  - + BM     Metastatic esophageal cancer with brain metastases  - Follows closely OP. Undergoing gamma knife radiation. Currently on Keytruda   - Continue marinol with poor appetite, Keppra for seizure prevention > stop Keppra at d/c  - oncology involved     Acute metabolic encephalopathy - Resolved     HTN  - BP is significantly elevated on admit, now well controlled   - Continued home Cardizem 120 mg daily      History of prostate cancer  - s/p prostatectomy      Diabetes type 2  - diet-controlled     Hypophosphatemia - Resolved  - replaced     Nephrolithiasis   - 3 mm R renal stone noted      Thrombocytopenia - Resolved  - Chronic.   Plt count ordered - 94 > 140 at d/c    Procedures (Please Review Full Report for Details)  N/A    Consults    Gastroenterology  Hematology/Oncology  Nephrology    Physical Exam at Discharge:    /65   Pulse 84   Temp 98 °F (36.7 °C) (Oral)   Resp 16   Ht 5' 6\" (1.676 m)   Wt 134 lb 3.2 oz (60.9 kg)   SpO2 96%   BMI 21.66 kg/m²   Gen: No distress. Alert. Billee Sark and well-oriented  Eyes:  No conjunctival injection. Neck: Trachea midline. Resp: No accessory muscle use. No crackles. No wheezes. No rhonchi. CV: Regular rate. Regular rhythm. + murmur.  No rub. No edema. GI: Non-tender. Non-distended.  Normal bowel sounds. Skin: Warm and dry. Diffuse papular rash with crusting to anterior chest and back  M/S: No cyanosis. No joint deformity. No clubbing. Neuro: Awake. Grossly nonfocal, generalized weakness  Psych: Oriented  To person, month, year, president. Carlos Carnes states he is at his home in [place] but quickly reorients. No anxiety or agitation. CBC:   Recent Labs     08/17/20  1512 08/18/20  0625 08/19/20  0740   WBC 2.3* 3.5* 4.5   HGB 11.4* 12.1* 10.4*   HCT 33.3* 35.4* 30.6*   MCV 91.8 92.4 90.5   PLT 69* 94* 140     BMP:   Recent Labs     08/17/20  0450 08/18/20  0625 08/19/20  0740   * 131* 126*   K 3.8 3.8 4.0   CL 97* 93* 93*   CO2 27 29 30   PHOS 2.7  --   --    BUN 17 21* 19   CREATININE 0.7* 0.6* <0.5*     LIVER PROFILE:   Recent Labs     08/17/20  0450 08/18/20  0625 08/19/20  0740   * 413* 200*   * 425* 299*   BILIDIR 1.9*  --   --    BILITOT 2.8* 5.4* 4.2*   ALKPHOS 460* 534* 492*     PT/INR:   Recent Labs     08/17/20  1846   PROTIME 12.3   INR 1.06     CULTURES    Blood cx x2: NGTD     RADIOLOGY    US GALLBLADDER RUQ 8/18/2020   Final Result   Limited study. Fatty infiltration of the visualized liver. CT ABDOMEN PELVIS W IV CONTRAST Additional Contrast? Oral   Final Result   1. Status post esophagectomy with gastric pull-through   2. Interval decrease in para-aortic retroperitoneal adenopathy   3. Interval decrease in left perinephric tumor nodules   4. Stable left adrenal mass and celiac lymph node   5. No acute gastrointestinal abnormality   6. Colonic diverticulosis   7. Status post prostatectomy   8. Status post hysterectomy   9. Right nephrolithiasis         XR CHEST PORTABLE 8/13/2020   Final Result   No acute findings           Discharge Medications     Medication List      START taking these medications    hydrOXYzine 25 MG capsule  Commonly known as:  VISTARIL  Take 1 capsule by mouth 3 times daily as needed for Itching     oxyCODONE 5 MG immediate release tablet  Commonly known as:  ROXICODONE  Take 1 tablet by mouth every 4 hours as needed for Pain for up to 5 days. predniSONE 20 MG tablet  Commonly known as:  DELTASONE  Take 3 tablets by mouth daily for 10 days        CONTINUE taking these medications    dilTIAZem 120 MG extended release capsule  Commonly known as:  CARDIZEM 12 HR     dronabinol 2.5 MG capsule  Commonly known as:  MARINOL     gabapentin 300 MG capsule  Commonly known as:  NEURONTIN     levETIRAcetam 500 MG tablet  Commonly known as:  KEPPRA     metFORMIN 500 MG tablet  Commonly known as:  GLUCOPHAGE  Take 1 tablet by mouth Daily with supper Metformin must be crushed. ondansetron 4 MG disintegrating tablet  Commonly known as:  Zofran ODT  Take 1 tablet by mouth every 8 hours as needed for Nausea     ONE TOUCH ULTRA TEST strip  Generic drug:  blood glucose test strips     OneTouch Delica Lancets 21F Misc     PREVACID 24HR PO        STOP taking these medications    dexamethasone 4 MG tablet  Commonly known as:  DECADRON           Where to Get Your Medications      These medications were sent to [facility] - [facility] [address] - P [phone] - F [phone]  [address] [facility] [address]    Phone:  [phone]   · hydrOXYzine 25 MG capsule  · oxyCODONE 5 MG immediate release tablet  · predniSONE 20 MG tablet           Discharged in stable condition to home. Follow Up: Follow up with PCP in 1 week. NILSA Meza.

## 2020-08-20 LAB
F-ACTIN AB IGG: 5 UNITS (ref 0–19)
MITOCHONDRIAL M2 AB, IGG: 11.9 UNITS (ref 0–24.9)

## 2020-08-21 LAB
AFP: 0.6 UG/L
ALPHA-1 ANTITRYPSIN PHENOTYPE: NORMAL
ALPHA-1 ANTITRYPSIN: 181 MG/DL (ref 90–200)
CERULOPLASMIN: 21 MG/DL (ref 15–30)

## 2020-08-28 ENCOUNTER — TELEPHONE (OUTPATIENT)
Dept: CARDIOTHORACIC SURGERY | Age: 75
End: 2020-08-28

## 2020-08-28 NOTE — TELEPHONE ENCOUNTER
Pt wife called to report drainage coming from the site where the PEG tube had been removed 4/27/20. This occurs mostly at nite when pt lies on his side. Notified Dr. Ganesh Roldan. At his direction, pt is sched to come to office on 9/1/20.  Pt wife notified.  Bora Mack

## 2020-09-01 ENCOUNTER — OFFICE VISIT (OUTPATIENT)
Dept: CARDIOTHORACIC SURGERY | Age: 75
End: 2020-09-01
Payer: MEDICARE

## 2020-09-01 VITALS
HEIGHT: 66 IN | TEMPERATURE: 97.8 F | OXYGEN SATURATION: 96 % | BODY MASS INDEX: 22.47 KG/M2 | DIASTOLIC BLOOD PRESSURE: 60 MMHG | WEIGHT: 139.8 LBS | HEART RATE: 80 BPM | SYSTOLIC BLOOD PRESSURE: 96 MMHG

## 2020-09-01 PROCEDURE — 99212 OFFICE O/P EST SF 10 MIN: CPT | Performed by: THORACIC SURGERY (CARDIOTHORACIC VASCULAR SURGERY)

## 2020-09-01 PROCEDURE — 3017F COLORECTAL CA SCREEN DOC REV: CPT | Performed by: THORACIC SURGERY (CARDIOTHORACIC VASCULAR SURGERY)

## 2020-09-01 PROCEDURE — G8420 CALC BMI NORM PARAMETERS: HCPCS | Performed by: THORACIC SURGERY (CARDIOTHORACIC VASCULAR SURGERY)

## 2020-09-01 PROCEDURE — 1123F ACP DISCUSS/DSCN MKR DOCD: CPT | Performed by: THORACIC SURGERY (CARDIOTHORACIC VASCULAR SURGERY)

## 2020-09-01 PROCEDURE — 4040F PNEUMOC VAC/ADMIN/RCVD: CPT | Performed by: THORACIC SURGERY (CARDIOTHORACIC VASCULAR SURGERY)

## 2020-09-01 PROCEDURE — 1036F TOBACCO NON-USER: CPT | Performed by: THORACIC SURGERY (CARDIOTHORACIC VASCULAR SURGERY)

## 2020-09-01 PROCEDURE — G8427 DOCREV CUR MEDS BY ELIG CLIN: HCPCS | Performed by: THORACIC SURGERY (CARDIOTHORACIC VASCULAR SURGERY)

## 2020-09-01 PROCEDURE — 1111F DSCHRG MED/CURRENT MED MERGE: CPT | Performed by: THORACIC SURGERY (CARDIOTHORACIC VASCULAR SURGERY)

## 2020-09-01 RX ORDER — PREDNISONE 20 MG/1
40 TABLET ORAL DAILY
COMMUNITY
End: 2020-10-15 | Stop reason: ALTCHOICE

## 2020-09-01 RX ORDER — OXYCODONE HYDROCHLORIDE 5 MG/1
5 TABLET ORAL EVERY 4 HOURS PRN
COMMUNITY
End: 2020-10-15 | Stop reason: ALTCHOICE

## 2020-09-01 NOTE — PROGRESS NOTES
[facility]  Clinic Note     9/1/2020 3:45 PM  Surgeon:  Dieter Blackburn     S/P :  PEG removal on 4/27/20. [place] Rohit on 4/18/19. Chief complaint : Leaking around PEG tube site. Subjective:  Mr. Harrison Orozco is here today for new leaking/drainage from PEG tube site. Apperance of yellow bile. Worse when patient lies on right side. Saturating one dressing daily. Vital Signs: Temp 97.8 °F (36.6 °C) (Infrared)   Ht 5' 6\" (1.676 m)   Wt 139 lb 12.8 oz (63.4 kg)   BMI 22.56 kg/m²      I/O:  No intake or output data in the 24 hours ending 09/01/20 1275    Exam:   Cardiovascular: S1 plus S2 +0 no added sound  Pulmonary: S1 plus S2 +0 no additional  Gastrostomy feeding tube site from 2 years ago started leaking again      Labs:   CBC: No results for input(s): WBC, HGB, HCT, MCV, PLT in the last 72 hours. BMP: No results for input(s): NA, K, CL, CO2, PHOS, BUN, CREATININE in the last 72 hours. Invalid input(s): CA  PT/INR: No results for input(s): PROTIME, INR in the last 72 hours. APTT: No results for input(s): APTT in the last 72 hours. Scheduled Meds:     Patient Active Problem List   Diagnosis    Malignant neoplasm of lower third of esophagus ([facility] [address]    History of prostatectomy    Dysphagia    History of prostate cancer    Loss of appetite    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 (HCC)    Mucositis due to radiation therapy    Type 2 diabetes mellitus ([address]    Undiagnosed cardiac murmurs    Right adrenal mass (HCC)    Moderate malnutrition (HCC)    Retroperitoneal mass    Hyponatremia    Elevated liver enzymes    Lower abdominal pain    Primary malignant neoplasm of esophagus with metastasis to other site [facility])       Assessment/Plan:   1. Patient was given the options between surgical intervention although due to health malnutrition and metastatic status he could develop dehiscence of the incision and that will be significantly problematic after surgery  2.  Dressing changes improve his nutritional status or consult IR to place percutaneous gastrostomy feeding tube back through the tunnel for fistula control could be used as a nutritional support for him patient elected to go with the dressing changes for now    Shanell Andre MD FACS

## 2020-09-02 ENCOUNTER — TELEPHONE (OUTPATIENT)
Dept: SURGERY | Age: 75
End: 2020-09-02

## 2020-09-18 ENCOUNTER — HOSPITAL ENCOUNTER (OUTPATIENT)
Dept: MRI IMAGING | Age: 75
Discharge: HOME | End: 2020-09-18
Payer: MEDICARE

## 2020-09-18 ENCOUNTER — HOSPITAL ENCOUNTER (OUTPATIENT)
Dept: CT IMAGING | Age: 75
Discharge: HOME | End: 2020-09-18
Payer: MEDICARE

## 2020-09-18 PROCEDURE — 74177 CT ABD & PELVIS W/CONTRAST: CPT

## 2020-09-18 PROCEDURE — 6360000004 HC RX CONTRAST MEDICATION: Performed by: NURSE PRACTITIONER

## 2020-09-18 PROCEDURE — 70553 MRI BRAIN STEM W/O & W/DYE: CPT

## 2020-09-18 PROCEDURE — A9579 GAD-BASE MR CONTRAST NOS,1ML: HCPCS | Performed by: NURSE PRACTITIONER

## 2020-09-18 RX ADMIN — GADOTERIDOL 13 ML: 279.3 INJECTION, SOLUTION INTRAVENOUS at 16:10

## 2020-09-18 RX ADMIN — IOPAMIDOL 75 ML: 755 INJECTION, SOLUTION INTRAVENOUS at 15:14

## 2020-10-15 ENCOUNTER — APPOINTMENT (OUTPATIENT)
Dept: GENERAL RADIOLOGY | Age: 75
DRG: 641 | End: 2020-10-15
Payer: MEDICARE

## 2020-10-15 ENCOUNTER — HOSPITAL ENCOUNTER (INPATIENT)
Age: 75
LOS: 1 days | Discharge: HOME | DRG: 641 | End: 2020-10-16
Attending: STUDENT IN AN ORGANIZED HEALTH CARE EDUCATION/TRAINING PROGRAM | Admitting: INTERNAL MEDICINE
Payer: MEDICARE

## 2020-10-15 PROBLEM — R29.6 RECURRENT FALLS: Status: ACTIVE | Noted: 2020-10-15

## 2020-10-15 LAB
A/G RATIO: 1.2 (ref 1.1–2.2)
ALBUMIN SERPL-MCNC: 2.1 G/DL (ref 3.4–5)
ALP BLD-CCNC: 520 U/L (ref 40–129)
ALT SERPL-CCNC: 21 U/L (ref 10–40)
ANION GAP SERPL CALCULATED.3IONS-SCNC: 4 MMOL/L (ref 3–16)
ANISOCYTOSIS: ABNORMAL
AST SERPL-CCNC: 34 U/L (ref 15–37)
ATYPICAL LYMPHOCYTE RELATIVE PERCENT: 3 % (ref 0–6)
BASE EXCESS VENOUS: -0.8 MMOL/L (ref -3–3)
BASOPHILIC STIPPLING: ABNORMAL
BASOPHILS ABSOLUTE: 0 K/UL (ref 0–0.2)
BASOPHILS RELATIVE PERCENT: 0 %
BILIRUB SERPL-MCNC: 0.8 MG/DL (ref 0–1)
BILIRUBIN URINE: NEGATIVE
BLOOD, URINE: NEGATIVE
BUN BLDV-MCNC: 10 MG/DL (ref 7–20)
CALCIUM SERPL-MCNC: 7.8 MG/DL (ref 8.3–10.6)
CARBOXYHEMOGLOBIN: 1 % (ref 0–1.5)
CHLORIDE BLD-SCNC: 105 MMOL/L (ref 99–110)
CHP ED QC CHECK: NORMAL
CHP ED QC CHECK: NORMAL
CLARITY: CLEAR
CO2: 26 MMOL/L (ref 21–32)
COLOR: YELLOW
CREAT SERPL-MCNC: 0.6 MG/DL (ref 0.8–1.3)
EKG ATRIAL RATE: 80 BPM
EKG DIAGNOSIS: NORMAL
EKG P AXIS: 26 DEGREES
EKG P-R INTERVAL: 166 MS
EKG Q-T INTERVAL: 400 MS
EKG QRS DURATION: 72 MS
EKG QTC CALCULATION (BAZETT): 461 MS
EKG R AXIS: 15 DEGREES
EKG T AXIS: 28 DEGREES
EKG VENTRICULAR RATE: 80 BPM
EOSINOPHILS ABSOLUTE: 0.1 K/UL (ref 0–0.6)
EOSINOPHILS RELATIVE PERCENT: 5 %
GFR AFRICAN AMERICAN: >60
GFR NON-AFRICAN AMERICAN: >60
GLOBULIN: 1.8 G/DL
GLUCOSE BLD-MCNC: 105 MG/DL
GLUCOSE BLD-MCNC: 105 MG/DL (ref 70–99)
GLUCOSE BLD-MCNC: 105 MG/DL (ref 70–99)
GLUCOSE BLD-MCNC: 112 MG/DL (ref 70–99)
GLUCOSE BLD-MCNC: 75 MG/DL (ref 70–99)
GLUCOSE BLD-MCNC: 80 MG/DL
GLUCOSE BLD-MCNC: 80 MG/DL (ref 70–99)
GLUCOSE BLD-MCNC: 85 MG/DL (ref 70–99)
GLUCOSE BLD-MCNC: 89 MG/DL (ref 70–99)
GLUCOSE URINE: NEGATIVE MG/DL
HCO3 VENOUS: 24.8 MMOL/L (ref 23–29)
HCT VFR BLD CALC: 26.7 % (ref 40.5–52.5)
HEMATOLOGY PATH CONSULT: NORMAL
HEMATOLOGY PATH CONSULT: YES
HEMOGLOBIN: 8.8 G/DL (ref 13.5–17.5)
HYPOCHROMIA: ABNORMAL
KETONES, URINE: ABNORMAL MG/DL
LEUKOCYTE ESTERASE, URINE: NEGATIVE
LYMPHOCYTES ABSOLUTE: 1.4 K/UL (ref 1–5.1)
LYMPHOCYTES RELATIVE PERCENT: 57 %
MCH RBC QN AUTO: 30.9 PG (ref 26–34)
MCHC RBC AUTO-ENTMCNC: 33.1 G/DL (ref 31–36)
MCV RBC AUTO: 93.3 FL (ref 80–100)
METHEMOGLOBIN VENOUS: 0.3 %
MICROSCOPIC EXAMINATION: ABNORMAL
MONOCYTES ABSOLUTE: 0.5 K/UL (ref 0–1.3)
MONOCYTES RELATIVE PERCENT: 20 %
NEUTROPHILS ABSOLUTE: 0.4 K/UL (ref 1.7–7.7)
NEUTROPHILS RELATIVE PERCENT: 15 %
NITRITE, URINE: NEGATIVE
O2 CONTENT, VEN: 12 VOL %
O2 SAT, VEN: 90 %
O2 THERAPY: ABNORMAL
PCO2, VEN: 45.4 MMHG (ref 40–50)
PDW BLD-RTO: 16.3 % (ref 12.4–15.4)
PERFORMED ON: ABNORMAL
PERFORMED ON: NORMAL
PH UA: 5.5 (ref 5–8)
PH VENOUS: 7.36 (ref 7.35–7.45)
PLATELET # BLD: 224 K/UL (ref 135–450)
PLATELET SLIDE REVIEW: ADEQUATE
PMV BLD AUTO: 6.9 FL (ref 5–10.5)
PO2, VEN: 59.6 MMHG (ref 25–40)
POIKILOCYTES: ABNORMAL
POLYCHROMASIA: ABNORMAL
POTASSIUM SERPL-SCNC: 4 MMOL/L (ref 3.5–5.1)
PRO-BNP: 346 PG/ML (ref 0–449)
PROCALCITONIN: 0.05 NG/ML (ref 0–0.15)
PROTEIN UA: NEGATIVE MG/DL
RBC # BLD: 2.87 M/UL (ref 4.2–5.9)
SLIDE REVIEW: ABNORMAL
SODIUM BLD-SCNC: 135 MMOL/L (ref 136–145)
SPECIFIC GRAVITY UA: >=1.03 (ref 1–1.03)
TCO2 CALC VENOUS: 26 MMOL/L
TOTAL PROTEIN: 3.9 G/DL (ref 6.4–8.2)
TROPONIN: 0.38 NG/ML
TROPONIN: 0.43 NG/ML
URINE TYPE: ABNORMAL
UROBILINOGEN, URINE: 0.2 E.U./DL
WBC # BLD: 2.4 K/UL (ref 4–11)

## 2020-10-15 PROCEDURE — 96372 THER/PROPH/DIAG INJ SC/IM: CPT

## 2020-10-15 PROCEDURE — 2580000003 HC RX 258: Performed by: PHYSICIAN ASSISTANT

## 2020-10-15 PROCEDURE — 93010 ELECTROCARDIOGRAM REPORT: CPT | Performed by: INTERNAL MEDICINE

## 2020-10-15 PROCEDURE — 83880 ASSAY OF NATRIURETIC PEPTIDE: CPT

## 2020-10-15 PROCEDURE — 99222 1ST HOSP IP/OBS MODERATE 55: CPT | Performed by: INTERNAL MEDICINE

## 2020-10-15 PROCEDURE — 6370000000 HC RX 637 (ALT 250 FOR IP): Performed by: PHYSICIAN ASSISTANT

## 2020-10-15 PROCEDURE — 99285 EMERGENCY DEPT VISIT HI MDM: CPT

## 2020-10-15 PROCEDURE — 81003 URINALYSIS AUTO W/O SCOPE: CPT

## 2020-10-15 PROCEDURE — 6360000002 HC RX W HCPCS: Performed by: INTERNAL MEDICINE

## 2020-10-15 PROCEDURE — 2580000003 HC RX 258: Performed by: EMERGENCY MEDICINE

## 2020-10-15 PROCEDURE — 71046 X-RAY EXAM CHEST 2 VIEWS: CPT

## 2020-10-15 PROCEDURE — 96374 THER/PROPH/DIAG INJ IV PUSH: CPT

## 2020-10-15 PROCEDURE — 84484 ASSAY OF TROPONIN QUANT: CPT

## 2020-10-15 PROCEDURE — 84145 PROCALCITONIN (PCT): CPT

## 2020-10-15 PROCEDURE — 36415 COLL VENOUS BLD VENIPUNCTURE: CPT

## 2020-10-15 PROCEDURE — 93005 ELECTROCARDIOGRAM TRACING: CPT | Performed by: EMERGENCY MEDICINE

## 2020-10-15 PROCEDURE — 80053 COMPREHEN METABOLIC PANEL: CPT

## 2020-10-15 PROCEDURE — 82803 BLOOD GASES ANY COMBINATION: CPT

## 2020-10-15 PROCEDURE — G0378 HOSPITAL OBSERVATION PER HR: HCPCS

## 2020-10-15 PROCEDURE — 6360000002 HC RX W HCPCS: Performed by: PHYSICIAN ASSISTANT

## 2020-10-15 PROCEDURE — 85025 COMPLETE CBC W/AUTO DIFF WBC: CPT

## 2020-10-15 PROCEDURE — 2060000000 HC ICU INTERMEDIATE R&B

## 2020-10-15 RX ORDER — DIFLUPREDNATE 0.5 MG/ML
1 EMULSION OPHTHALMIC 4 TIMES DAILY
Status: DISCONTINUED | OUTPATIENT
Start: 2020-10-15 | End: 2020-10-15 | Stop reason: RX

## 2020-10-15 RX ORDER — MIDODRINE HYDROCHLORIDE 5 MG/1
5 TABLET ORAL 3 TIMES DAILY
Status: ON HOLD | COMMUNITY
End: 2020-10-16 | Stop reason: SDUPTHER

## 2020-10-15 RX ORDER — ACETAMINOPHEN 325 MG/1
650 TABLET ORAL EVERY 6 HOURS PRN
Status: DISCONTINUED | OUTPATIENT
Start: 2020-10-15 | End: 2020-10-16 | Stop reason: HOSPADM

## 2020-10-15 RX ORDER — SODIUM CHLORIDE 0.9 % (FLUSH) 0.9 %
10 SYRINGE (ML) INJECTION PRN
Status: DISCONTINUED | OUTPATIENT
Start: 2020-10-15 | End: 2020-10-16 | Stop reason: HOSPADM

## 2020-10-15 RX ORDER — DEXTROSE MONOHYDRATE 25 G/50ML
12.5 INJECTION, SOLUTION INTRAVENOUS PRN
Status: DISCONTINUED | OUTPATIENT
Start: 2020-10-15 | End: 2020-10-16 | Stop reason: HOSPADM

## 2020-10-15 RX ORDER — NICOTINE POLACRILEX 4 MG
15 LOZENGE BUCCAL PRN
Status: DISCONTINUED | OUTPATIENT
Start: 2020-10-15 | End: 2020-10-16 | Stop reason: HOSPADM

## 2020-10-15 RX ORDER — DIPHENOXYLATE HYDROCHLORIDE AND ATROPINE SULFATE 2.5; .025 MG/1; MG/1
1 TABLET ORAL 4 TIMES DAILY PRN
COMMUNITY

## 2020-10-15 RX ORDER — SODIUM CHLORIDE 9 MG/ML
INJECTION, SOLUTION INTRAVENOUS CONTINUOUS
Status: DISCONTINUED | OUTPATIENT
Start: 2020-10-15 | End: 2020-10-16

## 2020-10-15 RX ORDER — MIDODRINE HYDROCHLORIDE 5 MG/1
5 TABLET ORAL
Status: DISCONTINUED | OUTPATIENT
Start: 2020-10-15 | End: 2020-10-16

## 2020-10-15 RX ORDER — LEVOFLOXACIN 500 MG/1
500 TABLET, FILM COATED ORAL DAILY
Status: DISCONTINUED | OUTPATIENT
Start: 2020-10-15 | End: 2020-10-16 | Stop reason: HOSPADM

## 2020-10-15 RX ORDER — SODIUM CHLORIDE 0.9 % (FLUSH) 0.9 %
10 SYRINGE (ML) INJECTION EVERY 12 HOURS SCHEDULED
Status: DISCONTINUED | OUTPATIENT
Start: 2020-10-15 | End: 2020-10-16 | Stop reason: HOSPADM

## 2020-10-15 RX ORDER — DIFLUPREDNATE 0.5 MG/ML
1 EMULSION OPHTHALMIC 4 TIMES DAILY
Status: DISCONTINUED | OUTPATIENT
Start: 2020-10-15 | End: 2020-10-15

## 2020-10-15 RX ORDER — POLYETHYLENE GLYCOL 3350 17 G/17G
17 POWDER, FOR SOLUTION ORAL DAILY PRN
Status: DISCONTINUED | OUTPATIENT
Start: 2020-10-15 | End: 2020-10-16 | Stop reason: HOSPADM

## 2020-10-15 RX ORDER — TRIPROLIDINE/PSEUDOEPHEDRINE 2.5MG-60MG
1 TABLET ORAL 3 TIMES DAILY
COMMUNITY

## 2020-10-15 RX ORDER — DIFLUPREDNATE 0.5 MG/ML
1 EMULSION OPHTHALMIC 3 TIMES DAILY
Status: DISCONTINUED | OUTPATIENT
Start: 2020-10-15 | End: 2020-10-16 | Stop reason: SDUPTHER

## 2020-10-15 RX ORDER — MIDODRINE HYDROCHLORIDE 5 MG/1
5 TABLET ORAL 3 TIMES DAILY
Status: DISCONTINUED | OUTPATIENT
Start: 2020-10-15 | End: 2020-10-15

## 2020-10-15 RX ORDER — 0.9 % SODIUM CHLORIDE 0.9 %
1000 INTRAVENOUS SOLUTION INTRAVENOUS ONCE
Status: COMPLETED | OUTPATIENT
Start: 2020-10-15 | End: 2020-10-15

## 2020-10-15 RX ORDER — LEVOFLOXACIN 500 MG/1
500 TABLET, FILM COATED ORAL DAILY
COMMUNITY

## 2020-10-15 RX ORDER — DEXTROSE MONOHYDRATE 25 G/50ML
12.5 INJECTION, SOLUTION INTRAVENOUS ONCE
Status: COMPLETED | OUTPATIENT
Start: 2020-10-15 | End: 2020-10-15

## 2020-10-15 RX ORDER — DEXTROSE MONOHYDRATE 50 MG/ML
100 INJECTION, SOLUTION INTRAVENOUS PRN
Status: DISCONTINUED | OUTPATIENT
Start: 2020-10-15 | End: 2020-10-16 | Stop reason: HOSPADM

## 2020-10-15 RX ORDER — ACETAMINOPHEN 650 MG/1
650 SUPPOSITORY RECTAL EVERY 6 HOURS PRN
Status: DISCONTINUED | OUTPATIENT
Start: 2020-10-15 | End: 2020-10-16 | Stop reason: HOSPADM

## 2020-10-15 RX ORDER — ONDANSETRON 2 MG/ML
4 INJECTION INTRAMUSCULAR; INTRAVENOUS EVERY 6 HOURS PRN
Status: DISCONTINUED | OUTPATIENT
Start: 2020-10-15 | End: 2020-10-16 | Stop reason: HOSPADM

## 2020-10-15 RX ORDER — PROCHLORPERAZINE MALEATE 10 MG
10 TABLET ORAL EVERY 6 HOURS PRN
COMMUNITY

## 2020-10-15 RX ORDER — PROMETHAZINE HYDROCHLORIDE 25 MG/1
12.5 TABLET ORAL EVERY 6 HOURS PRN
Status: DISCONTINUED | OUTPATIENT
Start: 2020-10-15 | End: 2020-10-16 | Stop reason: HOSPADM

## 2020-10-15 RX ADMIN — MIDODRINE HYDROCHLORIDE 5 MG: 5 TABLET ORAL at 14:45

## 2020-10-15 RX ADMIN — ENOXAPARIN SODIUM 40 MG: 40 INJECTION SUBCUTANEOUS at 14:45

## 2020-10-15 RX ADMIN — SODIUM CHLORIDE 1000 ML: 9 INJECTION, SOLUTION INTRAVENOUS at 05:05

## 2020-10-15 RX ADMIN — Medication 10 ML: at 14:46

## 2020-10-15 RX ADMIN — LEVOFLOXACIN 500 MG: 500 TABLET, FILM COATED ORAL at 14:45

## 2020-10-15 RX ADMIN — SODIUM CHLORIDE: 9 INJECTION, SOLUTION INTRAVENOUS at 14:46

## 2020-10-15 RX ADMIN — DEXTROSE MONOHYDRATE 12.5 G: 25 INJECTION, SOLUTION INTRAVENOUS at 05:05

## 2020-10-15 RX ADMIN — TBO-FILGRASTIM 300 MCG: 300 INJECTION, SOLUTION SUBCUTANEOUS at 17:33

## 2020-10-15 ASSESSMENT — ENCOUNTER SYMPTOMS
VOMITING: 0
RHINORRHEA: 0
PHOTOPHOBIA: 0
BACK PAIN: 0
SHORTNESS OF BREATH: 1
CONSTIPATION: 0
ABDOMINAL PAIN: 0
COUGH: 0
NAUSEA: 0
DIARRHEA: 1
SORE THROAT: 0

## 2020-10-15 ASSESSMENT — PAIN SCALES - GENERAL: PAINLEVEL_OUTOF10: 0

## 2020-10-15 NOTE — CONSULTS
HEMATOLOGY/ONCOLOGY CONSULTATION:     10/15/2020 3:37 PM    REASON FOR CONSULT: Esophageal Cancer    PROVIDERS:  Felipe Isaac MD    CHIEF COMPLAINT:     Chief Complaint   Patient presents with    Other     weakness    Hypoglycemia       HISTORY OF PRESENT ILLNESS:     HPI:  76 WM well known to me with stage IV esophageal cancer. He is s/p gamma knife for brain metastasis. He was recently in the hospital after developing severe hepatitis and dermatitis from immunotherapy. Since discharge he was switched to a new regimen of Taxol/Ramicirumab. His treatment was held this week for neutropenia. He is now admitted to [facility] with hypotension and falls at home. He felt like his legs were going to give way and he was very dizzy. He denies any fever.      PAST MEDICAL HISTORY:     Past Medical History:   Diagnosis Date    Adrenal mass ([address]     Diabetes mellitus ([address]     Esophageal cancer ([address]     H/O sleep apnea 1998    resolved after surgery    History of esophageal reflux     Hypertension     Prostate cancer ([address]     Weight loss        PAST SURGICAL HISTORY:        Past Surgical History:   Procedure Laterality Date    ADRENALECTOMY N/A 2/27/2020    ROBOTIC LAPAROSCOPIC RIGHT RETROPERITONEAL MASS RESECTION WITH ADRENALECTOMY, LIVER NODULE WEDGE EXCISION AND ROBOTIC ASSISTED CHOLECYSTECTOMY AND OPEN LEFT INGUINAL HERNIA REPAIR WITH MESH performed by Larisa Solis MD at [address] [place] [address]  2003    COLONOSCOPY  2012    negative    COLONOSCOPY  09/14/2017    diverticulosis    ESOPHAGEAL DILATATION N/A 1/3/2019    ESOPHAGOGASTRODUODENOSCOPY WITH BIOPSY  CPT CODE - 23632 performed by Lian Patel MD at [facility] ESOPHAGECTOMY N/A 4/18/2019    BENITO VALENTINA ESOPHAGECTOMY WITH MEDIASTINAL LYMPHADENECTOMY INCLUDING THORACIC DUCT, UMBILICAL HERNIA REPAIR performed by Lian Patel MD at [address] [place] [address]   Ml Heck / Janine Bazzi / REPLACEMENT VENOUS ACCESS CATHETER N/A 2019    PORT A CATH PLACEMENT AND PEG TUBE EXCHANGE performed by Nikia Van MD at [address] [facility] [place] [facility] [place] [facility] N/A 2018    EUS/ WITH ANESTHESIA performed by Maria Elena Otero MD at [facility]    UPPER GASTROINTESTINAL ENDOSCOPY  2018    EGD BIOPSY performed by Maria Elena Otero MD at [facility] ENDOSCOPY N/A 2019    ESOPHAGOGASTRODUODENOSCOPY WITH BIOPSY  CPT CODE - 97399 performed by Nikia Van MD at [address]      hx of sleep apnea       SOCIAL HISTORY:     Social History     Socioeconomic History    Marital status:      Spouse name: Not on file    Number of children: Not on file    Years of education: Not on file    Highest education level: Not on file   Occupational History    Not on file   Social Needs    Financial resource strain: Not on file    Food insecurity     Worry: Not on file     Inability: Not on file    Transportation needs     Medical: Not on file     Non-medical: Not on file   Tobacco Use    Smoking status: Former Smoker     Types: Cigarettes     Last attempt to quit: 1972     Years since quittin.8    Smokeless tobacco: Never Used   Substance and Sexual Activity    Alcohol use:  Yes     Alcohol/week: 0.0 standard drinks     Comment: rarely    Drug use: No    Sexual activity: Yes     Partners: Female   Lifestyle    Physical activity     Days per week: Not on file     Minutes per session: Not on file    Stress: Not on file   Relationships    Social connections     Talks on phone: Not on file     Gets together: Not on file     Attends Mosque service: Not on file     Active member of club or organization: Not on file     Attends meetings of clubs or organizations: Not on file     Relationship status: Not on file    Intimate partner violence     Fear of current or ex partner: Not on file     Emotionally abused: Not on file     Physically abused: Not on file     Forced sexual activity: Not on file   Other Topics Concern    Not on file   Social History Narrative    Not on file       FAMILY HISTORY:     Family History   Problem Relation Age of Onset    Cancer Father         lung   [address] Other Mother         blood disorder       ALLERGIES:     Allergies as of 10/15/2020 - Review Complete 10/15/2020   Allergen Reaction Noted    No known allergies  11/20/2014       MEDICATIONS:     No current facility-administered medications on file prior to encounter. Current Outpatient Medications on File Prior to Encounter   Medication Sig Dispense Refill    diphenoxylate-atropine (LOMOTIL) 2.5-0.025 MG per tablet Take 1 tablet by mouth 4 times daily as needed for Diarrhea.  midodrine (PROAMATINE) 5 MG tablet Take 5 mg by mouth 3 times daily      levoFLOXacin (LEVAQUIN) 500 MG tablet Take 500 mg by mouth daily 5 more tablets      difluprednate (DUREZOL) 0.05 % EMUL Place 1 drop into the left eye three times daily       prochlorperazine (COMPAZINE) 10 MG tablet Take 10 mg by mouth every 6 hours as needed      Loperamide HCl (IMODIUM A-D PO) Take 1 tablet by mouth 2 times daily as needed      dronabinol (MARINOL) 2.5 MG capsule Take 5 mg by mouth 2 times daily (with meals). Before lunch and evening meal      gabapentin (NEURONTIN) 300 MG capsule Take 300 mg by mouth 2 times daily. Evening meal and hs      ONE TOUCH ULTRA TEST strip       ONETOUCH DELICA LANCETS [facility]        REVIEW OF SYSTEMS:       10 point ROS completed. Pertinent positives in HPI, otherwise negative.      PHYSICAL EXAM:       Vitals:    10/15/20 1214   BP: (!) 73/43   Pulse: 100   Resp:    Temp:    SpO2:        General appearance: alert and cooperative  Head: Normocephalic, without obvious abnormality, atraumatic  Neck: No palpable lymphadenopathy in supraclavicular or cervical chains  Lungs: Clear to auscultation bilaterally, no audible rales, wheezes or crackles  Heart: Regular rate and rhythm, S1, S2 normal  Abdomen: Soft, non-tender; bowel sounds normal; no masses,  no organomegaly  Extremities: without cyanosis, clubbing, edema or asymmetry  Skin: No jaundice, purpura or petechiae      LABS:     Lab Results   Component Value Date    WBC 2.4 (L) 10/15/2020    HGB 8.8 (L) 10/15/2020    HCT 26.7 (L) 10/15/2020    MCV 93.3 10/15/2020     10/15/2020       Lab Results   Component Value Date    GLUCOSE 105 10/15/2020    BUN 10 10/15/2020    CREATININE 0.6 (L) 10/15/2020    K 4.0 10/15/2020    PHOS 2.7 08/17/2020       Lab Results   Component Value Date    ALKPHOS 520 (H) 10/15/2020    ALT 21 10/15/2020    AST 34 10/15/2020    BILITOT 0.8 10/15/2020    BILIDIR 1.9 (H) 08/17/2020    PROT 3.9 (L) 10/15/2020       IMAGING:     Xr Chest (2 Vw)  Result Date: 10/15/2020  Left pleural effusion with left basilar atelectasis or pneumonia. Ct Chest Abdomen Pelvis W Contrast  Result Date: 9/18/2020  1. Status post lower esophagectomy and gastric pull-through. No evidence of recurrent disease of the resection site. 2. Interval decrease in size of a previously measured left supraclavicular lymph node. No new or enlarging thoracic adenopathy. 3. Mild increase in size of a central mesenteric lymph node. Stable previously measured left periaortic lymph node. 4. Stable left adrenal gland mass. 5. Small volume abdominal/pelvic free fluid. 6. Progressive fibrotic changes in the bilateral lower lobes and lingula. Small bilateral pleural effusions. Mri Brain W Wo Contrast  Result Date: 9/18/2020  Decrease in size and enhancement of the previously noted metastatic lesions in the right cerebellar and occipital lobes.        STAGING:     Cancer Staging    IV    ASSESSMENT:     Problem List Items Addressed This Visit     General weakness - Primary    Metastasis from esophageal cancer ([facility]    Relevant Medications acetaminophen (TYLENOL) tablet 650 mg    acetaminophen (TYLENOL) suppository 650 mg    promethazine (PHENERGAN) tablet 12.5 mg      Other Visit Diagnoses     Hypoglycemia        Frequent falls                    PLAN:     1. Neutropenia  - from recent chemotherapy  - will start Granix  - low threshold for antibiotics if any fever or sign of infection    2.  Hypotension  - likely volume depletion/dehyration  - IVF per IM    3. Stage IV Esophageal Cancer  - s/p chemoradiation,   Esophagectomy, FOLFOX  - recently on Keytruda and having response but had to stop due to severe dematitis and hepatotoxicity  - have discussed Hospice previously but he wanted to continue treatment  - now recently started Taxol/Ramicirumab, held for neutropenia this week  - he does not seem to be tolerating this well  - would consider palliative care consult    4. Brain Metastasis  - recently completed gamma knife      5.  Hx Prostate Cancer  - no acute issues    Goran Vergara MD

## 2020-10-15 NOTE — H&P
Hospital Medicine History & Physical      PCP: Teja Julian MD    Date of Admission: 10/15/2020    Date of Service: Pt seen/examined on 10/15/2020    Chief Complaint:    Chief Complaint   Patient presents with    Other     weakness    Hypoglycemia         History Of Present Illness: The patient is a 76 y.o. male with a PMH of Type II DM, Esophageal Cancer with brain mets, HTN, hx of prostate cancer who presented to the ED with complaint of recurrent falls and lightheadedness at home x 1 week. Pt denies any chest pain or SOB. No nausea, vomiting but does have chronic diarrhea with chemotherapy. His last treatment was about 1 week ago. He reports poor PO intake. Did fall at home a couple days ago. Reports his knees gave out 2/2 generalized weakness. No chest pain or SOB prior to fall. Did not lose consciousness or complain of pre-syncopal sensation. He previously had a hx of high blood pressure and was on medication but reports he no longer takes any BP medications as his BP tends to run low. Pt was admitted to PCU for further work up and management.     Past Medical History:        Diagnosis Date    Adrenal mass ([address]     Diabetes mellitus ([address]     Esophageal cancer ([address]     H/O sleep apnea 1998    resolved after surgery    History of esophageal reflux     Hypertension     Prostate cancer ([address]     Weight loss        Past Surgical History:        Procedure Laterality Date    ADRENALECTOMY N/A 2/27/2020    ROBOTIC LAPAROSCOPIC RIGHT RETROPERITONEAL MASS RESECTION WITH ADRENALECTOMY, LIVER NODULE WEDGE EXCISION AND ROBOTIC ASSISTED CHOLECYSTECTOMY AND OPEN LEFT INGUINAL HERNIA REPAIR WITH MESH performed by Stefani Noguera MD at [address] [place] [address]  2003    COLONOSCOPY  2012    negative    COLONOSCOPY  09/14/2017    diverticulosis    ESOPHAGEAL DILATATION N/A 1/3/2019    ESOPHAGOGASTRODUODENOSCOPY WITH BIOPSY  CPT CODE - 40388 performed by Jihan Arboleda MD at [facility]    ESOPHAGECTOMY N/A 4/18/2019    BENITO VALENTINA ESOPHAGECTOMY WITH MEDIASTINAL LYMPHADENECTOMY INCLUDING THORACIC DUCT, UMBILICAL HERNIA REPAIR performed by Abhinav Rojas MD at [address] / REMOVAL / REPLACEMENT VENOUS ACCESS CATHETER N/A 6/25/2019    PORT A CATH PLACEMENT AND PEG TUBE EXCHANGE performed by Abhinav Rojas MD at [facility] OFFICE/OUTPT [facility] N/A 9/19/2018    EUS/ WITH ANESTHESIA performed by Mikki Peralta MD at [facility] ENDOSCOPY  9/19/2018    EGD BIOPSY performed by Mikki Peralta MD at [facility] N/A 4/2/2019    ESOPHAGOGASTRODUODENOSCOPY WITH BIOPSY  CPT CODE - 63745 performed by Abhinav Rojas MD at [address] [place] [address]      hx of sleep apnea       Medications Prior to Admission:    Prior to Admission medications    Medication Sig Start Date End Date Taking? Authorizing Provider   diphenoxylate-atropine (LOMOTIL) 2.5-0.025 MG per tablet Take 1 tablet by mouth 4 times daily as needed for Diarrhea. Yes Historical Provider, MD   midodrine (PROAMATINE) 5 MG tablet Take 5 mg by mouth 3 times daily   Yes Historical Provider, MD   levoFLOXacin (LEVAQUIN) 500 MG tablet Take 500 mg by mouth daily 5 more tablets   Yes Historical Provider, MD   difluprednate (DUREZOL) 0.05 % EMUL Place 1 drop into the left eye three times daily    Yes Historical Provider, MD   prochlorperazine (COMPAZINE) 10 MG tablet Take 10 mg by mouth every 6 hours as needed   Yes Historical Provider, MD   dronabinol (MARINOL) 2.5 MG capsule Take 5 mg by mouth 2 times daily (with meals). Before lunch and evening meal    Historical Provider, MD   gabapentin (NEURONTIN) 300 MG capsule Take 300 mg by mouth 3 times daily.     Historical Provider, MD   ONE TOUCH ULTRA TEST strip  1/30/19   Historical Provider, MD PA formulated the plan. Agree with note with the following edits. HPI: 66-year-old male with a history of type 2 diabetes, esophageal cancer with brain mets, hypertension presented emergency room with extreme fatigue, lightheadedness. Denies having any chest pain. Admits to having some mild shortness of breath with exertion. Denies any cough or fever. I reviewed the patient's Past Medical History, Past Surgical History, Medications, and Allergies. Physical exam:    BP (!) 73/43   Pulse 100   Temp 97.7 °F (36.5 °C) (Oral)   Resp 14   Ht 5' 6\" (1.676 m)   Wt 135 lb (61.2 kg)   SpO2 100%   BMI 21.79 kg/m²     Gen: No distress. Alert. Eyes: PERRL. No sclera icterus. No conjunctival injection. ENT: No discharge. Pharynx clear. Neck: Trachea midline. Normal thyroid. Resp: No accessory muscle use. No crackles. No wheezes. No rhonchi. No dullness on percussion. CV: Regular rate. Regular rhythm. No murmur or rub. 1 + pedal  edema. GI: Non-tender. Non-distended. No masses. No organomegaly. Normal bowel sounds. No hernia. Skin: Warm and dry. No nodule on exposed extremities. No rash on exposed extremities. Lymph: No cervical LAD. No supraclavicular LAD. M/S: No cyanosis. No joint deformity. No clubbing. Neuro: Awake. Moves all 4 extremities, non focal  Psych: Oriented x 3. No anxiety or agitation.            NILSA Meza.      CBC:   Recent Labs     10/15/20  0449   WBC 2.4*   HGB 8.8*   HCT 26.7*   MCV 93.3        BMP:   Recent Labs     10/15/20  0449   *   K 4.0      CO2 26   BUN 10   CREATININE 0.6*     LIVER PROFILE:   Recent Labs     10/15/20  0449   AST 34   ALT 21   BILITOT 0.8   ALKPHOS 520*     UA:  Recent Labs     10/15/20  0725   COLORU Yellow   PHUR 5.5   CLARITYU Clear   SPECGRAV >=1.030   LEUKOCYTESUR Negative   UROBILINOGEN 0.2   BILIRUBINUR Negative   BLOODU Negative   GLUCOSEU Negative        CARDIAC ENZYMES  Recent Labs     10/15/20  0449 10/15/20  0800   TROPONINI 0.43* 0.38*       U/A:    Lab Results   Component Value Date    COLORU Yellow 10/15/2020    WBCUA 0-2 08/15/2020    RBCUA 5-10 08/15/2020    MUCUS 3+ 08/15/2020    BACTERIA 1+ 08/15/2020    CLARITYU Clear 10/15/2020    SPECGRAV >=1.030 10/15/2020    LEUKOCYTESUR Negative 10/15/2020    BLOODU Negative 10/15/2020    GLUCOSEU Negative 10/15/2020     CULTURES  None    EKG:  I have reviewed the EKG with the following interpretation:   Normal sinus rhythm rate of 80  Low voltage QRS  Borderline ECG  Confirmed by MICHELET MCCANN, [address] ([id number]) on 10/15/2020 11:11:35 AM     RADIOLOGY    XR CHEST (2 VW)   Final Result   Left pleural effusion with left basilar atelectasis or pneumonia. Pertinent previous results reviewed     TTE 11/27/2018  Summary   Normal left ventricle systolic function with an estimated ejection fraction   of 55%. No regional wall motion abnormalities are seen. Grade I diastolic dysfunction with normal filing pressure. The aortic valve is thickened/calcified with decreased leaflet mobility. A bicuspid aortic valve cannot be excluded. Mild aortic stenosis. Systolic pulmonary artery pressure (SPAP) is normal and estimated at 22 mmHg   (right atrial pressure 3 mmHg).     ASSESSMENT/PLAN:    Recurrent Falls  Lightheadedness  Orthostatic Hypotension  - Admit to PCU, tele  - trend troponins  - start IVF, cont Midodrine  - PT/OT    Elevated troponin  - denies any hx of cardiac disease; no c/o chest pain  - EKG:  Non-acute  - initial trop: 0.43 > 0.38  - trend, tele  - cards consult    Neutropenia  - ANC 0.4  - neutropenic precautions  - oncology consult    Esophageal Cancer with Brain Mets  - f/w [facility]  - undergoing gamma knife radiation  - currently on chemo  - Heme/Onc consulted as above    Type II DM  - well controlled by diet    Normocytic Anemia  - Hgb 8.8  - baseline:  ~ 12  - previously 10.4 on 8/2020  - monitor    Hx of Prostate Cancer  - s/p prostatectomy    DVT Prophylaxis: Lovenox  Diet: DIET CARB CONTROL;  Code Status: Full Code    Dario Hargrove JOSE R 1:59 PM 10/15/2020       1. Severe fatigue and lightheadedness. Orthostatic hypotension. Continue IV fluids. Continue midodrine. 2.Elevated troponin. Denies any chest pain. EKG is not showing any acute changes. Cardiology consult. Trend the troponins. 3.Metastatic esophageal cancer on chemotherapy. Neutropenia. Obtain oncology consult. NILSA Meza.

## 2020-10-15 NOTE — ED NOTES
Call placed to Cardiology @ [id number] after verbal request from Dr. Vance Orr  10/15/20 3317    Dr Kit Peraza returned the call to Dr. Nieves Recinos @ [address]  10/15/20 2230

## 2020-10-15 NOTE — PROGRESS NOTES
Bedside shift report given to Kristin 150 transferred. Patient resting in bed denying needs. When asked about no void yet. Patient states he doesn't void much at all at home.

## 2020-10-15 NOTE — ED NOTES
[facility] Neris Sommer transporting pt to floor via cot.      Chinita Kawasaki, RN  10/15/20 4263

## 2020-10-15 NOTE — PROGRESS NOTES
Patient admitted to room 310 from ED room 5. Patient oriented to room, call light, bed rails, phone, lights and bathroom. Patient instructed about the schedule of the day including: vital sign frequency, lab draws, possible tests, frequency of MD and staff rounds, daily weights, I &O's and prescribed diet. Bed alarm in place, patient aware of placement and reason. #10 Telemetry box in place, patient aware of placement and reason. Bed locked, in lowest position, side rails up 2/4, call light within reach. Recliner Assessment  Patient is not able to demonstrated the ability to move from a reclining position to an upright position within the recliner. however patient is alert, oriented and able to provide informed consent    4 Eyes Skin Assessment     The patient is being assess for   Admission    I agree that 2 RN's have performed a thorough Head to Toe Skin Assessment on the patient. ALL assessment sites listed below have been assessed. Areas assessed by both nurses:   [x]   Head, Face, and Ears   [x]   Shoulders, Back, and Chest, Abdomen  [x]   Arms, Elbows, and Hands   [x]   Coccyx, Sacrum, and Ischium  [x]   Legs, Feet, and Heels        Scattered fine red rash to chest and legs, scattered bruising to arms, healed scars to right posterior ribcage, chest and abdomen. Blanchable redness to coccyx 4cm - photo taken, mepilex border placed    **SHARE this note so that the co-signing nurse is able to place an eSignature**    Co-signer eSignature: {Esignature:[id number]}    Does the Patient have Skin Breakdown?   No          Johann Prevention initiated:  Yes   Wound Care Orders initiated:  NA      [facility] nurse consulted for Pressure Injury (Stage 3,4, Unstageable, DTI, NWPT, Complex wounds)and New or Established Ostomies:  NA      Primary Nurse eSignature: Electronically signed by Angela Pierre RN on 10/15/20 at 4:08 PM EDT

## 2020-10-15 NOTE — ED NOTES
Perfect serve message to Dr. Shena Sánchez @ [address]  10/15/20 Jovan Chandra returned the call to Dr. Ada Booth @ [address] [place] [address]  10/15/20 2253

## 2020-10-15 NOTE — ED PROVIDER NOTES
needed. GABAPENTIN (NEURONTIN) 300 MG CAPSULE    Take 300 mg by mouth 3 times daily. LEVOFLOXACIN (LEVAQUIN) 500 MG TABLET    Take 500 mg by mouth daily    METFORMIN (GLUCOPHAGE) 500 MG TABLET    Take 1 tablet by mouth Daily with supper Metformin must be crushed. MIDODRINE (PROAMATINE) 5 MG TABLET    Take 5 mg by mouth 3 times daily    ONE TOUCH ULTRA TEST STRIP        ONETOUCH DELICA LANCETS 25G MISC        PROCHLORPERAZINE (COMPAZINE) 10 MG TABLET    Take 10 mg by mouth every 6 hours as needed       ALLERGIES     No known allergies    FAMILY HISTORY       Family History   Problem Relation Age of Onset    Cancer Father         lung    Other Mother         blood disorder          SOCIAL HISTORY       Social History     Socioeconomic History    Marital status:      Spouse name: Not on file    Number of children: Not on file    Years of education: Not on file    Highest education level: Not on file   Occupational History    Not on file   Social Needs    Financial resource strain: Not on file    Food insecurity     Worry: Not on file     Inability: Not on file    Transportation needs     Medical: Not on file     Non-medical: Not on file   Tobacco Use    Smoking status: Former Smoker     Types: Cigarettes     Last attempt to quit: 1972     Years since quittin.8    Smokeless tobacco: Never Used   Substance and Sexual Activity    Alcohol use:  Yes     Alcohol/week: 0.0 standard drinks     Comment: rarely    Drug use: No    Sexual activity: Yes     Partners: Female   Lifestyle    Physical activity     Days per week: Not on file     Minutes per session: Not on file    Stress: Not on file   Relationships    Social connections     Talks on phone: Not on file     Gets together: Not on file     Attends [employer] service: Not on file     Active member of club or organization: Not on file     Attends meetings of clubs or organizations: Not on file     Relationship status: Not on file   [facility] Intimate partner violence     Fear of current or ex partner: Not on file     Emotionally abused: Not on file     Physically abused: Not on file     Forced sexual activity: Not on file   Other Topics Concern    Not on file   Social History Narrative    Not on file       SCREENINGS        [place] Coma Scale  Eye Opening: Spontaneous  Best Verbal Response: Oriented  Best Motor Response: Obeys commands  [place] Coma Scale Score: 15                   REVIEW OF SYSTEMS    (2-9 systems for level 4, 10 or more for level 5)   Review of Systems   Constitutional: Positive for fatigue. Negative for chills and fever. HENT: Negative for congestion, rhinorrhea and sore throat. Eyes: Negative for photophobia and visual disturbance. Respiratory: Positive for shortness of breath. Negative for cough. Cardiovascular: Positive for leg swelling. Negative for chest pain and palpitations. Gastrointestinal: Positive for diarrhea. Negative for abdominal pain, constipation, nausea and vomiting. Genitourinary: Negative for decreased urine volume. Musculoskeletal: Negative for back pain, neck pain and neck stiffness. Skin: Negative for rash. Allergic/Immunologic: Positive for immunocompromised state. Neurological: Positive for light-headedness. Negative for weakness, numbness and headaches. Psychiatric/Behavioral: Negative for confusion. PHYSICAL EXAM    (up to 7 for level 4, 8 or more for level 5)     ED Triage Vitals [10/15/20 0405]   BP Temp Temp Source Pulse Resp SpO2 Height Weight   98/70 98.5 °F (36.9 °C) Oral 92 18 92 % 5' 6\" (1.676 m) 135 lb (61.2 kg)       Physical Exam  Constitutional:       General: He is not in acute distress. Appearance: He is not diaphoretic. HENT:      Head: Normocephalic and atraumatic. Eyes:      Pupils: Pupils are equal, round, and reactive to light. Neck:      Musculoskeletal: Normal range of motion and neck supple. Trachea: No tracheal deviation. Cardiovascular:      Rate and Rhythm: Normal rate and regular rhythm. Pulmonary:      Effort: Pulmonary effort is normal. No respiratory distress. Breath sounds: No stridor. No wheezing. Abdominal:      General: There is no distension. Palpations: Abdomen is soft. Tenderness: There is no abdominal tenderness. Musculoskeletal: Normal range of motion. General: No deformity. Skin:     General: Skin is warm and dry. Neurological:      Mental Status: He is alert and oriented to person, place, and time. DIAGNOSTIC RESULTS     EKG: All EKG's are interpreted by the Emergency Department Physician who either signs or Co-signs this chart in the absence of a cardiologist.      The Ekg interpreted by me shows  normal sinus rhythm with a rate of 80  Axis is   Normal  QTc is  within an acceptable range  Intervals and Durations are unremarkable. ST Segments: nonspecific changes    No significant change from prior EKG dated 10/13\2020        RADIOLOGY:   Non-plain film images such as CT, Ultrasound and MRI are read by the radiologist. Plain radiographic images are visualized and preliminarily interpreted by the emergency physician. Interpretation per the Radiologist below, if available at the time of this note:    XR CHEST (2 VW)   Final Result   Left pleural effusion with left basilar atelectasis or pneumonia.                LABS:  Labs Reviewed   URINALYSIS - Abnormal; Notable for the following components:       Result Value    Ketones, Urine TRACE (*)     All other components within normal limits    Narrative:     Performed at:  [place] [address],  [place]   Phone [phone]   CBC WITH AUTO DIFFERENTIAL - Abnormal; Notable for the following components:    WBC 2.4 (*)     RBC 2.87 (*)     Hemoglobin 8.8 (*)     Hematocrit 26.7 (*)     RDW 16.3 (*)     Neutrophils Absolute 0.4 (*)     Anisocytosis Occasional (*) at:  [facility] - [facility]  [address],  [place], [facility]   Phone [phone]   POCT GLUCOSE   POCT GLUCOSE   POCT GLUCOSE   POCT GLUCOSE   POCT GLUCOSE    Narrative:     Performed at:  [facility],  [place], [facility]   Phone [phone]       All other labs were within normal range or not returned as of this dictation. EMERGENCY DEPARTMENT COURSE and DIFFERENTIAL DIAGNOSIS/MDM:   Rani Zuniga is a 76 y.o. male who presents to the emergency department with the complaint of worsening fatigue, multiple near falls, does have a history of metastatic esophageal cancer currently receiving chemotherapy last dose was greater than 1 week ago, on arrival patient is slightly hypotensive with blood pressures around 100, according to him this is about where he has been in the last month or so. Multiple near falls in the last several days, worsening generalized fatigue. Patient is afebrile with a temperature of 98.5, is neutropenic. Patient also with concerns of recurrent episodes of hypoglycemia going to wife he drops below 80 becomes symptomatic with lightheadedness and dizziness, blood sugar on arrival 80, was given dextrose. Tolerating p.o. intake. Follow my evaluation of patient I did add on a troponin, I had seen patient greater than 3 hours since his presentation to the ER, troponin was added to initial blood work, no active chest pain troponin was elevated at 0.43, no prior consistent with elevation, EKG was reviewed no signs of ischemia, no active chest pain, will repeat troponin now. Repeat troponin downtrending no active chest pain no ischemic findings on EKG.   Due to patient's worsening generalized weakness, multiple falls, metastatic cancer history on chemotherapy with abnormal blood counts, do feel patient would benefit from admission for observation and further work-up by medicine team.  Will discuss with

## 2020-10-15 NOTE — CARE COORDINATION
Case Management Assessment  Initial Evaluation      Patient Name: Jaida Black  YOB: 1945  Diagnosis: Recurrent falls [R29.6]  Date / Time: 10/15/2020  4:01 AM    Admission status/Date:10/15/2020  1030 inpt  Chart Reviewed: Yes      Patient Interviewed: Yes  wife at bedside and pt gave verbal permission for writer to speak in front of and to wife.     Family Interviewed:  Yes - wife, Rosa Elena Lawson      Hospitalization in the last 30 days:  No    Health Care Decision Maker :  Primary Decision Maker: Paul Recinos - [phone] (1st enter selection under Navigator - emergency contact- [place] Relationship)   Who do you trust or have selected to make healthcare decisions for you      Met with: pt and Imer Hamm conducted  (bedside/phone): bedside    Current PCP: Siobhan Glez MD    Financial [facility] Medicare  Precert required for SNF : Y, N          3 night stay required - Y, N    ADLS  Support Systems/Care Needs: None  Transportation: wife    Meal Preparation: self    Housing  Living Arrangements: ranch with wife  Steps: 2  Intent for return to present living arrangements: Yes  Identified Issues:     Home Care Information  Active with [address] : No Agency:(Services)  Type of Home Care Services: None  Passport/Waiver : No  :                      Phone Number:    Passport/Waiver Services:           Durable Medical Equiptment   DME Provider: none  Equipment: yes   Walker__X_Cane___RTS___ BSC___Shower Chair___Hospital Bed___W/C____Other________  02 at ____Liter(s)---wears(frequency)_______ [facility] ___ CPAP___ BiPap___   N/A____      Home O2 Use :  No    If No for home O2---if presently on O2 during hospitalization:  No  if yes CM to follow for potential DC O2 need  Informed of need for care provider to bring portable home O2 tank on day of discharge for nursing to connect prior to leaving:   Not Indicated  Verbalized agreement/Understanding:   Not Indicated    Community Service Affiliation  Dialysis:  No    · Agency:  · Location:  · Dialysis Schedule:  · Phone:   · Fax: Other Community Services: (ex:PT/OT,Mental Health,Wound Clinic, Cardio/Pul [address])    DISCHARGE PLAN: Explained Case Management role/services. Reviewed chart. Role of discharge planner explained and patient and wife, Sarah Nunez, verbalized understanding. [place] and plans to return. CM received a consult for possible home care. Pt currently declines [facility].

## 2020-10-16 VITALS
BODY MASS INDEX: 23.13 KG/M2 | RESPIRATION RATE: 16 BRPM | WEIGHT: 143.9 LBS | SYSTOLIC BLOOD PRESSURE: 98 MMHG | HEART RATE: 90 BPM | OXYGEN SATURATION: 98 % | TEMPERATURE: 97 F | HEIGHT: 66 IN | DIASTOLIC BLOOD PRESSURE: 63 MMHG

## 2020-10-16 LAB
ANION GAP SERPL CALCULATED.3IONS-SCNC: 2 MMOL/L (ref 3–16)
BASOPHILS ABSOLUTE: 0.1 K/UL (ref 0–0.2)
BASOPHILS RELATIVE PERCENT: 1.5 %
BUN BLDV-MCNC: 9 MG/DL (ref 7–20)
CALCIUM SERPL-MCNC: 7.6 MG/DL (ref 8.3–10.6)
CHLORIDE BLD-SCNC: 108 MMOL/L (ref 99–110)
CO2: 24 MMOL/L (ref 21–32)
CORTISOL - AM: 12.4 UG/DL (ref 4.3–22.4)
CREAT SERPL-MCNC: <0.5 MG/DL (ref 0.8–1.3)
EOSINOPHILS ABSOLUTE: 0.1 K/UL (ref 0–0.6)
EOSINOPHILS RELATIVE PERCENT: 2.2 %
GFR AFRICAN AMERICAN: >60
GFR NON-AFRICAN AMERICAN: >60
GLUCOSE BLD-MCNC: 75 MG/DL (ref 70–99)
GLUCOSE BLD-MCNC: 80 MG/DL (ref 70–99)
GLUCOSE BLD-MCNC: 84 MG/DL (ref 70–99)
HCT VFR BLD CALC: 25 % (ref 40.5–52.5)
HEMOGLOBIN: 8.3 G/DL (ref 13.5–17.5)
LV EF: 58 %
LVEF MODALITY: NORMAL
LYMPHOCYTES ABSOLUTE: 1.2 K/UL (ref 1–5.1)
LYMPHOCYTES RELATIVE PERCENT: 31.6 %
MCH RBC QN AUTO: 30.5 PG (ref 26–34)
MCHC RBC AUTO-ENTMCNC: 33.2 G/DL (ref 31–36)
MCV RBC AUTO: 91.9 FL (ref 80–100)
MONOCYTES ABSOLUTE: 0.8 K/UL (ref 0–1.3)
MONOCYTES RELATIVE PERCENT: 21.3 %
NEUTROPHILS ABSOLUTE: 1.6 K/UL (ref 1.7–7.7)
NEUTROPHILS RELATIVE PERCENT: 43.4 %
PDW BLD-RTO: 17 % (ref 12.4–15.4)
PERFORMED ON: NORMAL
PERFORMED ON: NORMAL
PLATELET # BLD: 224 K/UL (ref 135–450)
PMV BLD AUTO: 7 FL (ref 5–10.5)
POTASSIUM REFLEX MAGNESIUM: 3.8 MMOL/L (ref 3.5–5.1)
RBC # BLD: 2.72 M/UL (ref 4.2–5.9)
SODIUM BLD-SCNC: 134 MMOL/L (ref 136–145)
WBC # BLD: 3.8 K/UL (ref 4–11)

## 2020-10-16 PROCEDURE — 97530 THERAPEUTIC ACTIVITIES: CPT

## 2020-10-16 PROCEDURE — 99222 1ST HOSP IP/OBS MODERATE 55: CPT | Performed by: INTERNAL MEDICINE

## 2020-10-16 PROCEDURE — G0378 HOSPITAL OBSERVATION PER HR: HCPCS

## 2020-10-16 PROCEDURE — 51798 US URINE CAPACITY MEASURE: CPT

## 2020-10-16 PROCEDURE — 6360000002 HC RX W HCPCS: Performed by: INTERNAL MEDICINE

## 2020-10-16 PROCEDURE — 82533 TOTAL CORTISOL: CPT

## 2020-10-16 PROCEDURE — 6360000002 HC RX W HCPCS: Performed by: PHYSICIAN ASSISTANT

## 2020-10-16 PROCEDURE — 96372 THER/PROPH/DIAG INJ SC/IM: CPT

## 2020-10-16 PROCEDURE — 97535 SELF CARE MNGMENT TRAINING: CPT

## 2020-10-16 PROCEDURE — 97162 PT EVAL MOD COMPLEX 30 MIN: CPT

## 2020-10-16 PROCEDURE — 2580000003 HC RX 258: Performed by: PHYSICIAN ASSISTANT

## 2020-10-16 PROCEDURE — 85025 COMPLETE CBC W/AUTO DIFF WBC: CPT

## 2020-10-16 PROCEDURE — 97166 OT EVAL MOD COMPLEX 45 MIN: CPT

## 2020-10-16 PROCEDURE — 93306 TTE W/DOPPLER COMPLETE: CPT

## 2020-10-16 PROCEDURE — 99238 HOSP IP/OBS DSCHRG MGMT 30/<: CPT | Performed by: INTERNAL MEDICINE

## 2020-10-16 PROCEDURE — 80048 BASIC METABOLIC PNL TOTAL CA: CPT

## 2020-10-16 PROCEDURE — 6370000000 HC RX 637 (ALT 250 FOR IP): Performed by: PHYSICIAN ASSISTANT

## 2020-10-16 RX ORDER — MIDODRINE HYDROCHLORIDE 10 MG/1
10 TABLET ORAL 3 TIMES DAILY
Qty: 90 TABLET | Refills: 0 | Status: SHIPPED | OUTPATIENT
Start: 2020-10-16

## 2020-10-16 RX ORDER — MIDODRINE HYDROCHLORIDE 5 MG/1
10 TABLET ORAL
Status: DISCONTINUED | OUTPATIENT
Start: 2020-10-16 | End: 2020-10-16 | Stop reason: HOSPADM

## 2020-10-16 RX ORDER — DIFLUPREDNATE 0.5 MG/ML
1 EMULSION OPHTHALMIC 3 TIMES DAILY
Status: DISCONTINUED | OUTPATIENT
Start: 2020-10-16 | End: 2020-10-16 | Stop reason: HOSPADM

## 2020-10-16 RX ADMIN — SODIUM CHLORIDE: 9 INJECTION, SOLUTION INTRAVENOUS at 05:51

## 2020-10-16 RX ADMIN — SODIUM CHLORIDE: 9 INJECTION, SOLUTION INTRAVENOUS at 11:32

## 2020-10-16 RX ADMIN — LEVOFLOXACIN 500 MG: 500 TABLET, FILM COATED ORAL at 08:12

## 2020-10-16 RX ADMIN — ENOXAPARIN SODIUM 40 MG: 40 INJECTION SUBCUTANEOUS at 08:12

## 2020-10-16 RX ADMIN — MIDODRINE HYDROCHLORIDE 5 MG: 5 TABLET ORAL at 08:12

## 2020-10-16 RX ADMIN — Medication 10 ML: at 08:13

## 2020-10-16 RX ADMIN — MIDODRINE HYDROCHLORIDE 5 MG: 5 TABLET ORAL at 11:32

## 2020-10-16 RX ADMIN — TBO-FILGRASTIM 300 MCG: 300 INJECTION, SOLUTION SUBCUTANEOUS at 08:12

## 2020-10-16 NOTE — PROGRESS NOTES
Progress Note    Admit Date:  10/15/2020    Subjective:  Mr. Matilde Cullen says he feels better today    Objective:   Patient Vitals for the past 4 hrs:   BP Temp Temp src Pulse Resp SpO2   10/16/20 0808 98/63 97 °F (36.1 °C) Oral 90 16 98 %            Intake/Output Summary (Last 24 hours) at 10/16/2020 1033  Last data filed at 10/16/2020 0953  Gross per 24 hour   Intake 540 ml   Output 225 ml   Net 315 ml       Physical Exam:    Gen: No distress. Alert. Eyes: PERRL. No sclera icterus. No conjunctival injection. ENT: No discharge. Pharynx clear. Neck: Trachea midline. Normal thyroid. Resp: No accessory muscle use. No crackles. No wheezes. No rhonchi. No dullness on percussion. CV: Regular rate. Regular rhythm. No murmur or rub. 1 + pedal  edema. GI: Non-tender. Non-distended. No masses. No organomegaly. Normal bowel sounds. No hernia. Skin: Warm and dry. No nodule on exposed extremities. No rash on exposed extremities. Lymph: No cervical LAD. No supraclavicular LAD. M/S: No cyanosis. No joint deformity. No clubbing. Neuro: Awake. Moves all 4 extremities, non focal  Psych: Oriented x 3. No anxiety or agitation.      Scheduled Meds:   sodium chloride flush  10 mL Intravenous 2 times per day    enoxaparin  40 mg Subcutaneous Daily    tbo-filgrastim  300 mcg Subcutaneous Daily    difluprednate  1 drop Left Eye TID    midodrine  5 mg Oral TID     levoFLOXacin  500 mg Oral Daily       Continuous Infusions:   dextrose      sodium chloride 100 mL/hr at 10/16/20 0551       PRN Meds:  sodium chloride flush, acetaminophen **OR** acetaminophen, polyethylene glycol, promethazine **OR** ondansetron, glucose, dextrose, glucagon (rDNA), dextrose      Data:  CBC:   Recent Labs     10/15/20  0449 10/16/20  0558   WBC 2.4* 3.8*   HGB 8.8* 8.3*   HCT 26.7* 25.0*   MCV 93.3 91.9    224     BMP:   Recent Labs     10/15/20  0449 10/16/20  0558   * 134*   K 4.0 3.8    108   CO2 26 24   BUN 10 9 CREATININE 0.6* <0.5*     LIVER PROFILE:   Recent Labs     10/15/20  0449   AST 34   ALT 21   BILITOT 0.8   ALKPHOS 520*     CULTURES  N/A     RADIOLOGY    XR CHEST (2 VW)   Final Result   Left pleural effusion with left basilar atelectasis or pneumonia. Assessment/Plan:    Recurrent Falls  Severe Fatigue  Lightheadedness  Orthostatic Hypotension  - Admit to PCU, tele  - trend troponins  - start IVF, cont Midodrine  - PT/OT - rec SNF     Elevated troponin  - denies any hx of cardiac disease; no c/o chest pain  - EKG:  Non-acute  - initial trop: 0.43 > 0.38  - trend, tele  - cards consult -  Check echo, cortisol levels     Neutropenia - improving  - ANC 0.4 > 1.6 today  - neutropenic precautions  - oncology consult  - start Granix     Esophageal Cancer with Brain Mets  - f/w [facility]  - recently completed gamma knife  - s/p chemoradiation, esophagectomy, FOLFOX, recently started Txol/Ramicirumab  - currently on chemo  - Heme/Onc consulted as above     Type II DM  - well controlled by diet     Normocytic Anemia  - Hgb 8.8  - baseline:  ~ 12  - previously 10.4 on 8/2020  - monitor     Hx of Prostate Cancer  - s/p prostatectomy  - no acute issues    DVT Prophylaxis: Lovenox  Diet: DIET CARB CONTROL;  Code Status: DNR-CCA     PT/OT recommending SNF  Patient and wife refuses     D/c home after ECHO. NILSA Meza.

## 2020-10-16 NOTE — PROGRESS NOTES
Inpatient Physical Therapy Evaluation and Treatment    Unit: PCU  Date:  10/16/2020  Patient Name:    Sarahi Zaidi  Admitting diagnosis:  Recurrent falls [R29.6]  Admit Date:  10/15/2020  Precautions/Restrictions/WB Status/ Lines/ Wounds/ Oxygen: Fall risk, Bed/chair alarm, Lines -IV, Telemetry and Isolation Precautions: Neutropenic    Treatment Time:  08:40-09:10  Treatment Number:  1   Timed Code Treatment Minutes: 20 minutes  Total Treatment Minutes:  30  minutes    Patient Goals for Therapy: \" go home \"          Discharge Recommendations: SNF  DME needs for discharge: Needs Met       Therapy recommendation for EMS Transport: can transport by wheelchair    Therapy recommendations for staff:   Assist of 1 to sit EOB  Assist of 1 with use of NIRAV STEDY for all transfers to/from [facility]  to/from chair (watch BP)    History of Present Illness: (Per H&P by Dr. Roland Maria on 10/15/20)  76 y.o. male with a PMH of Type II DM, Esophageal Cancer with brain mets, HTN, hx of prostate cancer who presented to the ED with complaint of recurrent falls and lightheadedness at home x 1 week. Pt denies any chest pain or SOB. No nausea, vomiting but does have chronic diarrhea with chemotherapy. His last treatment was about 1 week ago. He reports poor PO intake. Did fall at home a couple days ago. Reports his knees gave out 2/2 generalized weakness. No chest pain or SOB prior to fall. Did not lose consciousness or complain of pre-syncopal sensation. He previously had a hx of high blood pressure and was on medication but reports he no longer takes any BP medications as his BP tends to run low. Pt was admitted to PCU for further work up and management. Home Health S4 Level Recommendation:  Level 3 Safety     Preadmission Environment    Pt.  Lives with spouse able to provide 24/7 limited assist,   Home environment:    one story home  Steps to enter first floor:   1+1 with post to hold onto  steps to enter and no handrail            Steps to second floor: N/A  Bathroom:       Walk-in Shower, Grab bars and Shower Chair   Equipment owned:      Sury Frakns [facility], [facility] (), manual W/C, lift chair and reacher adjustable beds      Preadmission Status / PLOF:  History of falls             Yes, near fall on knees with wife reason for admission  Pt. Able to drive          No wife  Pt Fully independent with ADL's         Yes  Pt. Required assistance from family for:  Bathing, Cleaning, Cooking, Dressing and Laundry     Granddaughter helps as needed as well  Pt. Fully independent for transfers and gait and walked with: Rahat De La Torre     Pain  No  Rating:NA  Location:  Pain Medicine Status: Denies need       Cognition    A&O x4   Able to follow 2 step commands     Subjective  Patient lying supine in bed with no family present   Pt agreeable to this PT eval & tx. Upper Extremity ROM/Strength  Please see OT evaluation. Lower Extremity ROM / Strength   AROM WFL: Yes    Strength Assessment (measured on a 0-5 scale):  R LE   Quad   4+   Ant Tib  5/5   Hamstring 4+   Iliopsoas 4-  L LE  Quad   4+   Ant Tib  5/5   Hamstring 4+   Iliopsoas 4-    Lower Extremity Sensation    WNL    Lower Extremity Proprioception:   WNL    Coordination and Tone  WNL    Balance  Sitting:  Good ; Independent  Comments: for static and dynamic tasks (5 minutes)    Standing: Fair +; CGA-Min A  Comments: 45 sec with and without UE support on RW to pull up pants, dizziness with standing, significant unsteadiness in B knees noted    Bed Mobility   Supine to Sit:    Min A  with HOB elevated  Sit to Supine:   Supervision with HOB elevated  Rolling:   Min A  with HOB elevated  Scooting in sitting: Independent  Scooting in supine: Mod A  and 2 persons    Transfer Training     Sit to stand:   Min A   Stand to sit:   CGA  Bed to Chair:   Not Tested with use of N/A    Gait gait deferred due to low BP with standing; pt ambulated 0 ft.      Stair Training deferred, pt unsafe/ not appropriate to complete stairs at this time    Activity Tolerance   Pt completed therapy session with Dizziness noted with sit<>stand, returned to supine  BP: 93/59 supine   66/48 after standing   94/59 supine (end of session)  HR: 90-91 bpm  SpO2: >95% on RA throughout session    Positioning Needs   Pt in bed, alarm set, positioned in proper neutral alignment and pressure relief provided. Call light provided and all needs within reach    Exercises Initiated  Sridhar deferred secondary to treatment focus on functional mobility  NA    Other  See OT note for assist with lower body dressing. Patient/Family Education   Pt educated on role of inpatient PT, POC, importance of continued activity, DC recommendations, safety awareness, transfer techniques, pacing activity and calling for assist with mobility. Assessment  Pt seen for Physical Therapy evaluation in acute care setting. Pt demonstrated decreased Activity tolerance, Balance, Safety and Strength as well as decreased independence with Ambulation, Bed Mobility  and Transfers. Pt limited by orthostatic BP with attempted transfer. Pt's BP improved with return to supine. Will continue to assess pt's functional mobility as able. Recommending SNF upon discharge as patient functioning well below baseline, demonstrates good rehab potential and unable to return home due to inability to negotiate stairs to enter home/bedroom/bathroom, burden of care beyond caregiver ability and poor hemodynamic stability. .    Goals : To be met in 3 visits:  1). Independent with LE Ex x 10 reps    To be met in 6 visits:  1). Supine to/from sit: Modified Independent  2). Sit to/from stand: SBA  3). Bed to chair: SBA  4). Gait: Ambulate 50 ft.  with  SBA and use of rolling walker (RW)  5). Tolerate B LE exercises 3 sets of 10-15 reps  6).   Ascend/descend 2 steps with CGA with use of post to hold onto and rolling walker (RW)    Rehabilitation Potential: Good  Strengths for achieving goals include:   Pt motivated, PLOF, Family Support and Pt cooperative   Barriers to achieving goals include:    Complexity of condition and Weakness    Plan    To be seen 3-5 x / week  while in acute care setting for therapeutic exercises, bed mobility, transfers, progressive gait training, balance training, and family/patient education. Signature: Annabella Hanna, PT, DPT #[id number]    If patient discharges from this facility prior to next visit, this note will serve as the Discharge Summary.

## 2020-10-16 NOTE — CONSULTS
[address] [place] [address]  [phone]        Reason for Consultation/Chief Complaint: \"I have been asked to see him for elevated troponin. \"    History of Present Illness:  Delia Lora is a 76 y.o. patient who presented to the hospital with complaints of weakness and low blood sugar. He c/o lighteadedness and falls for a week. Incidentally checked for troponin which is found to be high. Esophageal Cancer with Brain Mets. - undergoing gamma knife radiation and chemo Rx   I have been asked to provide consultation regarding further management and testing. Patient says he cannot eat much at a time due to partial resection of stomach. He runs low bP and take midodrine to support it. Denies chest pain no syncope palpitations or shortness of breath. Past Medical History:PMH of Type II DM,Adrenal mass, s/p chemotherapy, chronic diarrhea related to chemotherapy, Esophageal Cancer with brain mets, HTN, hx of prostate cancer s/p adernalectomy Rt adrenal mass.  ESOPHAGECTOMY N/A 4/18/2019     BEN[facility]O [facility] ESOPHAGECTOMY WITH MEDIASTINAL LYMPHADENECTOMY INCLUDING THORACIC DUCT, UMBILICAL HERNIA REPAIR performed by Tejas Christine MD at [address]        has a past medical history of Adrenal mass ([address] Diabetes mellitus ([address] Esophageal cancer ([facility] [address] H/O sleep apnea, History of esophageal reflux, Hypertension, Prostate cancer ([address] and Weight loss. Surgical History:   has a past surgical history that includes Colonoscopy (2012); Prostatectomy (2000); Cataract removal with implant (2003); Colonoscopy (09/14/2017); pr office/outpt visit,procedure only (N/A, 9/19/2018); Upper gastrointestinal endoscopy (9/19/2018); Uvulopalatopharygoplasty; Esophagus dilation (N/A, 1/3/2019); Upper gastrointestinal endoscopy (N/A, 4/2/2019); Inguinal hernia repair (Right, 1962); Esophagectomy (N/A, 4/18/2019); INSERTION / REMOVAL / REPLACEMENT VENOUS ACCESS CATHETER (N/A, 6/25/2019); and Adrenalectomy (N/A, 2/27/2020). Social History:   reports that he quit smoking about 48 years ago. His smoking use included cigarettes. He has never used smokeless tobacco. He reports current alcohol use. He reports that he does not use drugs. Family History:  family history includes Cancer in his father; Other in his mother. Home Medications:  Were reviewed and are listed in nursing record. and/or listed below  Prior to Admission medications    Medication Sig Start Date End Date Taking? Authorizing Provider   diphenoxylate-atropine (LOMOTIL) 2.5-0.025 MG per tablet Take 1 tablet by mouth 4 times daily as needed for Diarrhea. Yes Historical Provider, MD   midodrine (PROAMATINE) 5 MG tablet Take 5 mg by mouth 3 times daily   Yes Historical Provider, MD   levoFLOXacin (LEVAQUIN) 500 MG tablet Take 500 mg by mouth daily 5 more tablets   Yes Historical Provider, MD   difluprednate (DUREZOL) 0.05 % EMUL Place 1 drop into the left eye three times daily    Yes Historical Provider, MD   prochlorperazine (COMPAZINE) 10 MG tablet Take 10 mg by mouth every 6 hours as needed   Yes Historical Provider, MD   Loperamide HCl (IMODIUM A-D PO) Take 1 tablet by mouth 2 times daily as needed   Yes Historical Provider, MD   dronabinol (MARINOL) 2.5 MG capsule Take 5 mg by mouth 2 times daily (with meals). Before lunch and evening meal    Historical Provider, MD   gabapentin (NEURONTIN) 300 MG capsule Take 300 mg by mouth 2 times daily.  Evening meal and hs    Historical Provider, MD   ONE TOUCH ULTRA TEST strip  1/30/19   Historical Provider, MD Sherren Peabody LANCETS 28D [facility]  1/30/19   Historical Provider, MD        Allergies:  No known allergies     Review of Systems:   12 point ROS negative in all areas as listed below except as in [address] [place] [address],  pulmonary, , Musculoskeletal, skin, neurological,  endocrine, Psychiatric    Physical Examination:    Vitals:    10/15/20 1934   BP: 103/68   Pulse: 85   Resp: 16   Temp: 98 °F (36.7 °C)   SpO2: 99% Weight: 135 lb (61.2 kg)         General Appearance:  Alert, cooperative, no distress, appears stated age   Head:  Normocephalic, without obvious abnormality, atraumatic   Eyes:  PERRL, conjunctiva/corneas clear       Nose: Nares normal, no drainage or sinus tenderness   Throat: Lips, mucosa, and tongue normal   Neck: Supple, symmetrical, trachea midline, no adenopathy, thyroid: not enlarged, symmetric, no tenderness/mass/nodules, no carotid bruit or JVD       Lungs:   Crackles at left base to auscultation , respirations unlabored   Chest Wall:  No tenderness or deformity   Heart:  Regular rate and rhythm, S1, S2 normal, 2/6 STEPHEN all over heart  murmur, rub or gallop   Abdomen:   Soft, non-tender, bowel sounds active all four quadrants,  no masses, no organomegaly           Extremities: Extremities normal, atraumatic, no cyanosis  1+ pretibial edema   Pulses: 2+ and symmetric   Skin: Skin color, texture, turgor normal, no rashes or lesions   Pysch: Normal mood and affect   Neurologic: Normal gross motor and sensory exam.         Labs  CBC:   Lab Results   Component Value Date    WBC 2.4 10/15/2020    RBC 2.87 10/15/2020    HGB 8.8 10/15/2020    HCT 26.7 10/15/2020    MCV 93.3 10/15/2020    RDW 16.3 10/15/2020     10/15/2020     CMP:    Lab Results   Component Value Date     10/15/2020    K 4.0 10/15/2020    K 3.8 08/16/2020     10/15/2020    CO2 26 10/15/2020    BUN 10 10/15/2020    CREATININE 0.6 10/15/2020    GFRAA >60 10/15/2020    AGRATIO 1.2 10/15/2020    LABGLOM >60 10/15/2020    GLUCOSE 105 10/15/2020    PROT 3.9 10/15/2020    CALCIUM 7.8 10/15/2020    BILITOT 0.8 10/15/2020    ALKPHOS 520 10/15/2020    AST 34 10/15/2020    ALT 21 10/15/2020     PT/INR:  No results found for: PTINR  Lab Results   Component Value Date    TROPONINI 0.38 (H) 10/15/2020     Troponin 0.43->0.38  chest xray 10.16.20     FINDINGS:    Left chest port is again noted.  The lung volumes are low.  There is a small left pleural effusion with adjacent airspace opacity.  The right lung is    clear.  The heart size is normal.  There is no pneumothorax.              Impression    Left pleural effusion with left basilar atelectasis or pneumonia.             ECG Final  10/15/2020  4:22 AM  14      Normal sinus rhythmLow voltage QRSBorderline ECGConfirmed by Lina Mendoza MD, [address] ([id number]) on 10/15/2020 11:11:35 AM    Echocardiogram 11.27.18   Summary   Normal left ventricle systolic function with an estimated ejection fraction   of 55%. No regional wall motion abnormalities are seen. Grade I diastolic dysfunction with normal filing pressure. The aortic valve is thickened/calcified with decreased leaflet mobility. A bicuspid aortic valve cannot be excluded. Mild aortic stenosis. Systolic pulmonary artery pressure (SPAP) is normal and estimated at 22 mmHg   (right atrial pressure 3 mmHg).          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

## 2020-10-16 NOTE — FLOWSHEET NOTE
10/16/20 0808   Vitals   Temp 97 °F (36.1 °C)   Temp Source Oral   Pulse 90   Heart Rate Source Monitor   Resp 16   BP 98/63   Patient Position Semi fowlers   Level of Consciousness 0   MEWS Score 2   Oxygen Therapy   SpO2 98 %   O2 Device None (Room air)   Vital signs stable. Pt remains mildly hypotensive. This appears to be baseline. Asymptomatic. Pt is alert and oriented and denies any SOB, dizziness, or pain at the moment. Nothing new noted on head to toe assessment. Pt is NSR on the monitor. Evening medication administration completed. Pt noted to have not urinated all night. Will bladder scan when able to assess for urinary retention. Pt denies the urge to void at this time. Pt denies any further assistance at the moment. Will continue to monitor.

## 2020-10-16 NOTE — PROGRESS NOTES
Inpatient Occupational Therapy  Evaluation and Treatment    Unit: PCU  Date:  10/16/2020  Patient Name:    Verenice Zaidi  Admitting diagnosis:  Recurrent falls [R29.6]  Admit Date:  10/15/2020  Precautions/Restrictions/WB Status/ Lines/ Wounds/ Oxygen: fall risk, IV, bed/chair alarm and telemetry    Treatment Time:  0830-905  Treatment Number: 1     Billable Treatment Time:25 minutes   Total Treatment Time:  35  minutes    Patient Goals for Therapy:  \" get better \"      Discharge Recommendations: SNF(pending progress may discharge home with 24/7)  DME needs for discharge: defer to facility       Therapy recommendations for staff:   Assist of 1 with use of NIRAV STEDY for all transfers to/from BSC/chair    History of Present Illness: 76 y.o. male with a PMH of Type II DM, Esophageal Cancer with brain mets, HTN, hx of prostate cancer who presented to the ED with complaint of recurrent falls and lightheadedness at home x 1 week. Pt denies any chest pain or SOB. No nausea, vomiting but does have chronic diarrhea with chemotherapy. His last treatment was about 1 week ago. He reports poor PO intake. Did fall at home a couple days ago. Reports his knees gave out 2/2 generalized weakness. No chest pain or SOB prior to fall. Did not lose consciousness or complain of pre-syncopal sensation. He previously had a hx of high blood pressure and was on medication but reports he no longer takes any BP medications as his BP tends to run low. Pt was admitted to PCU for further work up and management. Home Health S4 Level Recommendation:  Level 3 Safety  AM-PAC Score:      Preadmission Environment    Pt.  Lives with spouse able to provide 24/7 limited assist,   Home environment:  one story home  Steps to enter first floor:   1+1 with post to hold onto  steps to enter and no handrail    Steps to second floor: N/A  Bathroom:  Walk-in Shower, Grab bars and [facility] owned:  [facility], [address] [place] [address] (), manual W/C, lift chair N/A    Patient/Family Education:   Role of OT  Recommendations for DC    Assessment of Deficits: Pt seen for Occupational therapy evaluation in acute care setting. Pt demonstrated decreased Activity tolerance, ADLs, Balance , Bed mobility, Strength, Transfers and Coping Skills. Pt functioning below baseline and will likely benefit from skilled occupational therapy services to maximize safety and independence. Goal(s) : To be met in 3 Visits:  1). Bed to toilet/BSC: CGA    To be met in 5 Visits:  1). Supine to/from Sit:  SBA  2). Upper Body Bathing:   SBA  3). Lower Body Bathing:   SBA  4). Upper Body Dressing:  SBA  5). Lower Body Dressing:  SBA  6). Pt to demonstrate UE exs x 15 reps with minimal cues    Rehabilitation Potential:  Good for goals listed above. Strengths for achieving goals include: Pt motivated  Barriers to achieving goals include:  Complexity of condition     Plan: To be seen 3-5 x/wk while in acute care setting for therapeutic exercises, bed mobility, transfers, dressing, bathing, family/patient education, ADL/IADL retraining, energy conservation training.      Erika Ibarra OTR/L [id number]            If patient discharges from this facility prior to next visit, this note will serve as the Discharge Summary

## 2020-10-16 NOTE — PROGRESS NOTES
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    Mucositis due to radiation therapy K12.33    Type 2 diabetes mellitus (HCC) E11.9    Undiagnosed cardiac murmurs R01.1    Right adrenal mass (HCC) E27.8    Moderate malnutrition (HCC) E44.0    Retroperitoneal mass R19.00    Hyponatremia E87.1    Elevated liver enzymes R74.8    Lower abdominal pain R10.30    Primary malignant neoplasm of esophagus with metastasis to other site (HCC) C15.9    Recurrent falls R29.6    General weakness R53.1    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 BP has improved but still low. Remains on Granix. REVIEW OF SYSTEMS:       10 point ROS completed. Pertinent positives in HPI, otherwise negative.      PHYSICAL EXAM:       Vitals:    10/16/20 0808   BP: 98/63   Pulse: 90   Resp: 16   Temp: 97 °F (36.1 °C)   SpO2: 98%       General appearance: alert and cooperative  Head: Normocephalic, without obvious abnormality, atraumatic  Neck: No palpable lymphadenopathy in supraclavicular or cervical chains  Lungs: Clear to auscultation bilaterally, no audible rales, wheezes or crackles  Heart: Regular rate and rhythm, S1, S2 normal  Abdomen: Soft, non-tender; bowel sounds normal; no masses,  no organomegaly  Extremities: without cyanosis, clubbing, edema or asymmetry  Skin: No jaundice, purpura or petechiae      LABS:     Lab Results   Component Value Date    WBC 3.8 (L) 10/16/2020    HGB 8.3 (L) 10/16/2020    HCT 25.0 (L) 10/16/2020    MCV 91.9 10/16/2020     10/16/2020       Lab Results Component Value Date    GLUCOSE 75 10/16/2020    BUN 9 10/16/2020    CREATININE <0.5 (L) 10/16/2020    K 3.8 10/16/2020    PHOS 2.7 08/17/2020       Lab Results   Component Value Date    ALKPHOS 520 (H) 10/15/2020    ALT 21 10/15/2020    AST 34 10/15/2020    BILITOT 0.8 10/15/2020    BILIDIR 1.9 (H) 08/17/2020    PROT 3.9 (L) 10/15/2020       IMAGING:     Xr Chest (2 Vw)  Result Date: 10/15/2020  Left pleural effusion with left basilar atelectasis or pneumonia. Ct Chest Abdomen Pelvis W Contrast  Result Date: 9/18/2020  1. Status post lower esophagectomy and gastric pull-through. No evidence of recurrent disease of the resection site. 2. Interval decrease in size of a previously measured left supraclavicular lymph node. No new or enlarging thoracic adenopathy. 3. Mild increase in size of a central mesenteric lymph node. Stable previously measured left periaortic lymph node. 4. Stable left adrenal gland mass. 5. Small volume abdominal/pelvic free fluid. 6. Progressive fibrotic changes in the bilateral lower lobes and lingula. Small bilateral pleural effusions. Mri Brain W Wo Contrast  Result Date: 9/18/2020  Decrease in size and enhancement of the previously noted metastatic lesions in the right cerebellar and occipital lobes. STAGING:     Cancer Staging    IV    ASSESSMENT:     Problem List Items Addressed This Visit     General weakness - Primary    Metastasis from esophageal cancer (HCC)    Relevant Medications    acetaminophen (TYLENOL) tablet 650 mg    acetaminophen (TYLENOL) suppository 650 mg    promethazine (PHENERGAN) tablet 12.5 mg      Other Visit Diagnoses     Hypoglycemia        Frequent falls                    PLAN:     1. Neutropenia  - from recent chemotherapy  - started Granix 10/15/2020 - WBC up to 3.8, ANC 1.6 - can likely d/c tomorrow  - low threshold for antibiotics if any fever or sign of infection    2.  Hypotension  - likely volume depletion/dehyration  - IVF per IM    3. Stage IV Esophageal Cancer  - s/p chemoradiation,   Esophagectomy, FOLFOX  - recently on Keytruda and having response but had to stop due to severe dematitis and hepatotoxicity  - have discussed Hospice previously but he wanted to continue treatment  - now recently started Taxol/Ramicirumab, held for neutropenia this week  - he does not seem to be tolerating this well  - would consider palliative care consult    4. Brain Metastasis  - recently completed gamma knife      5.  Hx Prostate Cancer  - no acute issues    Kati Jennings MD

## 2020-10-16 NOTE — PROGRESS NOTES
Physician Progress Note      Smooth Pandya  [facility] #:                  [id number]  :                       1945  ADMIT DATE:       10/15/2020 4:01 AM  DISCH DATE:  RESPONDING  PROVIDER #:        Inge Aguilera MD          QUERY TEXT:    Dear Attending Provider,  Patient admitted with orthostatic hypotension. If possible, please document in   progress notes and discharge summary if you are evaluating and/or treating   any of the following: The medical record reflects the following:  Risk Factors: advanced age, esophageal cancer with mets, chemotherapy, PCM  Clinical Indicators: per ONC consult: Hypotension - likely volume   depletion/dehydration, chronic diarrhea, poor po  Treatment: start IVF, cont Midodrine    Thank you for your assistance,  Chela Dutton RN,BSN,CCDS,CRCR  Options provided:  -- Syncope due to dehydration  -- Syncope due to other hypotension  -- Other - I will add my own diagnosis  -- Disagree - Not applicable / Not valid  -- Disagree - Clinically unable to determine / Unknown  -- Refer to Clinical Documentation Reviewer    PROVIDER RESPONSE TEXT:    This syncope is due to dehydration.     Query created by: Cezar Cerna on 10/16/2020 12:47 PM      Electronically signed by:  Inge Aguilera MD 10/16/2020 12:52 PM

## 2020-10-16 NOTE — CARE COORDINATION
declined. Pt and wife decline HHC. CM explained the benefits of HHC. They still declined. Discharge order is noted. Pt is being d/c'd to home today. Pt's O2 sats are 98% on RA. No further discharge needs needed or noted. Reviewed chart. Role of discharge planner explained and patient verbalized understanding. Discharge order is noted. Has Home O2 in place on admit:  No  Informed of need to bring portable home O2 tank on day of discharge for nursing to connect prior to leaving:   Not Indicated  Verbalized agreement/Understanding:   Not Indicated    Discharge timeout done with [facility], RN. All discharge needs and concerns addressed.

## 2020-10-16 NOTE — PLAN OF CARE
Problem: Falls - Risk of:  Goal: Will remain free from falls  Description: Will remain free from falls  Note: Free of falls this shift

## 2020-10-16 NOTE — PROGRESS NOTES
Physician Progress Note      Jalil Aguilar  [facility] #:                  [id number]  :                       1945  ADMIT DATE:       10/15/2020 4:01 AM  DISCH DATE:  RESPONDING  PROVIDER #:        Montez Lagos MD          QUERY TEXT:    Patient admitted with syncope and hypotension. Per cardiology: Elevated   troponin due to myocardial injury  Not stemi since there is no angina or EKG   abnormality. If possible, please document in the progress notes and discharge   summary if you are evaluating and/or treating any of the following: The medical record reflects the following:  Risk Factors: hypotension, dehydration, neutropenia, malnutrition, esophageal   cancer with mets  Clinical Indicators: initial trop: 0.43 > 0.38, syncope, hypotension,   lightheadedness, severe fatigue  Treatment: trend troponin, tele, cardiology consult, echo, BP as been low in   70's at times    Thank you for your assistance,  Chela Dutton RN,BSN,CCDS,CRCR  Options provided:  -- Type 2 MI related to hypotension  -- Myocardial injury, no MI  -- Other - I will add my own diagnosis  -- Disagree - Not applicable / Not valid  -- Disagree - Clinically unable to determine / Unknown  -- Refer to Clinical Documentation Reviewer    PROVIDER RESPONSE TEXT:    This patient has myocardial injury only, no MI.     Query created by: Mohamud Rose on 10/16/2020 1:00 PM      Electronically signed by:  Montez Lagos MD 10/16/2020 3:35 PM

## 2020-10-16 NOTE — PROGRESS NOTES
Shift assessment complete. Pt states no needs at this time. Call light within reach. Will continue to monitor.

## 2020-10-25 NOTE — DISCHARGE SUMMARY
Granix     Esophageal Cancer with Brain Mets  - f/w OHC  - recently completed gamma knife  - s/p chemoradiation, esophagectomy, FOLFOX, recently started Txol/Ramicirumab  - currently on chemo  - Heme/Onc consulted as above     Type II DM  - well controlled by diet     Normocytic Anemia  - Hgb 8.8  - baseline:  ~ 12  - previously 10.4 on 8/2020  - monitor     Hx of Prostate Cancer  - s/p prostatectomy  - no acute issues     DVT Prophylaxis: Lovenox  Diet: DIET CARB CONTROL;  Code Status: DNR-CCA      PT/OT recommending SNF  Patient and wife refuses      D/c home     Discharge Medications     Medication List      CHANGE how you take these medications    midodrine 10 MG tablet  Commonly known as:  PROAMATINE  Take 1 tablet by mouth 3 times daily  What changed:    · medication strength  · how much to take        CONTINUE taking these medications    diphenoxylate-atropine 2.5-0.025 MG per tablet  Commonly known as:  LOMOTIL     dronabinol 2.5 MG capsule  Commonly known as:  MARINOL     Durezol 0.05 % Emul  Generic drug:  difluprednate     gabapentin 300 MG capsule  Commonly known as:  NEURONTIN     IMODIUM A-D PO     levoFLOXacin 500 MG tablet  Commonly known as:  LEVAQUIN     ONE TOUCH ULTRA TEST strip  Generic drug:  blood glucose test strips     OneTouch Delica Lancets 54C Misc     prochlorperazine 10 MG tablet  Commonly known as:  COMPAZINE        STOP taking these medications    hydrOXYzine 25 MG capsule  Commonly known as:  VISTARIL     oxyCODONE 5 MG immediate release tablet  Commonly known as:  ROXICODONE     predniSONE 20 MG tablet  Commonly known as:  Torrie Limerick           Where to Get Your Medications      These medications were sent to [facility] - [facility], [place] - [address] - P [phone] - F [phone]  [address], [place] [address]    Phone:  [phone]   · midodrine 10 MG tablet           Discharge Condition/Location: Stable to home      Follow Up:   Follow up with PCP regarding ECHO results        [facility] 10/25/2020 1:52 PM

## 2020-10-27 ENCOUNTER — HOSPITAL ENCOUNTER (OUTPATIENT)
Dept: MRI IMAGING | Age: 75
Discharge: HOME | End: 2020-10-27
Payer: MEDICARE

## 2020-10-27 PROCEDURE — 70553 MRI BRAIN STEM W/O & W/DYE: CPT

## 2020-10-27 PROCEDURE — 6360000004 HC RX CONTRAST MEDICATION: Performed by: NURSE PRACTITIONER

## 2020-10-27 PROCEDURE — A9579 GAD-BASE MR CONTRAST NOS,1ML: HCPCS | Performed by: NURSE PRACTITIONER

## 2020-10-27 RX ADMIN — GADOTERIDOL 13 ML: 279.3 INJECTION, SOLUTION INTRAVENOUS at 11:08

## 2021-06-08 NOTE — PROGRESS NOTES
Page sent to Dr. Nataly Ramirez about low Bps. Dr. Nataly Ramirez called, give one time bolus of 500 over one hour. [Anxiety] : anxiety [Negative] : Cardiovascular

## 2023-04-12 NOTE — ED PROVIDER NOTES
immunocompromised  Neuro: No dizziness, lightheadedness, numbness, headache, weakness, seizures, tremors  Heme/Lymph: Does not bruise or bleed easily, no lymphadenopathy  Psych: No agitation, behavioral problems, confusion, anxiety, depression, sleep disturbance, hyperactivity    Past Medical, Surgical, Family, and Social History     He has a past medical history of Adrenal mass ([facility] [address] Diabetes mellitus ([facility] [address] Esophageal cancer ([facility] [address] H/O sleep apnea, History of esophageal reflux, Hypertension, Prostate cancer ([facility] [address] and Weight loss. He has a past surgical history that includes Colonoscopy (2012); Prostatectomy (2000); Cataract removal with implant (2003); Colonoscopy (09/14/2017); pr office/outpt visit,procedure only (N/A, 9/19/2018); Upper gastrointestinal endoscopy (9/19/2018); Uvulopalatopharygoplasty; Esophagus dilation (N/A, 1/3/2019); Upper gastrointestinal endoscopy (N/A, 4/2/2019); Inguinal hernia repair (Right, 1962); Esophagectomy (N/A, 4/18/2019); INSERTION / REMOVAL / REPLACEMENT VENOUS ACCESS CATHETER (N/A, 6/25/2019); and Adrenalectomy (N/A, 2/27/2020). His family history includes Cancer in his father; Other in his mother. He reports that he quit smoking about 48 years ago. His smoking use included cigarettes. He has never used smokeless tobacco. He reports current alcohol use. He reports that he does not use drugs. Medications     Previous Medications    DILTIAZEM (CARDIZEM 12 HR) 120 MG EXTENDED RELEASE CAPSULE    Take 120 mg by mouth daily    DRONABINOL (MARINOL) 2.5 MG CAPSULE    Take 2.5 mg by mouth 2 times daily as needed. GABAPENTIN (NEURONTIN) 300 MG CAPSULE    Take 300 mg by mouth 3 times daily. METFORMIN (GLUCOPHAGE) 500 MG TABLET    Take 1 tablet by mouth Daily with supper Metformin must be crushed.     ONDANSETRON (ZOFRAN ODT) 4 MG DISINTEGRATING TABLET    Take 1 tablet by mouth every 8 hours as needed for Nausea    ONE TOUCH ULTRA TEST STRIP        ONETOUCH DELICA LANCETS 10U MISC           Allergies     He is allergic to no known allergies. Physical Exam     INITIAL VITALS: BP: 117/62, Temp: 98.5 °F (36.9 °C), Pulse: 75, Resp: 16, SpO2: 98 %     Physical Exam:  Const: Well nourished, well developed, not distressed, not diaphoretic, appears stated age  Eyes: PERRL, EOMI, no conjunctival injection, no discharge  HENT: Normocephalic, atraumatic, oropharynx not erythematous, no postnasal drip  Neck: Supple, no JVD, no thyromegaly, trachea midline  CV: Regular rate, regular rhythm, no gallop, no rub, capillary refill <2s, 2+ pulses in bilateral distal upper and lower extremities; mild holosystolic murmur heard primarily at cardiac apex  RESP: Clear to auscultation bilaterally, normal breath sounds, Unlabored respiratory effort, no wheezes, no rhonchi, no stridor, no chest wall tenderness   GI: soft, non-tender, non-distended, no rebound, bowel sounds normal; pt with multiple healing surgical incisions on abdomen; moderate protrusion of abdominal contents through abdominal wall in LLQ - does not appear to be in the same area as the incisions for his inguinal hernia repair - unable to fully reduce abdominal contents through defect  MSK/Extremities: No gross deformities appreciated, no edema noted, no tenderness to palpation  Skin: Warm, dry; some mild erythema in L inguinal area over incision  Neuro: Alert, CNs II-XII grossly intact. Sensation and motor function of extremities grossly intact. No abnormal tone. Psych: Appropriate mood and affect. Diagnostic Results     RADIOLOGY:  No orders to display     BEDSIDE ULTRASOUND:  Showed a mild seroma underneath the skin of the LLQ of abdomen    LABS:   No results found for this visit on 03/07/20. RECENT VITALS:  BP: 117/62, Temp: 98.5 °F (36.9 °C),Pulse: 75, Resp: 16, SpO2: 98 %     ED Course     Nursing Notes, Past Medical Hx, Past Surgical Hx, Social Hx, Allergies, and FamilyHx were reviewed.     The patient was giventhe following medications:  No orders of the defined types were placed in this encounter. CONSULTS:  C/ Roosevelt Torres 19 / ASSESSMENT / Neil Rivera is a 76 y.o. male who presented with a postop complication. Patient recently had a right adrenalectomy for a metastatic lesion, in addition to removal of a liver lesion, a cholecystectomy, and a left inguinal hernia repair. Patient states that he was doing well at his postop visit recently. Patient states that today during the shower, he noticed a sudden protrusion of abdominal wall contents in his left lower quadrant. Thought it was related to his previous inguinal hernia repair, and possibly was a re-emergence of the hernia. Has not had any abdominal pain, and has had normal bowel movements since this episode. Some mild nausea was noted. Vital signs remarkable for blood pressure 117/62. All other vitals stable and within normal limits. Physical exam is remarkable for a G-tube in the epigastric area, with multiple well-healing incisions across the abdominal wall. There is a moderate protrusion of abdominal contents through the left lower quadrant abdominal wall. Does not seem to be in the same area as the left inguinal hernia incision. Abdominal contents are soft, but are unable to be fully reduced into the abdomen. Due to recency of procedure and patient's history of esophageal cancer complicated by metastatic disease, consulted general surgery for evaluation of possible new abdominal hernia. Following surgery's examination and a bedside ultrasound, general surgery was under the impression that this was a mild seroma from the incisions and was not a new onset herniation of abdominal contents. Ordered a CBC to rule out possible infection of the abdominal mesh. CBC was unremarkable.   Patient was advised to follow-up with Dr. Ashley Lala in the outpatient surgical clinic if the seroma failed to resolve in a timely manner or acutely worsened. Patient and family were agreeable with this treatment plan. This patient was also evaluated by the attending physician. All care plans were discussed and agreed upon. Clinical Impression     1.  Abdominal wall seroma, initial encounter        Disposition     PATIENT REFERRED TO:  Woo Navarro MD  [address] [place] [address]  [phone]    Call   As needed, If symptoms worsen      DISCHARGE MEDICATIONS:  New Prescriptions    No medications on file       DISPOSITION    Decision to discharge     Kevin Perdomo MD  Resident  03/07/20 0323       MD Miguel Stallings  03/07/20 1982 Griseofulvin Pregnancy And Lactation Text: This medication is Pregnancy Category X and is known to cause serious birth defects. It is unknown if this medication is excreted in breast milk but breast feeding should be avoided.
